# Patient Record
Sex: MALE | Race: WHITE | NOT HISPANIC OR LATINO | Employment: OTHER | URBAN - METROPOLITAN AREA
[De-identification: names, ages, dates, MRNs, and addresses within clinical notes are randomized per-mention and may not be internally consistent; named-entity substitution may affect disease eponyms.]

---

## 2019-10-28 ENCOUNTER — OFFICE VISIT (OUTPATIENT)
Dept: FAMILY MEDICINE | Facility: CLINIC | Age: 59
End: 2019-10-28
Payer: COMMERCIAL

## 2019-10-28 VITALS
TEMPERATURE: 98 F | SYSTOLIC BLOOD PRESSURE: 164 MMHG | HEART RATE: 76 BPM | BODY MASS INDEX: 40.43 KG/M2 | OXYGEN SATURATION: 97 % | DIASTOLIC BLOOD PRESSURE: 72 MMHG | HEIGHT: 74 IN | WEIGHT: 315 LBS

## 2019-10-28 DIAGNOSIS — I10 ESSENTIAL HYPERTENSION: ICD-10-CM

## 2019-10-28 DIAGNOSIS — Z11.59 ENCOUNTER FOR HEPATITIS C SCREENING TEST FOR LOW RISK PATIENT: ICD-10-CM

## 2019-10-28 DIAGNOSIS — I25.2 HX OF MYOCARDIAL INFARCTION: ICD-10-CM

## 2019-10-28 DIAGNOSIS — E11.9 TYPE 2 DIABETES MELLITUS WITHOUT COMPLICATION, WITHOUT LONG-TERM CURRENT USE OF INSULIN: Primary | ICD-10-CM

## 2019-10-28 DIAGNOSIS — Z23 IMMUNIZATION DUE: ICD-10-CM

## 2019-10-28 PROCEDURE — 3008F PR BODY MASS INDEX (BMI) DOCUMENTED: ICD-10-PCS | Mod: S$GLB,,, | Performed by: NURSE PRACTITIONER

## 2019-10-28 PROCEDURE — 99204 OFFICE O/P NEW MOD 45 MIN: CPT | Mod: 25,S$GLB,, | Performed by: NURSE PRACTITIONER

## 2019-10-28 PROCEDURE — 90471 IMMUNIZATION ADMIN: CPT | Mod: S$GLB,,, | Performed by: NURSE PRACTITIONER

## 2019-10-28 PROCEDURE — 3008F BODY MASS INDEX DOCD: CPT | Mod: S$GLB,,, | Performed by: NURSE PRACTITIONER

## 2019-10-28 PROCEDURE — 90686 FLU VACCINE (QUAD) GREATER THAN OR EQUAL TO 3YO PRESERVATIVE FREE IM: ICD-10-PCS | Mod: S$GLB,,, | Performed by: NURSE PRACTITIONER

## 2019-10-28 PROCEDURE — 90686 IIV4 VACC NO PRSV 0.5 ML IM: CPT | Mod: S$GLB,,, | Performed by: NURSE PRACTITIONER

## 2019-10-28 PROCEDURE — 90471 FLU VACCINE (QUAD) GREATER THAN OR EQUAL TO 3YO PRESERVATIVE FREE IM: ICD-10-PCS | Mod: S$GLB,,, | Performed by: NURSE PRACTITIONER

## 2019-10-28 PROCEDURE — 99204 PR OFFICE/OUTPT VISIT, NEW, LEVL IV, 45-59 MIN: ICD-10-PCS | Mod: 25,S$GLB,, | Performed by: NURSE PRACTITIONER

## 2019-10-28 RX ORDER — PRAVASTATIN SODIUM 40 MG/1
40 TABLET ORAL NIGHTLY
Qty: 90 TABLET | Refills: 1 | Status: SHIPPED | OUTPATIENT
Start: 2019-10-28 | End: 2020-04-02 | Stop reason: SDUPTHER

## 2019-10-28 RX ORDER — LISINOPRIL 40 MG/1
40 TABLET ORAL DAILY
COMMUNITY
End: 2019-10-28 | Stop reason: SDUPTHER

## 2019-10-28 RX ORDER — CLOPIDOGREL BISULFATE 75 MG/1
75 TABLET ORAL DAILY
COMMUNITY
End: 2019-10-28 | Stop reason: SDUPTHER

## 2019-10-28 RX ORDER — METFORMIN HYDROCHLORIDE 1000 MG/1
1000 TABLET ORAL 2 TIMES DAILY WITH MEALS
COMMUNITY
End: 2019-10-28 | Stop reason: SDUPTHER

## 2019-10-28 RX ORDER — FENOFIBRATE 160 MG/1
160 TABLET ORAL DAILY
COMMUNITY
End: 2019-10-28 | Stop reason: SDUPTHER

## 2019-10-28 RX ORDER — NEBIVOLOL 20 MG/1
1 TABLET ORAL DAILY
Qty: 90 TABLET | Refills: 1 | Status: SHIPPED | OUTPATIENT
Start: 2019-10-28 | End: 2020-04-02 | Stop reason: SDUPTHER

## 2019-10-28 RX ORDER — LISINOPRIL 40 MG/1
40 TABLET ORAL DAILY
Qty: 90 TABLET | Refills: 1 | Status: SHIPPED | OUTPATIENT
Start: 2019-10-28 | End: 2020-04-02 | Stop reason: SDUPTHER

## 2019-10-28 RX ORDER — PRAVASTATIN SODIUM 40 MG/1
40 TABLET ORAL DAILY
COMMUNITY
End: 2019-10-28 | Stop reason: SDUPTHER

## 2019-10-28 RX ORDER — FENOFIBRATE 160 MG/1
160 TABLET ORAL DAILY
Qty: 90 TABLET | Refills: 1 | Status: SHIPPED | OUTPATIENT
Start: 2019-10-28 | End: 2020-04-02 | Stop reason: SDUPTHER

## 2019-10-28 RX ORDER — CLOPIDOGREL BISULFATE 75 MG/1
75 TABLET ORAL DAILY
Qty: 90 TABLET | Refills: 1 | Status: SHIPPED | OUTPATIENT
Start: 2019-10-28 | End: 2020-04-02 | Stop reason: SDUPTHER

## 2019-10-28 RX ORDER — METFORMIN HYDROCHLORIDE 1000 MG/1
1000 TABLET ORAL 2 TIMES DAILY WITH MEALS
Qty: 180 TABLET | Refills: 1 | Status: SHIPPED | OUTPATIENT
Start: 2019-10-28 | End: 2020-04-02 | Stop reason: SDUPTHER

## 2019-10-28 RX ORDER — AMLODIPINE BESYLATE 10 MG/1
10 TABLET ORAL DAILY
COMMUNITY
End: 2019-10-28 | Stop reason: SDUPTHER

## 2019-10-28 RX ORDER — NEBIVOLOL 20 MG/1
TABLET ORAL
COMMUNITY
End: 2019-10-28 | Stop reason: SDUPTHER

## 2019-10-28 RX ORDER — AMLODIPINE BESYLATE 10 MG/1
10 TABLET ORAL DAILY
Qty: 90 TABLET | Refills: 1 | Status: SHIPPED | OUTPATIENT
Start: 2019-10-28 | End: 2020-04-02 | Stop reason: SDUPTHER

## 2019-10-28 NOTE — PROGRESS NOTES
SUBJECTIVE:      Patient ID: Donald Frey is a 59 y.o. male.    Chief Complaint: Establish Care (new patient)    New patient here to establish care- he recently moved from [place]. He has a PMH of DM type 2, HTN, MI with one stent in 2013, and HLD. He has been stable on his medications and reports his last A1C was 7.5. He has been running low on his BP medications and has been splitting them in half until this appointment. His BP is normally well-controlled on his regimen. He has not been seeing cardiology or any other specialists. He is overdue for a DM eye exam. He does not smoke and never has smoked. He denies any complaints today and needs all medications refilled.     HM: he has never had a screening colonoscopy (no complaints and no fam hx of colon cancer); wants a flu shot today     Diabetes   He has type 2 diabetes mellitus. His disease course has been stable. There are no hypoglycemic associated symptoms. Pertinent negatives for hypoglycemia include no dizziness, headaches or nervousness/anxiousness. Associated symptoms include foot paresthesias (occasional ). Pertinent negatives for diabetes include no blurred vision, no chest pain, no fatigue, no foot ulcerations, no polydipsia, no polyphagia, no polyuria, no visual change, no weakness and no weight loss. There are no hypoglycemic complications. There are no diabetic complications. Risk factors for coronary artery disease include male sex, obesity, hypertension, sedentary lifestyle, dyslipidemia and diabetes mellitus. Current diabetic treatment includes oral agent (monotherapy). He is compliant with treatment all of the time. There is no change in his home blood glucose trend. His breakfast blood glucose range is generally 130-140 mg/dl. An ACE inhibitor/angiotensin II receptor blocker is being taken. He does not see a podiatrist.Eye exam is not current.   Hypertension   This is a chronic problem. The current episode started more than 1 year ago. The  problem is unchanged. Pertinent negatives include no anxiety, blurred vision, chest pain, headaches, malaise/fatigue, palpitations, peripheral edema or shortness of breath. There are no associated agents to hypertension. Risk factors for coronary artery disease include male gender, obesity, dyslipidemia, diabetes mellitus and sedentary lifestyle. Past treatments include calcium channel blockers, ACE inhibitors and beta blockers. Compliance problems include diet and exercise.        Family History   Problem Relation Age of Onset    Heart attack Father     Cancer Father         prostate      Social History     Socioeconomic History    Marital status:      Spouse name: Not on file    Number of children: Not on file    Years of education: Not on file    Highest education level: Not on file   Occupational History    Not on file   Social Needs    Financial resource strain: Not on file    Food insecurity:     Worry: Not on file     Inability: Not on file    Transportation needs:     Medical: Not on file     Non-medical: Not on file   Tobacco Use    Smoking status: Never Smoker    Smokeless tobacco: Never Used   Substance and Sexual Activity    Alcohol use: Yes     Comment: occ    Drug use: Not on file    Sexual activity: Not on file   Lifestyle    Physical activity:     Days per week: Not on file     Minutes per session: Not on file    Stress: Not at all   Relationships    Social connections:     Talks on phone: Not on file     Gets together: Not on file     Attends Hoahaoism service: Not on file     Active member of club or organization: Not on file     Attends meetings of clubs or organizations: Not on file     Relationship status: Not on file   Other Topics Concern    Not on file   Social History Narrative    Not on file     Current Outpatient Medications   Medication Sig Dispense Refill    amLODIPine (NORVASC) 10 MG tablet Take 1 tablet (10 mg total) by mouth once daily. 90 tablet 1     clopidogrel (PLAVIX) 75 mg tablet Take 1 tablet (75 mg total) by mouth once daily. 90 tablet 1    fenofibrate 160 MG Tab Take 1 tablet (160 mg total) by mouth once daily. 90 tablet 1    lisinopril (PRINIVIL,ZESTRIL) 40 MG tablet Take 1 tablet (40 mg total) by mouth once daily. 90 tablet 1    metFORMIN (GLUCOPHAGE) 1000 MG tablet Take 1 tablet (1,000 mg total) by mouth 2 (two) times daily with meals. 180 tablet 1    nebivolol (BYSTOLIC) 20 mg Tab Take 1 tablet by mouth once daily. 90 tablet 1    pravastatin (PRAVACHOL) 40 MG tablet Take 1 tablet (40 mg total) by mouth every evening. 90 tablet 1     No current facility-administered medications for this visit.      Review of patient's allergies indicates:  No Known Allergies   Past Medical History:   Diagnosis Date    Diabetes mellitus, type 2     Myocardial infarction 2013     Past Surgical History:   Procedure Laterality Date    CORONARY STENT PLACEMENT  2013       Review of Systems   Constitutional: Negative for appetite change, chills, diaphoresis, fatigue, fever, malaise/fatigue, unexpected weight change and weight loss.   HENT: Negative for congestion, ear discharge, ear pain and sore throat.    Eyes: Negative for blurred vision, photophobia, pain and visual disturbance.   Respiratory: Negative for cough, shortness of breath and wheezing.    Cardiovascular: Negative for chest pain, palpitations and leg swelling.   Gastrointestinal: Negative for abdominal pain, blood in stool, constipation, diarrhea, nausea and vomiting.   Endocrine: Negative for cold intolerance, heat intolerance, polydipsia, polyphagia and polyuria.   Genitourinary: Negative for dysuria, frequency and hematuria.   Musculoskeletal: Negative for arthralgias and myalgias.   Skin: Negative for rash and wound.   Neurological: Negative for dizziness, syncope, weakness and headaches.   Hematological: Negative for adenopathy. Does not bruise/bleed easily.   Psychiatric/Behavioral: Negative for  "dysphoric mood. The patient is not nervous/anxious.       OBJECTIVE:      Vitals:    10/28/19 1017   BP: (!) 164/72   BP Location: Left arm   Patient Position: Sitting   BP Method: X-Large (Manual)   Pulse: 76   Temp: 98.4 °F (36.9 °C)   TempSrc: Oral   SpO2: 97%   Weight: (!) 153.5 kg (338 lb 6.4 oz)   Height: 6' 2" (1.88 m)     Physical Exam   Constitutional: He is oriented to person, place, and time. He appears well-developed and well-nourished. No distress.   Morbid obesity    HENT:   Head: Normocephalic and atraumatic.   Right Ear: Tympanic membrane, external ear and ear canal normal.   Left Ear: Tympanic membrane, external ear and ear canal normal.   Mouth/Throat: Oropharynx is clear and moist. No oropharyngeal exudate.   Eyes: Pupils are equal, round, and reactive to light. Conjunctivae are normal.   Neck: Normal range of motion. Neck supple. Carotid bruit is not present. No thyromegaly present.   Cardiovascular: Normal rate, regular rhythm and normal heart sounds. Exam reveals no gallop and no friction rub.   No murmur heard.  Pulmonary/Chest: Effort normal and breath sounds normal. He has no wheezes. He has no rales.   Abdominal: Soft. Bowel sounds are normal. He exhibits no distension. There is no tenderness.   Musculoskeletal: Normal range of motion. He exhibits no edema.   Lymphadenopathy:     He has no cervical adenopathy.   Neurological: He is alert and oriented to person, place, and time.   Skin: Skin is warm and dry. No rash noted. He is not diaphoretic.   Psychiatric: He has a normal mood and affect. His behavior is normal.   Nursing note and vitals reviewed.     Assessment:       1. Type 2 diabetes mellitus without complication, without long-term current use of insulin    2. Essential hypertension    3. Hx of myocardial infarction    4. Encounter for hepatitis C screening test for low risk patient    5. Immunization due        Plan:       Type 2 diabetes mellitus without complication, without " long-term current use of insulin  -     Comprehensive metabolic panel; Future; Expected date: 10/28/2019  -     Lipid panel; Future; Expected date: 10/28/2019  -     Urinalysis; Future; Expected date: 10/28/2019  -     Microalbumin/creatinine urine ratio; Future; Expected date: 10/28/2019  -     Hemoglobin A1c; Future; Expected date: 10/28/2019  -     fenofibrate 160 MG Tab; Take 1 tablet (160 mg total) by mouth once daily.  Dispense: 90 tablet; Refill: 1  -     metFORMIN (GLUCOPHAGE) 1000 MG tablet; Take 1 tablet (1,000 mg total) by mouth 2 (two) times daily with meals.  Dispense: 180 tablet; Refill: 1  -     pravastatin (PRAVACHOL) 40 MG tablet; Take 1 tablet (40 mg total) by mouth every evening.  Dispense: 90 tablet; Refill: 1  -     Ambulatory referral to Ophthalmology    *Risks of DM discussed. Types of treatment discussed. Encouraged exercise and diet. A1C Goal of < 7.0 reviewed. Stay away from sweets and high carb foods such as pasta, rice, bread, etc.  Will adjust/continue medication to achieve optimal glucose and cholesterol control.  Discussed need for annual eye exam, seasonal  flu vaccine seasonally, and routine inspection of feet.  Bring glucose diary to each visit.  *discussed will add januvia to regimen if needed after labs     Essential hypertension  -     CBC auto differential; Future; Expected date: 10/28/2019  -     Comprehensive metabolic panel; Future; Expected date: 10/28/2019  -     TSH; Future; Expected date: 10/28/2019  -     Urinalysis; Future; Expected date: 10/28/2019  -     amLODIPine (NORVASC) 10 MG tablet; Take 1 tablet (10 mg total) by mouth once daily.  Dispense: 90 tablet; Refill: 1  -     lisinopril (PRINIVIL,ZESTRIL) 40 MG tablet; Take 1 tablet (40 mg total) by mouth once daily.  Dispense: 90 tablet; Refill: 1  -     nebivolol (BYSTOLIC) 20 mg Tab; Take 1 tablet by mouth once daily.  Dispense: 90 tablet; Refill: 1  -     Ambulatory referral to Ophthalmology    *will start meds  again as prescribed and RTC in 2 weeks on nurse visit for BP recheck  *Lifestyle changes: Reduce the amount of salt in your diet; Lose weight; Avoid drinking too much alcohol; Exercise at least 30 minutes per day most days of the week.  Continue current medications and home BP monitoring.     Hx of myocardial infarction  -     clopidogrel (PLAVIX) 75 mg tablet; Take 1 tablet (75 mg total) by mouth once daily.  Dispense: 90 tablet; Refill: 1    Encounter for hepatitis C screening test for low risk patient  -     Hepatitis C antibody; Future; Expected date: 10/28/2019    Immunization due  -     Influenza - Quadrivalent (PF)      *discussed overdue health maintenance, he will contemplate colonoscopy vs [facility] for next visit     Follow up in about 3 months (around 1/28/2020) for diabetes, HTN.      10/28/2019 HONORIO Coello, FNP

## 2020-03-31 DIAGNOSIS — I25.2 HX OF MYOCARDIAL INFARCTION: ICD-10-CM

## 2020-03-31 DIAGNOSIS — I10 ESSENTIAL HYPERTENSION: ICD-10-CM

## 2020-03-31 DIAGNOSIS — E11.9 TYPE 2 DIABETES MELLITUS WITHOUT COMPLICATION, WITHOUT LONG-TERM CURRENT USE OF INSULIN: ICD-10-CM

## 2020-03-31 RX ORDER — LISINOPRIL 40 MG/1
TABLET ORAL
Qty: 90 TABLET | Refills: 3 | OUTPATIENT
Start: 2020-03-31

## 2020-03-31 RX ORDER — AMLODIPINE BESYLATE 10 MG/1
TABLET ORAL
Qty: 90 TABLET | Refills: 3 | OUTPATIENT
Start: 2020-03-31

## 2020-03-31 RX ORDER — FENOFIBRATE 160 MG/1
TABLET ORAL
Qty: 90 TABLET | Refills: 3 | OUTPATIENT
Start: 2020-03-31

## 2020-03-31 RX ORDER — METFORMIN HYDROCHLORIDE 1000 MG/1
TABLET ORAL
Qty: 180 TABLET | Refills: 3 | OUTPATIENT
Start: 2020-03-31

## 2020-03-31 RX ORDER — CLOPIDOGREL BISULFATE 75 MG/1
TABLET ORAL
Qty: 90 TABLET | Refills: 3 | OUTPATIENT
Start: 2020-03-31

## 2020-03-31 RX ORDER — NEBIVOLOL HYDROCHLORIDE 20 MG/1
TABLET ORAL
Qty: 90 TABLET | Refills: 3 | OUTPATIENT
Start: 2020-03-31

## 2020-03-31 RX ORDER — PRAVASTATIN SODIUM 40 MG/1
TABLET ORAL
Qty: 90 TABLET | Refills: 3 | OUTPATIENT
Start: 2020-03-31

## 2020-04-02 ENCOUNTER — OFFICE VISIT (OUTPATIENT)
Dept: FAMILY MEDICINE | Facility: CLINIC | Age: 60
End: 2020-04-02
Payer: COMMERCIAL

## 2020-04-02 DIAGNOSIS — I10 ESSENTIAL HYPERTENSION: ICD-10-CM

## 2020-04-02 DIAGNOSIS — Z11.59 ENCOUNTER FOR HEPATITIS C SCREENING TEST FOR LOW RISK PATIENT: ICD-10-CM

## 2020-04-02 DIAGNOSIS — E11.9 TYPE 2 DIABETES MELLITUS WITHOUT COMPLICATION, WITHOUT LONG-TERM CURRENT USE OF INSULIN: Primary | ICD-10-CM

## 2020-04-02 DIAGNOSIS — I25.2 HX OF MYOCARDIAL INFARCTION: ICD-10-CM

## 2020-04-02 PROCEDURE — 99213 OFFICE O/P EST LOW 20 MIN: CPT | Mod: 95,,, | Performed by: NURSE PRACTITIONER

## 2020-04-02 PROCEDURE — 99213 PR OFFICE/OUTPT VISIT, EST, LEVL III, 20-29 MIN: ICD-10-PCS | Mod: 95,,, | Performed by: NURSE PRACTITIONER

## 2020-04-02 RX ORDER — METFORMIN HYDROCHLORIDE 1000 MG/1
1000 TABLET ORAL 2 TIMES DAILY WITH MEALS
Qty: 180 TABLET | Refills: 0 | Status: SHIPPED | OUTPATIENT
Start: 2020-04-02 | End: 2020-10-21 | Stop reason: SDUPTHER

## 2020-04-02 RX ORDER — PRAVASTATIN SODIUM 40 MG/1
40 TABLET ORAL NIGHTLY
Qty: 90 TABLET | Refills: 0 | Status: SHIPPED | OUTPATIENT
Start: 2020-04-02 | End: 2020-07-15 | Stop reason: SDUPTHER

## 2020-04-02 RX ORDER — NEBIVOLOL 20 MG/1
1 TABLET ORAL DAILY
Qty: 90 TABLET | Refills: 0 | Status: SHIPPED | OUTPATIENT
Start: 2020-04-02 | End: 2020-07-15 | Stop reason: SDUPTHER

## 2020-04-02 RX ORDER — CLOPIDOGREL BISULFATE 75 MG/1
75 TABLET ORAL DAILY
Qty: 90 TABLET | Refills: 0 | Status: SHIPPED | OUTPATIENT
Start: 2020-04-02 | End: 2020-07-15 | Stop reason: SDUPTHER

## 2020-04-02 RX ORDER — AMLODIPINE BESYLATE 10 MG/1
10 TABLET ORAL DAILY
Qty: 90 TABLET | Refills: 0 | Status: SHIPPED | OUTPATIENT
Start: 2020-04-02 | End: 2020-07-15 | Stop reason: SDUPTHER

## 2020-04-02 RX ORDER — LISINOPRIL 40 MG/1
40 TABLET ORAL DAILY
Qty: 90 TABLET | Refills: 0 | Status: SHIPPED | OUTPATIENT
Start: 2020-04-02 | End: 2020-07-15 | Stop reason: SDUPTHER

## 2020-04-02 RX ORDER — FENOFIBRATE 160 MG/1
160 TABLET ORAL DAILY
Qty: 90 TABLET | Refills: 0 | Status: SHIPPED | OUTPATIENT
Start: 2020-04-02 | End: 2020-07-15

## 2020-04-02 NOTE — PROGRESS NOTES
"      Subjective:        The chief complaint leading to consultation is: med refills   The patient location is:  Home  Visit type: Virtual visit with synchronous audio/video or audio only  This was a phone conversation in lieu of in-person visit due to the coronavirus emergency. Patient acknowledged and agreed to the telephone encounter.     Established patient here via telemed visit for medication refills. He has been taking all of his medications as prescribed daily without SE or complaints. His BP is running around 135/80 at home per machine/cuff. He reports his BS are around 140 fasting daily. He has not completed any labs since his last visit- he states he has been too busy traveling. He is feeling "good" and has no complaints.       Past Surgical History:   Procedure Laterality Date    CORONARY STENT PLACEMENT  2013     Past Medical History:   Diagnosis Date    Diabetes mellitus, type 2     Myocardial infarction 2013     Family History   Problem Relation Age of Onset    Heart attack Father     Cancer Father         prostate        Social History:   Marital Status:   Alcohol History:  reports that he drinks alcohol.  Tobacco History:  reports that he has never smoked. He has never used smokeless tobacco.  Drug History:  has no drug history on file.    Review of patient's allergies indicates:  No Known Allergies    Current Outpatient Medications   Medication Sig Dispense Refill    amLODIPine (NORVASC) 10 MG tablet Take 1 tablet (10 mg total) by mouth once daily. 90 tablet 0    clopidogreL (PLAVIX) 75 mg tablet Take 1 tablet (75 mg total) by mouth once daily. 90 tablet 0    fenofibrate 160 MG Tab Take 1 tablet (160 mg total) by mouth once daily. 90 tablet 0    lisinopriL (PRINIVIL,ZESTRIL) 40 MG tablet Take 1 tablet (40 mg total) by mouth once daily. 90 tablet 0    metFORMIN (GLUCOPHAGE) 1000 MG tablet Take 1 tablet (1,000 mg total) by mouth 2 (two) times daily with meals. 180 tablet 0    " nebivoloL (BYSTOLIC) 20 mg Tab Take 1 tablet by mouth once daily. 90 tablet 0    pravastatin (PRAVACHOL) 40 MG tablet Take 1 tablet (40 mg total) by mouth every evening. 90 tablet 0     No current facility-administered medications for this visit.        Review of Systems   Constitutional: Negative for appetite change, chills, diaphoresis, fatigue, fever and unexpected weight change.   HENT: Negative for congestion, ear discharge, ear pain and sore throat.    Eyes: Negative for photophobia, pain and visual disturbance.   Respiratory: Negative for cough, shortness of breath and wheezing.    Cardiovascular: Negative for chest pain, palpitations and leg swelling.   Gastrointestinal: Negative for abdominal pain, constipation, diarrhea, nausea and vomiting.   Endocrine: Negative for cold intolerance, heat intolerance, polydipsia, polyphagia and polyuria.   Genitourinary: Negative for dysuria, frequency and hematuria.   Musculoskeletal: Negative for arthralgias and myalgias.   Skin: Negative for rash and wound.   Neurological: Negative for dizziness, syncope, weakness and headaches.   Hematological: Negative for adenopathy. Does not bruise/bleed easily.   Psychiatric/Behavioral: Negative for dysphoric mood. The patient is not nervous/anxious.          Objective:        Physical Exam:   Physical Exam   Constitutional: He is oriented to person, place, and time. He appears well-developed. No distress.   Morbid obesity    HENT:   Head: Normocephalic.   Eyes: Pupils are equal, round, and reactive to light.   Neck: Normal range of motion.   Pulmonary/Chest: Effort normal.   Neurological: He is alert and oriented to person, place, and time.   Skin: He is not diaphoretic. No pallor.   Psychiatric: He has a normal mood and affect. His behavior is normal. Judgment and thought content normal.            Assessment:       1. Type 2 diabetes mellitus without complication, without long-term current use of insulin    2. Essential  hypertension    3. Hx of myocardial infarction    4. Encounter for hepatitis C screening test for low risk patient      Plan:   Type 2 diabetes mellitus without complication, without long-term current use of insulin  -     fenofibrate 160 MG Tab; Take 1 tablet (160 mg total) by mouth once daily.  Dispense: 90 tablet; Refill: 0  -     metFORMIN (GLUCOPHAGE) 1000 MG tablet; Take 1 tablet (1,000 mg total) by mouth 2 (two) times daily with meals.  Dispense: 180 tablet; Refill: 0  -     pravastatin (PRAVACHOL) 40 MG tablet; Take 1 tablet (40 mg total) by mouth every evening.  Dispense: 90 tablet; Refill: 0  -     Comprehensive metabolic panel; Future; Expected date: 04/02/2020  -     Lipid panel; Future; Expected date: 04/02/2020  -     Urinalysis; Future; Expected date: 04/02/2020  -     Microalbumin/creatinine urine ratio; Future; Expected date: 04/02/2020  -     Hemoglobin A1c; Future; Expected date: 04/02/2020    Essential hypertension  -     amLODIPine (NORVASC) 10 MG tablet; Take 1 tablet (10 mg total) by mouth once daily.  Dispense: 90 tablet; Refill: 0  -     lisinopriL (PRINIVIL,ZESTRIL) 40 MG tablet; Take 1 tablet (40 mg total) by mouth once daily.  Dispense: 90 tablet; Refill: 0  -     nebivoloL (BYSTOLIC) 20 mg Tab; Take 1 tablet by mouth once daily.  Dispense: 90 tablet; Refill: 0  -     CBC auto differential; Future; Expected date: 04/02/2020  -     Comprehensive metabolic panel; Future; Expected date: 04/02/2020  -     Lipid panel; Future; Expected date: 04/02/2020  -     TSH; Future; Expected date: 04/02/2020  -     Urinalysis; Future; Expected date: 04/02/2020    Hx of myocardial infarction  -     clopidogreL (PLAVIX) 75 mg tablet; Take 1 tablet (75 mg total) by mouth once daily.  Dispense: 90 tablet; Refill: 0    Encounter for hepatitis C screening test for low risk patient  -     Hepatitis C Antibody; Future; Expected date: 04/02/2020    *pt understands he must complete labs to continue getting his  medications- we cannot prescribe them safely without basic assessment of renal function, hepatic function and evaluation of DM with A1C; he will go for fasting labs and f/u in 3 mo     Follow up in about 3 months (around 7/2/2020) for diabetes, HTN.    Total time spent with patient: 10 minutes     Each patient to whom he or she provides medical services by telemedicine is:  (1) informed of the relationship between the physician and patient and the respective role of any other health care provider with respect to management of the patient; and (2) notified that he or she may decline to receive medical services by telemedicine and may withdraw from such care at any time.    This note was created using [employer] voice recognition software that occasionally misinterprets phrases or words.

## 2020-04-06 ENCOUNTER — TELEPHONE (OUTPATIENT)
Dept: FAMILY MEDICINE | Facility: CLINIC | Age: 60
End: 2020-04-06

## 2020-06-30 ENCOUNTER — LAB VISIT (OUTPATIENT)
Dept: LAB | Facility: HOSPITAL | Age: 60
End: 2020-06-30
Attending: NURSE PRACTITIONER
Payer: COMMERCIAL

## 2020-06-30 DIAGNOSIS — Z11.59 ENCOUNTER FOR HEPATITIS C SCREENING TEST FOR LOW RISK PATIENT: ICD-10-CM

## 2020-06-30 DIAGNOSIS — I10 ESSENTIAL HYPERTENSION: ICD-10-CM

## 2020-06-30 DIAGNOSIS — E11.9 TYPE 2 DIABETES MELLITUS WITHOUT COMPLICATION, WITHOUT LONG-TERM CURRENT USE OF INSULIN: ICD-10-CM

## 2020-06-30 LAB
ALBUMIN SERPL BCP-MCNC: 4.4 G/DL (ref 3.5–5.2)
ALBUMIN/CREAT UR: 18.2 UG/MG (ref 0–30)
ALP SERPL-CCNC: 37 U/L (ref 55–135)
ALT SERPL W/O P-5'-P-CCNC: 38 U/L (ref 10–44)
ANION GAP SERPL CALC-SCNC: 14 MMOL/L (ref 8–16)
AST SERPL-CCNC: 28 U/L (ref 10–40)
BACTERIA #/AREA URNS HPF: NEGATIVE /HPF
BASOPHILS # BLD AUTO: 0.04 K/UL (ref 0–0.2)
BASOPHILS NFR BLD: 0.6 % (ref 0–1.9)
BILIRUB SERPL-MCNC: 1.1 MG/DL (ref 0.1–1)
BILIRUB UR QL STRIP: ABNORMAL
BUN SERPL-MCNC: 20 MG/DL (ref 8–23)
CALCIUM SERPL-MCNC: 9.2 MG/DL (ref 8.7–10.5)
CHLORIDE SERPL-SCNC: 101 MMOL/L (ref 95–110)
CHOLEST SERPL-MCNC: 214 MG/DL (ref 120–199)
CHOLEST/HDLC SERPL: 6.3 {RATIO} (ref 2–5)
CLARITY UR: CLEAR
CO2 SERPL-SCNC: 23 MMOL/L (ref 23–29)
COLOR UR: YELLOW
CREAT SERPL-MCNC: 1.2 MG/DL (ref 0.5–1.4)
CREAT UR-MCNC: 518 MG/DL (ref 23–375)
DIFFERENTIAL METHOD: NORMAL
EOSINOPHIL # BLD AUTO: 0.1 K/UL (ref 0–0.5)
EOSINOPHIL NFR BLD: 1.7 % (ref 0–8)
ERYTHROCYTE [DISTWIDTH] IN BLOOD BY AUTOMATED COUNT: 13.2 % (ref 11.5–14.5)
EST. GFR  (AFRICAN AMERICAN): >60 ML/MIN/1.73 M^2
EST. GFR  (NON AFRICAN AMERICAN): >60 ML/MIN/1.73 M^2
GLUCOSE SERPL-MCNC: 238 MG/DL (ref 70–110)
GLUCOSE UR QL STRIP: ABNORMAL
HCT VFR BLD AUTO: 46.9 % (ref 40–54)
HDLC SERPL-MCNC: 34 MG/DL (ref 40–75)
HDLC SERPL: 15.9 % (ref 20–50)
HGB BLD-MCNC: 15.5 G/DL (ref 14–18)
HGB UR QL STRIP: NEGATIVE
HYALINE CASTS #/AREA URNS LPF: 54 /LPF
IMM GRANULOCYTES # BLD AUTO: 0.02 K/UL (ref 0–0.04)
IMM GRANULOCYTES NFR BLD AUTO: 0.3 % (ref 0–0.5)
KETONES UR QL STRIP: ABNORMAL
LDLC SERPL CALC-MCNC: 127.6 MG/DL (ref 63–159)
LEUKOCYTE ESTERASE UR QL STRIP: NEGATIVE
LYMPHOCYTES # BLD AUTO: 2.1 K/UL (ref 1–4.8)
LYMPHOCYTES NFR BLD: 30.5 % (ref 18–48)
MCH RBC QN AUTO: 27.2 PG (ref 27–31)
MCHC RBC AUTO-ENTMCNC: 33 G/DL (ref 32–36)
MCV RBC AUTO: 82 FL (ref 82–98)
MICROALBUMIN UR DL<=1MG/L-MCNC: 94.4 UG/ML
MICROSCOPIC COMMENT: ABNORMAL
MONOCYTES # BLD AUTO: 0.6 K/UL (ref 0.3–1)
MONOCYTES NFR BLD: 9.2 % (ref 4–15)
NEUTROPHILS # BLD AUTO: 4 K/UL (ref 1.8–7.7)
NEUTROPHILS NFR BLD: 57.7 % (ref 38–73)
NITRITE UR QL STRIP: NEGATIVE
NONHDLC SERPL-MCNC: 180 MG/DL
NRBC BLD-RTO: 0 /100 WBC
PH UR STRIP: 6 [PH] (ref 5–8)
PLATELET # BLD AUTO: 231 K/UL (ref 150–350)
PMV BLD AUTO: 11 FL (ref 9.2–12.9)
POTASSIUM SERPL-SCNC: 4.1 MMOL/L (ref 3.5–5.1)
PROT SERPL-MCNC: 7.6 G/DL (ref 6–8.4)
PROT UR QL STRIP: ABNORMAL
RBC # BLD AUTO: 5.69 M/UL (ref 4.6–6.2)
RBC #/AREA URNS HPF: 1 /HPF (ref 0–4)
SODIUM SERPL-SCNC: 138 MMOL/L (ref 136–145)
SP GR UR STRIP: >1.03 (ref 1–1.03)
SQUAMOUS #/AREA URNS HPF: 6 /HPF
TRIGL SERPL-MCNC: 262 MG/DL (ref 30–150)
TSH SERPL DL<=0.005 MIU/L-ACNC: 3.04 UIU/ML (ref 0.34–5.6)
URN SPEC COLLECT METH UR: ABNORMAL
UROBILINOGEN UR STRIP-ACNC: ABNORMAL EU/DL
WBC # BLD AUTO: 6.92 K/UL (ref 3.9–12.7)
WBC #/AREA URNS HPF: 4 /HPF (ref 0–5)

## 2020-06-30 PROCEDURE — 80061 LIPID PANEL: CPT

## 2020-06-30 PROCEDURE — 36415 COLL VENOUS BLD VENIPUNCTURE: CPT

## 2020-06-30 PROCEDURE — 85025 COMPLETE CBC W/AUTO DIFF WBC: CPT

## 2020-06-30 PROCEDURE — 80053 COMPREHEN METABOLIC PANEL: CPT

## 2020-06-30 PROCEDURE — 86803 HEPATITIS C AB TEST: CPT

## 2020-06-30 PROCEDURE — 84443 ASSAY THYROID STIM HORMONE: CPT

## 2020-06-30 PROCEDURE — 81001 URINALYSIS AUTO W/SCOPE: CPT

## 2020-06-30 PROCEDURE — 83036 HEMOGLOBIN GLYCOSYLATED A1C: CPT

## 2020-06-30 PROCEDURE — 82043 UR ALBUMIN QUANTITATIVE: CPT

## 2020-07-01 LAB
ESTIMATED AVG GLUCOSE: 237 MG/DL (ref 68–131)
HBA1C MFR BLD HPLC: 9.9 % (ref 4.5–6.2)
HCV AB S/CO SERPL IA: <0.1 S/CO RATIO (ref 0–0.9)

## 2020-07-15 ENCOUNTER — OFFICE VISIT (OUTPATIENT)
Dept: FAMILY MEDICINE | Facility: CLINIC | Age: 60
End: 2020-07-15
Payer: COMMERCIAL

## 2020-07-15 VITALS
HEART RATE: 91 BPM | TEMPERATURE: 99 F | OXYGEN SATURATION: 99 % | WEIGHT: 315 LBS | SYSTOLIC BLOOD PRESSURE: 146 MMHG | HEIGHT: 74 IN | DIASTOLIC BLOOD PRESSURE: 82 MMHG | BODY MASS INDEX: 40.43 KG/M2

## 2020-07-15 DIAGNOSIS — Z23 IMMUNIZATION DUE: ICD-10-CM

## 2020-07-15 DIAGNOSIS — Z12.5 SCREENING FOR PROSTATE CANCER: ICD-10-CM

## 2020-07-15 DIAGNOSIS — I25.2 HX OF MYOCARDIAL INFARCTION: ICD-10-CM

## 2020-07-15 DIAGNOSIS — E11.65 TYPE 2 DIABETES MELLITUS WITH HYPERGLYCEMIA, WITHOUT LONG-TERM CURRENT USE OF INSULIN: Primary | ICD-10-CM

## 2020-07-15 DIAGNOSIS — I10 ESSENTIAL HYPERTENSION: ICD-10-CM

## 2020-07-15 PROCEDURE — 90471 PNEUMOCOCCAL POLYSACCHARIDE VACCINE 23-VALENT =>2YO SQ IM: ICD-10-PCS | Mod: S$GLB,,, | Performed by: NURSE PRACTITIONER

## 2020-07-15 PROCEDURE — 90732 PNEUMOCOCCAL POLYSACCHARIDE VACCINE 23-VALENT =>2YO SQ IM: ICD-10-PCS | Mod: S$GLB,,, | Performed by: NURSE PRACTITIONER

## 2020-07-15 PROCEDURE — 3046F PR MOST RECENT HEMOGLOBIN A1C LEVEL > 9.0%: ICD-10-PCS | Mod: S$GLB,,, | Performed by: NURSE PRACTITIONER

## 2020-07-15 PROCEDURE — 3008F PR BODY MASS INDEX (BMI) DOCUMENTED: ICD-10-PCS | Mod: S$GLB,,, | Performed by: NURSE PRACTITIONER

## 2020-07-15 PROCEDURE — 3077F SYST BP >= 140 MM HG: CPT | Mod: S$GLB,,, | Performed by: NURSE PRACTITIONER

## 2020-07-15 PROCEDURE — 3079F PR MOST RECENT DIASTOLIC BLOOD PRESSURE 80-89 MM HG: ICD-10-PCS | Mod: S$GLB,,, | Performed by: NURSE PRACTITIONER

## 2020-07-15 PROCEDURE — 3079F DIAST BP 80-89 MM HG: CPT | Mod: S$GLB,,, | Performed by: NURSE PRACTITIONER

## 2020-07-15 PROCEDURE — 3046F HEMOGLOBIN A1C LEVEL >9.0%: CPT | Mod: S$GLB,,, | Performed by: NURSE PRACTITIONER

## 2020-07-15 PROCEDURE — 90471 IMMUNIZATION ADMIN: CPT | Mod: S$GLB,,, | Performed by: NURSE PRACTITIONER

## 2020-07-15 PROCEDURE — 90732 PPSV23 VACC 2 YRS+ SUBQ/IM: CPT | Mod: S$GLB,,, | Performed by: NURSE PRACTITIONER

## 2020-07-15 PROCEDURE — 3008F BODY MASS INDEX DOCD: CPT | Mod: S$GLB,,, | Performed by: NURSE PRACTITIONER

## 2020-07-15 PROCEDURE — 99214 OFFICE O/P EST MOD 30 MIN: CPT | Mod: 25,S$GLB,, | Performed by: NURSE PRACTITIONER

## 2020-07-15 PROCEDURE — 3077F PR MOST RECENT SYSTOLIC BLOOD PRESSURE >= 140 MM HG: ICD-10-PCS | Mod: S$GLB,,, | Performed by: NURSE PRACTITIONER

## 2020-07-15 PROCEDURE — 99214 PR OFFICE/OUTPT VISIT, EST, LEVL IV, 30-39 MIN: ICD-10-PCS | Mod: 25,S$GLB,, | Performed by: NURSE PRACTITIONER

## 2020-07-15 RX ORDER — PRAVASTATIN SODIUM 40 MG/1
40 TABLET ORAL NIGHTLY
Qty: 90 TABLET | Refills: 0 | Status: SHIPPED | OUTPATIENT
Start: 2020-07-15 | End: 2020-10-21

## 2020-07-15 RX ORDER — NEBIVOLOL HYDROCHLORIDE 20 MG/1
1 TABLET ORAL DAILY
Qty: 90 TABLET | Refills: 0 | Status: SHIPPED | OUTPATIENT
Start: 2020-07-15 | End: 2020-10-21 | Stop reason: SDUPTHER

## 2020-07-15 RX ORDER — AMLODIPINE BESYLATE 10 MG/1
10 TABLET ORAL DAILY
Qty: 90 TABLET | Refills: 0 | Status: SHIPPED | OUTPATIENT
Start: 2020-07-15 | End: 2020-10-21

## 2020-07-15 RX ORDER — CLOPIDOGREL BISULFATE 75 MG/1
75 TABLET ORAL DAILY
Qty: 90 TABLET | Refills: 1 | Status: SHIPPED | OUTPATIENT
Start: 2020-07-15 | End: 2020-10-21 | Stop reason: SDUPTHER

## 2020-07-15 RX ORDER — LISINOPRIL 40 MG/1
40 TABLET ORAL DAILY
Qty: 90 TABLET | Refills: 0 | Status: SHIPPED | OUTPATIENT
Start: 2020-07-15 | End: 2020-10-21 | Stop reason: SDUPTHER

## 2020-07-15 NOTE — PROGRESS NOTES
SUBJECTIVE:      Patient ID: Donald Frey is a 60 y.o. male.    Chief Complaint: Hypertension (3 mo f/u) and Diabetes    Established patient here for follow-up on diabetes, hypertension, and hyperlipidemia.  He has been taking his medications as prescribed daily without side effects or complaints.  His lab work was done recently and is available for review today.  He is aware his A1c is high and he has began making changes to his diet such as no sugar to help with his blood sugar control.  He was recently told by his insurance the fenofibrate will not be covered and would like this taken off his list.    Lab Visit on 06/30/2020  WBC                                           Date: 06/30/2020  Value: 6.92        Ref range: 3.90 - 12.70 K/uL  Status: Final  RBC                                           Date: 06/30/2020  Value: 5.69        Ref range: 4.60 - 6.20 M/uL   Status: Final  Hemoglobin                                    Date: 06/30/2020  Value: 15.5        Ref range: 14.0 - 18.0 g/dL   Status: Final  Hematocrit                                    Date: 06/30/2020  Value: 46.9        Ref range: 40.0 - 54.0 %      Status: Final  Mean Corpuscular Volume                       Date: 06/30/2020  Value: 82          Ref range: 82 - 98 fL         Status: Final  Mean Corpuscular Hemoglobin                   Date: 06/30/2020  Value: 27.2        Ref range: 27.0 - 31.0 pg     Status: Final  Mean Corpuscular Hemoglobin Conc              Date: 06/30/2020  Value: 33.0        Ref range: 32.0 - 36.0 g/dL   Status: Final  RDW                                           Date: 06/30/2020  Value: 13.2        Ref range: 11.5 - 14.5 %      Status: Final  Platelets                                     Date: 06/30/2020  Value: 231         Ref range: 150 - 350 K/uL     Status: Final  MPV                                           Date: 06/30/2020  Value: 11.0        Ref range: 9.2 - 12.9 fL      Status: Final  Immature Granulocytes                          Date: 06/30/2020  Value: 0.3         Ref range: 0.0 - 0.5 %        Status: Final  Gran # (ANC)                                  Date: 06/30/2020  Value: 4.0         Ref range: 1.8 - 7.7 K/uL     Status: Final  Immature Grans (Abs)                          Date: 06/30/2020  Value: 0.02        Ref range: 0.00 - 0.04 K/uL   Status: Final                Comment: Mild elevation in immature granulocytes is non specific and   can be seen in a variety of conditions including stress response,   acute inflammation, trauma and pregnancy. Correlation with other   laboratory and clinical findings is essential.    Lymph #                                       Date: 06/30/2020  Value: 2.1         Ref range: 1.0 - 4.8 K/uL     Status: Final  Mono #                                        Date: 06/30/2020  Value: 0.6         Ref range: 0.3 - 1.0 K/uL     Status: Final  Eos #                                         Date: 06/30/2020  Value: 0.1         Ref range: 0.0 - 0.5 K/uL     Status: Final  Baso #                                        Date: 06/30/2020  Value: 0.04        Ref range: 0.00 - 0.20 K/uL   Status: Final  nRBC                                          Date: 06/30/2020  Value: 0           Ref range: 0 /100 WBC         Status: Final  Gran%                                         Date: 06/30/2020  Value: 57.7        Ref range: 38.0 - 73.0 %      Status: Final  Lymph%                                        Date: 06/30/2020  Value: 30.5        Ref range: 18.0 - 48.0 %      Status: Final  Mono%                                         Date: 06/30/2020  Value: 9.2         Ref range: 4.0 - 15.0 %       Status: Final  Eosinophil%                                   Date: 06/30/2020  Value: 1.7         Ref range: 0.0 - 8.0 %        Status: Final  Basophil%                                     Date: 06/30/2020  Value: 0.6         Ref range: 0.0 - 1.9 %        Status: Final  Differential Method                            Date: 06/30/2020  Value: Automated     Status: Final  Sodium                                        Date: 06/30/2020  Value: 138         Ref range: 136 - 145 mmol/L   Status: Final  Potassium                                     Date: 06/30/2020  Value: 4.1         Ref range: 3.5 - 5.1 mmol/L   Status: Final  Chloride                                      Date: 06/30/2020  Value: 101         Ref range: 95 - 110 mmol/L    Status: Final  CO2                                           Date: 06/30/2020  Value: 23          Ref range: 23 - 29 mmol/L     Status: Final  Glucose                                       Date: 06/30/2020  Value: 238*        Ref range: 70 - 110 mg/dL     Status: Final  BUN, Bld                                      Date: 06/30/2020  Value: 20          Ref range: 8 - 23 mg/dL       Status: Final  Creatinine                                    Date: 06/30/2020  Value: 1.2         Ref range: 0.5 - 1.4 mg/dL    Status: Final  Calcium                                       Date: 06/30/2020  Value: 9.2         Ref range: 8.7 - 10.5 mg/dL   Status: Final  Total Protein                                 Date: 06/30/2020  Value: 7.6         Ref range: 6.0 - 8.4 g/dL     Status: Final  Albumin                                       Date: 06/30/2020  Value: 4.4         Ref range: 3.5 - 5.2 g/dL     Status: Final  Total Bilirubin                               Date: 06/30/2020  Value: 1.1*        Ref range: 0.1 - 1.0 mg/dL    Status: Final                Comment: For infants and newborns, interpretation of results should be based  on gestational age, weight and in agreement with clinical  observations.  Premature Infant recommended reference ranges:  Up to 24 hours.............<8.0 mg/dL  Up to 48 hours............<12.0 mg/dL  3-5 days..................<15.0 mg/dL  6-29 days.................<15.0 mg/dL    Alkaline Phosphatase                          Date: 06/30/2020  Value: 37*         Ref range: 55 - 135 U/L        Status: Final  AST                                           Date: 06/30/2020  Value: 28          Ref range: 10 - 40 U/L        Status: Final  ALT                                           Date: 06/30/2020  Value: 38          Ref range: 10 - 44 U/L        Status: Final  Anion Gap                                     Date: 06/30/2020  Value: 14          Ref range: 8 - 16 mmol/L      Status: Final  eGFR if                       Date: 06/30/2020  Value: >60.0       Ref range: >60 mL/min/1.73 *  Status: Final  eGFR if non                   Date: 06/30/2020  Value: >60.0       Ref range: >60 mL/min/1.73 *  Status: Final                Comment: Calculation used to obtain the estimated glomerular filtration  rate (eGFR) is the CKD-EPI equation.     Cholesterol                                   Date: 06/30/2020  Value: 214*        Ref range: 120 - 199 mg/dL    Status: Final                Comment: The National Cholesterol Education Program (NCEP) has set the  following guidelines (reference ranges) for Cholesterol:  Optimal.....................<200 mg/dL  Borderline High.............200-239 mg/dL  High........................> or = 240 mg/dL    Triglycerides                                 Date: 06/30/2020  Value: 262*        Ref range: 30 - 150 mg/dL     Status: Final                Comment: The National Cholesterol Education Program (NCEP) has set the  following guidelines (reference values) for triglycerides:  Normal......................<150 mg/dL  Borderline High.............150-199 mg/dL  High........................200-499 mg/dL    HDL                                           Date: 06/30/2020  Value: 34*         Ref range: 40 - 75 mg/dL      Status: Final                Comment: The National Cholesterol Education Program (NCEP) has set the  following guidelines (reference values) for HDL Cholesterol:  Low...............<40 mg/dL  Optimal...........>60 mg/dL    LDL Cholesterol                                Date: 06/30/2020  Value: 127.6       Ref range: 63.0 - 159.0 mg/*  Status: Final                Comment: The National Cholesterol Education Program (NCEP) has set the  following guidelines (reference values) for LDL Cholesterol:  Optimal.......................<130 mg/dL  Borderline High...............130-159 mg/dL  High..........................160-189 mg/dL  Very High.....................>190 mg/dL    Hdl/Cholesterol Ratio                         Date: 06/30/2020  Value: 15.9*       Ref range: 20.0 - 50.0 %      Status: Final  Total Cholesterol/HDL Ratio                   Date: 06/30/2020  Value: 6.3*        Ref range: 2.0 - 5.0          Status: Final  Non-HDL Cholesterol                           Date: 06/30/2020  Value: 180         Ref range: mg/dL              Status: Final                Comment: Risk category and Non-HDL cholesterol goals:  Coronary heart disease (CHD)or equivalent (10-year risk of CHD >20%):  Non-HDL cholesterol goal     <130 mg/dL  Two or more CHD risk factors and 10-year risk of CHD <= 20%:  Non-HDL cholesterol goal     <160 mg/dL  0 to 1 CHD risk factor:  Non-HDL cholesterol goal     <190 mg/dL    TSH                                           Date: 06/30/2020  Value: 3.040       Ref range: 0.340 - 5.600 uI*  Status: Final  Specimen UA                                   Date: 06/30/2020  Value: Urine, Clean Catch                       Status: Final  Color, UA                                     Date: 06/30/2020  Value: Yellow      Ref range: Yellow, Straw, A*  Status: Final  Appearance, UA                                Date: 06/30/2020  Value: Clear       Ref range: Clear              Status: Final  pH, UA                                        Date: 06/30/2020  Value: 6.0         Ref range: 5.0 - 8.0          Status: Final  Specific [place], UA                          Date: 06/30/2020  Value: >1.030*     Ref range: 1.005 - 1.030      Status: Final  Protein, UA                                    Date: 06/30/2020  Value: 1+*         Ref range: Negative           Status: Final                Comment: Recommend a 24 hour urine protein or a urine   protein/creatinine ratio if globulin induced proteinuria is  clinically suspected.    Glucose, UA                                   Date: 06/30/2020  Value: Trace*      Ref range: Negative           Status: Final  Ketones, UA                                   Date: 06/30/2020  Value: Trace*      Ref range: Negative           Status: Final  Bilirubin (UA)                                Date: 06/30/2020  Value: 1+*         Ref range: Negative           Status: Final                Comment: Positive urine bilirubin is not confirmed. Correlate with   serum bilirubin and clinical presentation.    Occult Blood UA                               Date: 06/30/2020  Value: Negative    Ref range: Negative           Status: Final  Nitrite, UA                                   Date: 06/30/2020  Value: Negative    Ref range: Negative           Status: Final  Urobilinogen, UA                              Date: 06/30/2020  Value: 2.0-3.0*    Ref range: Negative EU/dL     Status: Final  Leukocytes, UA                                Date: 06/30/2020  Value: Negative    Ref range: Negative           Status: Final  Microalbum.,U,Random                          Date: 06/30/2020  Value: 94.4        Ref range: ug/mL              Status: Final  Creatinine, Random Ur                         Date: 06/30/2020  Value: 518.0*      Ref range: 23.0 - 375.0 mg/*  Status: Final                Comment: The random urine reference ranges provided were established   for 24 hour urine collections.  No reference ranges exist for  random urine specimens.  Correlate clinically.    Microalb Creat Ratio                          Date: 06/30/2020  Value: 18.2        Ref range: 0.0 - 30.0 ug/mg   Status: Final  Hemoglobin A1C                                Date: 06/30/2020  Value: 9.9*         Ref range: 4.5 - 6.2 %        Status: Final                Comment: According to ADA guidelines, hemoglobin A1C <7.0% represents  optimal control in non-pregnant diabetic patients.  Different  metrics may apply to specific populations.   Standards of Medical Care in Diabetes - 2016.  For the purpose of screening for the presence of diabetes:  <5.7%     Consistent with the absence of diabetes  5.7-6.4%  Consistent with increasing risk for diabetes   (prediabetes)  >or=6.5%  Consistent with diabetes  Currently no consensus exists for use of hemoglobin A1C  for diagnosis of diabetes for children.    Estimated Avg Glucose                         Date: 06/30/2020  Value: 237*        Ref range: 68 - 131 mg/dL     Status: Final  Hepatitis C Ab                                Date: 06/30/2020  Value: <0.1        Ref range: 0.0 - 0.9 s/co r*  Status: Final                Comment: Negative:     < 0.8  Indeterminate: 0.8 - 0.9  Positive:     > 0.9  The CDC recommends that a positive HCV antibody result  be followed up with a HCV Nucleic Acid Amplification  test ([id number]).  Performed at:  [facility]  [id number]  : Jax Montes De Oca MD, Phone:  [phone]    RBC, UA                                       Date: 06/30/2020  Value: 1           Ref range: 0 - 4 /hpf         Status: Final  WBC, UA                                       Date: 06/30/2020  Value: 4           Ref range: 0 - 5 /hpf         Status: Final  Bacteria                                      Date: 06/30/2020  Value: Negative    Ref range: None-Occ /hpf      Status: Final  Squam Epithel, UA                             Date: 06/30/2020  Value: 6           Ref range: /hpf               Status: Final  Hyaline Casts, UA                             Date: 06/30/2020  Value: 54*         Ref range: 0-1/lpf /lpf       Status: Final  Microscopic Comment                           Date: 06/30/2020  Value: SEE COMMENT    Status: Final                Comment: Other formed elements not mentioned in the report are not   present in the microscopic examination.     ------------)    Diabetes  He presents for his follow-up diabetic visit. He has type 2 diabetes mellitus. His disease course has been worsening. There are no hypoglycemic associated symptoms. Pertinent negatives for hypoglycemia include no dizziness, headaches or nervousness/anxiousness. Associated symptoms include foot paresthesias (occasional ). Pertinent negatives for diabetes include no blurred vision, no chest pain, no fatigue, no foot ulcerations, no polydipsia, no polyphagia, no polyuria, no visual change, no weakness and no weight loss. There are no hypoglycemic complications. There are no diabetic complications. Risk factors for coronary artery disease include male sex, obesity, hypertension, sedentary lifestyle, dyslipidemia and diabetes mellitus. Current diabetic treatment includes oral agent (monotherapy). He is compliant with treatment all of the time. His weight is stable. He is following a generally healthy diet. There is no change in his home blood glucose trend. His breakfast blood glucose range is generally >200 mg/dl. An ACE inhibitor/angiotensin II receptor blocker is being taken. He does not see a podiatrist.Eye exam is not current.   Hypertension  This is a chronic problem. The current episode started more than 1 year ago. The problem is unchanged. The problem is uncontrolled (normal at home- slightly elevated today ). Pertinent negatives include no anxiety, blurred vision, chest pain, headaches, malaise/fatigue, palpitations, peripheral edema or shortness of breath. There are no associated agents to hypertension. Risk factors for coronary artery disease include male gender, obesity, dyslipidemia, diabetes mellitus and sedentary lifestyle. Past treatments include calcium channel blockers, ACE inhibitors and beta blockers. The current treatment provides  moderate improvement. Compliance problems include diet and exercise.  There is no history of chronic renal disease, sleep apnea or a thyroid problem.   Hyperlipidemia  This is a chronic problem. The current episode started more than 1 year ago. The problem is uncontrolled. Recent lipid tests were reviewed and are high. Exacerbating diseases include diabetes and obesity. He has no history of chronic renal disease, hypothyroidism or liver disease. Factors aggravating his hyperlipidemia include beta blockers and fatty foods. Pertinent negatives include no chest pain, leg pain, myalgias or shortness of breath. Current antihyperlipidemic treatment includes statins and fibric acid derivatives. The current treatment provides mild improvement of lipids. Compliance problems include adherence to exercise and adherence to diet.  Risk factors for coronary artery disease include hypertension, male sex, obesity, a sedentary lifestyle, diabetes mellitus and dyslipidemia.       Family History   Problem Relation Age of Onset    Heart attack Father     Cancer Father         prostate      Social History     Socioeconomic History    Marital status:      Spouse name: Not on file    Number of children: Not on file    Years of education: Not on file    Highest education level: Not on file   Occupational History    Not on file   Social Needs    Financial resource strain: Not on file    Food insecurity     Worry: Not on file     Inability: Not on file    Transportation needs     Medical: Not on file     Non-medical: Not on file   Tobacco Use    Smoking status: Never Smoker    Smokeless tobacco: Never Used   Substance and Sexual Activity    Alcohol use: Yes     Comment: occ    Drug use: Not on file    Sexual activity: Not on file   Lifestyle    Physical activity     Days per week: Not on file     Minutes per session: Not on file    Stress: Not at all   Relationships    Social connections     Talks on phone: Not on  file     Gets together: Not on file     Attends Sabianist service: Not on file     Active member of club or organization: Not on file     Attends meetings of clubs or organizations: Not on file     Relationship status: Not on file   Other Topics Concern    Not on file   Social History Narrative    Not on file     Current Outpatient Medications   Medication Sig Dispense Refill    amLODIPine (NORVASC) 10 MG tablet Take 1 tablet (10 mg total) by mouth once daily. 90 tablet 0    clopidogreL (PLAVIX) 75 mg tablet Take 1 tablet (75 mg total) by mouth once daily. 90 tablet 1    lisinopriL (PRINIVIL,ZESTRIL) 40 MG tablet Take 1 tablet (40 mg total) by mouth once daily. 90 tablet 0    metFORMIN (GLUCOPHAGE) 1000 MG tablet Take 1 tablet (1,000 mg total) by mouth 2 (two) times daily with meals. 180 tablet 0    nebivoloL (BYSTOLIC) 20 mg Tab Take 1 tablet by mouth once daily. 90 tablet 0    pravastatin (PRAVACHOL) 40 MG tablet Take 1 tablet (40 mg total) by mouth every evening. 90 tablet 0    empagliflozin (JARDIANCE) 10 mg tablet Take 1 tablet (10 mg total) by mouth once daily. 90 tablet 1     No current facility-administered medications for this visit.      Review of patient's allergies indicates:  No Known Allergies   Past Medical History:   Diagnosis Date    Diabetes mellitus, type 2     Hypertension     Myocardial infarction 2013     Past Surgical History:   Procedure Laterality Date    CORONARY STENT PLACEMENT  2013       Review of Systems   Constitutional: Negative for appetite change, chills, diaphoresis, fatigue, fever, malaise/fatigue, unexpected weight change and weight loss.   HENT: Negative for congestion, ear discharge, ear pain and sore throat.    Eyes: Negative for blurred vision, pain and visual disturbance.   Respiratory: Negative for cough, shortness of breath and wheezing.    Cardiovascular: Negative for chest pain, palpitations and leg swelling.   Gastrointestinal: Negative for abdominal pain,  "blood in stool, constipation, diarrhea, nausea and vomiting.   Endocrine: Negative for cold intolerance, heat intolerance, polydipsia, polyphagia and polyuria.   Genitourinary: Negative for dysuria, frequency and hematuria.        Nocturia 1-2 times nightly- no change    Musculoskeletal: Negative for arthralgias and myalgias.   Skin: Negative for rash and wound.   Neurological: Negative for dizziness, syncope, weakness and headaches.   Hematological: Negative for adenopathy. Does not bruise/bleed easily.   Psychiatric/Behavioral: Negative for dysphoric mood and sleep disturbance. The patient is not nervous/anxious.       OBJECTIVE:      Vitals:    07/15/20 1302 07/15/20 1328   BP: (!) 150/84 (!) 146/82   BP Location: Left arm    Patient Position: Sitting    BP Method: X-Large (Manual)    Pulse: 91    Temp: 99.1 °F (37.3 °C)    TempSrc: Oral    SpO2: 99%    Weight: (!) 153.3 kg (338 lb)    Height: 6' 2" (1.88 m)      Physical Exam  Vitals signs and nursing note reviewed.   Constitutional:       General: He is not in acute distress.     Appearance: He is well-developed. He is obese. He is not ill-appearing or diaphoretic.      Comments: Morbid obesity    HENT:      Head: Normocephalic and atraumatic.      Left Ear: Ear canal normal.      Nose: Nose normal.      Mouth/Throat:      Mouth: Mucous membranes are moist.      Pharynx: Oropharynx is clear. No oropharyngeal exudate.   Eyes:      General: No scleral icterus.     Conjunctiva/sclera: Conjunctivae normal.      Pupils: Pupils are equal, round, and reactive to light.   Neck:      Musculoskeletal: Normal range of motion and neck supple.      Thyroid: No thyroid mass or thyromegaly.   Cardiovascular:      Rate and Rhythm: Normal rate and regular rhythm.      Heart sounds: Normal heart sounds. No murmur. No friction rub. No gallop.    Pulmonary:      Effort: Pulmonary effort is normal.      Breath sounds: Normal breath sounds. No wheezing, rhonchi or rales. "   Abdominal:      General: Bowel sounds are normal. There is no distension.      Palpations: Abdomen is soft.      Tenderness: There is no abdominal tenderness.   Musculoskeletal: Normal range of motion.      Right lower leg: No edema.      Left lower leg: No edema.   Lymphadenopathy:      Cervical: No cervical adenopathy.   Skin:     General: Skin is warm and dry.      Findings: No rash.   Neurological:      Mental Status: He is alert and oriented to person, place, and time.   Psychiatric:         Mood and Affect: Mood normal.         Behavior: Behavior normal.         Thought Content: Thought content normal.         Judgment: Judgment normal.        Assessment:       1. Type 2 diabetes mellitus with hyperglycemia, without long-term current use of insulin    2. Essential hypertension    3. Hx of myocardial infarction    4. Immunization due    5. Screening for prostate cancer        Plan:       Type 2 diabetes mellitus with hyperglycemia, without long-term current use of insulin  -     empagliflozin (JARDIANCE) 10 mg tablet; Take 1 tablet (10 mg total) by mouth once daily.  Dispense: 90 tablet; Refill: 1  -     pravastatin (PRAVACHOL) 40 MG tablet; Take 1 tablet (40 mg total) by mouth every evening.  Dispense: 90 tablet; Refill: 0  -     Comprehensive metabolic panel; Future; Expected date: 10/15/2020  -     Lipid Panel; Future; Expected date: 10/15/2020  -     Hemoglobin A1C; Future; Expected date: 10/15/2020    *will start Jardiance- discussed SE of the medication; increase hydration and strict low carb diet ; will make eye doc appt; foot exam at f/u   *Risks of DM discussed. Types of treatment discussed. Encouraged exercise and diet. A1C Goal of < 7.0 reviewed. Stay away from sweets and high carb foods such as pasta, rice, bread, etc.  Will adjust/continue medication to achieve optimal glucose and cholesterol control.  Discussed need for annual eye exam, seasonal  flu vaccine seasonally, and routine inspection of  feet.  Bring glucose diary to each visit.    Essential hypertension  -     amLODIPine (NORVASC) 10 MG tablet; Take 1 tablet (10 mg total) by mouth once daily.  Dispense: 90 tablet; Refill: 0  -     lisinopriL (PRINIVIL,ZESTRIL) 40 MG tablet; Take 1 tablet (40 mg total) by mouth once daily.  Dispense: 90 tablet; Refill: 0  -     nebivoloL (BYSTOLIC) 20 mg Tab; Take 1 tablet by mouth once daily.  Dispense: 90 tablet; Refill: 0    *pt is nervous today- normal ranges at home; will recheck In 3 mo; Lifestyle changes: Reduce the amount of salt in your diet; Lose weight; Avoid drinking too much alcohol; Exercise at least 30 minutes per day most days of the week.  Continue current medications and home BP monitoring.     Hx of myocardial infarction  -     Ambulatory referral/consult to Cardiology; Future; Expected date: 07/22/2020  -     clopidogreL (PLAVIX) 75 mg tablet; Take 1 tablet (75 mg total) by mouth once daily.  Dispense: 90 tablet; Refill: 1    Immunization due  -     Pneumococcal Polysaccharide Vaccine (23 Valent) (SQ/IM)    Screening for prostate cancer  -     PSA, Screening; Future; Expected date: 10/15/2020        Follow up in about 3 months (around 10/15/2020) for diabetes, HTN.      7/15/2020 HONORIO Coello, THIAGOP

## 2020-09-29 ENCOUNTER — TELEPHONE (OUTPATIENT)
Dept: FAMILY MEDICINE | Facility: CLINIC | Age: 60
End: 2020-09-29

## 2020-10-07 ENCOUNTER — TELEPHONE (OUTPATIENT)
Dept: FAMILY MEDICINE | Facility: CLINIC | Age: 60
End: 2020-10-07

## 2020-10-14 ENCOUNTER — LAB VISIT (OUTPATIENT)
Dept: LAB | Facility: HOSPITAL | Age: 60
End: 2020-10-14
Attending: NURSE PRACTITIONER
Payer: COMMERCIAL

## 2020-10-14 DIAGNOSIS — Z12.5 SCREENING FOR PROSTATE CANCER: ICD-10-CM

## 2020-10-14 DIAGNOSIS — E11.65 TYPE 2 DIABETES MELLITUS WITH HYPERGLYCEMIA, WITHOUT LONG-TERM CURRENT USE OF INSULIN: ICD-10-CM

## 2020-10-14 LAB
ALBUMIN SERPL BCP-MCNC: 4.5 G/DL (ref 3.5–5.2)
ALP SERPL-CCNC: 56 U/L (ref 55–135)
ALT SERPL W/O P-5'-P-CCNC: 28 U/L (ref 10–44)
ANION GAP SERPL CALC-SCNC: 15 MMOL/L (ref 8–16)
AST SERPL-CCNC: 20 U/L (ref 10–40)
BILIRUB SERPL-MCNC: 0.8 MG/DL (ref 0.1–1)
BUN SERPL-MCNC: 20 MG/DL (ref 8–23)
CALCIUM SERPL-MCNC: 9.7 MG/DL (ref 8.7–10.5)
CHLORIDE SERPL-SCNC: 102 MMOL/L (ref 95–110)
CHOLEST SERPL-MCNC: 197 MG/DL (ref 120–199)
CHOLEST/HDLC SERPL: 5.6 {RATIO} (ref 2–5)
CO2 SERPL-SCNC: 22 MMOL/L (ref 23–29)
COMPLEXED PSA SERPL-MCNC: 0.77 NG/ML (ref 0–4)
CREAT SERPL-MCNC: 0.9 MG/DL (ref 0.5–1.4)
EST. GFR  (AFRICAN AMERICAN): >60 ML/MIN/1.73 M^2
EST. GFR  (NON AFRICAN AMERICAN): >60 ML/MIN/1.73 M^2
ESTIMATED AVG GLUCOSE: 203 MG/DL (ref 68–131)
GLUCOSE SERPL-MCNC: 218 MG/DL (ref 70–110)
HBA1C MFR BLD HPLC: 8.7 % (ref 4.5–6.2)
HDLC SERPL-MCNC: 35 MG/DL (ref 40–75)
HDLC SERPL: 17.8 % (ref 20–50)
LDLC SERPL CALC-MCNC: 117.8 MG/DL (ref 63–159)
NONHDLC SERPL-MCNC: 162 MG/DL
POTASSIUM SERPL-SCNC: 4.3 MMOL/L (ref 3.5–5.1)
PROT SERPL-MCNC: 7.6 G/DL (ref 6–8.4)
SODIUM SERPL-SCNC: 139 MMOL/L (ref 136–145)
TRIGL SERPL-MCNC: 221 MG/DL (ref 30–150)

## 2020-10-14 PROCEDURE — 36415 COLL VENOUS BLD VENIPUNCTURE: CPT

## 2020-10-14 PROCEDURE — 80061 LIPID PANEL: CPT

## 2020-10-14 PROCEDURE — 83036 HEMOGLOBIN GLYCOSYLATED A1C: CPT

## 2020-10-14 PROCEDURE — 80053 COMPREHEN METABOLIC PANEL: CPT

## 2020-10-14 PROCEDURE — 84153 ASSAY OF PSA TOTAL: CPT

## 2020-10-21 ENCOUNTER — OFFICE VISIT (OUTPATIENT)
Dept: FAMILY MEDICINE | Facility: CLINIC | Age: 60
End: 2020-10-21
Payer: COMMERCIAL

## 2020-10-21 VITALS
SYSTOLIC BLOOD PRESSURE: 134 MMHG | HEART RATE: 72 BPM | TEMPERATURE: 98 F | BODY MASS INDEX: 40.43 KG/M2 | HEIGHT: 74 IN | OXYGEN SATURATION: 98 % | WEIGHT: 315 LBS | DIASTOLIC BLOOD PRESSURE: 78 MMHG

## 2020-10-21 DIAGNOSIS — E78.5 HYPERLIPIDEMIA, UNSPECIFIED HYPERLIPIDEMIA TYPE: ICD-10-CM

## 2020-10-21 DIAGNOSIS — E11.9 TYPE 2 DIABETES MELLITUS WITHOUT COMPLICATION, WITHOUT LONG-TERM CURRENT USE OF INSULIN: Primary | ICD-10-CM

## 2020-10-21 DIAGNOSIS — Z12.11 SCREENING FOR COLON CANCER: ICD-10-CM

## 2020-10-21 DIAGNOSIS — Z23 IMMUNIZATION DUE: ICD-10-CM

## 2020-10-21 DIAGNOSIS — I25.2 HX OF MYOCARDIAL INFARCTION: ICD-10-CM

## 2020-10-21 DIAGNOSIS — I10 ESSENTIAL HYPERTENSION: ICD-10-CM

## 2020-10-21 PROCEDURE — 3078F DIAST BP <80 MM HG: CPT | Mod: S$GLB,,, | Performed by: NURSE PRACTITIONER

## 2020-10-21 PROCEDURE — 3075F PR MOST RECENT SYSTOLIC BLOOD PRESS GE 130-139MM HG: ICD-10-PCS | Mod: S$GLB,,, | Performed by: NURSE PRACTITIONER

## 2020-10-21 PROCEDURE — 3075F SYST BP GE 130 - 139MM HG: CPT | Mod: S$GLB,,, | Performed by: NURSE PRACTITIONER

## 2020-10-21 PROCEDURE — 90686 FLU VACCINE (QUAD) GREATER THAN OR EQUAL TO 3YO PRESERVATIVE FREE IM: ICD-10-PCS | Mod: S$GLB,,, | Performed by: NURSE PRACTITIONER

## 2020-10-21 PROCEDURE — 90471 IMMUNIZATION ADMIN: CPT | Mod: S$GLB,,, | Performed by: NURSE PRACTITIONER

## 2020-10-21 PROCEDURE — 99214 OFFICE O/P EST MOD 30 MIN: CPT | Mod: 25,S$GLB,, | Performed by: NURSE PRACTITIONER

## 2020-10-21 PROCEDURE — 3078F PR MOST RECENT DIASTOLIC BLOOD PRESSURE < 80 MM HG: ICD-10-PCS | Mod: S$GLB,,, | Performed by: NURSE PRACTITIONER

## 2020-10-21 PROCEDURE — 3052F PR MOST RECENT HEMOGLOBIN A1C LEVEL 8.0 - < 9.0%: ICD-10-PCS | Mod: S$GLB,,, | Performed by: NURSE PRACTITIONER

## 2020-10-21 PROCEDURE — 90471 FLU VACCINE (QUAD) GREATER THAN OR EQUAL TO 3YO PRESERVATIVE FREE IM: ICD-10-PCS | Mod: S$GLB,,, | Performed by: NURSE PRACTITIONER

## 2020-10-21 PROCEDURE — 3008F BODY MASS INDEX DOCD: CPT | Mod: S$GLB,,, | Performed by: NURSE PRACTITIONER

## 2020-10-21 PROCEDURE — 99214 PR OFFICE/OUTPT VISIT, EST, LEVL IV, 30-39 MIN: ICD-10-PCS | Mod: 25,S$GLB,, | Performed by: NURSE PRACTITIONER

## 2020-10-21 PROCEDURE — 90686 IIV4 VACC NO PRSV 0.5 ML IM: CPT | Mod: S$GLB,,, | Performed by: NURSE PRACTITIONER

## 2020-10-21 PROCEDURE — 3052F HG A1C>EQUAL 8.0%<EQUAL 9.0%: CPT | Mod: S$GLB,,, | Performed by: NURSE PRACTITIONER

## 2020-10-21 PROCEDURE — 3008F PR BODY MASS INDEX (BMI) DOCUMENTED: ICD-10-PCS | Mod: S$GLB,,, | Performed by: NURSE PRACTITIONER

## 2020-10-21 RX ORDER — CLOPIDOGREL BISULFATE 75 MG/1
75 TABLET ORAL DAILY
Qty: 90 TABLET | Refills: 1 | Status: SHIPPED | OUTPATIENT
Start: 2020-10-21 | End: 2021-01-28 | Stop reason: SDUPTHER

## 2020-10-21 RX ORDER — NEBIVOLOL HYDROCHLORIDE 20 MG/1
1 TABLET ORAL DAILY
Qty: 90 TABLET | Refills: 1 | Status: SHIPPED | OUTPATIENT
Start: 2020-10-21 | End: 2021-01-28 | Stop reason: SDUPTHER

## 2020-10-21 RX ORDER — EMPAGLIFLOZIN 25 MG/1
25 TABLET, FILM COATED ORAL DAILY
Qty: 90 TABLET | Refills: 1 | Status: SHIPPED | OUTPATIENT
Start: 2020-10-21 | End: 2021-01-28 | Stop reason: SDUPTHER

## 2020-10-21 RX ORDER — LISINOPRIL 40 MG/1
40 TABLET ORAL DAILY
Qty: 90 TABLET | Refills: 1 | Status: SHIPPED | OUTPATIENT
Start: 2020-10-21 | End: 2021-01-28 | Stop reason: SDUPTHER

## 2020-10-21 RX ORDER — DILTIAZEM HYDROCHLORIDE 120 MG/1
CAPSULE, EXTENDED RELEASE ORAL
COMMUNITY
Start: 2020-10-19 | End: 2021-10-19 | Stop reason: SDUPTHER

## 2020-10-21 RX ORDER — METFORMIN HYDROCHLORIDE 1000 MG/1
1000 TABLET ORAL 2 TIMES DAILY WITH MEALS
Qty: 180 TABLET | Refills: 1 | Status: SHIPPED | OUTPATIENT
Start: 2020-10-21 | End: 2021-01-28 | Stop reason: SDUPTHER

## 2020-10-21 RX ORDER — ROSUVASTATIN CALCIUM 20 MG/1
20 TABLET, COATED ORAL DAILY
COMMUNITY
Start: 2020-10-19

## 2020-10-21 NOTE — PROGRESS NOTES
SUBJECTIVE:      Patient ID: Donald Frey is a 60 y.o. male.    Chief Complaint: Follow-up (dm htn)    Established patient here for follow-up on diabetes, hypertension, and hyperlipidemia.  He has been taking his medications as prescribed daily without side effects or complaints.  His A1c is coming down to 8.7 from 9.9.  He is avoiding all sugar products and drinks.  He has lost 7 lb since his last visit and he is feeling much better.  He has seen cardiology since his last visit with me and was prescribed Crestor instead of pravastatin and Cartia instead of amlodipine.  His blood pressures are running much better at home and only had to readings with diastolic over 90.  He has not yet completed his eye exam.  He does want the flu vaccine today.  He is due for a foot exam today.    Diabetes  He presents for his follow-up diabetic visit. He has type 2 diabetes mellitus. His disease course has been improving. There are no hypoglycemic associated symptoms. Pertinent negatives for hypoglycemia include no dizziness, headaches, nervousness/anxiousness or pallor. Associated symptoms include foot paresthesias (occasional ). Pertinent negatives for diabetes include no blurred vision, no chest pain, no fatigue, no foot ulcerations, no polydipsia, no polyphagia, no polyuria, no visual change, no weakness and no weight loss. There are no hypoglycemic complications. Symptoms are improving. There are no diabetic complications. Risk factors for coronary artery disease include male sex, obesity, hypertension, sedentary lifestyle, dyslipidemia and diabetes mellitus. Current diabetic treatment includes oral agent (dual therapy) and diet. He is compliant with treatment all of the time. His weight is decreasing steadily. He is following a generally healthy diet. Meal planning includes avoidance of concentrated sweets. There is no change in his home blood glucose trend. His breakfast blood glucose range is generally >200 mg/dl. An  ACE inhibitor/angiotensin II receptor blocker is being taken. He does not see a podiatrist.Eye exam is not current.   Hypertension  This is a chronic problem. The current episode started more than 1 year ago. The problem has been gradually improving since onset. The problem is controlled. Pertinent negatives include no anxiety, blurred vision, chest pain, headaches, malaise/fatigue, palpitations, peripheral edema or shortness of breath. There are no associated agents to hypertension. Risk factors for coronary artery disease include male gender, obesity, dyslipidemia, diabetes mellitus and sedentary lifestyle. Past treatments include calcium channel blockers, ACE inhibitors, beta blockers and lifestyle changes. The current treatment provides moderate improvement. Compliance problems include diet and exercise.  There is no history of chronic renal disease, sleep apnea or a thyroid problem.   Hyperlipidemia  This is a chronic problem. The current episode started more than 1 year ago. The problem is uncontrolled. Recent lipid tests were reviewed and are high (had recent change to Crestor ). Exacerbating diseases include diabetes and obesity. He has no history of chronic renal disease, hypothyroidism or liver disease. Factors aggravating his hyperlipidemia include beta blockers and fatty foods. Pertinent negatives include no chest pain, leg pain, myalgias or shortness of breath. Current antihyperlipidemic treatment includes statins and diet change. The current treatment provides mild improvement of lipids. Compliance problems include adherence to exercise and adherence to diet.  Risk factors for coronary artery disease include hypertension, male sex, obesity, a sedentary lifestyle, diabetes mellitus and dyslipidemia.       Family History   Problem Relation Age of Onset    Heart attack Father     Cancer Father         prostate      Social History     Socioeconomic History    Marital status:      Spouse name: Not  on file    Number of children: Not on file    Years of education: Not on file    Highest education level: Not on file   Occupational History    Not on file   Social Needs    Financial resource strain: Not on file    Food insecurity     Worry: Not on file     Inability: Not on file    Transportation needs     Medical: Not on file     Non-medical: Not on file   Tobacco Use    Smoking status: Never Smoker    Smokeless tobacco: Never Used   Substance and Sexual Activity    Alcohol use: Yes     Comment: occ    Drug use: Not on file    Sexual activity: Not on file   Lifestyle    Physical activity     Days per week: Not on file     Minutes per session: Not on file    Stress: Not at all   Relationships    Social connections     Talks on phone: Not on file     Gets together: Not on file     Attends Jew service: Not on file     Active member of club or organization: Not on file     Attends meetings of clubs or organizations: Not on file     Relationship status: Not on file   Other Topics Concern    Not on file   Social History Narrative    Not on file     Current Outpatient Medications   Medication Sig Dispense Refill    CARTIA  mg Cp24       clopidogreL (PLAVIX) 75 mg tablet Take 1 tablet (75 mg total) by mouth once daily. 90 tablet 1    lisinopriL (PRINIVIL,ZESTRIL) 40 MG tablet Take 1 tablet (40 mg total) by mouth once daily. 90 tablet 1    metFORMIN (GLUCOPHAGE) 1000 MG tablet Take 1 tablet (1,000 mg total) by mouth 2 (two) times daily with meals. 180 tablet 1    nebivoloL (BYSTOLIC) 20 mg Tab Take 1 tablet by mouth once daily. 90 tablet 1    rosuvastatin (CRESTOR) 20 MG tablet       empagliflozin (JARDIANCE) 25 mg tablet Take 1 tablet (25 mg total) by mouth once daily. 90 tablet 1     No current facility-administered medications for this visit.      Review of patient's allergies indicates:  No Known Allergies   Past Medical History:   Diagnosis Date    Diabetes mellitus, type 2      "Hypertension     Myocardial infarction 2013     Past Surgical History:   Procedure Laterality Date    CORONARY STENT PLACEMENT  2013       Review of Systems   Constitutional: Negative for appetite change, chills, diaphoresis, fatigue, fever, malaise/fatigue, unexpected weight change and weight loss.   HENT: Negative for congestion, ear discharge, ear pain, rhinorrhea, sore throat and trouble swallowing.    Eyes: Negative for blurred vision, pain and visual disturbance.   Respiratory: Negative for cough, shortness of breath and wheezing.    Cardiovascular: Negative for chest pain, palpitations and leg swelling.   Gastrointestinal: Negative for abdominal pain, blood in stool, constipation, diarrhea, nausea and vomiting.   Endocrine: Negative for cold intolerance, heat intolerance, polydipsia, polyphagia and polyuria.   Genitourinary: Negative for dysuria, frequency and hematuria.        Nocturia 1-2 times nightly- no change    Musculoskeletal: Negative for arthralgias and myalgias.   Skin: Negative for pallor, rash and wound.   Neurological: Negative for dizziness, syncope, weakness and headaches.   Hematological: Negative for adenopathy. Does not bruise/bleed easily.   Psychiatric/Behavioral: Negative for dysphoric mood and sleep disturbance. The patient is not nervous/anxious.       OBJECTIVE:      Vitals:    10/21/20 1327 10/21/20 1400   BP: (!) 142/80 134/78   BP Location: Left arm    Patient Position: Sitting    BP Method: X-Large (Manual)    Pulse: 72    Temp: 97.7 °F (36.5 °C)    TempSrc: Temporal    SpO2: 98%    Weight: (!) 150.5 kg (331 lb 11.2 oz)    Height: 6' 2" (1.88 m)      Physical Exam  Vitals signs and nursing note reviewed.   Constitutional:       General: He is not in acute distress.     Appearance: Normal appearance. He is well-developed. He is not ill-appearing or diaphoretic.      Comments: Morbid obesity    HENT:      Head: Normocephalic and atraumatic.      Mouth/Throat:      Mouth: Mucous " membranes are moist.      Pharynx: Oropharynx is clear. No oropharyngeal exudate.   Eyes:      General: No scleral icterus.     Conjunctiva/sclera: Conjunctivae normal.      Pupils: Pupils are equal, round, and reactive to light.   Neck:      Musculoskeletal: Normal range of motion and neck supple.      Thyroid: No thyroid mass or thyromegaly.   Cardiovascular:      Rate and Rhythm: Normal rate and regular rhythm.      Pulses:           Dorsalis pedis pulses are 2+ on the right side and 2+ on the left side.        Posterior tibial pulses are 2+ on the right side and 2+ on the left side.      Heart sounds: Normal heart sounds. No murmur. No friction rub. No gallop.    Pulmonary:      Effort: Pulmonary effort is normal.      Breath sounds: Normal breath sounds. No wheezing, rhonchi or rales.   Abdominal:      General: Bowel sounds are normal. There is no distension.      Palpations: Abdomen is soft.      Tenderness: There is no abdominal tenderness.   Musculoskeletal: Normal range of motion.      Right lower leg: No edema.      Left lower leg: No edema.      Right foot: Normal range of motion. No deformity.      Left foot: Normal range of motion. No deformity.   Feet:      Right foot:      Protective Sensation: 6 sites tested. 6 sites sensed.      Skin integrity: Dry skin present. No ulcer, blister, skin breakdown, erythema, warmth or callus.      Toenail Condition: Right toenails are normal.      Left foot:      Protective Sensation: 6 sites tested. 6 sites sensed.      Skin integrity: Dry skin present. No ulcer, blister, skin breakdown, erythema, warmth or callus.      Toenail Condition: Left toenails are normal.   Lymphadenopathy:      Cervical: No cervical adenopathy.   Skin:     General: Skin is warm and dry.      Findings: No rash.   Neurological:      Mental Status: He is alert and oriented to person, place, and time.   Psychiatric:         Mood and Affect: Mood normal.         Behavior: Behavior normal.          Thought Content: Thought content normal.         Judgment: Judgment normal.        Assessment:       1. Type 2 diabetes mellitus without complication, without long-term current use of insulin    2. Essential hypertension    3. Hyperlipidemia, unspecified hyperlipidemia type    4. Immunization due    5. Hx of myocardial infarction    6. Screening for colon cancer        Plan:       Type 2 diabetes mellitus without complication, without long-term current use of insulin  -     empagliflozin (JARDIANCE) 25 mg tablet; Take 1 tablet (25 mg total) by mouth once daily.  Dispense: 90 tablet; Refill: 1  -     metFORMIN (GLUCOPHAGE) 1000 MG tablet; Take 1 tablet (1,000 mg total) by mouth 2 (two) times daily with meals.  Dispense: 180 tablet; Refill: 1  -     Microalbumin/Creatinine Ratio, Urine; Future; Expected date: 10/15/2021  -     Hemoglobin A1C; Future; Expected date: 10/15/2021  -     Urinalysis; Future; Expected date: 10/15/2021  -     Lipid Panel; Future; Expected date: 10/15/2021  -     Comprehensive Metabolic Panel; Future; Expected date: 10/15/2021    *DM improving- A1C down to 8.7; will increase Jardiance to 25 mg daily; recheck in 3 mo   *needs eye exam- will schedule     Essential hypertension  -     lisinopriL (PRINIVIL,ZESTRIL) 40 MG tablet; Take 1 tablet (40 mg total) by mouth once daily.  Dispense: 90 tablet; Refill: 1  -     nebivoloL (BYSTOLIC) 20 mg Tab; Take 1 tablet by mouth once daily.  Dispense: 90 tablet; Refill: 1  -     Urinalysis; Future; Expected date: 10/15/2021  -     Lipid Panel; Future; Expected date: 10/15/2021  -     Comprehensive Metabolic Panel; Future; Expected date: 10/15/2021   *Lifestyle changes: Reduce the amount of salt in your diet; Lose weight; Avoid drinking too much alcohol; Exercise at least 30 minutes per day most days of the week.  Continue current medications and home BP monitoring.     Hyperlipidemia, unspecified hyperlipidemia type   *had change to Crestor- will recheck  in 3 mo     Immunization due  -     Influenza - Quadrivalent (PF)    Hx of myocardial infarction  -     clopidogreL (PLAVIX) 75 mg tablet; Take 1 tablet (75 mg total) by mouth once daily.  Dispense: 90 tablet; Refill: 1    Screening for colon cancer  -     Cologuard Screening (Multitarget Stool DNA); Future; Expected date: 10/21/2020   *no fam hx of colon cancer; no changes in bowel habits- will order Cologuard       Follow up in about 3 months (around 1/21/2021) for diabetes, HTN.      10/21/2020 HONORIO Coello, FNP

## 2020-11-02 ENCOUNTER — TELEPHONE (OUTPATIENT)
Dept: FAMILY MEDICINE | Facility: CLINIC | Age: 60
End: 2020-11-02

## 2020-11-02 NOTE — TELEPHONE ENCOUNTER
Left msg with pt regarding negative results. Updated HM and encouraged to call back if any questions.

## 2020-11-02 NOTE — TELEPHONE ENCOUNTER
----- Message from LEANNA Brownlee sent at 11/2/2020 11:30 AM CST -----  Okay please tell patient his Cologuard test is negative and should be repeated in 3 years; also please update result in health maintenance   ----- Message -----  From: Adriana Delgadillo LPN  Sent: 11/2/2020  10:35 AM CST  To: LEANNA Brownlee      ----- Message -----  From: Romy Arroyo  Sent: 11/2/2020  10:07 AM CST  To: Jeromy Garcia Staff    [facility], [employer], [facility]ARD, 10/27/20

## 2020-11-30 ENCOUNTER — PATIENT MESSAGE (OUTPATIENT)
Dept: FAMILY MEDICINE | Facility: CLINIC | Age: 60
End: 2020-11-30

## 2020-12-30 ENCOUNTER — TELEPHONE (OUTPATIENT)
Dept: FAMILY MEDICINE | Facility: CLINIC | Age: 60
End: 2020-12-30

## 2021-01-20 ENCOUNTER — LAB VISIT (OUTPATIENT)
Dept: LAB | Facility: HOSPITAL | Age: 61
End: 2021-01-20
Attending: NURSE PRACTITIONER
Payer: COMMERCIAL

## 2021-01-20 DIAGNOSIS — I10 ESSENTIAL HYPERTENSION: ICD-10-CM

## 2021-01-20 DIAGNOSIS — E11.9 TYPE 2 DIABETES MELLITUS WITHOUT COMPLICATION, WITHOUT LONG-TERM CURRENT USE OF INSULIN: ICD-10-CM

## 2021-01-20 LAB
ALBUMIN SERPL BCP-MCNC: 4.3 G/DL (ref 3.5–5.2)
ALBUMIN/CREAT UR: 15.6 UG/MG (ref 0–30)
ALP SERPL-CCNC: 50 U/L (ref 55–135)
ALT SERPL W/O P-5'-P-CCNC: 40 U/L (ref 10–44)
ANION GAP SERPL CALC-SCNC: 10 MMOL/L (ref 8–16)
AST SERPL-CCNC: 24 U/L (ref 10–40)
BACTERIA #/AREA URNS HPF: NEGATIVE /HPF
BILIRUB SERPL-MCNC: 1.1 MG/DL (ref 0.1–1)
BILIRUB UR QL STRIP: NEGATIVE
BUN SERPL-MCNC: 14 MG/DL (ref 8–23)
CALCIUM SERPL-MCNC: 9.1 MG/DL (ref 8.7–10.5)
CHLORIDE SERPL-SCNC: 102 MMOL/L (ref 95–110)
CHOLEST SERPL-MCNC: 105 MG/DL (ref 120–199)
CHOLEST/HDLC SERPL: 3.9 {RATIO} (ref 2–5)
CLARITY UR: CLEAR
CO2 SERPL-SCNC: 27 MMOL/L (ref 23–29)
COLOR UR: YELLOW
CREAT SERPL-MCNC: 1.1 MG/DL (ref 0.5–1.4)
CREAT UR-MCNC: 139 MG/DL (ref 23–375)
EST. GFR  (AFRICAN AMERICAN): >60 ML/MIN/1.73 M^2
EST. GFR  (NON AFRICAN AMERICAN): >60 ML/MIN/1.73 M^2
ESTIMATED AVG GLUCOSE: 194 MG/DL (ref 68–131)
GLUCOSE SERPL-MCNC: 156 MG/DL (ref 70–110)
GLUCOSE UR QL STRIP: ABNORMAL
HBA1C MFR BLD HPLC: 8.4 % (ref 4.5–6.2)
HDLC SERPL-MCNC: 27 MG/DL (ref 40–75)
HDLC SERPL: 25.7 % (ref 20–50)
HGB UR QL STRIP: NEGATIVE
HYALINE CASTS #/AREA URNS LPF: 0 /LPF
KETONES UR QL STRIP: ABNORMAL
LDLC SERPL CALC-MCNC: 44.2 MG/DL (ref 63–159)
LEUKOCYTE ESTERASE UR QL STRIP: NEGATIVE
MICROALBUMIN UR DL<=1MG/L-MCNC: 21.7 UG/ML
MICROSCOPIC COMMENT: NORMAL
NITRITE UR QL STRIP: NEGATIVE
NONHDLC SERPL-MCNC: 78 MG/DL
PH UR STRIP: 5 [PH] (ref 5–8)
POTASSIUM SERPL-SCNC: 4.2 MMOL/L (ref 3.5–5.1)
PROT SERPL-MCNC: 7.6 G/DL (ref 6–8.4)
PROT UR QL STRIP: NEGATIVE
RBC #/AREA URNS HPF: 0 /HPF (ref 0–4)
SODIUM SERPL-SCNC: 139 MMOL/L (ref 136–145)
SP GR UR STRIP: >1.03 (ref 1–1.03)
SQUAMOUS #/AREA URNS HPF: 0 /HPF
TRIGL SERPL-MCNC: 169 MG/DL (ref 30–150)
URN SPEC COLLECT METH UR: ABNORMAL
UROBILINOGEN UR STRIP-ACNC: NEGATIVE EU/DL
WBC #/AREA URNS HPF: 0 /HPF (ref 0–5)
YEAST URNS QL MICRO: NORMAL

## 2021-01-20 PROCEDURE — 82043 UR ALBUMIN QUANTITATIVE: CPT

## 2021-01-20 PROCEDURE — 36415 COLL VENOUS BLD VENIPUNCTURE: CPT

## 2021-01-20 PROCEDURE — 80061 LIPID PANEL: CPT

## 2021-01-20 PROCEDURE — 82570 ASSAY OF URINE CREATININE: CPT

## 2021-01-20 PROCEDURE — 83036 HEMOGLOBIN GLYCOSYLATED A1C: CPT

## 2021-01-20 PROCEDURE — 80053 COMPREHEN METABOLIC PANEL: CPT

## 2021-01-20 PROCEDURE — 81001 URINALYSIS AUTO W/SCOPE: CPT

## 2021-01-28 ENCOUNTER — DOCUMENTATION ONLY (OUTPATIENT)
Dept: FAMILY MEDICINE | Facility: CLINIC | Age: 61
End: 2021-01-28

## 2021-01-28 ENCOUNTER — OFFICE VISIT (OUTPATIENT)
Dept: FAMILY MEDICINE | Facility: CLINIC | Age: 61
End: 2021-01-28
Payer: COMMERCIAL

## 2021-01-28 VITALS
OXYGEN SATURATION: 99 % | SYSTOLIC BLOOD PRESSURE: 132 MMHG | HEART RATE: 69 BPM | DIASTOLIC BLOOD PRESSURE: 82 MMHG | HEIGHT: 74 IN | WEIGHT: 315 LBS | BODY MASS INDEX: 40.43 KG/M2 | TEMPERATURE: 98 F

## 2021-01-28 DIAGNOSIS — E11.9 TYPE 2 DIABETES MELLITUS WITHOUT COMPLICATION, WITHOUT LONG-TERM CURRENT USE OF INSULIN: Primary | ICD-10-CM

## 2021-01-28 DIAGNOSIS — I25.2 HX OF MYOCARDIAL INFARCTION: ICD-10-CM

## 2021-01-28 DIAGNOSIS — E78.5 HYPERLIPIDEMIA, UNSPECIFIED HYPERLIPIDEMIA TYPE: ICD-10-CM

## 2021-01-28 DIAGNOSIS — I10 ESSENTIAL HYPERTENSION: ICD-10-CM

## 2021-01-28 DIAGNOSIS — U07.1 COVID-19 VIRUS INFECTION: ICD-10-CM

## 2021-01-28 PROCEDURE — 99214 OFFICE O/P EST MOD 30 MIN: CPT | Mod: S$GLB,,, | Performed by: NURSE PRACTITIONER

## 2021-01-28 PROCEDURE — 3052F HG A1C>EQUAL 8.0%<EQUAL 9.0%: CPT | Mod: S$GLB,,, | Performed by: NURSE PRACTITIONER

## 2021-01-28 PROCEDURE — 3079F DIAST BP 80-89 MM HG: CPT | Mod: S$GLB,,, | Performed by: NURSE PRACTITIONER

## 2021-01-28 PROCEDURE — 1126F PR PAIN SEVERITY QUANTIFIED, NO PAIN PRESENT: ICD-10-PCS | Mod: S$GLB,,, | Performed by: NURSE PRACTITIONER

## 2021-01-28 PROCEDURE — 3008F PR BODY MASS INDEX (BMI) DOCUMENTED: ICD-10-PCS | Mod: S$GLB,,, | Performed by: NURSE PRACTITIONER

## 2021-01-28 PROCEDURE — 3075F SYST BP GE 130 - 139MM HG: CPT | Mod: S$GLB,,, | Performed by: NURSE PRACTITIONER

## 2021-01-28 PROCEDURE — 1126F AMNT PAIN NOTED NONE PRSNT: CPT | Mod: S$GLB,,, | Performed by: NURSE PRACTITIONER

## 2021-01-28 PROCEDURE — 3079F PR MOST RECENT DIASTOLIC BLOOD PRESSURE 80-89 MM HG: ICD-10-PCS | Mod: S$GLB,,, | Performed by: NURSE PRACTITIONER

## 2021-01-28 PROCEDURE — 3052F PR MOST RECENT HEMOGLOBIN A1C LEVEL 8.0 - < 9.0%: ICD-10-PCS | Mod: S$GLB,,, | Performed by: NURSE PRACTITIONER

## 2021-01-28 PROCEDURE — 3075F PR MOST RECENT SYSTOLIC BLOOD PRESS GE 130-139MM HG: ICD-10-PCS | Mod: S$GLB,,, | Performed by: NURSE PRACTITIONER

## 2021-01-28 PROCEDURE — 99214 PR OFFICE/OUTPT VISIT, EST, LEVL IV, 30-39 MIN: ICD-10-PCS | Mod: S$GLB,,, | Performed by: NURSE PRACTITIONER

## 2021-01-28 PROCEDURE — 3008F BODY MASS INDEX DOCD: CPT | Mod: S$GLB,,, | Performed by: NURSE PRACTITIONER

## 2021-01-28 RX ORDER — METFORMIN HYDROCHLORIDE 1000 MG/1
1000 TABLET ORAL 2 TIMES DAILY WITH MEALS
Qty: 180 TABLET | Refills: 1 | Status: SHIPPED | OUTPATIENT
Start: 2021-01-28 | End: 2021-02-04 | Stop reason: SDUPTHER

## 2021-01-28 RX ORDER — NEBIVOLOL HYDROCHLORIDE 20 MG/1
1 TABLET ORAL DAILY
Qty: 90 TABLET | Refills: 1 | Status: SHIPPED | OUTPATIENT
Start: 2021-01-28 | End: 2021-02-04 | Stop reason: SDUPTHER

## 2021-01-28 RX ORDER — FENOFIBRATE 160 MG/1
TABLET ORAL
COMMUNITY
End: 2021-10-19

## 2021-01-28 RX ORDER — CLOPIDOGREL BISULFATE 75 MG/1
75 TABLET ORAL DAILY
Qty: 90 TABLET | Refills: 1 | Status: SHIPPED | OUTPATIENT
Start: 2021-01-28 | End: 2021-02-04 | Stop reason: SDUPTHER

## 2021-01-28 RX ORDER — EMPAGLIFLOZIN 25 MG/1
25 TABLET, FILM COATED ORAL DAILY
Qty: 90 TABLET | Refills: 1 | Status: SHIPPED | OUTPATIENT
Start: 2021-01-28 | End: 2021-02-04 | Stop reason: SDUPTHER

## 2021-01-28 RX ORDER — LISINOPRIL 40 MG/1
40 TABLET ORAL DAILY
Qty: 90 TABLET | Refills: 1 | Status: SHIPPED | OUTPATIENT
Start: 2021-01-28 | End: 2021-02-04 | Stop reason: SDUPTHER

## 2021-02-03 ENCOUNTER — TELEPHONE (OUTPATIENT)
Dept: FAMILY MEDICINE | Facility: CLINIC | Age: 61
End: 2021-02-03

## 2021-02-03 DIAGNOSIS — I25.2 HX OF MYOCARDIAL INFARCTION: ICD-10-CM

## 2021-02-03 DIAGNOSIS — E11.9 TYPE 2 DIABETES MELLITUS WITHOUT COMPLICATION, WITHOUT LONG-TERM CURRENT USE OF INSULIN: ICD-10-CM

## 2021-02-03 DIAGNOSIS — I10 ESSENTIAL HYPERTENSION: ICD-10-CM

## 2021-02-04 RX ORDER — EMPAGLIFLOZIN 25 MG/1
25 TABLET, FILM COATED ORAL DAILY
Qty: 90 TABLET | Refills: 1 | Status: SHIPPED | OUTPATIENT
Start: 2021-02-04 | End: 2021-10-19

## 2021-02-04 RX ORDER — CLOPIDOGREL BISULFATE 75 MG/1
75 TABLET ORAL DAILY
Qty: 90 TABLET | Refills: 1 | Status: SHIPPED | OUTPATIENT
Start: 2021-02-04 | End: 2021-10-19 | Stop reason: SDUPTHER

## 2021-02-04 RX ORDER — NEBIVOLOL HYDROCHLORIDE 20 MG/1
1 TABLET ORAL DAILY
Qty: 90 TABLET | Refills: 1 | Status: SHIPPED | OUTPATIENT
Start: 2021-02-04 | End: 2021-10-19

## 2021-02-04 RX ORDER — METFORMIN HYDROCHLORIDE 1000 MG/1
1000 TABLET ORAL 2 TIMES DAILY WITH MEALS
Qty: 180 TABLET | Refills: 1 | Status: SHIPPED | OUTPATIENT
Start: 2021-02-04 | End: 2021-10-19 | Stop reason: SDUPTHER

## 2021-02-04 RX ORDER — LISINOPRIL 40 MG/1
40 TABLET ORAL DAILY
Qty: 90 TABLET | Refills: 1 | Status: SHIPPED | OUTPATIENT
Start: 2021-02-04 | End: 2021-10-19 | Stop reason: SDUPTHER

## 2021-10-13 ENCOUNTER — LAB VISIT (OUTPATIENT)
Dept: LAB | Facility: HOSPITAL | Age: 61
End: 2021-10-13
Attending: NURSE PRACTITIONER
Payer: COMMERCIAL

## 2021-10-13 DIAGNOSIS — E11.9 TYPE 2 DIABETES MELLITUS WITHOUT COMPLICATION, WITHOUT LONG-TERM CURRENT USE OF INSULIN: ICD-10-CM

## 2021-10-13 DIAGNOSIS — U07.1 COVID-19 VIRUS INFECTION: ICD-10-CM

## 2021-10-13 LAB
ALBUMIN SERPL BCP-MCNC: 4.3 G/DL (ref 3.5–5.2)
ALP SERPL-CCNC: 46 U/L (ref 55–135)
ALT SERPL W/O P-5'-P-CCNC: 26 U/L (ref 10–44)
ANION GAP SERPL CALC-SCNC: 13 MMOL/L (ref 8–16)
AST SERPL-CCNC: 21 U/L (ref 10–40)
BILIRUB SERPL-MCNC: 1.2 MG/DL (ref 0.1–1)
BUN SERPL-MCNC: 14 MG/DL (ref 8–23)
CALCIUM SERPL-MCNC: 9.7 MG/DL (ref 8.7–10.5)
CHLORIDE SERPL-SCNC: 105 MMOL/L (ref 95–110)
CO2 SERPL-SCNC: 25 MMOL/L (ref 23–29)
CREAT SERPL-MCNC: 0.9 MG/DL (ref 0.5–1.4)
EST. GFR  (AFRICAN AMERICAN): >60 ML/MIN/1.73 M^2
EST. GFR  (NON AFRICAN AMERICAN): >60 ML/MIN/1.73 M^2
ESTIMATED AVG GLUCOSE: 214 MG/DL (ref 68–131)
GLUCOSE SERPL-MCNC: 231 MG/DL (ref 70–110)
HBA1C MFR BLD: 9.1 % (ref 4.5–6.2)
POTASSIUM SERPL-SCNC: 4.2 MMOL/L (ref 3.5–5.1)
PROT SERPL-MCNC: 7.3 G/DL (ref 6–8.4)
SARS-COV-2 IGG SERPL IA-ACNC: 479.2 AU/ML
SARS-COV-2 IGG SERPL QL IA: POSITIVE
SODIUM SERPL-SCNC: 143 MMOL/L (ref 136–145)

## 2021-10-13 PROCEDURE — 86769 SARS-COV-2 COVID-19 ANTIBODY: CPT | Performed by: NURSE PRACTITIONER

## 2021-10-13 PROCEDURE — 80053 COMPREHEN METABOLIC PANEL: CPT | Performed by: NURSE PRACTITIONER

## 2021-10-13 PROCEDURE — 36415 COLL VENOUS BLD VENIPUNCTURE: CPT | Performed by: NURSE PRACTITIONER

## 2021-10-13 PROCEDURE — 83036 HEMOGLOBIN GLYCOSYLATED A1C: CPT | Performed by: NURSE PRACTITIONER

## 2021-10-19 ENCOUNTER — OFFICE VISIT (OUTPATIENT)
Dept: FAMILY MEDICINE | Facility: CLINIC | Age: 61
End: 2021-10-19
Payer: COMMERCIAL

## 2021-10-19 VITALS
OXYGEN SATURATION: 97 % | WEIGHT: 315 LBS | HEIGHT: 74 IN | HEART RATE: 74 BPM | DIASTOLIC BLOOD PRESSURE: 81 MMHG | BODY MASS INDEX: 40.43 KG/M2 | SYSTOLIC BLOOD PRESSURE: 126 MMHG

## 2021-10-19 DIAGNOSIS — E11.9 TYPE 2 DIABETES MELLITUS WITHOUT COMPLICATION, WITHOUT LONG-TERM CURRENT USE OF INSULIN: Primary | ICD-10-CM

## 2021-10-19 DIAGNOSIS — I25.2 HX OF MYOCARDIAL INFARCTION: ICD-10-CM

## 2021-10-19 DIAGNOSIS — I10 ESSENTIAL HYPERTENSION: ICD-10-CM

## 2021-10-19 PROCEDURE — 3079F DIAST BP 80-89 MM HG: CPT | Mod: 95,,, | Performed by: NURSE PRACTITIONER

## 2021-10-19 PROCEDURE — 3046F PR MOST RECENT HEMOGLOBIN A1C LEVEL > 9.0%: ICD-10-PCS | Mod: 95,,, | Performed by: NURSE PRACTITIONER

## 2021-10-19 PROCEDURE — 3061F NEG MICROALBUMINURIA REV: CPT | Mod: 95,,, | Performed by: NURSE PRACTITIONER

## 2021-10-19 PROCEDURE — 3046F HEMOGLOBIN A1C LEVEL >9.0%: CPT | Mod: 95,,, | Performed by: NURSE PRACTITIONER

## 2021-10-19 PROCEDURE — 99213 OFFICE O/P EST LOW 20 MIN: CPT | Mod: 95,,, | Performed by: NURSE PRACTITIONER

## 2021-10-19 PROCEDURE — 3079F PR MOST RECENT DIASTOLIC BLOOD PRESSURE 80-89 MM HG: ICD-10-PCS | Mod: 95,,, | Performed by: NURSE PRACTITIONER

## 2021-10-19 PROCEDURE — 1160F RVW MEDS BY RX/DR IN RCRD: CPT | Mod: 95,,, | Performed by: NURSE PRACTITIONER

## 2021-10-19 PROCEDURE — 3061F PR NEG MICROALBUMINURIA RESULT DOCUMENTED/REVIEW: ICD-10-PCS | Mod: 95,,, | Performed by: NURSE PRACTITIONER

## 2021-10-19 PROCEDURE — 4010F ACE/ARB THERAPY RXD/TAKEN: CPT | Mod: 95,,, | Performed by: NURSE PRACTITIONER

## 2021-10-19 PROCEDURE — 3066F NEPHROPATHY DOC TX: CPT | Mod: 95,,, | Performed by: NURSE PRACTITIONER

## 2021-10-19 PROCEDURE — 3008F PR BODY MASS INDEX (BMI) DOCUMENTED: ICD-10-PCS | Mod: 95,,, | Performed by: NURSE PRACTITIONER

## 2021-10-19 PROCEDURE — 3074F PR MOST RECENT SYSTOLIC BLOOD PRESSURE < 130 MM HG: ICD-10-PCS | Mod: 95,,, | Performed by: NURSE PRACTITIONER

## 2021-10-19 PROCEDURE — 99213 PR OFFICE/OUTPT VISIT, EST, LEVL III, 20-29 MIN: ICD-10-PCS | Mod: 95,,, | Performed by: NURSE PRACTITIONER

## 2021-10-19 PROCEDURE — 3066F PR DOCUMENTATION OF TREATMENT FOR NEPHROPATHY: ICD-10-PCS | Mod: 95,,, | Performed by: NURSE PRACTITIONER

## 2021-10-19 PROCEDURE — 1160F PR REVIEW ALL MEDS BY PRESCRIBER/CLIN PHARMACIST DOCUMENTED: ICD-10-PCS | Mod: 95,,, | Performed by: NURSE PRACTITIONER

## 2021-10-19 PROCEDURE — 3008F BODY MASS INDEX DOCD: CPT | Mod: 95,,, | Performed by: NURSE PRACTITIONER

## 2021-10-19 PROCEDURE — 3074F SYST BP LT 130 MM HG: CPT | Mod: 95,,, | Performed by: NURSE PRACTITIONER

## 2021-10-19 PROCEDURE — 4010F PR ACE/ARB THEARPY RXD/TAKEN: ICD-10-PCS | Mod: 95,,, | Performed by: NURSE PRACTITIONER

## 2021-10-19 RX ORDER — DILTIAZEM HYDROCHLORIDE 120 MG/1
120 CAPSULE, EXTENDED RELEASE ORAL DAILY
Qty: 90 CAPSULE | Refills: 1 | Status: ON HOLD | OUTPATIENT
Start: 2021-10-19 | End: 2022-01-24 | Stop reason: HOSPADM

## 2021-10-19 RX ORDER — CARVEDILOL 6.25 MG/1
6.25 TABLET ORAL 2 TIMES DAILY
Status: ON HOLD | COMMUNITY
Start: 2021-08-01 | End: 2022-01-24 | Stop reason: HOSPADM

## 2021-10-19 RX ORDER — SEMAGLUTIDE 1.34 MG/ML
0.25 INJECTION, SOLUTION SUBCUTANEOUS
Qty: 1 PEN | Refills: 0 | COMMUNITY
Start: 2021-10-19 | End: 2022-02-21

## 2021-10-19 RX ORDER — METFORMIN HYDROCHLORIDE 1000 MG/1
1000 TABLET ORAL 2 TIMES DAILY WITH MEALS
Qty: 180 TABLET | Refills: 1 | Status: SHIPPED | OUTPATIENT
Start: 2021-10-19 | End: 2022-04-18

## 2021-10-19 RX ORDER — LISINOPRIL 40 MG/1
40 TABLET ORAL DAILY
Qty: 90 TABLET | Refills: 1 | Status: ON HOLD | OUTPATIENT
Start: 2021-10-19 | End: 2022-01-24 | Stop reason: HOSPADM

## 2021-10-19 RX ORDER — GLIPIZIDE 5 MG/1
5 TABLET ORAL
Qty: 90 TABLET | Refills: 1 | Status: SHIPPED | OUTPATIENT
Start: 2021-10-19 | End: 2022-04-18

## 2021-10-19 RX ORDER — CLOPIDOGREL BISULFATE 75 MG/1
75 TABLET ORAL DAILY
Qty: 90 TABLET | Refills: 1 | Status: ON HOLD | OUTPATIENT
Start: 2021-10-19 | End: 2022-01-24 | Stop reason: HOSPADM

## 2021-10-20 ENCOUNTER — CLINICAL SUPPORT (OUTPATIENT)
Dept: FAMILY MEDICINE | Facility: CLINIC | Age: 61
End: 2021-10-20
Payer: COMMERCIAL

## 2021-10-20 VITALS — TEMPERATURE: 99 F

## 2021-10-20 DIAGNOSIS — Z23 NEED FOR INFLUENZA VACCINATION: Primary | ICD-10-CM

## 2021-10-20 PROCEDURE — 90686 FLU VACCINE (QUAD) GREATER THAN OR EQUAL TO 3YO PRESERVATIVE FREE IM: ICD-10-PCS | Mod: S$GLB,,, | Performed by: NURSE PRACTITIONER

## 2021-10-20 PROCEDURE — 90471 FLU VACCINE (QUAD) GREATER THAN OR EQUAL TO 3YO PRESERVATIVE FREE IM: ICD-10-PCS | Mod: S$GLB,,, | Performed by: NURSE PRACTITIONER

## 2021-10-20 PROCEDURE — 90471 IMMUNIZATION ADMIN: CPT | Mod: S$GLB,,, | Performed by: NURSE PRACTITIONER

## 2021-10-20 PROCEDURE — 90686 IIV4 VACC NO PRSV 0.5 ML IM: CPT | Mod: S$GLB,,, | Performed by: NURSE PRACTITIONER

## 2021-11-19 ENCOUNTER — OFFICE VISIT (OUTPATIENT)
Dept: FAMILY MEDICINE | Facility: CLINIC | Age: 61
End: 2021-11-19
Payer: COMMERCIAL

## 2021-11-19 DIAGNOSIS — E78.5 HYPERLIPIDEMIA, UNSPECIFIED HYPERLIPIDEMIA TYPE: ICD-10-CM

## 2021-11-19 DIAGNOSIS — E11.65 TYPE 2 DIABETES MELLITUS WITH HYPERGLYCEMIA, WITHOUT LONG-TERM CURRENT USE OF INSULIN: Primary | ICD-10-CM

## 2021-11-19 DIAGNOSIS — Z12.5 SCREENING FOR PROSTATE CANCER: ICD-10-CM

## 2021-11-19 DIAGNOSIS — I10 ESSENTIAL HYPERTENSION: ICD-10-CM

## 2021-11-19 PROCEDURE — 3066F PR DOCUMENTATION OF TREATMENT FOR NEPHROPATHY: ICD-10-PCS | Mod: 95,,, | Performed by: NURSE PRACTITIONER

## 2021-11-19 PROCEDURE — 1160F RVW MEDS BY RX/DR IN RCRD: CPT | Mod: 95,,, | Performed by: NURSE PRACTITIONER

## 2021-11-19 PROCEDURE — 99213 OFFICE O/P EST LOW 20 MIN: CPT | Mod: 95,,, | Performed by: NURSE PRACTITIONER

## 2021-11-19 PROCEDURE — 3046F HEMOGLOBIN A1C LEVEL >9.0%: CPT | Mod: 95,,, | Performed by: NURSE PRACTITIONER

## 2021-11-19 PROCEDURE — 3046F PR MOST RECENT HEMOGLOBIN A1C LEVEL > 9.0%: ICD-10-PCS | Mod: 95,,, | Performed by: NURSE PRACTITIONER

## 2021-11-19 PROCEDURE — 3066F NEPHROPATHY DOC TX: CPT | Mod: 95,,, | Performed by: NURSE PRACTITIONER

## 2021-11-19 PROCEDURE — 1160F PR REVIEW ALL MEDS BY PRESCRIBER/CLIN PHARMACIST DOCUMENTED: ICD-10-PCS | Mod: 95,,, | Performed by: NURSE PRACTITIONER

## 2021-11-19 PROCEDURE — 3061F NEG MICROALBUMINURIA REV: CPT | Mod: 95,,, | Performed by: NURSE PRACTITIONER

## 2021-11-19 PROCEDURE — 4010F ACE/ARB THERAPY RXD/TAKEN: CPT | Mod: 95,,, | Performed by: NURSE PRACTITIONER

## 2021-11-19 PROCEDURE — 3061F PR NEG MICROALBUMINURIA RESULT DOCUMENTED/REVIEW: ICD-10-PCS | Mod: 95,,, | Performed by: NURSE PRACTITIONER

## 2021-11-19 PROCEDURE — 99213 PR OFFICE/OUTPT VISIT, EST, LEVL III, 20-29 MIN: ICD-10-PCS | Mod: 95,,, | Performed by: NURSE PRACTITIONER

## 2021-11-19 PROCEDURE — 4010F PR ACE/ARB THEARPY RXD/TAKEN: ICD-10-PCS | Mod: 95,,, | Performed by: NURSE PRACTITIONER

## 2022-01-13 LAB
LEFT EYE DM RETINOPATHY: POSITIVE
RIGHT EYE DM RETINOPATHY: POSITIVE

## 2022-01-20 ENCOUNTER — HOSPITAL ENCOUNTER (INPATIENT)
Facility: HOSPITAL | Age: 62
LOS: 4 days | Discharge: HOME | DRG: 308 | End: 2022-01-24
Attending: EMERGENCY MEDICINE | Admitting: INTERNAL MEDICINE
Payer: COMMERCIAL

## 2022-01-20 ENCOUNTER — CLINICAL SUPPORT (OUTPATIENT)
Dept: CARDIOLOGY | Facility: HOSPITAL | Age: 62
DRG: 308 | End: 2022-01-20
Attending: INTERNAL MEDICINE
Payer: COMMERCIAL

## 2022-01-20 VITALS — WEIGHT: 315 LBS | HEIGHT: 74 IN | BODY MASS INDEX: 40.43 KG/M2

## 2022-01-20 DIAGNOSIS — R06.03 RESPIRATORY DISTRESS: ICD-10-CM

## 2022-01-20 DIAGNOSIS — I50.9 ACUTE ON CHRONIC CONGESTIVE HEART FAILURE, UNSPECIFIED HEART FAILURE TYPE: ICD-10-CM

## 2022-01-20 DIAGNOSIS — R07.9 CHEST PAIN: ICD-10-CM

## 2022-01-20 DIAGNOSIS — R06.02 SOB (SHORTNESS OF BREATH): ICD-10-CM

## 2022-01-20 DIAGNOSIS — J96.01 ACUTE HYPOXEMIC RESPIRATORY FAILURE: ICD-10-CM

## 2022-01-20 DIAGNOSIS — I48.91 ATRIAL FIBRILLATION WITH RVR: Primary | ICD-10-CM

## 2022-01-20 DIAGNOSIS — E11.9 TYPE 2 DIABETES MELLITUS WITHOUT COMPLICATION, WITHOUT LONG-TERM CURRENT USE OF INSULIN: ICD-10-CM

## 2022-01-20 LAB
ALBUMIN SERPL BCP-MCNC: 4.6 G/DL (ref 3.5–5.2)
ALP SERPL-CCNC: 58 U/L (ref 55–135)
ALT SERPL W/O P-5'-P-CCNC: 18 U/L (ref 10–44)
ANION GAP SERPL CALC-SCNC: 13 MMOL/L (ref 8–16)
AST SERPL-CCNC: 16 U/L (ref 10–40)
BASOPHILS # BLD AUTO: 0.05 K/UL (ref 0–0.2)
BASOPHILS # BLD AUTO: 0.07 K/UL (ref 0–0.2)
BASOPHILS NFR BLD: 0.5 % (ref 0–1.9)
BASOPHILS NFR BLD: 0.6 % (ref 0–1.9)
BILIRUB SERPL-MCNC: 1.7 MG/DL (ref 0.1–1)
BNP SERPL-MCNC: 828 PG/ML (ref 0–99)
BUN SERPL-MCNC: 19 MG/DL (ref 8–23)
CALCIUM SERPL-MCNC: 9.4 MG/DL (ref 8.7–10.5)
CHLORIDE SERPL-SCNC: 100 MMOL/L (ref 95–110)
CO2 SERPL-SCNC: 21 MMOL/L (ref 23–29)
CREAT SERPL-MCNC: 1 MG/DL (ref 0.5–1.4)
DIFFERENTIAL METHOD: ABNORMAL
DIFFERENTIAL METHOD: ABNORMAL
EOSINOPHIL # BLD AUTO: 0 K/UL (ref 0–0.5)
EOSINOPHIL # BLD AUTO: 0.1 K/UL (ref 0–0.5)
EOSINOPHIL NFR BLD: 0.3 % (ref 0–8)
EOSINOPHIL NFR BLD: 1 % (ref 0–8)
ERYTHROCYTE [DISTWIDTH] IN BLOOD BY AUTOMATED COUNT: 14.6 % (ref 11.5–14.5)
ERYTHROCYTE [DISTWIDTH] IN BLOOD BY AUTOMATED COUNT: 14.6 % (ref 11.5–14.5)
EST. GFR  (AFRICAN AMERICAN): >60 ML/MIN/1.73 M^2
EST. GFR  (NON AFRICAN AMERICAN): >60 ML/MIN/1.73 M^2
ESTIMATED AVG GLUCOSE: 194 MG/DL (ref 68–131)
GLUCOSE SERPL-MCNC: 265 MG/DL (ref 70–110)
GLUCOSE SERPL-MCNC: 320 MG/DL (ref 70–110)
GLUCOSE SERPL-MCNC: 322 MG/DL (ref 70–110)
GLUCOSE SERPL-MCNC: 339 MG/DL (ref 70–110)
GLUCOSE SERPL-MCNC: 425 MG/DL (ref 70–110)
HBA1C MFR BLD: 8.4 % (ref 4.5–6.2)
HCT VFR BLD AUTO: 44.5 % (ref 40–54)
HCT VFR BLD AUTO: 47.8 % (ref 40–54)
HGB BLD-MCNC: 14.6 G/DL (ref 14–18)
HGB BLD-MCNC: 15.5 G/DL (ref 14–18)
IMM GRANULOCYTES # BLD AUTO: 0.04 K/UL (ref 0–0.04)
IMM GRANULOCYTES # BLD AUTO: 0.04 K/UL (ref 0–0.04)
IMM GRANULOCYTES NFR BLD AUTO: 0.3 % (ref 0–0.5)
IMM GRANULOCYTES NFR BLD AUTO: 0.4 % (ref 0–0.5)
INR PPP: 1.2
LYMPHOCYTES # BLD AUTO: 1.3 K/UL (ref 1–4.8)
LYMPHOCYTES # BLD AUTO: 2.2 K/UL (ref 1–4.8)
LYMPHOCYTES NFR BLD: 14.6 % (ref 18–48)
LYMPHOCYTES NFR BLD: 19.5 % (ref 18–48)
MAGNESIUM SERPL-MCNC: 1.6 MG/DL (ref 1.6–2.6)
MCH RBC QN AUTO: 27.1 PG (ref 27–31)
MCH RBC QN AUTO: 27.7 PG (ref 27–31)
MCHC RBC AUTO-ENTMCNC: 32.4 G/DL (ref 32–36)
MCHC RBC AUTO-ENTMCNC: 32.8 G/DL (ref 32–36)
MCV RBC AUTO: 83 FL (ref 82–98)
MCV RBC AUTO: 84 FL (ref 82–98)
MONOCYTES # BLD AUTO: 0.6 K/UL (ref 0.3–1)
MONOCYTES # BLD AUTO: 0.8 K/UL (ref 0.3–1)
MONOCYTES NFR BLD: 6.2 % (ref 4–15)
MONOCYTES NFR BLD: 6.6 % (ref 4–15)
NEUTROPHILS # BLD AUTO: 7.1 K/UL (ref 1.8–7.7)
NEUTROPHILS # BLD AUTO: 8.2 K/UL (ref 1.8–7.7)
NEUTROPHILS NFR BLD: 72 % (ref 38–73)
NEUTROPHILS NFR BLD: 78 % (ref 38–73)
NRBC BLD-RTO: 0 /100 WBC
NRBC BLD-RTO: 0 /100 WBC
PLATELET # BLD AUTO: 244 K/UL (ref 150–450)
PLATELET # BLD AUTO: 278 K/UL (ref 150–450)
PMV BLD AUTO: 10.8 FL (ref 9.2–12.9)
PMV BLD AUTO: 10.9 FL (ref 9.2–12.9)
POTASSIUM SERPL-SCNC: 4.5 MMOL/L (ref 3.5–5.1)
PROT SERPL-MCNC: 8 G/DL (ref 6–8.4)
PROTHROMBIN TIME: 14.4 SEC (ref 11.4–13.7)
RBC # BLD AUTO: 5.28 M/UL (ref 4.6–6.2)
RBC # BLD AUTO: 5.73 M/UL (ref 4.6–6.2)
SARS-COV-2 RDRP RESP QL NAA+PROBE: NEGATIVE
SODIUM SERPL-SCNC: 134 MMOL/L (ref 136–145)
TROPONIN I SERPL DL<=0.01 NG/ML-MCNC: 0.04 NG/ML
TSH SERPL DL<=0.005 MIU/L-ACNC: 3.84 UIU/ML (ref 0.34–5.6)
WBC # BLD AUTO: 11.45 K/UL (ref 3.9–12.7)
WBC # BLD AUTO: 9.1 K/UL (ref 3.9–12.7)

## 2022-01-20 PROCEDURE — 96375 TX/PRO/DX INJ NEW DRUG ADDON: CPT

## 2022-01-20 PROCEDURE — 83735 ASSAY OF MAGNESIUM: CPT | Mod: 91 | Performed by: INTERNAL MEDICINE

## 2022-01-20 PROCEDURE — 93306 TTE W/DOPPLER COMPLETE: CPT

## 2022-01-20 PROCEDURE — 93005 ELECTROCARDIOGRAM TRACING: CPT | Performed by: SPECIALIST

## 2022-01-20 PROCEDURE — 63600175 PHARM REV CODE 636 W HCPCS: Performed by: INTERNAL MEDICINE

## 2022-01-20 PROCEDURE — 25000003 PHARM REV CODE 250: Performed by: INTERNAL MEDICINE

## 2022-01-20 PROCEDURE — 85025 COMPLETE CBC W/AUTO DIFF WBC: CPT | Performed by: EMERGENCY MEDICINE

## 2022-01-20 PROCEDURE — 93010 ELECTROCARDIOGRAM REPORT: CPT | Mod: 76,,, | Performed by: SPECIALIST

## 2022-01-20 PROCEDURE — 93010 ELECTROCARDIOGRAM REPORT: CPT | Mod: ,,, | Performed by: SPECIALIST

## 2022-01-20 PROCEDURE — 80053 COMPREHEN METABOLIC PANEL: CPT | Performed by: EMERGENCY MEDICINE

## 2022-01-20 PROCEDURE — 21000000 HC CCU ICU ROOM CHARGE

## 2022-01-20 PROCEDURE — 99900035 HC TECH TIME PER 15 MIN (STAT)

## 2022-01-20 PROCEDURE — 93010 EKG 12-LEAD: ICD-10-PCS | Mod: ,,, | Performed by: SPECIALIST

## 2022-01-20 PROCEDURE — 83880 ASSAY OF NATRIURETIC PEPTIDE: CPT | Performed by: EMERGENCY MEDICINE

## 2022-01-20 PROCEDURE — 85025 COMPLETE CBC W/AUTO DIFF WBC: CPT | Mod: 91 | Performed by: INTERNAL MEDICINE

## 2022-01-20 PROCEDURE — 99900031 HC PATIENT EDUCATION (STAT)

## 2022-01-20 PROCEDURE — 27000221 HC OXYGEN, UP TO 24 HOURS

## 2022-01-20 PROCEDURE — 83036 HEMOGLOBIN GLYCOSYLATED A1C: CPT | Performed by: INTERNAL MEDICINE

## 2022-01-20 PROCEDURE — 94660 CPAP INITIATION&MGMT: CPT

## 2022-01-20 PROCEDURE — U0002 COVID-19 LAB TEST NON-CDC: HCPCS | Performed by: EMERGENCY MEDICINE

## 2022-01-20 PROCEDURE — 94761 N-INVAS EAR/PLS OXIMETRY MLT: CPT

## 2022-01-20 PROCEDURE — 85610 PROTHROMBIN TIME: CPT | Performed by: EMERGENCY MEDICINE

## 2022-01-20 PROCEDURE — 84484 ASSAY OF TROPONIN QUANT: CPT | Performed by: EMERGENCY MEDICINE

## 2022-01-20 PROCEDURE — 83735 ASSAY OF MAGNESIUM: CPT | Performed by: INTERNAL MEDICINE

## 2022-01-20 PROCEDURE — 36415 COLL VENOUS BLD VENIPUNCTURE: CPT | Performed by: INTERNAL MEDICINE

## 2022-01-20 PROCEDURE — 96365 THER/PROPH/DIAG IV INF INIT: CPT

## 2022-01-20 PROCEDURE — 99291 CRITICAL CARE FIRST HOUR: CPT

## 2022-01-20 PROCEDURE — 84443 ASSAY THYROID STIM HORMONE: CPT | Performed by: INTERNAL MEDICINE

## 2022-01-20 PROCEDURE — 63600175 PHARM REV CODE 636 W HCPCS: Performed by: EMERGENCY MEDICINE

## 2022-01-20 RX ORDER — ENOXAPARIN SODIUM 100 MG/ML
1 INJECTION SUBCUTANEOUS
Status: COMPLETED | OUTPATIENT
Start: 2022-01-20 | End: 2022-01-20

## 2022-01-20 RX ORDER — HYDROCODONE BITARTRATE AND ACETAMINOPHEN 7.5; 325 MG/1; MG/1
1 TABLET ORAL EVERY 4 HOURS PRN
Status: DISCONTINUED | OUTPATIENT
Start: 2022-01-20 | End: 2022-01-24 | Stop reason: HOSPADM

## 2022-01-20 RX ORDER — FUROSEMIDE 20 MG/1
20 TABLET ORAL 2 TIMES DAILY
Status: DISCONTINUED | OUTPATIENT
Start: 2022-01-20 | End: 2022-01-24 | Stop reason: HOSPADM

## 2022-01-20 RX ORDER — HYDRALAZINE HYDROCHLORIDE 20 MG/ML
10 INJECTION INTRAMUSCULAR; INTRAVENOUS EVERY 6 HOURS PRN
Status: DISCONTINUED | OUTPATIENT
Start: 2022-01-20 | End: 2022-01-24 | Stop reason: HOSPADM

## 2022-01-20 RX ORDER — POTASSIUM CHLORIDE 7.45 MG/ML
40 INJECTION INTRAVENOUS
Status: DISCONTINUED | OUTPATIENT
Start: 2022-01-20 | End: 2022-01-24 | Stop reason: HOSPADM

## 2022-01-20 RX ORDER — POTASSIUM CHLORIDE 20 MEQ/1
20 TABLET, EXTENDED RELEASE ORAL
Status: DISCONTINUED | OUTPATIENT
Start: 2022-01-20 | End: 2022-01-24 | Stop reason: HOSPADM

## 2022-01-20 RX ORDER — INSULIN ASPART 100 [IU]/ML
1-10 INJECTION, SOLUTION INTRAVENOUS; SUBCUTANEOUS
Status: DISCONTINUED | OUTPATIENT
Start: 2022-01-20 | End: 2022-01-20

## 2022-01-20 RX ORDER — POTASSIUM CHLORIDE 7.45 MG/ML
20 INJECTION INTRAVENOUS
Status: DISCONTINUED | OUTPATIENT
Start: 2022-01-20 | End: 2022-01-24 | Stop reason: HOSPADM

## 2022-01-20 RX ORDER — ENOXAPARIN SODIUM 100 MG/ML
1 INJECTION SUBCUTANEOUS
Status: DISCONTINUED | OUTPATIENT
Start: 2022-01-20 | End: 2022-01-22

## 2022-01-20 RX ORDER — DILTIAZEM HYDROCHLORIDE 180 MG/1
180 CAPSULE, COATED, EXTENDED RELEASE ORAL DAILY
Status: DISCONTINUED | OUTPATIENT
Start: 2022-01-20 | End: 2022-01-21

## 2022-01-20 RX ORDER — LANOLIN ALCOHOL/MO/W.PET/CERES
800 CREAM (GRAM) TOPICAL
Status: DISCONTINUED | OUTPATIENT
Start: 2022-01-20 | End: 2022-01-24 | Stop reason: HOSPADM

## 2022-01-20 RX ORDER — POTASSIUM CHLORIDE 20 MEQ/1
40 TABLET, EXTENDED RELEASE ORAL
Status: DISCONTINUED | OUTPATIENT
Start: 2022-01-20 | End: 2022-01-24 | Stop reason: HOSPADM

## 2022-01-20 RX ORDER — IBUPROFEN 200 MG
24 TABLET ORAL
Status: DISCONTINUED | OUTPATIENT
Start: 2022-01-20 | End: 2022-01-20

## 2022-01-20 RX ORDER — GLUCAGON 1 MG
1 KIT INJECTION
Status: DISCONTINUED | OUTPATIENT
Start: 2022-01-20 | End: 2022-01-20

## 2022-01-20 RX ORDER — ACETAMINOPHEN 325 MG/1
650 TABLET ORAL EVERY 6 HOURS PRN
Status: DISCONTINUED | OUTPATIENT
Start: 2022-01-20 | End: 2022-01-24 | Stop reason: HOSPADM

## 2022-01-20 RX ORDER — IBUPROFEN 200 MG
16 TABLET ORAL
Status: DISCONTINUED | OUTPATIENT
Start: 2022-01-20 | End: 2022-01-20

## 2022-01-20 RX ORDER — MAGNESIUM SULFATE HEPTAHYDRATE 40 MG/ML
2 INJECTION, SOLUTION INTRAVENOUS
Status: DISCONTINUED | OUTPATIENT
Start: 2022-01-20 | End: 2022-01-24 | Stop reason: HOSPADM

## 2022-01-20 RX ORDER — AMIODARONE HYDROCHLORIDE 150 MG/3ML
150 INJECTION, SOLUTION INTRAVENOUS
Status: COMPLETED | OUTPATIENT
Start: 2022-01-20 | End: 2022-01-20

## 2022-01-20 RX ORDER — FUROSEMIDE 10 MG/ML
40 INJECTION INTRAMUSCULAR; INTRAVENOUS
Status: COMPLETED | OUTPATIENT
Start: 2022-01-20 | End: 2022-01-20

## 2022-01-20 RX ORDER — MAGNESIUM SULFATE 1 G/100ML
1 INJECTION INTRAVENOUS
Status: DISCONTINUED | OUTPATIENT
Start: 2022-01-20 | End: 2022-01-24 | Stop reason: HOSPADM

## 2022-01-20 RX ORDER — MAGNESIUM SULFATE HEPTAHYDRATE 40 MG/ML
4 INJECTION, SOLUTION INTRAVENOUS
Status: DISCONTINUED | OUTPATIENT
Start: 2022-01-20 | End: 2022-01-24 | Stop reason: HOSPADM

## 2022-01-20 RX ORDER — SODIUM CHLORIDE 0.9 % (FLUSH) 0.9 %
10 SYRINGE (ML) INJECTION
Status: DISCONTINUED | OUTPATIENT
Start: 2022-01-20 | End: 2022-01-24 | Stop reason: HOSPADM

## 2022-01-20 RX ORDER — CARVEDILOL 6.25 MG/1
6.25 TABLET ORAL 2 TIMES DAILY
Status: DISCONTINUED | OUTPATIENT
Start: 2022-01-20 | End: 2022-01-21

## 2022-01-20 RX ORDER — CLOPIDOGREL BISULFATE 75 MG/1
75 TABLET ORAL DAILY
Status: DISCONTINUED | OUTPATIENT
Start: 2022-01-20 | End: 2022-01-24 | Stop reason: HOSPADM

## 2022-01-20 RX ORDER — ASPIRIN 325 MG
325 TABLET ORAL DAILY
Status: ON HOLD | COMMUNITY
End: 2022-01-24 | Stop reason: HOSPADM

## 2022-01-20 RX ORDER — LISINOPRIL 20 MG/1
40 TABLET ORAL DAILY
Status: DISCONTINUED | OUTPATIENT
Start: 2022-01-20 | End: 2022-01-22

## 2022-01-20 RX ORDER — ATORVASTATIN CALCIUM 40 MG/1
80 TABLET, FILM COATED ORAL NIGHTLY
Status: DISCONTINUED | OUTPATIENT
Start: 2022-01-20 | End: 2022-01-24 | Stop reason: HOSPADM

## 2022-01-20 RX ADMIN — AMIODARONE HYDROCHLORIDE 150 MG: 50 INJECTION, SOLUTION INTRAVENOUS at 02:01

## 2022-01-20 RX ADMIN — LISINOPRIL 40 MG: 20 TABLET ORAL at 10:01

## 2022-01-20 RX ADMIN — FUROSEMIDE 40 MG: 10 INJECTION, SOLUTION INTRAVENOUS at 03:01

## 2022-01-20 RX ADMIN — ACETAMINOPHEN 650 MG: 325 TABLET, FILM COATED ORAL at 06:01

## 2022-01-20 RX ADMIN — AMIODARONE HYDROCHLORIDE 1 MG/MIN: 1.8 INJECTION, SOLUTION INTRAVENOUS at 10:01

## 2022-01-20 RX ADMIN — FUROSEMIDE 20 MG: 20 TABLET ORAL at 05:01

## 2022-01-20 RX ADMIN — CARVEDILOL 6.25 MG: 6.25 TABLET, FILM COATED ORAL at 08:01

## 2022-01-20 RX ADMIN — AMIODARONE HYDROCHLORIDE 0.5 MG/MIN: 1.8 INJECTION, SOLUTION INTRAVENOUS at 05:01

## 2022-01-20 RX ADMIN — CLOPIDOGREL BISULFATE 75 MG: 75 TABLET, FILM COATED ORAL at 10:01

## 2022-01-20 RX ADMIN — ATORVASTATIN CALCIUM 80 MG: 40 TABLET, FILM COATED ORAL at 08:01

## 2022-01-20 RX ADMIN — ENOXAPARIN SODIUM 150 MG: 80 INJECTION, SOLUTION INTRAVENOUS; SUBCUTANEOUS at 05:01

## 2022-01-20 RX ADMIN — DILTIAZEM HYDROCHLORIDE 180 MG: 180 CAPSULE, COATED, EXTENDED RELEASE ORAL at 10:01

## 2022-01-20 RX ADMIN — ENOXAPARIN SODIUM 150 MG: 80 INJECTION SUBCUTANEOUS at 05:01

## 2022-01-20 RX ADMIN — INSULIN ASPART 8 UNITS: 100 INJECTION, SOLUTION INTRAVENOUS; SUBCUTANEOUS at 05:01

## 2022-01-20 RX ADMIN — INSULIN ASPART 10 UNITS: 100 INJECTION, SOLUTION INTRAVENOUS; SUBCUTANEOUS at 08:01

## 2022-01-20 RX ADMIN — MAGNESIUM SULFATE 2 G: 2 INJECTION INTRAVENOUS at 08:01

## 2022-01-20 RX ADMIN — AMIODARONE HYDROCHLORIDE 1 MG/MIN: 1.8 INJECTION, SOLUTION INTRAVENOUS at 02:01

## 2022-01-20 RX ADMIN — FUROSEMIDE 20 MG: 20 TABLET ORAL at 10:01

## 2022-01-20 RX ADMIN — CARVEDILOL 6.25 MG: 6.25 TABLET, FILM COATED ORAL at 10:01

## 2022-01-20 NOTE — ED PROVIDER NOTES
Encounter Date: 1/20/2022       History   No chief complaint on file.    This is a 61-year-old male with history of coronary artery disease, hypertension, diabetes who comes in complaining of shortness of breath.  Patient reports that he has been having dyspnea on exertion, orthopnea, shortness of breath even at rest over the past few days.  His symptoms got significantly worse tonight.  His wife reports that he is unable to sleep.  He is waking up in the middle of the night gasping for air.  Tonight that was severe and she insisted on bringing him to the ER.  Patient denies any associated chest pain but reports that he feels like his chest is congested.  He denies any lower extremity edema.  He denies any fevers or chills.  Symptoms have become severe and constant.  He denies any exacerbating or alleviating factors otherwise.        Review of patient's allergies indicates:  No Known Allergies  Past Medical History:   Diagnosis Date    Diabetes mellitus, type 2     Hypertension     Myocardial infarction 2013     Past Surgical History:   Procedure Laterality Date    CORONARY STENT PLACEMENT  2013     Family History   Problem Relation Age of Onset    Heart attack Father     Cancer Father         prostate     Social History     Tobacco Use    Smoking status: Never Smoker    Smokeless tobacco: Never Used   Substance Use Topics    Alcohol use: Yes     Comment: occ    Drug use: Never     Review of Systems   Constitutional: Negative for chills and fever.   HENT: Negative for congestion, sore throat and trouble swallowing.    Respiratory: Positive for cough and shortness of breath.    Cardiovascular: Negative for chest pain and palpitations.   Gastrointestinal: Negative for abdominal pain, diarrhea, nausea and vomiting.   Genitourinary: Negative for dysuria and flank pain.   Musculoskeletal: Negative for back pain and neck pain.   Neurological: Positive for weakness. Negative for numbness and headaches.    Psychiatric/Behavioral: Negative for agitation and confusion.   All other systems reviewed and are negative.      Physical Exam     Initial Vitals [01/20/22 0147]   BP Pulse Resp Temp SpO2   (!) 176/127 (!) 145 (!) 30 97.6 °F (36.4 °C) (!) 92 %      MAP       --         Physical Exam    Nursing note and vitals reviewed.  Constitutional: Vital signs are normal. He appears well-developed and well-nourished.  Non-toxic appearance.   Severe respiratory distress.  Diaphoretic and pale appearing.   HENT:   Head: Normocephalic and atraumatic.   Mouth/Throat: Oropharynx is clear and moist.   Eyes: Conjunctivae and EOM are normal. Pupils are equal, round, and reactive to light.   Neck: Neck supple.   Normal range of motion.  Cardiovascular: Intact distal pulses.   Irregularly irregular, tachycardic   Pulmonary/Chest:   Patient is in severe respiratory distress with coarse breath sounds bilaterally.   Abdominal: Abdomen is soft. Normal appearance and bowel sounds are normal. There is no abdominal tenderness.   Musculoskeletal:         General: No tenderness or edema. Normal range of motion.      Cervical back: Normal range of motion and neck supple. No rigidity. No muscular tenderness.     Lymphadenopathy:     He has no cervical adenopathy.     He has no axillary adenopathy.   Neurological: He is alert and oriented to person, place, and time. He has normal strength. No cranial nerve deficit or sensory deficit. Gait normal.   Skin: Skin is warm, dry and intact.   Psychiatric: He has a normal mood and affect. His behavior is normal.         ED Course   Critical Care    Date/Time: 1/20/2022 3:22 AM  Performed by: Parul Laguna MD  Authorized by: Parul Laguna MD   Direct patient critical care time: 15 minutes  Additional history critical care time: 5 minutes  Ordering / reviewing critical care time: 5 minutes  Documentation critical care time: 5 minutes  Consulting other physicians critical care time: 5 minutes  Total  critical care time (exclusive of procedural time) : 35 minutes  Critical care was necessary to treat or prevent imminent or life-threatening deterioration of the following conditions: cardiac failure and respiratory failure.  Critical care was time spent personally by me on the following activities: development of treatment plan with patient or surrogate, discussions with consultants, interpretation of cardiac output measurements, evaluation of patient's response to treatment, examination of patient, obtaining history from patient or surrogate, ordering and performing treatments and interventions, ordering and review of laboratory studies, ordering and review of radiographic studies, pulse oximetry and re-evaluation of patient's condition.        Labs Reviewed   CBC W/ AUTO DIFFERENTIAL - Abnormal; Notable for the following components:       Result Value    RDW 14.6 (*)     Gran # (ANC) 8.2 (*)     All other components within normal limits   COMPREHENSIVE METABOLIC PANEL - Abnormal; Notable for the following components:    Sodium 134 (*)     CO2 21 (*)     Glucose 339 (*)     Total Bilirubin 1.7 (*)     All other components within normal limits   PROTIME-INR - Abnormal; Notable for the following components:    PT 14.4 (*)     All other components within normal limits   B-TYPE NATRIURETIC PEPTIDE - Abnormal; Notable for the following components:     (*)     All other components within normal limits   TROPONIN I   SARS-COV-2 RNA AMPLIFICATION, QUAL     EKG Readings: (Independently Interpreted)   Time: 159  Rate: 143  Afib with RVR. Inferior T wave abnormality. No priors.       Imaging Results          X-Ray Chest AP Portable (In process)                  Medications   amiodarone 360 mg/200 mL (1.8 mg/mL) infusion (1 mg/min Intravenous New Bag 1/20/22 0212)   enoxaparin injection 150 mg (has no administration in time range)   amiodarone injection 150 mg (150 mg Intravenous Given 1/20/22 0211)   furosemide  injection 40 mg (40 mg Intravenous Given 1/20/22 0308)     Medical Decision Making:   Initial Assessment:   This is a 61-year-old male with history of diabetes, hypertension, coronary artery disease who comes in acute respiratory distress.  On evaluation patient is hypertensive, tachycardic.  He is hypoxic and tachypneic.  On physical exam he has coarse breath sounds bilaterally.  Patient is very pale and diaphoretic.  He was immediately placed on rescue BiPAP.    Orders included EKG, CBC, CMP, troponin, BNP, chest x-ray, COVID-19 test.  Differential Diagnosis:   Acute respiratory distress, AFib with RVR, ACS, hypertensive emergency, COVID-19.  Independently Interpreted Test(s):   I have ordered and independently interpreted X-rays - see summary below.       <> Summary of X-Ray Reading(s): CXR showed cardiomegaly and venous congestion.  I have ordered and independently interpreted EKG Reading(s) - see prior notes  Clinical Tests:   Lab Tests: Ordered and Reviewed       <> Summary of Lab: Labs were reviewed and were significant for negative COVID 19 and elevated BNP.   ED Management:  Patient's work-up is concerning for Afib with RVR with pulmonary edema on CXR. He was started on amiodarone in light of concern for underlying CHF. His rate improved as did his work of breathing. His blood pressure also improved. He was diuresed and admitted to the hospital. He was started on anticoagulation with Lovenox.  Other:   I have discussed this case with another health care provider.       <> Summary of the Discussion: 0328: Case was discussed with Dr. Mg, hospitalist on-call who will admit the patient to the CCU.                      Clinical Impression:   Final diagnoses:  [R07.9] Chest pain  [I48.91] Atrial fibrillation with RVR (Primary)  [R06.03] Respiratory distress  [I50.9] Acute on chronic congestive heart failure, unspecified heart failure type          ED Disposition Condition    Admit               Parul AGARWAL  MD Jase  01/20/22 0323       Parul Laguna MD  01/20/22 0335

## 2022-01-20 NOTE — H&P
[facility] Medicine History & Physical Examination   Patient Name: Donald Frey  MRN: [id number]  Patient Class: Emergency   Admission Date: 1/20/2022  1:45 AM  Attending Physician: Dariana Mg MD  Primary Care Provider: LEANNA Coello  Face-to-Face encounter date: 01/20/2022  Code Status: Full  Chief Complaint: No chief complaint on file.      Covid test negative       Patient information was obtained from patient, past medical records and ER records and ED physician sign out.   HISTORY OF PRESENT ILLNESS:   Donald Frey is a 61 y.o. male who has PMH of  CAD s/p pci 2013 on DAPT, HTN, DM, Obesity, afib not on AC  The patient presented to [facility] on 1/20/2022 with acute respiratory distress.  Patient reports exertional short of breath, with orthopnea her with uncontrolled heart rate, wife at bedside reports patient could not sleep due to short breath, denies any chest pain nausea vomiting dizziness, reports increasing weight after started taking Ozempic.  Denies any fever cough change in bowel or bladder habits.  Denies any use due to bleed    In ED patient was tachypneic tachycardic AFib RVR with heart rate in 140s to 150s with pulmonary edema .Patient was placed on BiPAP and initiated on amiodarone drip with interval resolution of his symptoms.  During my eval patient is sitting comfortable on NC.  He has no history of CHF in the past.  Had afib  but not RVR in the past  On lab review CBC CMP unremarkable except blood glucose of 339,   cxr pending  EKG shows AFib with RVR    REVIEW OF SYSTEMS:   10 Point Review of System was performed and was found to be negative except for that mentioned already in the HPI above.     PAST MEDICAL HISTORY:     Past Medical History:   Diagnosis Date    Diabetes mellitus, type 2     Hypertension     Myocardial infarction 2013       PAST SURGICAL HISTORY:     Past Surgical History:   Procedure Laterality Date     "CORONARY STENT PLACEMENT  2013       ALLERGIES:   Patient has no known allergies.    FAMILY HISTORY:     Family History   Problem Relation Age of Onset    Heart attack Father     Cancer Father         prostate       SOCIAL HISTORY:     Social History     Tobacco Use    Smoking status: Never Smoker    Smokeless tobacco: Never Used   Substance Use Topics    Alcohol use: Yes     Comment: occ        Social History     Substance and Sexual Activity   Sexual Activity Not on file        HOME MEDICATIONS:     Prior to Admission medications    Medication Sig Start Date End Date Taking? Authorizing Provider   CARTIA  mg Cp24 Take 1 capsule (120 mg total) by mouth once daily. 10/19/21   LEANNA Brownlee   carvediloL (COREG) 6.25 MG tablet  8/1/21   Historical Provider   clopidogreL (PLAVIX) 75 mg tablet Take 1 tablet (75 mg total) by mouth once daily. 10/19/21   LEANNA Brownlee   glipiZIDE (GLUCOTROL) 5 MG tablet Take 1 tablet (5 mg total) by mouth daily with breakfast. 10/19/21   LEANNA Brownlee   lisinopriL (PRINIVIL,ZESTRIL) 40 MG tablet Take 1 tablet (40 mg total) by mouth once daily. 10/19/21   LEANNA Brownlee   metFORMIN (GLUCOPHAGE) 1000 MG tablet Take 1 tablet (1,000 mg total) by mouth 2 (two) times daily with meals. 10/19/21   LEANNA Brownlee   rosuvastatin (CRESTOR) 20 MG tablet  10/19/20   Historical Provider   semaglutide (OZEMPIC) 0.25 mg or 0.5 mg(2 mg/1.5 mL) pen injector Inject 0.25 mg into the skin every 7 days. 10/19/21   LEANNA Brownlee         PHYSICAL EXAM:   BP (!) 176/127   Pulse (!) 121   Temp 97.6 °F (36.4 °C) (Oral)   Resp (!) 27   Ht 6' 2" (1.88 m)   Wt (!) 145.2 kg (320 lb)   SpO2 (!) 92%   BMI 41.09 kg/m²   Vitals Reviewed  General appearance: Well-developed, well-nourished male in no apparent distress on nc, obese.  Skin: No Rash.   Neuro: Motor and sensory exams grossly intact. Good tone. Power in all 4 extremities 5/5.   HENT: Atraumatic " head. Moist mucous membranes of oral cavity.  Eyes: Normal extraocular movements.   Neck: Supple. No evidence of lymphadenopathy. No thyroidomegaly.  Lungs:  Wet  crackles basal bilaterally. No wheezing present.   Heart:  Irregular rate and tachy  S1 and S2 present with no murmurs/gallop/rub. No pedal edema. No JVD present.   Abdomen: Soft, non-distended, non-tender. No rebound tenderness/guarding. No masses or organomegaly. Bowel sounds are normal. Bladder is not palpable.  :no storm ,no CVA tenderness  Extremities: No cyanosis, clubbing, or edema.  Psych/mental status: Alert and oriented. Cooperative. Responds appropriately to questions.     EMERGENCY DEPARTMENT LABS AND IMAGING:     Labs Reviewed   CBC W/ AUTO DIFFERENTIAL - Abnormal; Notable for the following components:       Result Value    RDW 14.6 (*)     Gran # (ANC) 8.2 (*)     All other components within normal limits   COMPREHENSIVE METABOLIC PANEL - Abnormal; Notable for the following components:    Sodium 134 (*)     CO2 21 (*)     Glucose 339 (*)     Total Bilirubin 1.7 (*)     All other components within normal limits   PROTIME-INR - Abnormal; Notable for the following components:    PT 14.4 (*)     All other components within normal limits   B-TYPE NATRIURETIC PEPTIDE - Abnormal; Notable for the following components:     (*)     All other components within normal limits   TROPONIN I   SARS-COV-2 RNA AMPLIFICATION, QUAL       X-Ray Chest AP Portable    (Results Pending)       ASSESSMENT & PLAN:   Donald Frey is a 61 y.o. male admitted for    #Acute on chronic AFib with RVR  # new onset of CHF exacerbation possibly due to the above  # CAD s/p pci  # htn  # dm    Plan:  Admit to  ccu  Continue amiodarone drip  Will add therapeutic Lovenox  IV diuresis 20 IV q.12 hours lasix  Monitor blood pressure control   Monitor intake and output  Consult cardiology  Will obtain echo  Glucose poc  Sliding scale insulin  Will hold homeoral  medication on  DM  Restart home medication rate control      DVT Prophylaxis: will be placed on Lovenox for DVT prophylaxis and will be advised to be as mobile as possible and sit in a chair as tolerated.     INPATIENT LIST OF MEDICATIONS     Current Facility-Administered Medications:     amiodarone 360 mg/200 mL (1.8 mg/mL) infusion, 1 mg/min, Intravenous, Continuous, Parul Laguna MD, Last Rate: 33.3 mL/hr at 01/20/22 0212, 1 mg/min at 01/20/22 0212    Current Outpatient Medications:     CARTIA  mg Cp24, Take 1 capsule (120 mg total) by mouth once daily., Disp: 90 capsule, Rfl: 1    carvediloL (COREG) 6.25 MG tablet, , Disp: , Rfl:     clopidogreL (PLAVIX) 75 mg tablet, Take 1 tablet (75 mg total) by mouth once daily., Disp: 90 tablet, Rfl: 1    glipiZIDE (GLUCOTROL) 5 MG tablet, Take 1 tablet (5 mg total) by mouth daily with breakfast., Disp: 90 tablet, Rfl: 1    lisinopriL (PRINIVIL,ZESTRIL) 40 MG tablet, Take 1 tablet (40 mg total) by mouth once daily., Disp: 90 tablet, Rfl: 1    metFORMIN (GLUCOPHAGE) 1000 MG tablet, Take 1 tablet (1,000 mg total) by mouth 2 (two) times daily with meals., Disp: 180 tablet, Rfl: 1    rosuvastatin (CRESTOR) 20 MG tablet, , Disp: , Rfl:     semaglutide (OZEMPIC) 0.25 mg or 0.5 mg(2 mg/1.5 mL) pen injector, Inject 0.25 mg into the skin every 7 days., Disp: 1 pen, Rfl: 0      Scheduled Meds:  Continuous Infusions:   amiodarone in dextrose 5% 1 mg/min (01/20/22 0212)     PRN Meds:.      Dariana Mg  [facility] Hospitalist  01/20/2022

## 2022-01-20 NOTE — CARE UPDATE
"Rounding note: seen by Dr. Mg already today. No chest discomfort since admission. Feels "Short of breath, at times". Has orthopnea. Is for ECHO. Still in a fib with 's on amiodarone and diltiazem. Has received furosemide and has had expected output. Overall feels "Better" than when he arrived.  Lungs: decreased entry with scattered opening creps  Heart S1S2 irreg irreg tachy  Abdo: morbidly obese  Imp a fib RVR  Plan continue current course, transfer to Cardiology A or MICU when room is available; TSH and Mg level now  "

## 2022-01-21 LAB
ALBUMIN SERPL BCP-MCNC: 4.1 G/DL (ref 3.5–5.2)
ALP SERPL-CCNC: 47 U/L (ref 55–135)
ALT SERPL W/O P-5'-P-CCNC: 15 U/L (ref 10–44)
ANION GAP SERPL CALC-SCNC: 9 MMOL/L (ref 8–16)
ANION GAP SERPL CALC-SCNC: 9 MMOL/L (ref 8–16)
AORTIC ROOT ANNULUS: 2.64 CM
AORTIC VALVE CUSP SEPERATION: 2.05 CM
AST SERPL-CCNC: 11 U/L (ref 10–40)
AV INDEX (PROSTH): 0.84
AV MEAN GRADIENT: 2 MMHG
AV PEAK GRADIENT: 3 MMHG
AV VALVE AREA: 4.07 CM2
AV VELOCITY RATIO: 81.41
BILIRUB SERPL-MCNC: 1.4 MG/DL (ref 0.1–1)
BSA FOR ECHO PROCEDURE: 2.75 M2
BUN SERPL-MCNC: 24 MG/DL (ref 8–23)
BUN SERPL-MCNC: 24 MG/DL (ref 8–23)
CALCIUM SERPL-MCNC: 8.7 MG/DL (ref 8.7–10.5)
CALCIUM SERPL-MCNC: 8.7 MG/DL (ref 8.7–10.5)
CHLORIDE SERPL-SCNC: 99 MMOL/L (ref 95–110)
CHLORIDE SERPL-SCNC: 99 MMOL/L (ref 95–110)
CO2 SERPL-SCNC: 26 MMOL/L (ref 23–29)
CO2 SERPL-SCNC: 26 MMOL/L (ref 23–29)
CREAT SERPL-MCNC: 1 MG/DL (ref 0.5–1.4)
CREAT SERPL-MCNC: 1 MG/DL (ref 0.5–1.4)
CV ECHO LV RWT: 0.41 CM
DOP CALC AO PEAK VEL: 0.86 M/S
DOP CALC AO VTI: 11.8 CM
DOP CALC LVOT AREA: 4.9 CM2
DOP CALC LVOT DIAMETER: 2.49 CM
DOP CALC LVOT PEAK VEL: 70.01 M/S
DOP CALC LVOT STROKE VOLUME: 47.99 CM3
DOP CALCLVOT PEAK VEL VTI: 9.86 CM
E WAVE DECELERATION TIME: 98.88 MSEC
E/E' RATIO: 6.3 M/S
ECHO LV POSTERIOR WALL: 1.2 CM (ref 0.6–1.1)
EJECTION FRACTION: 45 %
EST. GFR  (AFRICAN AMERICAN): >60 ML/MIN/1.73 M^2
EST. GFR  (AFRICAN AMERICAN): >60 ML/MIN/1.73 M^2
EST. GFR  (NON AFRICAN AMERICAN): >60 ML/MIN/1.73 M^2
EST. GFR  (NON AFRICAN AMERICAN): >60 ML/MIN/1.73 M^2
FRACTIONAL SHORTENING: 7 % (ref 28–44)
GLUCOSE SERPL-MCNC: 224 MG/DL (ref 70–110)
GLUCOSE SERPL-MCNC: 227 MG/DL (ref 70–110)
GLUCOSE SERPL-MCNC: 238 MG/DL (ref 70–110)
GLUCOSE SERPL-MCNC: 238 MG/DL (ref 70–110)
GLUCOSE SERPL-MCNC: 310 MG/DL (ref 70–110)
INTERVENTRICULAR SEPTUM: 1.21 CM (ref 0.6–1.1)
IVRT: 99.45 MSEC
LEFT INTERNAL DIMENSION IN SYSTOLE: 5.51 CM (ref 2.1–4)
LEFT VENTRICLE MASS INDEX: 116 G/M2
LEFT VENTRICULAR INTERNAL DIMENSION IN DIASTOLE: 5.9 CM (ref 3.5–6)
LEFT VENTRICULAR MASS: 307.17 G
LV LATERAL E/E' RATIO: 5.67 M/S
LV SEPTAL E/E' RATIO: 7.08 M/S
MAGNESIUM SERPL-MCNC: 1.8 MG/DL (ref 1.6–2.6)
MAGNESIUM SERPL-MCNC: 2 MG/DL (ref 1.6–2.6)
MV PEAK E VEL: 0.85 M/S
PHOSPHATE SERPL-MCNC: 3.9 MG/DL (ref 2.7–4.5)
PISA TR MAX VEL: 2.49 M/S
POTASSIUM SERPL-SCNC: 4.3 MMOL/L (ref 3.5–5.1)
POTASSIUM SERPL-SCNC: 4.3 MMOL/L (ref 3.5–5.1)
PROT SERPL-MCNC: 7.2 G/DL (ref 6–8.4)
PV PEAK VELOCITY: 46.44 CM/S
RA PRESSURE: 8 MMHG
RIGHT VENTRICULAR END-DIASTOLIC DIMENSION: 343 CM
SODIUM SERPL-SCNC: 134 MMOL/L (ref 136–145)
SODIUM SERPL-SCNC: 134 MMOL/L (ref 136–145)
TDI LATERAL: 0.15 M/S
TDI SEPTAL: 0.12 M/S
TDI: 0.14 M/S
TR MAX PG: 25 MMHG
TV REST PULMONARY ARTERY PRESSURE: 33 MMHG

## 2022-01-21 PROCEDURE — 83735 ASSAY OF MAGNESIUM: CPT | Performed by: INTERNAL MEDICINE

## 2022-01-21 PROCEDURE — 99900035 HC TECH TIME PER 15 MIN (STAT)

## 2022-01-21 PROCEDURE — 27000221 HC OXYGEN, UP TO 24 HOURS

## 2022-01-21 PROCEDURE — 99900031 HC PATIENT EDUCATION (STAT)

## 2022-01-21 PROCEDURE — 21000000 HC CCU ICU ROOM CHARGE

## 2022-01-21 PROCEDURE — 84100 ASSAY OF PHOSPHORUS: CPT | Performed by: INTERNAL MEDICINE

## 2022-01-21 PROCEDURE — C9399 UNCLASSIFIED DRUGS OR BIOLOG: HCPCS | Performed by: INTERNAL MEDICINE

## 2022-01-21 PROCEDURE — 25000003 PHARM REV CODE 250

## 2022-01-21 PROCEDURE — 25000003 PHARM REV CODE 250: Performed by: INTERNAL MEDICINE

## 2022-01-21 PROCEDURE — 94799 UNLISTED PULMONARY SVC/PX: CPT

## 2022-01-21 PROCEDURE — 94761 N-INVAS EAR/PLS OXIMETRY MLT: CPT

## 2022-01-21 PROCEDURE — 80053 COMPREHEN METABOLIC PANEL: CPT | Performed by: INTERNAL MEDICINE

## 2022-01-21 PROCEDURE — 63600175 PHARM REV CODE 636 W HCPCS: Performed by: INTERNAL MEDICINE

## 2022-01-21 PROCEDURE — 36415 COLL VENOUS BLD VENIPUNCTURE: CPT | Performed by: INTERNAL MEDICINE

## 2022-01-21 RX ORDER — DILTIAZEM HYDROCHLORIDE 240 MG/1
240 CAPSULE, EXTENDED RELEASE ORAL DAILY
Status: DISCONTINUED | OUTPATIENT
Start: 2022-01-22 | End: 2022-01-22

## 2022-01-21 RX ORDER — AMIODARONE HYDROCHLORIDE 200 MG/1
200 TABLET ORAL 2 TIMES DAILY
Status: DISCONTINUED | OUTPATIENT
Start: 2022-01-21 | End: 2022-01-24 | Stop reason: HOSPADM

## 2022-01-21 RX ADMIN — HUMAN INSULIN 6 UNITS: 100 INJECTION, SOLUTION SUBCUTANEOUS at 12:01

## 2022-01-21 RX ADMIN — AMIODARONE HYDROCHLORIDE 200 MG: 200 TABLET ORAL at 09:01

## 2022-01-21 RX ADMIN — AMIODARONE HYDROCHLORIDE 200 MG: 200 TABLET ORAL at 10:01

## 2022-01-21 RX ADMIN — ENOXAPARIN SODIUM 150 MG: 80 INJECTION SUBCUTANEOUS at 05:01

## 2022-01-21 RX ADMIN — AMIODARONE HYDROCHLORIDE 0.5 MG/MIN: 1.8 INJECTION, SOLUTION INTRAVENOUS at 06:01

## 2022-01-21 RX ADMIN — FUROSEMIDE 20 MG: 20 TABLET ORAL at 05:01

## 2022-01-21 RX ADMIN — LISINOPRIL 40 MG: 20 TABLET ORAL at 08:01

## 2022-01-21 RX ADMIN — ATORVASTATIN CALCIUM 80 MG: 40 TABLET, FILM COATED ORAL at 09:01

## 2022-01-21 RX ADMIN — HUMAN INSULIN 12 UNITS: 100 INJECTION, SOLUTION SUBCUTANEOUS at 09:01

## 2022-01-21 RX ADMIN — CARVEDILOL 6.25 MG: 6.25 TABLET, FILM COATED ORAL at 08:01

## 2022-01-21 RX ADMIN — FUROSEMIDE 20 MG: 20 TABLET ORAL at 08:01

## 2022-01-21 RX ADMIN — ENOXAPARIN SODIUM 150 MG: 80 INJECTION SUBCUTANEOUS at 06:01

## 2022-01-21 RX ADMIN — DILTIAZEM HYDROCHLORIDE 180 MG: 180 CAPSULE, COATED, EXTENDED RELEASE ORAL at 08:01

## 2022-01-21 RX ADMIN — CLOPIDOGREL BISULFATE 75 MG: 75 TABLET, FILM COATED ORAL at 08:01

## 2022-01-21 RX ADMIN — INSULIN DETEMIR 10 UNITS: 100 INJECTION, SOLUTION SUBCUTANEOUS at 10:01

## 2022-01-21 NOTE — PLAN OF CARE
[facility]  Initial Discharge Assessment       Primary Care Provider: LEANNA Coello    Admission Diagnosis: Atrial fibrillation with RVR [I48.91]    Admission Date: 1/20/2022  Expected Discharge Date: 1/23/2022     Assessment completed at bedside with pt. Pt lives with spouse, and plans to return home at discharge. He is independent with ADLs. No needs voiced at this time.    Payor: Sell My Timeshare NOW BLUE SHIELD / Plan: BLUE CONNECT / Product Type: HMO /     Extended Emergency Contact Information  Primary Emergency Contact: Cassie Frey  [address]           [place] [address] [facility]  Home Phone: [phone]  Mobile Phone: [phone]  Relation: Spouse  Preferred language: English   needed? No    Discharge Plan A: (P) Home with family  Discharge Plan B: (P) Home with family      [facility] - [address] [address]  Phone: [phone] Fax: [phone]    [id number] - [place] - [address]  [address] [place] [address]  Phone: [phone] Fax: [phone]      Initial Assessment (most recent)     Adult Discharge Assessment - 01/21/22 1212        Discharge Assessment    Assessment Type Discharge Planning Assessment (P)      Confirmed/corrected address, phone number and insurance Yes (P)      Confirmed Demographics Correct on Facesheet (P)      Source of Information patient;health record (P)      When was your last doctors appointment? -- (P)    6 weeks ago    Communicated ROBINSON with patient/caregiver Date not available/Unable to determine (P)      Reason For Admission a fib (P)      Lives With spouse (P)      Facility Arrived From: home (P)      Do you expect to return to your current living situation? Yes (P)      Do you have help at home or someone to help you manage your care at home? Yes (P)       Who are your caregiver(s) and their phone number(s)? Spouse Cassie Frey [phone] (P)      Prior to hospitilization cognitive status: Alert/Oriented (P)      Current cognitive status: Alert/Oriented (P)      Walking or Climbing Stairs Difficulty none (P)      Dressing/Bathing Difficulty none (P)      Equipment Currently Used at Home other (see comments) (P)    blood pressure machine    Readmission within 30 days? No (P)      Patient currently being followed by outpatient case management? Yes (P)      If yes, name of outpatient case management following: insurance company assigned oupatient case management (P)      Do you currently have service(s) that help you manage your care at home? No (P)      Do you take prescription medications? Yes (P)      Do you have prescription coverage? Yes (P)      Coverage BCBS (P)      Do you have any problems affording any of your prescribed medications? No (P)      Is the patient taking medications as prescribed? yes (P)      Who is going to help you get home at discharge? spouse (P)      How do you get to doctors appointments? car, drives self (P)      Are you on dialysis? No (P)      Do you take coumadin? No (P)      Discharge Plan A Home with family (P)      Discharge Plan B Home with family (P)      DME Needed Upon Discharge  none (P)      Discharge Plan discussed with: Patient (P)

## 2022-01-21 NOTE — PLAN OF CARE
01/21/22 0918   Patient Assessment/Suction   Level of Consciousness (AVPU) alert   Respiratory Effort Normal;Unlabored   Expansion/Accessory Muscles/Retractions no use of accessory muscles;no retractions   PRE-TX-O2   O2 Device (Oxygen Therapy) nasal cannula   $ Is the patient on Low Flow Oxygen? Yes   Flow (L/min) 3   SpO2 98 %   Pulse Oximetry Type Continuous   $ Pulse Oximetry - Multiple Charge Pulse Oximetry - Multiple   Pulse 103   Resp (!) 25   Education   $ Education 15 min  (OXYGEN)   Respiratory Evaluation   $ Care Plan Tech Time 15 min   $ Eval/Re-eval Charges Re-evaluation

## 2022-01-21 NOTE — CONSULTS
[facility]  Adult Nutrition   Consult Note (Nutrition Education)     SUMMARY     Nutrition Education:  · Educated pt on significance of A1c and how it is affected and goal ranges. Educated on carb choices and serving sizes of 1 choice. Pairing carbohydrates with a fat, fiber, or protein to prevent increase in blood glucose. Educated pt on MyPlate and what a plate should consist of. Educated pt on consistent carbohydrate intake throughout the day with appropriate insulin coverage. Recommended checking blood glucose daily. Educated pt on heart healthy diet. Education focused on including healthy fats- gave examples and lowering LDL levels by excluding saturated/trans fat in the diet. Went over types of foods that have saturated/trans fat. Encouraged cooking with olive oil instead of butter. Choosing whole grains over refined grains, choosing canned foods with no salt added and plan frozen veggies instead of veggies cooked in butter and cream sauces. Encouraged patient to choose leaner cuts of meat and decreasing high fat meats such as wright, sausage, hot dogs, etc. Educated pt on decreasing sodium in diet. To try not to cook with salt and use herbs and spices to make food more flavorful. To limit eating out-if patient chooses to eat out, informed patient to discuss with  to cook meats and veggies with no salt and olive oil instead of butter. Provided handouts on all of these topics for pt to use in the future. Also provided RD contact information for pt to call with future questions.     Anais Jeffers RD 01/21/2022 8:49 AM

## 2022-01-21 NOTE — HOSPITAL COURSE
"01/21  Assumed care. Chart reviewed. Consultant's attendance noted and appreciated. Labs reviewed and noted below: trivial hyponatremia with normal renal function and minimal prerenal azotemia. Telemetry reviewed: a fib rate 's. Discussed a fib pathophysiology with patient and wife: expressed understanding of process and need for anticoagulation. No chest discomfort since admission. Feels "Pretty good". Plan agree with amiodarone converion to po, and apixaban; AM labs for review    01/22  Labs reviewed and noted below: normal CBC; normal electrolytes and renal function with minimal prerenal azotemia. Telemetry reviewed: a fib RVR (though rate has decreased--100's). No chest discomfort since admission. Feels "Good". No SOB. Plan continue current regimen--discharge if Ok' d by Cardiology with outpatient follow-up to ensure rate improvement--or keep in house for the same    01/23  Discussed with Cardiology: telemetry review: persisting a fib: continue current course, add digoxin--hopefully discharge in AM. Labs reviewed and noted below: normal CBC; normal electrolytes and renal function.   Plan: continue current regimen, adding Digoxin as above; AM labs; hopefully discharge in AM    01/24  Ready for discharge. Has been clear by Cardiology with close outpatient follow-up. Patient feels "Really good". Obtained vouchers for first month free and $10 co-pay there after for apixaban for the patient. Medication as per discharge med rec below.He is to follow-up with Cardiology in 1 week. Cardiac/DM diet. Activity as tolerated  VSS  Lungs: decreased entry without adventitious sounds  Heart S1S2 irreg irreg  Abdo obese, soft  "

## 2022-01-21 NOTE — PROGRESS NOTES
"[facility] Medicine  Progress Note    Patient Name: Dnoald Frey  MRN: [id number]  Patient Class: IP- Inpatient   Admission Date: 1/20/2022  Length of Stay: 1 days  Attending Physician: Frank Perez MD  Primary Care Provider: LEANNA Coello        Subjective:     Principal Problem:Acute hypoxemic respiratory failure        HPI:  Donald Frey is a 61 y.o. male who has PMH of  CAD s/p pci 2013 on DAPT, HTN, DM, Obesity, afib not on AC  The patient presented to [facility] on 1/20/2022 with acute respiratory distress.  Patient reports exertional short of breath, with orthopnea her with uncontrolled heart rate, wife at bedside reports patient could not sleep due to short breath, denies any chest pain nausea vomiting dizziness, reports increasing weight after started taking Ozempic.  Denies any fever cough change in bowel or bladder habits.  Denies any use due to bleed     In ED patient was tachypneic tachycardic AFib RVR with heart rate in 140s to 150s with pulmonary edema .Patient was placed on BiPAP and initiated on amiodarone drip with interval resolution of his symptoms.  During my eval patient is sitting comfortable on NC.  He has no history of CHF in the past.  Had afib  but not RVR in the past  On lab review CBC CMP unremarkable except blood glucose of 339,   cxr pending  EKG shows AFib with RVR         Overview/Hospital Course:  01/21  Assumed care. Chart reviewed. Consultant's attendance noted and appreciated. Labs reviewed and noted below: trivial hyponatremia with normal renal function and minimal prerenal azotemia. Telemetry reviewed: a fib rate 's. Discussed a fib pathophysiology with patient and wife: expressed understanding of process and need for anticoagulation. No chest discomfort since admission. Feels "Pretty good". Plan agree with amiodarone converion to p, and apixaban; AM labs for review      Interval History: a fib persisting " with better rate    Review of Systems   Constitutional: Negative.    HENT: Negative.    Eyes: Negative.    Respiratory: Positive for shortness of breath.    Cardiovascular: Positive for palpitations.   Gastrointestinal: Negative.    Endocrine: Negative.    Genitourinary: Negative.    Musculoskeletal: Negative.    Skin: Negative.    Allergic/Immunologic: Negative.    Neurological: Negative.    Hematological: Negative.    All other systems reviewed and are negative.    Objective:     Vital Signs (Most Recent):  Temp: 97.7 °F (36.5 °C) (01/21/22 0728)  Pulse: 103 (01/21/22 0919)  Resp: (!) 31 (01/21/22 0919)  BP: 121/80 (01/21/22 0837)  SpO2: 97 % (01/21/22 0919) Vital Signs (24h Range):  Temp:  [97.7 °F (36.5 °C)-98.9 °F (37.2 °C)] 97.7 °F (36.5 °C)  Pulse:  [] 103  Resp:  [18-38] 31  SpO2:  [91 %-100 %] 97 %  BP: (114-161)/() 121/80     Weight: (!) 159.9 kg (352 lb 8.3 oz)  Body mass index is 45.26 kg/m².    Intake/Output Summary (Last 24 hours) at 1/21/2022 1336  Last data filed at 1/21/2022 0500  Gross per 24 hour   Intake --   Output 750 ml   Net -750 ml      Physical Exam  Vitals and nursing note reviewed.   Constitutional:       Appearance: He is well-developed and well-nourished. He is obese.   HENT:      Head: Normocephalic and atraumatic.      Right Ear: External ear normal.      Left Ear: External ear normal.      Nose: Nose normal.      Mouth/Throat:      Mouth: Oropharynx is clear and moist.   Eyes:      Extraocular Movements: EOM normal.      Conjunctiva/sclera: Conjunctivae normal.      Pupils: Pupils are equal, round, and reactive to light.   Cardiovascular:      Rate and Rhythm: Tachycardia present. Rhythm irregular.      Pulses: Intact distal pulses.      Heart sounds: Normal heart sounds.   Pulmonary:      Effort: Pulmonary effort is normal.      Breath sounds: Normal breath sounds.   Abdominal:      General: Bowel sounds are normal.      Palpations: Abdomen is soft.   Musculoskeletal:          General: Normal range of motion.      Cervical back: Normal range of motion and neck supple.   Skin:     General: Skin is warm and dry.      Capillary Refill: Capillary refill takes less than 2 seconds.   Neurological:      Mental Status: He is alert and oriented to person, place, and time.   Psychiatric:         Mood and Affect: Mood and affect normal.         Behavior: Behavior normal.         Thought Content: Thought content normal.         Judgment: Judgment normal.         Significant Labs:   All pertinent labs within the past 24 hours have been reviewed.  BMP:   Recent Labs   Lab 01/21/22  0440   *  238*   *  134*   K 4.3  4.3   CL 99  99   CO2 26  26   BUN 24*  24*   CREATININE 1.0  1.0   CALCIUM 8.7  8.7   MG 1.8     CBC:   Recent Labs   Lab 01/20/22  0205 01/20/22  0706   WBC 11.45 9.10   HGB 15.5 14.6   HCT 47.8 44.5    244     Cardiac Markers:   Recent Labs   Lab 01/20/22  0205   *     Troponin:   Recent Labs   Lab 01/20/22  0205   TROPONINI 0.036       Significant Imaging: I have reviewed all pertinent imaging results/findings within the past 24 hours.      Assessment/Plan:      No notes have been filed under this hospital service.  Service: Hospital Medicine    VTE Risk Mitigation (From admission, onward)         Ordered     enoxaparin injection 150 mg  Every 12 hours (non-standard times)         01/20/22 0532                Discharge Planning   ROBINSON: 1/23/2022     Code Status: Full Code   Is the patient medically ready for discharge?:     Reason for patient still in hospital (select all that apply): Patient trending condition, Laboratory test and Treatment  Discharge Plan A: (P) Home with family                  Frank Perez MD  Department of Hospital Medicine   [facility]

## 2022-01-21 NOTE — PLAN OF CARE
01/20/22 1920   Patient Assessment/Suction   Level of Consciousness (AVPU) alert   Respiratory Effort Normal;Unlabored   Expansion/Accessory Muscles/Retractions no use of accessory muscles   All Lung Fields Breath Sounds Anterior:;coarse   Rhythm/Pattern, Respiratory unlabored   Cough Frequency infrequent   PRE-TX-O2   O2 Device (Oxygen Therapy) nasal cannula   $ Is the patient on Low Flow Oxygen? Yes   Flow (L/min) 3   SpO2 95 %   Pulse Oximetry Type Continuous   $ Pulse Oximetry - Multiple Charge Pulse Oximetry - Multiple   Pulse 102   Resp (!) 28   Education   $ Education DME Nebulizer;15 min   Respiratory Evaluation   $ Care Plan Tech Time 15 min

## 2022-01-21 NOTE — PLAN OF CARE
Problem: Adult Inpatient Plan of Care  Goal: Plan of Care Review  Outcome: Ongoing, Progressing  Goal: Patient-Specific Goal (Individualized)  Outcome: Ongoing, Progressing  Goal: Absence of Hospital-Acquired Illness or Injury  Outcome: Ongoing, Progressing  Goal: Optimal Comfort and Wellbeing  Outcome: Ongoing, Progressing  Goal: Readiness for Transition of Care  Outcome: Ongoing, Progressing     Problem: Bariatric Environmental Safety  Goal: Safety Maintained with Care  Outcome: Ongoing, Progressing     Problem: Diabetes Comorbidity  Goal: Blood Glucose Level Within Targeted Range  Outcome: Ongoing, Progressing     Problem: Fall Injury Risk  Goal: Absence of Fall and Fall-Related Injury  Outcome: Ongoing, Progressing

## 2022-01-21 NOTE — HPI
Donald Frey is a 61 y.o. male who has PMH of  CAD s/p pci 2013 on DAPT, HTN, DM, Obesity, afib not on AC  [facility].  Patient reports exertional short of breath, with orthopnea her with uncontrolled heart rate, wife at bedside reports patient could not sleep due to short breath, denies any chest pain nausea vomiting dizziness, reports increasing weight after started taking Ozempic.  Denies any fever cough change in bowel or bladder habits.  Denies any use due to bleed     In ED patient was tachypneic tachycardic AFib RVR with heart rate in 140s to 150s with pulmonary edema .Patient was placed on BiPAP and initiated on amiodarone drip with interval resolution of his symptoms.  During my eval patient is sitting comfortable on NC.  He has no history of CHF in the past.  Had afib  but not RVR in the past  On lab review CBC CMP unremarkable except blood glucose of 339,   cxr pending  EKG shows AFib with RVR

## 2022-01-21 NOTE — CONSULTS
[facility]   Cardiology Note    Consult Requested By: hospital medicine  Reason for Consult: Afib RVR    SUBJECTIVE:     History of Present Illness: Pt is a 60 yo male with PMHx CAD s/p PCI 2013, HTN, HLP, DM2, paroxysmal atrial fibrillation not on anticoagulation who presented to the ED on 1/20 with chief complaint of shortness of breath. He states it was worse with exertion and laying flat. He was having difficulty sleeping th night before due to his breathing. He denies CP, palpitations, dizziness, N/V, LE edema. He started ozempic a few weeks ago and experienced shortness of breath shortly after, he thought it was a side effect of the medication and stopped using the medication. ED showed patient was tachycardic and tachypneic. EKG showed atrial fibrillation with RVR, HR 140s-150s. He was placed on BiPap and amiodarone drip was initiated. IV lasix was started. Troponin negative, . TSH normal. Cr 1.0. HgA1c 8.4. CXR showed blunting of R costophrenic angle possible scarring or pleural effusion. . Echo pending. This morning patient is still in afib HR low 100s. He states his breathing has improved since yesterday. He denies palpitations, CP, dizziness, LE edema, syncope.   Vitals reviewed. 3L NC.      Review of patient's allergies indicates:  No Known Allergies    Past Medical History:   Diagnosis Date    Diabetes mellitus, type 2     Hypertension     Myocardial infarction 2013     Past Surgical History:   Procedure Laterality Date    CORONARY STENT PLACEMENT  2013     Family History   Problem Relation Age of Onset    Heart attack Father     Cancer Father         prostate     Social History     Tobacco Use    Smoking status: Never Smoker    Smokeless tobacco: Never Used   Substance Use Topics    Alcohol use: Yes     Comment: occ    Drug use: Never       Review of Systems:  Review of Systems   Respiratory: Positive for cough and shortness of breath.    Cardiovascular: Positive for  orthopnea. Negative for chest pain, palpitations and leg swelling.   Gastrointestinal: Negative for nausea and vomiting.   Neurological: Negative for dizziness and headaches.   All other systems reviewed and are negative.      OBJECTIVE:     Vital Signs (Most Recent)  Temp: 97.7 °F (36.5 °C) (01/21/22 0728)  Pulse: 104 (01/21/22 0728)  Resp: 18 (01/21/22 0728)  BP: 121/80 (01/21/22 0837)  SpO2: 100 % (01/21/22 0728)    Vital Signs Range (Last 24H):  Temp:  [97.7 °F (36.5 °C)-98.9 °F (37.2 °C)]   Pulse:  []   Resp:  [18-38]   BP: (114-161)/()   SpO2:  [91 %-100 %]     I & O (Last 24H):    Intake/Output Summary (Last 24 hours) at 1/21/2022 0909  Last data filed at 1/21/2022 0500  Gross per 24 hour   Intake --   Output 750 ml   Net -750 ml       Current Diet:     Current Diet Order   Procedures    Diet diabetic [facility]; 1500 Calorie     Order Specific Question:   Indicate patient location for additional diet options:     Answer:   [facility]     Order Specific Question:   Total calories:     Answer:   1500 Calorie        Allergies:  Review of patient's allergies indicates:  No Known Allergies    Meds:  Scheduled Meds:   atorvastatin  80 mg Oral QHS    carvediloL  6.25 mg Oral BID    clopidogreL  75 mg Oral Daily    diltiaZEM  180 mg Oral Daily    enoxaparin  1 mg/kg Subcutaneous Q12H    furosemide  20 mg Oral BID    insulin detemir U-100  10 Units Subcutaneous QHS    lisinopriL  40 mg Oral Daily     Continuous Infusions:   amiodarone in dextrose 5% 0.5 mg/min (01/21/22 0611)     PRN Meds:acetaminophen, calcium chloride IVPB, calcium chloride IVPB, calcium chloride IVPB, hydrALAZINE, HYDROcodone-acetaminophen, insulin regular, magnesium oxide, magnesium sulfate IVPB, magnesium sulfate IVPB, magnesium sulfate IVPB, magnesium sulfate IVPB, potassium chloride in water, potassium chloride in water, potassium chloride in water, potassium chloride in water, potassium chloride, potassium chloride, potassium  chloride, potassium chloride, sodium chloride 0.9%    Oxygen/Ventilator Data (Last 24H):  (if applicable)        Hemodynamic Parameters (Last 24H):   (if applicable)        Laboratory and Radiology Data:  Recent Results (from the past 24 hour(s))   Echo    Collection Time: 01/20/22  1:20 PM   Result Value Ref Range    BSA 2.75 m2    TDI SEPTAL 0.12 m/s    LV LATERAL E/E' RATIO 5.67 m/s    LV SEPTAL E/E' RATIO 7.08 m/s    AORTIC VALVE CUSP SEPERATION 2.05 cm    TDI LATERAL 0.15 m/s    PV PEAK VELOCITY 46.44 cm/s    LVIDd 5.90 3.5 - 6.0 cm    IVS 1.21 (A) 0.6 - 1.1 cm    Posterior Wall 1.20 (A) 0.6 - 1.1 cm    Ao root annulus 2.64 cm    LVIDs 5.51 (A) 2.1 - 4.0 cm    FS 7 28 - 44 %    LV mass 307.17 g    RVDD 343.00 cm    Left Ventricle Relative Wall Thickness 0.41 cm    AV mean gradient 2 mmHg    AV valve area 4.07 cm2    AV Velocity Ratio 81.41     AV index (prosthetic) 0.84     Mean e' 0.14 m/s    E wave deceleration time 98.88 msec    IVRT 99.45 msec    LVOT diameter 2.49 cm    LVOT area 4.9 cm2    LVOT peak fernando 70.01 m/s    LVOT peak VTI 9.86 cm    Ao peak fernando 0.86 m/s    Ao VTI 11.80 cm    LVOT stroke volume 47.99 cm3    AV peak gradient 3 mmHg    E/E' ratio 6.30 m/s    MV Peak E Fernando 0.85 m/s    TR Max Fernando 2.49 m/s    LV Mass Index 116 g/m2    Triscuspid Valve Regurgitation Peak Gradient 25 mmHg   TSH    Collection Time: 01/20/22  1:31 PM   Result Value Ref Range    TSH 3.840 0.340 - 5.600 uIU/mL   Magnesium    Collection Time: 01/20/22  1:31 PM   Result Value Ref Range    Magnesium 1.6 1.6 - 2.6 mg/dL   POCT glucose    Collection Time: 01/20/22  3:05 PM   Result Value Ref Range    POC Glucose 320 (H) 70 - 110   POCT glucose    Collection Time: 01/20/22  5:22 PM   Result Value Ref Range    POC Glucose 322 (H) 70 - 110   POCT glucose    Collection Time: 01/20/22  8:23 PM   Result Value Ref Range    POC Glucose 425 (H) 70 - 110   POCT glucose    Collection Time: 01/20/22 11:35 PM   Result Value Ref Range    POC  Glucose 265 (H) 70 - 110   Magnesium    Collection Time: 01/20/22 11:59 PM   Result Value Ref Range    Magnesium 2.0 1.6 - 2.6 mg/dL   Comprehensive metabolic panel - if not done in ED    Collection Time: 01/21/22  4:40 AM   Result Value Ref Range    Sodium 134 (L) 136 - 145 mmol/L    Potassium 4.3 3.5 - 5.1 mmol/L    Chloride 99 95 - 110 mmol/L    CO2 26 23 - 29 mmol/L    Glucose 238 (H) 70 - 110 mg/dL    BUN 24 (H) 8 - 23 mg/dL    Creatinine 1.0 0.5 - 1.4 mg/dL    Calcium 8.7 8.7 - 10.5 mg/dL    Total Protein 7.2 6.0 - 8.4 g/dL    Albumin 4.1 3.5 - 5.2 g/dL    Total Bilirubin 1.4 (H) 0.1 - 1.0 mg/dL    Alkaline Phosphatase 47 (L) 55 - 135 U/L    AST 11 10 - 40 U/L    ALT 15 10 - 44 U/L    Anion Gap 9 8 - 16 mmol/L    eGFR if African American >60.0 >60 mL/min/1.73 m^2    eGFR if non African American >60.0 >60 mL/min/1.73 m^2   Magnesium - if not done in ED    Collection Time: 01/21/22  4:40 AM   Result Value Ref Range    Magnesium 1.8 1.6 - 2.6 mg/dL   Phosphorus - if not done in ED    Collection Time: 01/21/22  4:40 AM   Result Value Ref Range    Phosphorus 3.9 2.7 - 4.5 mg/dL   Basic metabolic panel     Collection Time: 01/21/22  4:40 AM   Result Value Ref Range    Sodium 134 (L) 136 - 145 mmol/L    Potassium 4.3 3.5 - 5.1 mmol/L    Chloride 99 95 - 110 mmol/L    CO2 26 23 - 29 mmol/L    Glucose 238 (H) 70 - 110 mg/dL    BUN 24 (H) 8 - 23 mg/dL    Creatinine 1.0 0.5 - 1.4 mg/dL    Calcium 8.7 8.7 - 10.5 mg/dL    Anion Gap 9 8 - 16 mmol/L    eGFR if African American >60.0 >60 mL/min/1.73 m^2    eGFR if non African American >60.0 >60 mL/min/1.73 m^2   POCT glucose    Collection Time: 01/21/22  5:42 AM   Result Value Ref Range    POC Glucose 224 (H) 70 - 110     Imaging Results          X-Ray Chest AP Portable (Final result)  Result time 01/20/22 06:16:29    Final result by Wily Matta MD (01/20/22 06:16:29)                 Impression:      Underinflated chest with minimal blunting of lateral right costophrenic  angle which could be due to pleuroparenchymal scarring or trace right pleural effusion.      Electronically signed by: Wily Matta MD  Date:    01/20/2022  Time:    06:16             Narrative:    EXAMINATION:  XR CHEST AP PORTABLE    CLINICAL HISTORY:  shortness of breath;    FINDINGS:  Portable chest at 209 without comparisons shows normal cardiomediastinal silhouette. Patient is rotated.    No confluent alveolar consolidation or pneumothorax.  Minimal blunting of lateral right costophrenic angle occurs.  Lung volumes are low.  Central pulmonary vasculature is prominent without evidence of marjorie pulmonary edema.  No pneumothorax or acute osseous abnormality.                                12-lead EKG interpretation:  (if applicable)      Current Cardiac Rhythm:   (if applicable)    Physical Exam:   Physical Exam  Vitals and nursing note reviewed.   Constitutional:       General: He is not in acute distress.     Appearance: Normal appearance.   Cardiovascular:      Rate and Rhythm: Tachycardia present. Rhythm irregular.      Pulses: Normal pulses.      Heart sounds: Normal heart sounds. No murmur heard.      Pulmonary:      Effort: Pulmonary effort is normal. No respiratory distress.      Breath sounds: Normal breath sounds.   Musculoskeletal:      Right lower leg: No edema.      Left lower leg: No edema.   Skin:     General: Skin is warm and dry.      Findings: No rash.   Neurological:      Mental Status: He is alert and oriented to person, place, and time.         ASSESSMENT/PLAN:   Assessment:   Atrial Fibrillation with RVR  CHF exacerbation - likely due to afib RVR  CAD s/p PCI 2013  HTN  HLP  DM2 - uncontrolled, hgA1c 8.4.     Plan:   Patient remains in atrial fibrillation this morning. HR low 100s. He is on coreg, diltiazem, and amiodarone gtt. Can start amiodarone 200 mg PO BID once amio drip has completed.  D/c coreg, increase diltiazem 240 mg daily.   D/c lovenox and start eliquis 5 mg BID.   BNP elevated  to 828, he was started on IV lasix in ED, now on lasix 20 mg PO BID.

## 2022-01-21 NOTE — SUBJECTIVE & OBJECTIVE
Interval History: a fib persisting with better rate    Review of Systems   Constitutional: Negative.    HENT: Negative.    Eyes: Negative.    Respiratory: Positive for shortness of breath.    Cardiovascular: Positive for palpitations.   Gastrointestinal: Negative.    Endocrine: Negative.    Genitourinary: Negative.    Musculoskeletal: Negative.    Skin: Negative.    Allergic/Immunologic: Negative.    Neurological: Negative.    Hematological: Negative.    All other systems reviewed and are negative.    Objective:     Vital Signs (Most Recent):  Temp: 97.7 °F (36.5 °C) (01/21/22 0728)  Pulse: 103 (01/21/22 0919)  Resp: (!) 31 (01/21/22 0919)  BP: 121/80 (01/21/22 0837)  SpO2: 97 % (01/21/22 0919) Vital Signs (24h Range):  Temp:  [97.7 °F (36.5 °C)-98.9 °F (37.2 °C)] 97.7 °F (36.5 °C)  Pulse:  [] 103  Resp:  [18-38] 31  SpO2:  [91 %-100 %] 97 %  BP: (114-161)/() 121/80     Weight: (!) 159.9 kg (352 lb 8.3 oz)  Body mass index is 45.26 kg/m².    Intake/Output Summary (Last 24 hours) at 1/21/2022 1336  Last data filed at 1/21/2022 0500  Gross per 24 hour   Intake --   Output 750 ml   Net -750 ml      Physical Exam  Vitals and nursing note reviewed.   Constitutional:       Appearance: He is well-developed and well-nourished. He is obese.   HENT:      Head: Normocephalic and atraumatic.      Right Ear: External ear normal.      Left Ear: External ear normal.      Nose: Nose normal.      Mouth/Throat:      Mouth: Oropharynx is clear and moist.   Eyes:      Extraocular Movements: EOM normal.      Conjunctiva/sclera: Conjunctivae normal.      Pupils: Pupils are equal, round, and reactive to light.   Cardiovascular:      Rate and Rhythm: Tachycardia present. Rhythm irregular.      Pulses: Intact distal pulses.      Heart sounds: Normal heart sounds.   Pulmonary:      Effort: Pulmonary effort is normal.      Breath sounds: Normal breath sounds.   Abdominal:      General: Bowel sounds are normal.      Palpations:  Abdomen is soft.   Musculoskeletal:         General: Normal range of motion.      Cervical back: Normal range of motion and neck supple.   Skin:     General: Skin is warm and dry.      Capillary Refill: Capillary refill takes less than 2 seconds.   Neurological:      Mental Status: He is alert and oriented to person, place, and time.   Psychiatric:         Mood and Affect: Mood and affect normal.         Behavior: Behavior normal.         Thought Content: Thought content normal.         Judgment: Judgment normal.         Significant Labs:   All pertinent labs within the past 24 hours have been reviewed.  BMP:   Recent Labs   Lab 01/21/22  0440   *  238*   *  134*   K 4.3  4.3   CL 99  99   CO2 26  26   BUN 24*  24*   CREATININE 1.0  1.0   CALCIUM 8.7  8.7   MG 1.8     CBC:   Recent Labs   Lab 01/20/22  0205 01/20/22  0706   WBC 11.45 9.10   HGB 15.5 14.6   HCT 47.8 44.5    244     Cardiac Markers:   Recent Labs   Lab 01/20/22  0205   *     Troponin:   Recent Labs   Lab 01/20/22  0205   TROPONINI 0.036       Significant Imaging: I have reviewed all pertinent imaging results/findings within the past 24 hours.

## 2022-01-22 LAB
ANION GAP SERPL CALC-SCNC: 9 MMOL/L (ref 8–16)
BNP SERPL-MCNC: 329 PG/ML (ref 0–99)
BUN SERPL-MCNC: 25 MG/DL (ref 8–23)
CALCIUM SERPL-MCNC: 8.9 MG/DL (ref 8.7–10.5)
CHLORIDE SERPL-SCNC: 99 MMOL/L (ref 95–110)
CO2 SERPL-SCNC: 29 MMOL/L (ref 23–29)
CREAT SERPL-MCNC: 0.9 MG/DL (ref 0.5–1.4)
ERYTHROCYTE [DISTWIDTH] IN BLOOD BY AUTOMATED COUNT: 14.5 % (ref 11.5–14.5)
EST. GFR  (AFRICAN AMERICAN): >60 ML/MIN/1.73 M^2
EST. GFR  (NON AFRICAN AMERICAN): >60 ML/MIN/1.73 M^2
GLUCOSE SERPL-MCNC: 190 MG/DL (ref 70–110)
GLUCOSE SERPL-MCNC: 205 MG/DL (ref 70–110)
GLUCOSE SERPL-MCNC: 209 MG/DL (ref 70–110)
GLUCOSE SERPL-MCNC: 222 MG/DL (ref 70–110)
HCT VFR BLD AUTO: 41.5 % (ref 40–54)
HGB BLD-MCNC: 13.2 G/DL (ref 14–18)
MAGNESIUM SERPL-MCNC: 1.9 MG/DL (ref 1.6–2.6)
MCH RBC QN AUTO: 27.6 PG (ref 27–31)
MCHC RBC AUTO-ENTMCNC: 31.8 G/DL (ref 32–36)
MCV RBC AUTO: 87 FL (ref 82–98)
PLATELET # BLD AUTO: 215 K/UL (ref 150–450)
PMV BLD AUTO: 10.8 FL (ref 9.2–12.9)
POTASSIUM SERPL-SCNC: 4.5 MMOL/L (ref 3.5–5.1)
RBC # BLD AUTO: 4.79 M/UL (ref 4.6–6.2)
SODIUM SERPL-SCNC: 137 MMOL/L (ref 136–145)
WBC # BLD AUTO: 6.87 K/UL (ref 3.9–12.7)

## 2022-01-22 PROCEDURE — 63600175 PHARM REV CODE 636 W HCPCS: Performed by: INTERNAL MEDICINE

## 2022-01-22 PROCEDURE — 83880 ASSAY OF NATRIURETIC PEPTIDE: CPT | Performed by: INTERNAL MEDICINE

## 2022-01-22 PROCEDURE — 82962 GLUCOSE BLOOD TEST: CPT

## 2022-01-22 PROCEDURE — 25000003 PHARM REV CODE 250

## 2022-01-22 PROCEDURE — 83735 ASSAY OF MAGNESIUM: CPT | Performed by: INTERNAL MEDICINE

## 2022-01-22 PROCEDURE — 80048 BASIC METABOLIC PNL TOTAL CA: CPT | Performed by: INTERNAL MEDICINE

## 2022-01-22 PROCEDURE — 85027 COMPLETE CBC AUTOMATED: CPT | Performed by: INTERNAL MEDICINE

## 2022-01-22 PROCEDURE — 99900031 HC PATIENT EDUCATION (STAT)

## 2022-01-22 PROCEDURE — 99900035 HC TECH TIME PER 15 MIN (STAT)

## 2022-01-22 PROCEDURE — 25000003 PHARM REV CODE 250: Performed by: INTERNAL MEDICINE

## 2022-01-22 PROCEDURE — 21000000 HC CCU ICU ROOM CHARGE

## 2022-01-22 PROCEDURE — C9399 UNCLASSIFIED DRUGS OR BIOLOG: HCPCS | Performed by: INTERNAL MEDICINE

## 2022-01-22 PROCEDURE — 94799 UNLISTED PULMONARY SVC/PX: CPT

## 2022-01-22 PROCEDURE — 36415 COLL VENOUS BLD VENIPUNCTURE: CPT | Performed by: INTERNAL MEDICINE

## 2022-01-22 PROCEDURE — 94761 N-INVAS EAR/PLS OXIMETRY MLT: CPT

## 2022-01-22 RX ORDER — LISINOPRIL 20 MG/1
20 TABLET ORAL DAILY
Status: DISCONTINUED | OUTPATIENT
Start: 2022-01-23 | End: 2022-01-24 | Stop reason: HOSPADM

## 2022-01-22 RX ORDER — DILTIAZEM HYDROCHLORIDE 120 MG/1
240 CAPSULE, COATED, EXTENDED RELEASE ORAL 2 TIMES DAILY
Status: DISCONTINUED | OUTPATIENT
Start: 2022-01-22 | End: 2022-01-24 | Stop reason: HOSPADM

## 2022-01-22 RX ADMIN — CLOPIDOGREL BISULFATE 75 MG: 75 TABLET, FILM COATED ORAL at 09:01

## 2022-01-22 RX ADMIN — DILTIAZEM HYDROCHLORIDE 240 MG: 120 CAPSULE, COATED, EXTENDED RELEASE ORAL at 08:01

## 2022-01-22 RX ADMIN — FUROSEMIDE 20 MG: 20 TABLET ORAL at 09:01

## 2022-01-22 RX ADMIN — HUMAN INSULIN 6 UNITS: 100 INJECTION, SOLUTION SUBCUTANEOUS at 12:01

## 2022-01-22 RX ADMIN — ATORVASTATIN CALCIUM 80 MG: 40 TABLET, FILM COATED ORAL at 08:01

## 2022-01-22 RX ADMIN — LISINOPRIL 20 MG: 20 TABLET ORAL at 09:01

## 2022-01-22 RX ADMIN — HUMAN INSULIN 6 UNITS: 100 INJECTION, SOLUTION SUBCUTANEOUS at 05:01

## 2022-01-22 RX ADMIN — ENOXAPARIN SODIUM 150 MG: 80 INJECTION SUBCUTANEOUS at 05:01

## 2022-01-22 RX ADMIN — APIXABAN 5 MG: 5 TABLET, FILM COATED ORAL at 08:01

## 2022-01-22 RX ADMIN — AMIODARONE HYDROCHLORIDE 200 MG: 200 TABLET ORAL at 08:01

## 2022-01-22 RX ADMIN — AMIODARONE HYDROCHLORIDE 200 MG: 200 TABLET ORAL at 09:01

## 2022-01-22 RX ADMIN — APIXABAN 5 MG: 5 TABLET, FILM COATED ORAL at 10:01

## 2022-01-22 RX ADMIN — FUROSEMIDE 20 MG: 20 TABLET ORAL at 05:01

## 2022-01-22 RX ADMIN — INSULIN DETEMIR 10 UNITS: 100 INJECTION, SOLUTION SUBCUTANEOUS at 08:01

## 2022-01-22 NOTE — PROGRESS NOTES
[facility]    Cardiology Progress Note    Subjective:  Patient feels well he denies any chest pains but does have occasional palpitations.  Heart rate still in the 100 range.  No shortness of breath.  He is not sure how long he has been in atrial fibrillation      Objective:  Vital Signs (Most Recent)  Temp: 97.9 °F (36.6 °C) (01/22/22 0736)  Pulse: 106 (01/22/22 0900)  Resp: (!) 26 (01/22/22 0900)  BP: 116/86 (01/22/22 0736)  SpO2: (!) 94 % (01/22/22 0900)    Vital Signs Range (Last 24H):  Temp:  [97.8 °F (36.6 °C)-97.9 °F (36.6 °C)]   Pulse:  []   Resp:  [19-31]   BP: ()/(65-86)   SpO2:  [94 %-99 %]     I & O (Last 24H):    Intake/Output Summary (Last 24 hours) at 1/22/2022 0928  Last data filed at 1/22/2022 0738  Gross per 24 hour   Intake --   Output 1150 ml   Net -1150 ml       Current Diet:     Current Diet Order   Procedures    Diet diabetic [facility]; 1500 Calorie     Order Specific Question:   Indicate patient location for additional diet options:     Answer:   [facility]     Order Specific Question:   Total calories:     Answer:   1500 Calorie        Allergies:  Review of patient's allergies indicates:  No Known Allergies    Meds:  Scheduled Meds:   amiodarone  200 mg Oral BID    atorvastatin  80 mg Oral QHS    clopidogreL  75 mg Oral Daily    diltiaZEM  240 mg Oral Daily    enoxaparin  1 mg/kg Subcutaneous Q12H    furosemide  20 mg Oral BID    insulin detemir U-100  10 Units Subcutaneous QHS    lisinopriL  40 mg Oral Daily     Continuous Infusions:  PRN Meds:acetaminophen, calcium chloride IVPB, calcium chloride IVPB, calcium chloride IVPB, hydrALAZINE, HYDROcodone-acetaminophen, insulin regular, magnesium oxide, magnesium sulfate IVPB, magnesium sulfate IVPB, magnesium sulfate IVPB, magnesium sulfate IVPB, potassium chloride in water, potassium chloride in water, potassium chloride in water, potassium chloride in water, potassium chloride, potassium chloride, potassium chloride,  potassium chloride, sodium chloride 0.9%    Lab Results :  Recent Results (from the past 24 hour(s))   POCT glucose    Collection Time: 01/21/22 11:04 AM   Result Value Ref Range    POC Glucose 227 (H) 70 - 110   POCT glucose    Collection Time: 01/21/22  8:41 PM   Result Value Ref Range    POC Glucose 310 (H) 70 - 110   Magnesium    Collection Time: 01/22/22  4:18 AM   Result Value Ref Range    Magnesium 1.9 1.6 - 2.6 mg/dL   Basic Metabolic Panel    Collection Time: 01/22/22  4:18 AM   Result Value Ref Range    Sodium 137 136 - 145 mmol/L    Potassium 4.5 3.5 - 5.1 mmol/L    Chloride 99 95 - 110 mmol/L    CO2 29 23 - 29 mmol/L    Glucose 190 (H) 70 - 110 mg/dL    BUN 25 (H) 8 - 23 mg/dL    Creatinine 0.9 0.5 - 1.4 mg/dL    Calcium 8.9 8.7 - 10.5 mg/dL    Anion Gap 9 8 - 16 mmol/L    eGFR if African American >60.0 >60 mL/min/1.73 m^2    eGFR if non African American >60.0 >60 mL/min/1.73 m^2   BNP    Collection Time: 01/22/22  4:18 AM   Result Value Ref Range     (H) 0 - 99 pg/mL   CBC Without Differential    Collection Time: 01/22/22  4:21 AM   Result Value Ref Range    WBC 6.87 3.90 - 12.70 K/uL    RBC 4.79 4.60 - 6.20 M/uL    Hemoglobin 13.2 (L) 14.0 - 18.0 g/dL    Hematocrit 41.5 40.0 - 54.0 %    MCV 87 82 - 98 fL    MCH 27.6 27.0 - 31.0 pg    MCHC 31.8 (L) 32.0 - 36.0 g/dL    RDW 14.5 11.5 - 14.5 %    Platelets 215 150 - 450 K/uL    MPV 10.8 9.2 - 12.9 fL       Diagnostic Results:  Imaging Results          X-Ray Chest AP Portable (Final result)  Result time 01/20/22 06:16:29    Final result by Wily Matta MD (01/20/22 06:16:29)                 Impression:      Underinflated chest with minimal blunting of lateral right costophrenic angle which could be due to pleuroparenchymal scarring or trace right pleural effusion.      Electronically signed by: Wily Matta MD  Date:    01/20/2022  Time:    06:16             Narrative:    EXAMINATION:  XR CHEST AP PORTABLE    CLINICAL HISTORY:  shortness of  "breath;    FINDINGS:  Portable chest at 209 without comparisons shows normal cardiomediastinal silhouette. Patient is rotated.    No confluent alveolar consolidation or pneumothorax.  Minimal blunting of lateral right costophrenic angle occurs.  Lung volumes are low.  Central pulmonary vasculature is prominent without evidence of marjorie pulmonary edema.  No pneumothorax or acute osseous abnormality.                                Recent Cardiac Rhythm   (if applicable)      Physical Exam:  Objective:  General Appearance:  Comfortable and in no acute distress.    Vital signs: (most recent): Blood pressure 116/86, pulse 106, temperature 97.9 °F (36.6 °C), temperature source Oral, resp. rate (!) 26, height 6' 2" (1.88 m), weight (!) 159.9 kg (352 lb 8.3 oz), SpO2 (!) 94 %.    Lungs:  Normal effort and normal respiratory rate.  Breath sounds clear to auscultation.    Heart: Tachycardia.  Irregular rhythm.  S1 normal and S2 normal.  Positive for murmur.    Abdomen: Abdomen is soft and scaphoid.  Bowel sounds are normal.   There is no abdominal tenderness.     Extremities: Normal range of motion.  There is no local swelling.        Current Consults:  IP CONSULT TO HOSPITAL MEDICINE  IP CONSULT TO CARDIOLOGY  IP CONSULT TO REGISTERED DIETITIAN/NUTRITIONIST    Assessment/Plan:  Assessment:   Atrial Fibrillation with RVR  CHF exacerbation - likely due to afib RVR  CAD s/p PCI 2013  HTN  HLP  DM2 - uncontrolled, hgA1c 8.4.   Plan:  Will improve rate control.  Will increase Cardizem.  DC Lovenox and change to Eliquis   "

## 2022-01-22 NOTE — SUBJECTIVE & OBJECTIVE
Interval History: rate improving    Review of Systems   Constitutional: Negative.    HENT: Negative.    Eyes: Negative.    Respiratory: Negative.    Cardiovascular: Positive for palpitations.   Gastrointestinal: Negative.    Endocrine: Negative.    Genitourinary: Negative.    Musculoskeletal: Negative.    Skin: Negative.    Allergic/Immunologic: Negative.    Neurological: Negative.    Hematological: Negative.    All other systems reviewed and are negative.    Objective:     Vital Signs (Most Recent):  Temp: 97.9 °F (36.6 °C) (01/22/22 0736)  Pulse: 96 (01/22/22 0736)  Resp: 19 (01/22/22 0736)  BP: 116/86 (01/22/22 0736)  SpO2: 99 % (01/22/22 0736) Vital Signs (24h Range):  Temp:  [97.8 °F (36.6 °C)-97.9 °F (36.6 °C)] 97.9 °F (36.6 °C)  Pulse:  [] 96  Resp:  [19-31] 19  SpO2:  [95 %-99 %] 99 %  BP: ()/(65-86) 116/86     Weight: (!) 159.9 kg (352 lb 8.3 oz)  Body mass index is 45.26 kg/m².    Intake/Output Summary (Last 24 hours) at 1/22/2022 0821  Last data filed at 1/22/2022 0738  Gross per 24 hour   Intake --   Output 1150 ml   Net -1150 ml      Physical Exam  Vitals and nursing note reviewed.   Constitutional:       Appearance: He is well-developed and well-nourished. He is obese.   HENT:      Head: Normocephalic and atraumatic.      Right Ear: External ear normal.      Left Ear: External ear normal.      Nose: Nose normal.      Mouth/Throat:      Mouth: Oropharynx is clear and moist.   Eyes:      Extraocular Movements: EOM normal.      Conjunctiva/sclera: Conjunctivae normal.      Pupils: Pupils are equal, round, and reactive to light.   Cardiovascular:      Rate and Rhythm: Tachycardia present. Rhythm irregular.      Pulses: Intact distal pulses.      Heart sounds: Normal heart sounds.   Pulmonary:      Effort: Pulmonary effort is normal.      Breath sounds: Normal breath sounds.   Abdominal:      General: Bowel sounds are normal.      Palpations: Abdomen is soft.   Musculoskeletal:         General:  Normal range of motion.      Cervical back: Normal range of motion and neck supple.   Skin:     General: Skin is warm and dry.      Capillary Refill: Capillary refill takes less than 2 seconds.   Neurological:      Mental Status: He is alert and oriented to person, place, and time.   Psychiatric:         Mood and Affect: Mood and affect normal.         Behavior: Behavior normal.         Thought Content: Thought content normal.         Judgment: Judgment normal.         Significant Labs:   All pertinent labs within the past 24 hours have been reviewed.  BMP:   Recent Labs   Lab 01/22/22  0418   *      K 4.5   CL 99   CO2 29   BUN 25*   CREATININE 0.9   CALCIUM 8.9   MG 1.9     CBC:   Recent Labs   Lab 01/22/22  0421   WBC 6.87   HGB 13.2*   HCT 41.5          Significant Imaging: I have reviewed all pertinent imaging results/findings within the past 24 hours.

## 2022-01-22 NOTE — PROGRESS NOTES
"[facility]  Progress Note    Patient Name: Donald Frey  MRN: [id number]  Patient Class: IP- Inpatient   Admission Date: 1/20/2022  Length of Stay: 2 days  Attending Physician: Frank Perez MD  Primary Care Provider: LEANNA Coello        Subjective:     Principal Problem:Acute hypoxemic respiratory failure        HPI:  Donald Frey is a 61 y.o. male who has PMH of  CAD s/p pci 2013 on DAPT, HTN, DM, Obesity, afib not on AC  The patient presented to [facility] on 1/20/2022 with acute respiratory distress.  Patient reports exertional short of breath, with orthopnea her with uncontrolled heart rate, wife at bedside reports patient could not sleep due to short breath, denies any chest pain nausea vomiting dizziness, reports increasing weight after started taking Ozempic.  Denies any fever cough change in bowel or bladder habits.  Denies any use due to bleed     In ED patient was tachypneic tachycardic AFib RVR with heart rate in 140s to 150s with pulmonary edema .Patient was placed on BiPAP and initiated on amiodarone drip with interval resolution of his symptoms.  During my eval patient is sitting comfortable on NC.  He has no history of CHF in the past.  Had afib  but not RVR in the past  On lab review CBC CMP unremarkable except blood glucose of 339,   cxr pending  EKG shows AFib with RVR         Overview/Hospital Course:  01/21  Assumed care. Chart reviewed. Consultant's attendance noted and appreciated. Labs reviewed and noted below: trivial hyponatremia with normal renal function and minimal prerenal azotemia. Telemetry reviewed: a fib rate 's. Discussed a fib pathophysiology with patient and wife: expressed understanding of process and need for anticoagulation. No chest discomfort since admission. Feels "Pretty good". Plan agree with amiodarone converion to po, and apixaban; AM labs for review    01/22  Labs reviewed and noted below: " "normal CBC; normal electrolytes and renal function with minimal prerenal azotemia. Telemetry reviewed: a fib RVR (though rate has decreased--100's). No chest discomfort since admission. Feels "Good". No SOB. Plan continue current regimen--discharge if Ok' d by Cardiology with outpatient follow-up to ensure rate improvement--or keep in house for the same        Interval History: rate improving    Review of Systems   Constitutional: Negative.    HENT: Negative.    Eyes: Negative.    Respiratory: Negative.    Cardiovascular: Positive for palpitations.   Gastrointestinal: Negative.    Endocrine: Negative.    Genitourinary: Negative.    Musculoskeletal: Negative.    Skin: Negative.    Allergic/Immunologic: Negative.    Neurological: Negative.    Hematological: Negative.    All other systems reviewed and are negative.    Objective:     Vital Signs (Most Recent):  Temp: 97.9 °F (36.6 °C) (01/22/22 0736)  Pulse: 96 (01/22/22 0736)  Resp: 19 (01/22/22 0736)  BP: 116/86 (01/22/22 0736)  SpO2: 99 % (01/22/22 0736) Vital Signs (24h Range):  Temp:  [97.8 °F (36.6 °C)-97.9 °F (36.6 °C)] 97.9 °F (36.6 °C)  Pulse:  [] 96  Resp:  [19-31] 19  SpO2:  [95 %-99 %] 99 %  BP: ()/(65-86) 116/86     Weight: (!) 159.9 kg (352 lb 8.3 oz)  Body mass index is 45.26 kg/m².    Intake/Output Summary (Last 24 hours) at 1/22/2022 0821  Last data filed at 1/22/2022 0738  Gross per 24 hour   Intake --   Output 1150 ml   Net -1150 ml      Physical Exam  Vitals and nursing note reviewed.   Constitutional:       Appearance: He is well-developed and well-nourished. He is obese.   HENT:      Head: Normocephalic and atraumatic.      Right Ear: External ear normal.      Left Ear: External ear normal.      Nose: Nose normal.      Mouth/Throat:      Mouth: Oropharynx is clear and moist.   Eyes:      Extraocular Movements: EOM normal.      Conjunctiva/sclera: Conjunctivae normal.      Pupils: Pupils are equal, round, and reactive to light. "   Cardiovascular:      Rate and Rhythm: Tachycardia present. Rhythm irregular.      Pulses: Intact distal pulses.      Heart sounds: Normal heart sounds.   Pulmonary:      Effort: Pulmonary effort is normal.      Breath sounds: Normal breath sounds.   Abdominal:      General: Bowel sounds are normal.      Palpations: Abdomen is soft.   Musculoskeletal:         General: Normal range of motion.      Cervical back: Normal range of motion and neck supple.   Skin:     General: Skin is warm and dry.      Capillary Refill: Capillary refill takes less than 2 seconds.   Neurological:      Mental Status: He is alert and oriented to person, place, and time.   Psychiatric:         Mood and Affect: Mood and affect normal.         Behavior: Behavior normal.         Thought Content: Thought content normal.         Judgment: Judgment normal.         Significant Labs:   All pertinent labs within the past 24 hours have been reviewed.  BMP:   Recent Labs   Lab 01/22/22  0418   *      K 4.5   CL 99   CO2 29   BUN 25*   CREATININE 0.9   CALCIUM 8.9   MG 1.9     CBC:   Recent Labs   Lab 01/22/22  0421   WBC 6.87   HGB 13.2*   HCT 41.5          Significant Imaging: I have reviewed all pertinent imaging results/findings within the past 24 hours.      Assessment/Plan:      No notes have been filed under this hospital service.  Service: Hospital Medicine    VTE Risk Mitigation (From admission, onward)         Ordered     enoxaparin injection 150 mg  Every 12 hours (non-standard times)         01/20/22 0532                Discharge Planning   ROBINSON: 1/23/2022     Code Status: Full Code   Is the patient medically ready for discharge?:     Reason for patient still in hospital (select all that apply): Patient trending condition and Treatment  Discharge Plan A: (P) Home with family                  Frank Perez MD  Department of Hospital Medicine   [facility]

## 2022-01-23 LAB
ANION GAP SERPL CALC-SCNC: 10 MMOL/L (ref 8–16)
BUN SERPL-MCNC: 22 MG/DL (ref 8–23)
CALCIUM SERPL-MCNC: 9.1 MG/DL (ref 8.7–10.5)
CHLORIDE SERPL-SCNC: 101 MMOL/L (ref 95–110)
CO2 SERPL-SCNC: 29 MMOL/L (ref 23–29)
CREAT SERPL-MCNC: 0.9 MG/DL (ref 0.5–1.4)
ERYTHROCYTE [DISTWIDTH] IN BLOOD BY AUTOMATED COUNT: 14.7 % (ref 11.5–14.5)
EST. GFR  (AFRICAN AMERICAN): >60 ML/MIN/1.73 M^2
EST. GFR  (NON AFRICAN AMERICAN): >60 ML/MIN/1.73 M^2
GLUCOSE SERPL-MCNC: 209 MG/DL (ref 70–110)
GLUCOSE SERPL-MCNC: 215 MG/DL (ref 70–110)
GLUCOSE SERPL-MCNC: 235 MG/DL (ref 70–110)
HCT VFR BLD AUTO: 41 % (ref 40–54)
HGB BLD-MCNC: 13.3 G/DL (ref 14–18)
MCH RBC QN AUTO: 27.4 PG (ref 27–31)
MCHC RBC AUTO-ENTMCNC: 32.4 G/DL (ref 32–36)
MCV RBC AUTO: 85 FL (ref 82–98)
PLATELET # BLD AUTO: 207 K/UL (ref 150–450)
PMV BLD AUTO: 10.8 FL (ref 9.2–12.9)
POTASSIUM SERPL-SCNC: 4.2 MMOL/L (ref 3.5–5.1)
RBC # BLD AUTO: 4.85 M/UL (ref 4.6–6.2)
SODIUM SERPL-SCNC: 140 MMOL/L (ref 136–145)
WBC # BLD AUTO: 6.29 K/UL (ref 3.9–12.7)

## 2022-01-23 PROCEDURE — 63600175 PHARM REV CODE 636 W HCPCS: Performed by: INTERNAL MEDICINE

## 2022-01-23 PROCEDURE — 85027 COMPLETE CBC AUTOMATED: CPT | Performed by: INTERNAL MEDICINE

## 2022-01-23 PROCEDURE — 80048 BASIC METABOLIC PNL TOTAL CA: CPT | Performed by: INTERNAL MEDICINE

## 2022-01-23 PROCEDURE — 25000003 PHARM REV CODE 250: Performed by: INTERNAL MEDICINE

## 2022-01-23 PROCEDURE — 36415 COLL VENOUS BLD VENIPUNCTURE: CPT | Performed by: INTERNAL MEDICINE

## 2022-01-23 PROCEDURE — 25000003 PHARM REV CODE 250

## 2022-01-23 PROCEDURE — 21000000 HC CCU ICU ROOM CHARGE

## 2022-01-23 PROCEDURE — 94799 UNLISTED PULMONARY SVC/PX: CPT

## 2022-01-23 PROCEDURE — 99900035 HC TECH TIME PER 15 MIN (STAT)

## 2022-01-23 PROCEDURE — 94761 N-INVAS EAR/PLS OXIMETRY MLT: CPT

## 2022-01-23 RX ORDER — DIGOXIN 0.25 MG/ML
250 INJECTION INTRAMUSCULAR; INTRAVENOUS ONCE
Status: COMPLETED | OUTPATIENT
Start: 2022-01-23 | End: 2022-01-23

## 2022-01-23 RX ORDER — DIGOXIN 125 MCG
0.12 TABLET ORAL DAILY
Status: DISCONTINUED | OUTPATIENT
Start: 2022-01-24 | End: 2022-01-24 | Stop reason: HOSPADM

## 2022-01-23 RX ORDER — DIGOXIN 0.25 MG/ML
250 INJECTION INTRAMUSCULAR; INTRAVENOUS ONCE
Status: DISCONTINUED | OUTPATIENT
Start: 2022-01-23 | End: 2022-01-23

## 2022-01-23 RX ADMIN — HUMAN INSULIN 6 UNITS: 100 INJECTION, SOLUTION SUBCUTANEOUS at 05:01

## 2022-01-23 RX ADMIN — APIXABAN 5 MG: 5 TABLET, FILM COATED ORAL at 08:01

## 2022-01-23 RX ADMIN — HUMAN INSULIN 6 UNITS: 100 INJECTION, SOLUTION SUBCUTANEOUS at 08:01

## 2022-01-23 RX ADMIN — DIGOXIN 250 MCG: 250 INJECTION, SOLUTION INTRAMUSCULAR; INTRAVENOUS; PARENTERAL at 10:01

## 2022-01-23 RX ADMIN — HUMAN INSULIN 6 UNITS: 100 INJECTION, SOLUTION SUBCUTANEOUS at 12:01

## 2022-01-23 RX ADMIN — ATORVASTATIN CALCIUM 80 MG: 40 TABLET, FILM COATED ORAL at 08:01

## 2022-01-23 RX ADMIN — AMIODARONE HYDROCHLORIDE 200 MG: 200 TABLET ORAL at 08:01

## 2022-01-23 RX ADMIN — DILTIAZEM HYDROCHLORIDE 240 MG: 120 CAPSULE, COATED, EXTENDED RELEASE ORAL at 08:01

## 2022-01-23 RX ADMIN — DIGOXIN 250 MCG: 0.25 INJECTION INTRAMUSCULAR; INTRAVENOUS at 03:01

## 2022-01-23 RX ADMIN — FUROSEMIDE 20 MG: 20 TABLET ORAL at 05:01

## 2022-01-23 RX ADMIN — LISINOPRIL 20 MG: 20 TABLET ORAL at 08:01

## 2022-01-23 RX ADMIN — FUROSEMIDE 20 MG: 20 TABLET ORAL at 08:01

## 2022-01-23 RX ADMIN — CLOPIDOGREL BISULFATE 75 MG: 75 TABLET, FILM COATED ORAL at 08:01

## 2022-01-23 RX ADMIN — INSULIN DETEMIR 10 UNITS: 100 INJECTION, SOLUTION SUBCUTANEOUS at 08:01

## 2022-01-23 NOTE — SUBJECTIVE & OBJECTIVE
Interval History: persisting RVR    Review of Systems   Constitutional: Positive for fatigue.   HENT: Negative.    Eyes: Negative.    Respiratory: Negative.    Cardiovascular: Positive for palpitations.   Gastrointestinal: Negative.    Endocrine: Negative.    Genitourinary: Negative.    Musculoskeletal: Negative.    Skin: Negative.    Allergic/Immunologic: Negative.    Neurological: Negative.    Hematological: Negative.    All other systems reviewed and are negative.    Objective:     Vital Signs (Most Recent):  Temp: 98.8 °F (37.1 °C) (01/23/22 1131)  Pulse: 95 (01/23/22 1131)  Resp: 18 (01/23/22 1131)  BP: 122/82 (01/23/22 1131)  SpO2: 95 % (01/23/22 1131) Vital Signs (24h Range):  Temp:  [96.8 °F (36 °C)-98.8 °F (37.1 °C)] 98.8 °F (37.1 °C)  Pulse:  [] 95  Resp:  [16-20] 18  SpO2:  [95 %-97 %] 95 %  BP: (114-130)/(76-91) 122/82     Weight: (!) 159.9 kg (352 lb 8.3 oz)  Body mass index is 45.26 kg/m².    Intake/Output Summary (Last 24 hours) at 1/23/2022 1405  Last data filed at 1/23/2022 0800  Gross per 24 hour   Intake 480 ml   Output --   Net 480 ml      Physical Exam  Vitals and nursing note reviewed.   Constitutional:       Appearance: He is well-developed and well-nourished. He is obese.   HENT:      Head: Normocephalic and atraumatic.      Right Ear: External ear normal.      Left Ear: External ear normal.      Nose: Nose normal.      Mouth/Throat:      Mouth: Oropharynx is clear and moist.   Eyes:      Extraocular Movements: EOM normal.      Conjunctiva/sclera: Conjunctivae normal.      Pupils: Pupils are equal, round, and reactive to light.   Cardiovascular:      Rate and Rhythm: Tachycardia present. Rhythm irregular.      Pulses: Intact distal pulses.      Heart sounds: Normal heart sounds.   Pulmonary:      Effort: Pulmonary effort is normal.      Breath sounds: Normal breath sounds.      Comments: Decreased entry without adventitious sounds    Abdominal:      General: Bowel sounds are normal.       Palpations: Abdomen is soft.   Musculoskeletal:         General: Normal range of motion.      Cervical back: Normal range of motion and neck supple.   Skin:     General: Skin is warm and dry.      Capillary Refill: Capillary refill takes less than 2 seconds.   Neurological:      Mental Status: He is alert and oriented to person, place, and time.   Psychiatric:         Mood and Affect: Mood and affect normal.         Behavior: Behavior normal.         Thought Content: Thought content normal.         Judgment: Judgment normal.         Significant Labs:   All pertinent labs within the past 24 hours have been reviewed.  BMP:   Recent Labs   Lab 01/22/22  0418 01/22/22  0418 01/23/22  0510   *   < > 235*      < > 140   K 4.5   < > 4.2   CL 99   < > 101   CO2 29   < > 29   BUN 25*   < > 22   CREATININE 0.9   < > 0.9   CALCIUM 8.9   < > 9.1   MG 1.9  --   --     < > = values in this interval not displayed.     CBC:   Recent Labs   Lab 01/22/22  0421 01/23/22  0510   WBC 6.87 6.29   HGB 13.2* 13.3*   HCT 41.5 41.0    207       Significant Imaging: I have reviewed all pertinent imaging results/findings within the past 24 hours.

## 2022-01-23 NOTE — CARE UPDATE
01/23/22 1414   Patient Assessment/Suction   Level of Consciousness (AVPU) alert   Respiratory Effort Normal;Unlabored   Expansion/Accessory Muscles/Retractions expansion symmetric;no use of accessory muscles   Rhythm/Pattern, Respiratory depth regular;pattern regular;no shortness of breath reported   PRE-TX-O2   O2 Device (Oxygen Therapy) room air   SpO2 98 %   Pulse Oximetry Type Continuous   $ Pulse Oximetry - Multiple Charge Pulse Oximetry - Multiple   Pulse 86   Resp 18   Positioning HOB elevated 45 degrees   Respiratory Evaluation   $ Care Plan Tech Time 15 min

## 2022-01-23 NOTE — PROGRESS NOTES
"[facility]  Progress Note    Patient Name: Donald Frey  MRN: [id number]  Patient Class: IP- Inpatient   Admission Date: 1/20/2022  Length of Stay: 3 days  Attending Physician: Frank Perez MD  Primary Care Provider: LEANNA Coello        Subjective:     Principal Problem:Acute hypoxemic respiratory failure        HPI:  Donald Frey is a 61 y.o. male who has PMH of  CAD s/p pci 2013 on DAPT, HTN, DM, Obesity, afib not on AC  The patient presented to [facility] on 1/20/2022 with acute respiratory distress.  Patient reports exertional short of breath, with orthopnea her with uncontrolled heart rate, wife at bedside reports patient could not sleep due to short breath, denies any chest pain nausea vomiting dizziness, reports increasing weight after started taking Ozempic.  Denies any fever cough change in bowel or bladder habits.  Denies any use due to bleed     In ED patient was tachypneic tachycardic AFib RVR with heart rate in 140s to 150s with pulmonary edema .Patient was placed on BiPAP and initiated on amiodarone drip with interval resolution of his symptoms.  During my eval patient is sitting comfortable on NC.  He has no history of CHF in the past.  Had afib  but not RVR in the past  On lab review CBC CMP unremarkable except blood glucose of 339,   cxr pending  EKG shows AFib with RVR         Overview/Hospital Course:  01/21  Assumed care. Chart reviewed. Consultant's attendance noted and appreciated. Labs reviewed and noted below: trivial hyponatremia with normal renal function and minimal prerenal azotemia. Telemetry reviewed: a fib rate 's. Discussed a fib pathophysiology with patient and wife: expressed understanding of process and need for anticoagulation. No chest discomfort since admission. Feels "Pretty good". Plan agree with amiodarone converion to po, and apixaban; AM labs for review    01/22  Labs reviewed and noted below: " "normal CBC; normal electrolytes and renal function with minimal prerenal azotemia. Telemetry reviewed: a fib RVR (though rate has decreased--100's). No chest discomfort since admission. Feels "Good". No SOB. Plan continue current regimen--discharge if Ok' d by Cardiology with outpatient follow-up to ensure rate improvement--or keep in house for the same    01/23  Discussed with Cardiology: telemetry review: persisting a fib: continue current course, add digoxin--hopefully discharge in AM. Labs reviewed and noted below: normal CBC; normal electrolytes and renal function.   Plan: continue current regimen, adding Digoxin as above; AM labs; hopefully discharge in AM      Interval History: persisting RVR    Review of Systems   Constitutional: Positive for fatigue.   HENT: Negative.    Eyes: Negative.    Respiratory: Negative.    Cardiovascular: Positive for palpitations.   Gastrointestinal: Negative.    Endocrine: Negative.    Genitourinary: Negative.    Musculoskeletal: Negative.    Skin: Negative.    Allergic/Immunologic: Negative.    Neurological: Negative.    Hematological: Negative.    All other systems reviewed and are negative.    Objective:     Vital Signs (Most Recent):  Temp: 98.8 °F (37.1 °C) (01/23/22 1131)  Pulse: 95 (01/23/22 1131)  Resp: 18 (01/23/22 1131)  BP: 122/82 (01/23/22 1131)  SpO2: 95 % (01/23/22 1131) Vital Signs (24h Range):  Temp:  [96.8 °F (36 °C)-98.8 °F (37.1 °C)] 98.8 °F (37.1 °C)  Pulse:  [] 95  Resp:  [16-20] 18  SpO2:  [95 %-97 %] 95 %  BP: (114-130)/(76-91) 122/82     Weight: (!) 159.9 kg (352 lb 8.3 oz)  Body mass index is 45.26 kg/m².    Intake/Output Summary (Last 24 hours) at 1/23/2022 1405  Last data filed at 1/23/2022 0800  Gross per 24 hour   Intake 480 ml   Output --   Net 480 ml      Physical Exam  Vitals and nursing note reviewed.   Constitutional:       Appearance: He is well-developed and well-nourished. He is obese.   HENT:      Head: Normocephalic and atraumatic.     "  Right Ear: External ear normal.      Left Ear: External ear normal.      Nose: Nose normal.      Mouth/Throat:      Mouth: Oropharynx is clear and moist.   Eyes:      Extraocular Movements: EOM normal.      Conjunctiva/sclera: Conjunctivae normal.      Pupils: Pupils are equal, round, and reactive to light.   Cardiovascular:      Rate and Rhythm: Tachycardia present. Rhythm irregular.      Pulses: Intact distal pulses.      Heart sounds: Normal heart sounds.   Pulmonary:      Effort: Pulmonary effort is normal.      Breath sounds: Normal breath sounds.      Comments: Decreased entry without adventitious sounds    Abdominal:      General: Bowel sounds are normal.      Palpations: Abdomen is soft.   Musculoskeletal:         General: Normal range of motion.      Cervical back: Normal range of motion and neck supple.   Skin:     General: Skin is warm and dry.      Capillary Refill: Capillary refill takes less than 2 seconds.   Neurological:      Mental Status: He is alert and oriented to person, place, and time.   Psychiatric:         Mood and Affect: Mood and affect normal.         Behavior: Behavior normal.         Thought Content: Thought content normal.         Judgment: Judgment normal.         Significant Labs:   All pertinent labs within the past 24 hours have been reviewed.  BMP:   Recent Labs   Lab 01/22/22  0418 01/22/22  0418 01/23/22  0510   *   < > 235*      < > 140   K 4.5   < > 4.2   CL 99   < > 101   CO2 29   < > 29   BUN 25*   < > 22   CREATININE 0.9   < > 0.9   CALCIUM 8.9   < > 9.1   MG 1.9  --   --     < > = values in this interval not displayed.     CBC:   Recent Labs   Lab 01/22/22  0421 01/23/22  0510   WBC 6.87 6.29   HGB 13.2* 13.3*   HCT 41.5 41.0    207       Significant Imaging: I have reviewed all pertinent imaging results/findings within the past 24 hours.      Assessment/Plan:      No notes have been filed under this hospital service.  Service: Hospital Medicine    VTE  Risk Mitigation (From admission, onward)         Ordered     apixaban tablet 5 mg  2 times daily         01/22/22 0935                Discharge Planning   ROBINSON: 1/23/2022     Code Status: Full Code   Is the patient medically ready for discharge?:     Reason for patient still in hospital (select all that apply): Patient trending condition, Laboratory test and Treatment  Discharge Plan A: (P) Home with family                  Frank Perez MD  Department of Hospital Medicine   [facility]

## 2022-01-23 NOTE — PROGRESS NOTES
[facility]    Cardiology Progress Note    Subjective:  Comfortable , however heart rate is still not under control he does bounce around up to the 120 range.  I did increase his diltiazem.  He denies any shortness of breath or chest pain       Objective:  Vital Signs (Most Recent)  Temp: 97.9 °F (36.6 °C) (01/23/22 0742)  Pulse: 97 (01/23/22 0742)  Resp: 18 (01/23/22 0742)  BP: (!) 130/91 (01/23/22 0742)  SpO2: 96 % (01/23/22 0742)    Vital Signs Range (Last 24H):  Temp:  [96.8 °F (36 °C)-98 °F (36.7 °C)]   Pulse:  []   Resp:  [16-20]   BP: (107-130)/(76-91)   SpO2:  [95 %-97 %]     I & O (Last 24H):    Intake/Output Summary (Last 24 hours) at 1/23/2022 0935  Last data filed at 1/22/2022 1300  Gross per 24 hour   Intake 360 ml   Output --   Net 360 ml       Current Diet:     Current Diet Order   Procedures    Diet diabetic [facility]; 1500 Calorie     Order Specific Question:   Indicate patient location for additional diet options:     Answer:   [facility]     Order Specific Question:   Total calories:     Answer:   1500 Calorie        Allergies:  Review of patient's allergies indicates:  No Known Allergies    Meds:  Scheduled Meds:   amiodarone  200 mg Oral BID    apixaban  5 mg Oral BID    atorvastatin  80 mg Oral QHS    clopidogreL  75 mg Oral Daily    diltiaZEM  240 mg Oral BID    furosemide  20 mg Oral BID    insulin detemir U-100  10 Units Subcutaneous QHS    lisinopriL  20 mg Oral Daily     Continuous Infusions:  PRN Meds:acetaminophen, calcium chloride IVPB, calcium chloride IVPB, calcium chloride IVPB, hydrALAZINE, HYDROcodone-acetaminophen, insulin regular, magnesium oxide, magnesium sulfate IVPB, magnesium sulfate IVPB, magnesium sulfate IVPB, magnesium sulfate IVPB, potassium chloride in water, potassium chloride in water, potassium chloride in water, potassium chloride in water, potassium chloride, potassium chloride, potassium chloride, potassium chloride, sodium chloride 0.9%    Lab  Results :  Recent Results (from the past 24 hour(s))   POCT glucose    Collection Time: 01/22/22 12:22 PM   Result Value Ref Range    POC Glucose 222 (H) 70 - 110   POCT glucose    Collection Time: 01/22/22  4:20 PM   Result Value Ref Range    POC Glucose 205 (H) 70 - 110   POCT glucose    Collection Time: 01/22/22  9:26 PM   Result Value Ref Range    POC Glucose 209 (H) 70 - 110   CBC Without Differential    Collection Time: 01/23/22  5:10 AM   Result Value Ref Range    WBC 6.29 3.90 - 12.70 K/uL    RBC 4.85 4.60 - 6.20 M/uL    Hemoglobin 13.3 (L) 14.0 - 18.0 g/dL    Hematocrit 41.0 40.0 - 54.0 %    MCV 85 82 - 98 fL    MCH 27.4 27.0 - 31.0 pg    MCHC 32.4 32.0 - 36.0 g/dL    RDW 14.7 (H) 11.5 - 14.5 %    Platelets 207 150 - 450 K/uL    MPV 10.8 9.2 - 12.9 fL   Basic Metabolic Panel    Collection Time: 01/23/22  5:10 AM   Result Value Ref Range    Sodium 140 136 - 145 mmol/L    Potassium 4.2 3.5 - 5.1 mmol/L    Chloride 101 95 - 110 mmol/L    CO2 29 23 - 29 mmol/L    Glucose 235 (H) 70 - 110 mg/dL    BUN 22 8 - 23 mg/dL    Creatinine 0.9 0.5 - 1.4 mg/dL    Calcium 9.1 8.7 - 10.5 mg/dL    Anion Gap 10 8 - 16 mmol/L    eGFR if African American >60.0 >60 mL/min/1.73 m^2    eGFR if non African American >60.0 >60 mL/min/1.73 m^2   POCT glucose    Collection Time: 01/23/22  6:03 AM   Result Value Ref Range    POC Glucose 235 (H) 70 - 110       Diagnostic Results:  Imaging Results          X-Ray Chest AP Portable (Final result)  Result time 01/20/22 06:16:29    Final result by Wily Matta MD (01/20/22 06:16:29)                 Impression:      Underinflated chest with minimal blunting of lateral right costophrenic angle which could be due to pleuroparenchymal scarring or trace right pleural effusion.      Electronically signed by: Wily Matta MD  Date:    01/20/2022  Time:    06:16             Narrative:    EXAMINATION:  XR CHEST AP PORTABLE    CLINICAL HISTORY:  shortness of breath;    FINDINGS:  Portable chest at 209  "without comparisons shows normal cardiomediastinal silhouette. Patient is rotated.    No confluent alveolar consolidation or pneumothorax.  Minimal blunting of lateral right costophrenic angle occurs.  Lung volumes are low.  Central pulmonary vasculature is prominent without evidence of marjorie pulmonary edema.  No pneumothorax or acute osseous abnormality.                                Recent Cardiac Rhythm   (if applicable)      Physical Exam:  Objective:  General Appearance:  Comfortable.    Vital signs: (most recent): Blood pressure (!) 130/91, pulse 97, temperature 97.9 °F (36.6 °C), temperature source Oral, resp. rate 18, height 6' 2" (1.88 m), weight (!) 159.9 kg (352 lb 8.3 oz), SpO2 96 %.    Lungs:  Normal effort and normal respiratory rate.  Breath sounds clear to auscultation.    Heart: Tachycardia.  Irregular rhythm.  S1 normal and S2 normal.  Positive for murmur.    Abdomen: Abdomen is soft.  There is no abdominal tenderness.         Current Consults:  IP CONSULT TO HOSPITAL MEDICINE  IP CONSULT TO CARDIOLOGY  IP CONSULT TO REGISTERED DIETITIAN/NUTRITIONIST    Assessment/Plan:  Assessment:   Atrial Fibrillation with RVR  CHF exacerbation - likely due to afib RVR  CAD s/p PCI 2013  HTN  HLP  DM2 - uncontrolled, hgA1c 8.4  Plan:   Since heart rate is still not under control with amiodarone and Cardizem will add digoxin hopefully rates will get below 100 and can be discharged in a.m.  "

## 2022-01-23 NOTE — CARE UPDATE
01/22/22 2110   Patient Assessment/Suction   Respiratory Effort Unlabored   Rhythm/Pattern, Respiratory pattern regular   PRE-TX-O2   O2 Device (Oxygen Therapy) room air   SpO2 97 %   Pulse Oximetry Type Continuous   Pulse (!) 114   Resp 16   Respiratory Evaluation   $ Care Plan Tech Time 15 min   $ Eval/Re-eval Charges Re-evaluation   Evaluation For   (CARE PLAN)

## 2022-01-24 VITALS
HEART RATE: 86 BPM | SYSTOLIC BLOOD PRESSURE: 156 MMHG | OXYGEN SATURATION: 96 % | HEIGHT: 74 IN | DIASTOLIC BLOOD PRESSURE: 88 MMHG | TEMPERATURE: 98 F | RESPIRATION RATE: 20 BRPM | WEIGHT: 315 LBS | BODY MASS INDEX: 40.43 KG/M2

## 2022-01-24 PROBLEM — J96.01 ACUTE HYPOXEMIC RESPIRATORY FAILURE: Status: RESOLVED | Noted: 2022-01-20 | Resolved: 2022-01-24

## 2022-01-24 LAB
ANION GAP SERPL CALC-SCNC: 10 MMOL/L (ref 8–16)
BUN SERPL-MCNC: 16 MG/DL (ref 8–23)
CALCIUM SERPL-MCNC: 9.3 MG/DL (ref 8.7–10.5)
CHLORIDE SERPL-SCNC: 101 MMOL/L (ref 95–110)
CO2 SERPL-SCNC: 30 MMOL/L (ref 23–29)
CREAT SERPL-MCNC: 0.8 MG/DL (ref 0.5–1.4)
ERYTHROCYTE [DISTWIDTH] IN BLOOD BY AUTOMATED COUNT: 14.6 % (ref 11.5–14.5)
EST. GFR  (AFRICAN AMERICAN): >60 ML/MIN/1.73 M^2
EST. GFR  (NON AFRICAN AMERICAN): >60 ML/MIN/1.73 M^2
GLUCOSE SERPL-MCNC: 192 MG/DL (ref 70–110)
GLUCOSE SERPL-MCNC: 208 MG/DL (ref 70–110)
GLUCOSE SERPL-MCNC: 215 MG/DL (ref 70–110)
HCT VFR BLD AUTO: 44.2 % (ref 40–54)
HGB BLD-MCNC: 14 G/DL (ref 14–18)
MCH RBC QN AUTO: 27.3 PG (ref 27–31)
MCHC RBC AUTO-ENTMCNC: 31.7 G/DL (ref 32–36)
MCV RBC AUTO: 86 FL (ref 82–98)
PLATELET # BLD AUTO: 211 K/UL (ref 150–450)
PMV BLD AUTO: 10.6 FL (ref 9.2–12.9)
POTASSIUM SERPL-SCNC: 4.4 MMOL/L (ref 3.5–5.1)
RBC # BLD AUTO: 5.13 M/UL (ref 4.6–6.2)
SODIUM SERPL-SCNC: 141 MMOL/L (ref 136–145)
WBC # BLD AUTO: 6.14 K/UL (ref 3.9–12.7)

## 2022-01-24 PROCEDURE — 94761 N-INVAS EAR/PLS OXIMETRY MLT: CPT

## 2022-01-24 PROCEDURE — 85027 COMPLETE CBC AUTOMATED: CPT | Performed by: INTERNAL MEDICINE

## 2022-01-24 PROCEDURE — 80048 BASIC METABOLIC PNL TOTAL CA: CPT | Performed by: INTERNAL MEDICINE

## 2022-01-24 PROCEDURE — 36415 COLL VENOUS BLD VENIPUNCTURE: CPT | Performed by: INTERNAL MEDICINE

## 2022-01-24 PROCEDURE — 63600175 PHARM REV CODE 636 W HCPCS: Performed by: INTERNAL MEDICINE

## 2022-01-24 PROCEDURE — 25000003 PHARM REV CODE 250

## 2022-01-24 PROCEDURE — 25000003 PHARM REV CODE 250: Performed by: INTERNAL MEDICINE

## 2022-01-24 RX ORDER — AMIODARONE HYDROCHLORIDE 200 MG/1
200 TABLET ORAL 2 TIMES DAILY
Qty: 60 TABLET | Refills: 11 | Status: SHIPPED | OUTPATIENT
Start: 2022-01-24 | End: 2022-01-24

## 2022-01-24 RX ORDER — LISINOPRIL 20 MG/1
20 TABLET ORAL DAILY
Qty: 90 TABLET | Refills: 3 | Status: SHIPPED | OUTPATIENT
Start: 2022-01-25 | End: 2024-02-20

## 2022-01-24 RX ORDER — FUROSEMIDE 20 MG/1
20 TABLET ORAL 2 TIMES DAILY
Qty: 60 TABLET | Refills: 11 | Status: SHIPPED | OUTPATIENT
Start: 2022-01-24 | End: 2024-02-20

## 2022-01-24 RX ORDER — DIGOXIN 125 MCG
0.12 TABLET ORAL DAILY
Qty: 30 TABLET | Refills: 11 | Status: SHIPPED | OUTPATIENT
Start: 2022-01-25 | End: 2022-01-24

## 2022-01-24 RX ORDER — AMIODARONE HYDROCHLORIDE 200 MG/1
200 TABLET ORAL 2 TIMES DAILY
Qty: 60 TABLET | Refills: 11 | Status: SHIPPED | OUTPATIENT
Start: 2022-01-24 | End: 2022-10-26

## 2022-01-24 RX ORDER — LISINOPRIL 20 MG/1
20 TABLET ORAL DAILY
Qty: 90 TABLET | Refills: 3 | Status: SHIPPED | OUTPATIENT
Start: 2022-01-25 | End: 2022-01-24

## 2022-01-24 RX ORDER — FUROSEMIDE 20 MG/1
20 TABLET ORAL 2 TIMES DAILY
Qty: 60 TABLET | Refills: 11 | Status: SHIPPED | OUTPATIENT
Start: 2022-01-24 | End: 2022-01-24

## 2022-01-24 RX ORDER — DILTIAZEM HYDROCHLORIDE 240 MG/1
240 CAPSULE, COATED, EXTENDED RELEASE ORAL 2 TIMES DAILY
Qty: 60 CAPSULE | Refills: 11 | Status: SHIPPED | OUTPATIENT
Start: 2022-01-24 | End: 2022-01-24

## 2022-01-24 RX ORDER — DIGOXIN 125 MCG
0.12 TABLET ORAL DAILY
Qty: 30 TABLET | Refills: 11 | Status: SHIPPED | OUTPATIENT
Start: 2022-01-25 | End: 2022-10-26

## 2022-01-24 RX ORDER — DILTIAZEM HYDROCHLORIDE 240 MG/1
240 CAPSULE, COATED, EXTENDED RELEASE ORAL 2 TIMES DAILY
Qty: 60 CAPSULE | Refills: 11 | Status: SHIPPED | OUTPATIENT
Start: 2022-01-24 | End: 2024-02-20

## 2022-01-24 RX ADMIN — LISINOPRIL 20 MG: 20 TABLET ORAL at 08:01

## 2022-01-24 RX ADMIN — FUROSEMIDE 20 MG: 20 TABLET ORAL at 08:01

## 2022-01-24 RX ADMIN — HUMAN INSULIN 6 UNITS: 100 INJECTION, SOLUTION SUBCUTANEOUS at 12:01

## 2022-01-24 RX ADMIN — DIGOXIN 0.12 MG: 125 TABLET ORAL at 08:01

## 2022-01-24 RX ADMIN — AMIODARONE HYDROCHLORIDE 200 MG: 200 TABLET ORAL at 08:01

## 2022-01-24 RX ADMIN — DILTIAZEM HYDROCHLORIDE 240 MG: 120 CAPSULE, COATED, EXTENDED RELEASE ORAL at 08:01

## 2022-01-24 RX ADMIN — APIXABAN 5 MG: 5 TABLET, FILM COATED ORAL at 08:01

## 2022-01-24 RX ADMIN — CLOPIDOGREL BISULFATE 75 MG: 75 TABLET, FILM COATED ORAL at 08:01

## 2022-01-24 NOTE — PROGRESS NOTES
[facility]    Cardiology Progress Note    Subjective:  Pt seen and examined this AM.  Patient remains in persistent afib, rate controlled this morning.   He denies CP, palpitations, SOB, LE edema, dizziness.  Vitals reviewed.   Labs reviewed.   Tele shows atrial fibrillation rate controlled, HR 80-90s.     Objective:  Vital Signs (Most Recent)  Temp: 97.5 °F (36.4 °C) (01/24/22 0737)  Pulse: 84 (01/24/22 0420)  Resp: 20 (01/24/22 0737)  BP: (!) 156/88 (01/24/22 0737)  SpO2: 97 % (01/24/22 0737)    Vital Signs Range (Last 24H):  Temp:  [97.5 °F (36.4 °C)-98.8 °F (37.1 °C)]   Pulse:  [70-95]   Resp:  [17-20]   BP: (122-156)/(56-88)   SpO2:  [95 %-98 %]     I & O (Last 24H):    Intake/Output Summary (Last 24 hours) at 1/24/2022 0907  Last data filed at 1/23/2022 1300  Gross per 24 hour   Intake 240 ml   Output --   Net 240 ml       Current Diet:     Current Diet Order   Procedures    Diet diabetic [facility]; 1500 Calorie     Order Specific Question:   Indicate patient location for additional diet options:     Answer:   [facility]     Order Specific Question:   Total calories:     Answer:   1500 Calorie        Allergies:  Review of patient's allergies indicates:  No Known Allergies    Meds:  Scheduled Meds:   amiodarone  200 mg Oral BID    apixaban  5 mg Oral BID    atorvastatin  80 mg Oral QHS    clopidogreL  75 mg Oral Daily    digoxin  0.125 mg Oral Daily    diltiaZEM  240 mg Oral BID    furosemide  20 mg Oral BID    insulin detemir U-100  10 Units Subcutaneous QHS    lisinopriL  20 mg Oral Daily     Continuous Infusions:  PRN Meds:acetaminophen, calcium chloride IVPB, calcium chloride IVPB, calcium chloride IVPB, hydrALAZINE, HYDROcodone-acetaminophen, insulin regular, magnesium oxide, magnesium sulfate IVPB, magnesium sulfate IVPB, magnesium sulfate IVPB, magnesium sulfate IVPB, potassium chloride in water, potassium chloride in water, potassium chloride in water, potassium chloride in water, potassium  chloride, potassium chloride, potassium chloride, potassium chloride, sodium chloride 0.9%    Lab Results :  Recent Results (from the past 24 hour(s))   POCT glucose    Collection Time: 01/23/22 11:36 AM   Result Value Ref Range    POC Glucose 215 (H) 70 - 110   POCT glucose    Collection Time: 01/23/22  4:02 PM   Result Value Ref Range    POC Glucose 209 (H) 70 - 110   POCT glucose    Collection Time: 01/23/22  8:27 PM   Result Value Ref Range    POC Glucose 235 (H) 70 - 110   CBC Without Differential    Collection Time: 01/24/22  5:04 AM   Result Value Ref Range    WBC 6.14 3.90 - 12.70 K/uL    RBC 5.13 4.60 - 6.20 M/uL    Hemoglobin 14.0 14.0 - 18.0 g/dL    Hematocrit 44.2 40.0 - 54.0 %    MCV 86 82 - 98 fL    MCH 27.3 27.0 - 31.0 pg    MCHC 31.7 (L) 32.0 - 36.0 g/dL    RDW 14.6 (H) 11.5 - 14.5 %    Platelets 211 150 - 450 K/uL    MPV 10.6 9.2 - 12.9 fL   Basic Metabolic Panel    Collection Time: 01/24/22  5:04 AM   Result Value Ref Range    Sodium 141 136 - 145 mmol/L    Potassium 4.4 3.5 - 5.1 mmol/L    Chloride 101 95 - 110 mmol/L    CO2 30 (H) 23 - 29 mmol/L    Glucose 215 (H) 70 - 110 mg/dL    BUN 16 8 - 23 mg/dL    Creatinine 0.8 0.5 - 1.4 mg/dL    Calcium 9.3 8.7 - 10.5 mg/dL    Anion Gap 10 8 - 16 mmol/L    eGFR if African American >60.0 >60 mL/min/1.73 m^2    eGFR if non African American >60.0 >60 mL/min/1.73 m^2   POCT glucose    Collection Time: 01/24/22  5:56 AM   Result Value Ref Range    POC Glucose 192 (H) 70 - 110       Diagnostic Results:  Imaging Results          X-Ray Chest AP Portable (Final result)  Result time 01/20/22 06:16:29    Final result by Wily Matta MD (01/20/22 06:16:29)                 Impression:      Underinflated chest with minimal blunting of lateral right costophrenic angle which could be due to pleuroparenchymal scarring or trace right pleural effusion.      Electronically signed by: Wily Matta MD  Date:    01/20/2022  Time:    06:16             Narrative:     "EXAMINATION:  XR CHEST AP PORTABLE    CLINICAL HISTORY:  shortness of breath;    FINDINGS:  Portable chest at 209 without comparisons shows normal cardiomediastinal silhouette. Patient is rotated.    No confluent alveolar consolidation or pneumothorax.  Minimal blunting of lateral right costophrenic angle occurs.  Lung volumes are low.  Central pulmonary vasculature is prominent without evidence of marjorie pulmonary edema.  No pneumothorax or acute osseous abnormality.                                Recent Cardiac Rhythm   (if applicable)      Physical Exam:  Objective:  General Appearance:  Comfortable and well-appearing.    Vital signs: (most recent): Blood pressure (!) 156/88, pulse 84, temperature 97.5 °F (36.4 °C), temperature source Oral, resp. rate 20, height 6' 2" (1.88 m), weight (!) 159.9 kg (352 lb 8.3 oz), SpO2 97 %.  Vital signs are normal.  No fever.    Output: Producing urine.    Lungs:  Normal effort and normal respiratory rate.  Breath sounds clear to auscultation.  He is not in respiratory distress.    Heart: Normal rate.  Irregular rhythm.  No murmur.   Extremities: There is no dependent edema.    Neurological: Patient is oriented to person, place and time.    Skin:  Warm and dry.  No rash.       Current Consults:  IP CONSULT TO HOSPITAL MEDICINE  IP CONSULT TO CARDIOLOGY  IP CONSULT TO REGISTERED DIETITIAN/NUTRITIONIST    Assessment/Plan:  Assessment:   Atrial Fibrillation with RVR  CHF exacerbation - likely due to afib RVR  CAD s/p PCI 2013  HTN  HLP  DM2     Plan:   Patient remains in atrial fibrillation, rate controlled this morning. On amiodarone, digoxin, and cardizem.   Continue eliquis.   Patient is okay to discharge from a cardiac standpoint.  Patient should follow up with Dr. Orellana in clinic in 1 week.   May need ARMANDO cardioversion.  "

## 2022-01-24 NOTE — DISCHARGE SUMMARY
"Critical access hospital Medicine  Discharge Summary      Patient Name: Donald Frey  MRN: [id number]  Patient Class: IP- Inpatient  Admission Date: 1/20/2022  Hospital Length of Stay: 4 days  Discharge Date and Time:  01/24/2022 2:44 PM  Attending Physician: Frank Perez MD   Discharging Provider: Frank Perez MD  Primary Care Provider: LEANNA Coello      HPI:   Donald Frey is a 61 y.o. male who has PMH of  CAD s/p pci 2013 on DAPT, HTN, DM, Obesity, afib not on AC  The patient presented to [facility] on 1/20/2022 with acute respiratory distress.  Patient reports exertional short of breath, with orthopnea her with uncontrolled heart rate, wife at bedside reports patient could not sleep due to short breath, denies any chest pain nausea vomiting dizziness, reports increasing weight after started taking Ozempic.  Denies any fever cough change in bowel or bladder habits.  Denies any use due to bleed     In ED patient was tachypneic tachycardic AFib RVR with heart rate in 140s to 150s with pulmonary edema .Patient was placed on BiPAP and initiated on amiodarone drip with interval resolution of his symptoms.  During my eval patient is sitting comfortable on NC.  He has no history of CHF in the past.  Had afib  but not RVR in the past  On lab review CBC CMP unremarkable except blood glucose of 339,   cxr pending  EKG shows AFib with RVR         * No surgery found *      Hospital Course:   01/21  Assumed care. Chart reviewed. Consultant's attendance noted and appreciated. Labs reviewed and noted below: trivial hyponatremia with normal renal function and minimal prerenal azotemia. Telemetry reviewed: a fib rate 's. Discussed a fib pathophysiology with patient and wife: expressed understanding of process and need for anticoagulation. No chest discomfort since admission. Feels "Pretty good". Plan agree with amiodarone converion to po, and apixaban; AM labs for " "review    01/22  Labs reviewed and noted below: normal CBC; normal electrolytes and renal function with minimal prerenal azotemia. Telemetry reviewed: a fib RVR (though rate has decreased--100's). No chest discomfort since admission. Feels "Good". No SOB. Plan continue current regimen--discharge if Ok' d by Cardiology with outpatient follow-up to ensure rate improvement--or keep in house for the same    01/23  Discussed with Cardiology: telemetry review: persisting a fib: continue current course, add digoxin--hopefully discharge in AM. Labs reviewed and noted below: normal CBC; normal electrolytes and renal function.   Plan: continue current regimen, adding Digoxin as above; AM labs; hopefully discharge in AM    01/24  Ready for discharge. Has been clear by Cardiology with close outpatient follow-up. Patient feels "Really good". Obtained vouchers for first month free and $10 co-pay there after for apixaban for the patient. Medication as per discharge med rec below.He is to follow-up with Cardiology in 1 week. Cardiac/DM diet. Activity as tolerated  VSS  Lungs: decreased entry without adventitious sounds  Heart S1S2 irreg irreg  Abdo obese, soft       Goals of Care Treatment Preferences:  Code Status: Full Code      Consults:   Consults (From admission, onward)        Status Ordering Provider     Inpatient consult to Registered Dietitian/Nutritionist  Once        Provider:  (Not yet assigned)    Completed DARIANA PRINCE     Inpatient consult to Cardiology  Once        Provider:  Gomez Orellana MD    Completed DARIANA PRINCE     Inpatient consult to Hospitalist  Once        Provider:  Dariana Prince MD    Acknowledged CASSIE BLACK          No new Assessment & Plan notes have been filed under this hospital service since the last note was generated.  Service: Hospital Medicine    Final Active Diagnoses:    Diagnosis Date Noted POA    Afib [I48.91] 01/20/2022 Unknown    Hyperlipidemia [E78.5] 10/21/2020 " Yes    Essential hypertension [I10] 10/28/2019 Yes    Type 2 diabetes mellitus without complication, without long-term current use of insulin [E11.9] 10/28/2019 Yes    Hx of myocardial infarction [I25.2] 10/28/2019 Not Applicable      Problems Resolved During this Admission:    Diagnosis Date Noted Date Resolved POA    PRINCIPAL PROBLEM:  Acute hypoxemic respiratory failure [J96.01] 01/20/2022 01/24/2022 Yes       Discharged Condition: good    Disposition: Home or Self Care    Follow Up:   Follow-up Information     Gomez Orellana MD In 1 week.    Specialty: Cardiology  Why: Post discharge follow-up  Contact information:  [address]  [address]  [facility]  [place] [address]  [phone]                       Patient Instructions:      Diet diabetic     Diet Cardiac     Activity as tolerated       Significant Diagnostic Studies: Labs:   BMP:   Recent Labs   Lab 01/23/22  0510 01/24/22  0504   * 215*    141   K 4.2 4.4    101   CO2 29 30*   BUN 22 16   CREATININE 0.9 0.8   CALCIUM 9.1 9.3    and CBC   Recent Labs   Lab 01/23/22  0510 01/23/22  0510 01/24/22  0504   WBC 6.29  --  6.14   HGB 13.3*  --  14.0   HCT 41.0   < > 44.2     --  211    < > = values in this interval not displayed.       Pending Diagnostic Studies:     None         Medications:  Reconciled Home Medications:      Medication List      START taking these medications    amiodarone 200 MG Tab  Commonly known as: PACERONE  Take 1 tablet (200 mg total) by mouth 2 (two) times daily.     apixaban 5 mg Tab  Commonly known as: ELIQUIS  Take 1 tablet (5 mg total) by mouth 2 (two) times daily.     digoxin 125 mcg tablet  Commonly known as: LANOXIN  Take 1 tablet (0.125 mg total) by mouth once daily.  Start taking on: January 25, 2022     furosemide 20 MG tablet  Commonly known as: LASIX  Take 1 tablet (20 mg total) by mouth 2 (two) times daily.        CHANGE how you take these medications    diltiaZEM 240 MG 24  hr capsule  Commonly known as: CARDIZEM CD  Take 1 capsule (240 mg total) by mouth 2 (two) times a day.  What changed:   · medication strength  · how much to take  · when to take this     lisinopriL 20 MG tablet  Commonly known as: PRINIVIL,ZESTRIL  Take 1 tablet (20 mg total) by mouth once daily.  Start taking on: January 25, 2022  What changed:   · medication strength  · how much to take        CONTINUE taking these medications    glipiZIDE 5 MG tablet  Commonly known as: GLUCOTROL  Take 1 tablet (5 mg total) by mouth daily with breakfast.     metFORMIN 1000 MG tablet  Commonly known as: GLUCOPHAGE  Take 1 tablet (1,000 mg total) by mouth 2 (two) times daily with meals.     OZEMPIC 0.25 mg or 0.5 mg(2 mg/1.5 mL) pen injector  Generic drug: semaglutide  Inject 0.25 mg into the skin every 7 days.     rosuvastatin 20 MG tablet  Commonly known as: CRESTOR  Take 20 mg by mouth once daily.        STOP taking these medications    aspirin 325 MG tablet     carvediloL 6.25 MG tablet  Commonly known as: COREG     clopidogreL 75 mg tablet  Commonly known as: PLAVIX            Indwelling Lines/Drains at time of discharge:   Lines/Drains/Airways     None                 Time spent on the discharge of patient: 32  minutes         Frank Perez MD  Department of Hospital Medicine  [facility]

## 2022-01-24 NOTE — RESPIRATORY THERAPY
01/23/22 2206   PRE-TX-O2   O2 Device (Oxygen Therapy) room air   SpO2 95 %   Pulse Oximetry Type Continuous   $ Pulse Oximetry - Multiple Charge Pulse Oximetry - Multiple   Oximetry Probe Site Assessed;Intact;No Change Needed   Resp 18   Respiratory Evaluation   $ Care Plan Tech Time 15 min   $ Eval/Re-eval Charges Re-evaluation

## 2022-01-24 NOTE — PLAN OF CARE
01/24/22 1529   Final Note   Assessment Type Final Discharge Note   Anticipated Discharge Disposition Home   What phone number can be called within the next 1-3 days to see how you are doing after discharge? [phone]   Post-Acute Status   Post-Acute Authorization Other   Other Status No Post-Acute Service Needs

## 2022-01-24 NOTE — RESPIRATORY THERAPY
01/24/22 0928   Patient Assessment/Suction   Level of Consciousness (AVPU) alert   Respiratory Effort Normal;Unlabored   Expansion/Accessory Muscles/Retractions no use of accessory muscles   PRE-TX-O2   O2 Device (Oxygen Therapy) room air   SpO2 96 %   Pulse Oximetry Type Continuous   $ Pulse Oximetry - Multiple Charge Pulse Oximetry - Multiple   Pulse 86   Resp 18

## 2022-01-24 NOTE — PLAN OF CARE
Problem: Adult Inpatient Plan of Care  Goal: Plan of Care Review  Outcome: Met  Goal: Patient-Specific Goal (Individualized)  Outcome: Met  Goal: Absence of Hospital-Acquired Illness or Injury  Outcome: Met  Goal: Optimal Comfort and Wellbeing  Outcome: Met  Goal: Readiness for Transition of Care  Outcome: Met     Problem: Bariatric Environmental Safety  Goal: Safety Maintained with Care  Outcome: Met     Problem: Diabetes Comorbidity  Goal: Blood Glucose Level Within Targeted Range  Outcome: Met     Problem: Fall Injury Risk  Goal: Absence of Fall and Fall-Related Injury  Outcome: Met

## 2022-01-25 ENCOUNTER — TELEPHONE (OUTPATIENT)
Dept: FAMILY MEDICINE | Facility: CLINIC | Age: 62
End: 2022-01-25
Payer: COMMERCIAL

## 2022-01-30 NOTE — PHYSICIAN QUERY
PT Name: Donald Frey  MR #: [id number]     DOCUMENTATION CLARIFICATION     CDS/: Sonya Parisi               Contact information: [phone]  This form is a permanent document in the medical record.     Query Date: January 30, 2022    By submitting this query, we are merely seeking further clarification of documentation.  Please utilize your independent clinical judgment when addressing the question(s) below.    The Medical Record contains the following   Indicators Supporting Clinical Findings Location in Medical Record    Heart Failure documented CHF exacerbation  New onset of CHF exacerbation H7P, Cardio Consult, PN, DS        [facility] - 1/20    Epic Lab - 1/22    EF/Echo Eccentric hypertrophy and mildly decreased systolic function.    The estimated ejection fraction is 45%.  Echo Results - 1/20    Radiology findings Underinflated chest with minimal blunting of lateral right costophrenic angle which could be due to pleuroparenchymal scarring or trace right pleural effusion.  Chest X-ray - 1/20    Subjective/Objective Respiratory Conditions SOB, Severe Respiratory Distress, Hypoxia,  ER phys Note    Diuretics/Meds  IV diuresis 20 IV q.12 hours lasix    lasix 20 mg PO BID    Home meds, start taking Lasix 20 mg twice daily Phys order 1/20, ER Phys note    Cardio Consult 1/21    DS         Provider, please specify the diagnosis associated with the above clinical findings.    [ X  ]  Acute Systolic Heart Failure (HFrEF or HFmrEF) - new diagnosis   [   ]  Acute Diastolic Heart Failure (HFpEF) - new diagnosis   [   ]  Acute Combined Systolic and Diastolic Heart Failure - new diagnosis   [   ]  Other (please specify):

## 2022-02-21 ENCOUNTER — OFFICE VISIT (OUTPATIENT)
Dept: FAMILY MEDICINE | Facility: CLINIC | Age: 62
End: 2022-02-21
Payer: COMMERCIAL

## 2022-02-21 VITALS
SYSTOLIC BLOOD PRESSURE: 140 MMHG | HEART RATE: 59 BPM | BODY MASS INDEX: 40.43 KG/M2 | RESPIRATION RATE: 18 BRPM | WEIGHT: 315 LBS | DIASTOLIC BLOOD PRESSURE: 80 MMHG | TEMPERATURE: 99 F | OXYGEN SATURATION: 94 % | HEIGHT: 74 IN

## 2022-02-21 DIAGNOSIS — I10 ESSENTIAL HYPERTENSION: ICD-10-CM

## 2022-02-21 DIAGNOSIS — E11.65 TYPE 2 DIABETES MELLITUS WITH HYPERGLYCEMIA, WITHOUT LONG-TERM CURRENT USE OF INSULIN: Primary | ICD-10-CM

## 2022-02-21 DIAGNOSIS — E78.5 HYPERLIPIDEMIA, UNSPECIFIED HYPERLIPIDEMIA TYPE: ICD-10-CM

## 2022-02-21 PROCEDURE — 99214 PR OFFICE/OUTPT VISIT, EST, LEVL IV, 30-39 MIN: ICD-10-PCS | Mod: S$GLB,,, | Performed by: NURSE PRACTITIONER

## 2022-02-21 PROCEDURE — 3008F BODY MASS INDEX DOCD: CPT | Mod: S$GLB,,, | Performed by: NURSE PRACTITIONER

## 2022-02-21 PROCEDURE — 4010F ACE/ARB THERAPY RXD/TAKEN: CPT | Mod: S$GLB,,, | Performed by: NURSE PRACTITIONER

## 2022-02-21 PROCEDURE — 3077F PR MOST RECENT SYSTOLIC BLOOD PRESSURE >= 140 MM HG: ICD-10-PCS | Mod: S$GLB,,, | Performed by: NURSE PRACTITIONER

## 2022-02-21 PROCEDURE — 1160F RVW MEDS BY RX/DR IN RCRD: CPT | Mod: S$GLB,,, | Performed by: NURSE PRACTITIONER

## 2022-02-21 PROCEDURE — 3052F HG A1C>EQUAL 8.0%<EQUAL 9.0%: CPT | Mod: S$GLB,,, | Performed by: NURSE PRACTITIONER

## 2022-02-21 PROCEDURE — 3077F SYST BP >= 140 MM HG: CPT | Mod: S$GLB,,, | Performed by: NURSE PRACTITIONER

## 2022-02-21 PROCEDURE — 3079F DIAST BP 80-89 MM HG: CPT | Mod: S$GLB,,, | Performed by: NURSE PRACTITIONER

## 2022-02-21 PROCEDURE — 3052F PR MOST RECENT HEMOGLOBIN A1C LEVEL 8.0 - < 9.0%: ICD-10-PCS | Mod: S$GLB,,, | Performed by: NURSE PRACTITIONER

## 2022-02-21 PROCEDURE — 3079F PR MOST RECENT DIASTOLIC BLOOD PRESSURE 80-89 MM HG: ICD-10-PCS | Mod: S$GLB,,, | Performed by: NURSE PRACTITIONER

## 2022-02-21 PROCEDURE — 99214 OFFICE O/P EST MOD 30 MIN: CPT | Mod: S$GLB,,, | Performed by: NURSE PRACTITIONER

## 2022-02-21 PROCEDURE — 1111F DSCHRG MED/CURRENT MED MERGE: CPT | Mod: S$GLB,,, | Performed by: NURSE PRACTITIONER

## 2022-02-21 PROCEDURE — 3008F PR BODY MASS INDEX (BMI) DOCUMENTED: ICD-10-PCS | Mod: S$GLB,,, | Performed by: NURSE PRACTITIONER

## 2022-02-21 PROCEDURE — 4010F PR ACE/ARB THEARPY RXD/TAKEN: ICD-10-PCS | Mod: S$GLB,,, | Performed by: NURSE PRACTITIONER

## 2022-02-21 PROCEDURE — 1160F PR REVIEW ALL MEDS BY PRESCRIBER/CLIN PHARMACIST DOCUMENTED: ICD-10-PCS | Mod: S$GLB,,, | Performed by: NURSE PRACTITIONER

## 2022-02-21 PROCEDURE — 1111F PR DISCHARGE MEDS RECONCILED W/ CURRENT OUTPATIENT MED LIST: ICD-10-PCS | Mod: S$GLB,,, | Performed by: NURSE PRACTITIONER

## 2022-02-21 RX ORDER — ERTUGLIFLOZIN 5 MG/1
5 TABLET, FILM COATED ORAL DAILY
Qty: 90 TABLET | Refills: 1 | Status: SHIPPED | OUTPATIENT
Start: 2022-02-21 | End: 2022-07-21

## 2022-02-21 NOTE — PROGRESS NOTES
SUBJECTIVE:      Patient ID: Donald Frey is a 61 y.o. male.    Chief Complaint: Diabetes and Hypertension    Donald is here today for follow-up on hypertension and diabetes. He is taking his medications as prescribed daily- denies any SE or complaints.  He stopped the Ozempic after last visit due to shortness of breath complaints.  Patient attributed this symptoms to the medication, but he ended up being hospitalized for atrial fibrillation.  He is currently seeing his cardiologist and is scheduled to have a cardioversion next month.  He did have a medication change since his last visit with me which was noted today.  Patient is feeling better and his heart rate is between 50s and 80s at home.  His blood sugar has been trending up again and he wants to discuss other medication options.  Last A1c while in the hospital was 8.4- he has not completed the labs yet that I ordered for him.  Eye exam has been done since last visit and we will need to get copies.  He is due for a foot exam today.  Diabetes  He presents for his follow-up diabetic visit. He has type 2 diabetes mellitus. His disease course has been worsening. There are no hypoglycemic associated symptoms. Pertinent negatives for hypoglycemia include no confusion, dizziness, headaches, nervousness/anxiousness, pallor or sweats. Associated symptoms include foot paresthesias (occasional ). Pertinent negatives for diabetes include no blurred vision, no chest pain, no fatigue, no foot ulcerations, no polydipsia, no polyphagia, no polyuria, no visual change, no weakness and no weight loss. There are no hypoglycemic complications. Symptoms are worsening. There are no diabetic complications. Risk factors for coronary artery disease include male sex, obesity, hypertension, sedentary lifestyle, dyslipidemia and diabetes mellitus. Current diabetic treatment includes oral agent (dual therapy) and diet. He is compliant with treatment all of the time. His weight is  increasing steadily. He is following a generally healthy diet. Meal planning includes avoidance of concentrated sweets. He rarely participates in exercise. His home blood glucose trend is increasing steadily. His breakfast blood glucose range is generally >200 mg/dl. An ACE inhibitor/angiotensin II receptor blocker is being taken. He does not see a podiatrist.Eye exam is current.   Hypertension  This is a chronic problem. The current episode started more than 1 year ago. The problem has been waxing and waning since onset. The problem is controlled. Pertinent negatives include no anxiety, blurred vision, chest pain, headaches, malaise/fatigue, neck pain, palpitations, peripheral edema, shortness of breath or sweats. There are no associated agents to hypertension. Risk factors for coronary artery disease include male gender, obesity, dyslipidemia, diabetes mellitus and sedentary lifestyle. Past treatments include calcium channel blockers, ACE inhibitors, beta blockers, lifestyle changes and diuretics. The current treatment provides moderate improvement. Compliance problems include diet and exercise.  There is no history of sleep apnea or a thyroid problem.       Family History   Problem Relation Age of Onset    Heart attack Father     Cancer Father         prostate      Social History     Socioeconomic History    Marital status:    Tobacco Use    Smoking status: Never Smoker    Smokeless tobacco: Never Used   Substance and Sexual Activity    Alcohol use: Yes     Comment: occ    Drug use: Never     Current Outpatient Medications   Medication Sig Dispense Refill    amiodarone (PACERONE) 200 MG Tab Take 1 tablet (200 mg total) by mouth 2 (two) times daily. 60 tablet 11    apixaban (ELIQUIS) 5 mg Tab Take 1 tablet (5 mg total) by mouth 2 (two) times daily. 60 tablet 11    digoxin (LANOXIN) 125 mcg tablet Take 1 tablet (0.125 mg total) by mouth once daily. 30 tablet 11    diltiaZEM (CARDIZEM CD) 240 MG 24  hr capsule Take 1 capsule (240 mg total) by mouth 2 (two) times a day. 60 capsule 11    furosemide (LASIX) 20 MG tablet Take 1 tablet (20 mg total) by mouth 2 (two) times daily. 60 tablet 11    glipiZIDE (GLUCOTROL) 5 MG tablet Take 1 tablet (5 mg total) by mouth daily with breakfast. 90 tablet 1    lisinopriL (PRINIVIL,ZESTRIL) 20 MG tablet Take 1 tablet (20 mg total) by mouth once daily. 90 tablet 3    metFORMIN (GLUCOPHAGE) 1000 MG tablet Take 1 tablet (1,000 mg total) by mouth 2 (two) times daily with meals. 180 tablet 1    rosuvastatin (CRESTOR) 20 MG tablet Take 20 mg by mouth once daily.      ertugliflozin (STEGLATRO) 5 mg Tab Take 5 mg by mouth once daily. 90 tablet 1     No current facility-administered medications for this visit.     Review of patient's allergies indicates:  No Known Allergies   Past Medical History:   Diagnosis Date    Diabetes mellitus, type 2     Hypertension     Myocardial infarction 2013     Past Surgical History:   Procedure Laterality Date    CORONARY STENT PLACEMENT  2013       Review of Systems   Constitutional: Negative for activity change, appetite change, chills, fatigue, fever, malaise/fatigue, unexpected weight change and weight loss.   HENT: Negative for congestion, hearing loss, rhinorrhea and trouble swallowing.    Eyes: Negative for blurred vision, discharge and visual disturbance.   Respiratory: Negative for cough, chest tightness, shortness of breath and wheezing.    Cardiovascular: Negative for chest pain, palpitations and leg swelling.   Gastrointestinal: Negative for abdominal pain, diarrhea, nausea and vomiting.   Endocrine: Negative for polydipsia, polyphagia and polyuria.   Genitourinary: Negative for difficulty urinating, dysuria, frequency, hematuria and urgency.   Musculoskeletal: Negative for arthralgias, joint swelling and neck pain.   Skin: Negative for pallor, rash and wound.   Neurological: Positive for numbness (toes ). Negative for dizziness,  "weakness and headaches.   Hematological: Negative for adenopathy. Bruises/bleeds easily.   Psychiatric/Behavioral: Negative for confusion and dysphoric mood. The patient is not nervous/anxious.       OBJECTIVE:      Vitals:    02/21/22 0837 02/21/22 0843   BP: (!) 160/80 (!) 140/80   BP Location: Left arm Left arm   Patient Position: Sitting Sitting   BP Method: Large (Manual) Large (Manual)   Pulse: (!) 59    Resp: 18    Temp: 99 °F (37.2 °C)    TempSrc: Oral    SpO2: (!) 94%    Weight: (!) 149.3 kg (329 lb 1.6 oz)    Height: 6' 2" (1.88 m)      Physical Exam  Vitals and nursing note reviewed.   Constitutional:       General: He is not in acute distress.     Appearance: Normal appearance. He is well-developed. He is not ill-appearing or diaphoretic.      Comments: Morbid obesity    HENT:      Head: Normocephalic and atraumatic.      Mouth/Throat:      Mouth: Mucous membranes are moist.   Eyes:      General: No scleral icterus.     Conjunctiva/sclera: Conjunctivae normal.      Pupils: Pupils are equal, round, and reactive to light.   Neck:      Thyroid: No thyroid mass or thyromegaly.   Cardiovascular:      Rate and Rhythm: Normal rate and regular rhythm.      Pulses:           Dorsalis pedis pulses are 1+ on the right side and 1+ on the left side.        Posterior tibial pulses are 1+ on the right side and 1+ on the left side.      Heart sounds: Normal heart sounds. No murmur heard.    No friction rub. No gallop.   Pulmonary:      Effort: Pulmonary effort is normal.      Breath sounds: Normal breath sounds. No wheezing, rhonchi or rales.   Abdominal:      General: Bowel sounds are normal.      Palpations: Abdomen is soft.      Tenderness: There is no abdominal tenderness.   Musculoskeletal:         General: Normal range of motion.      Cervical back: Normal range of motion and neck supple.      Right lower leg: No edema.      Left lower leg: No edema.      Right foot: Normal range of motion. No deformity.      Left " foot: Normal range of motion. No deformity.   Feet:      Right foot:      Protective Sensation: 8 sites tested. 4 sites sensed.      Skin integrity: Dry skin present. No ulcer, blister, skin breakdown, erythema, warmth, callus or fissure.      Toenail Condition: Right toenails are normal.      Left foot:      Protective Sensation: 8 sites tested. 4 sites sensed.      Skin integrity: Dry skin present. No ulcer, blister, skin breakdown, erythema, warmth, callus or fissure.      Toenail Condition: Left toenails are normal.   Lymphadenopathy:      Cervical: No cervical adenopathy.   Skin:     General: Skin is warm and dry.      Coloration: Skin is not jaundiced or pale.   Neurological:      Mental Status: He is alert and oriented to person, place, and time.   Psychiatric:         Mood and Affect: Mood normal.         Behavior: Behavior normal.         Thought Content: Thought content normal.         Judgment: Judgment normal.        Assessment:       1. Type 2 diabetes mellitus with hyperglycemia, without long-term current use of insulin    2. Essential hypertension    3. Hyperlipidemia, unspecified hyperlipidemia type    4. BMI 40.0-44.9, adult        Plan:       Type 2 diabetes mellitus with hyperglycemia, without long-term current use of insulin  -     ertugliflozin (STEGLATRO) 5 mg Tab; Take 5 mg by mouth once daily.  Dispense: 90 tablet; Refill: 1   -trial of Steglatro; SE and proper use discussed; recheck in 3 mo; keep BS log     Essential hypertension   -waxing/waning; monitor     Hyperlipidemia, unspecified hyperlipidemia type    BMI 40.0-44.9, adult        Follow up in 3 months (on 5/21/2022) for diabetes, HTN.      2/21/2022 HONORIO Coello, FNP    This note was created using [employer] voice recognition software that occasionally misinterprets phrases or words.

## 2022-03-07 ENCOUNTER — CLINICAL SUPPORT (OUTPATIENT)
Dept: CARDIOLOGY | Facility: HOSPITAL | Age: 62
End: 2022-03-07
Attending: SPECIALIST
Payer: COMMERCIAL

## 2022-03-07 ENCOUNTER — HOSPITAL ENCOUNTER (OUTPATIENT)
Facility: HOSPITAL | Age: 62
Discharge: HOME | End: 2022-03-07
Attending: SPECIALIST | Admitting: SPECIALIST
Payer: COMMERCIAL

## 2022-03-07 ENCOUNTER — ANESTHESIA (OUTPATIENT)
Dept: CARDIOLOGY | Facility: HOSPITAL | Age: 62
End: 2022-03-07
Payer: COMMERCIAL

## 2022-03-07 ENCOUNTER — ANESTHESIA EVENT (OUTPATIENT)
Dept: CARDIOLOGY | Facility: HOSPITAL | Age: 62
End: 2022-03-07
Payer: COMMERCIAL

## 2022-03-07 VITALS — WEIGHT: 315 LBS | BODY MASS INDEX: 41.75 KG/M2 | HEIGHT: 73 IN

## 2022-03-07 VITALS
TEMPERATURE: 97 F | HEIGHT: 73 IN | WEIGHT: 315 LBS | OXYGEN SATURATION: 97 % | DIASTOLIC BLOOD PRESSURE: 70 MMHG | RESPIRATION RATE: 20 BRPM | SYSTOLIC BLOOD PRESSURE: 140 MMHG | BODY MASS INDEX: 41.75 KG/M2 | HEART RATE: 94 BPM

## 2022-03-07 DIAGNOSIS — E78.5 HYPERLIPIDEMIA, UNSPECIFIED HYPERLIPIDEMIA TYPE: ICD-10-CM

## 2022-03-07 DIAGNOSIS — I48.91 ATRIAL FIBRILLATION STATUS POST CARDIOVERSION: ICD-10-CM

## 2022-03-07 DIAGNOSIS — I48.91 A-FIB: ICD-10-CM

## 2022-03-07 DIAGNOSIS — I48.92 ATRIAL FIBRILLATION AND FLUTTER: ICD-10-CM

## 2022-03-07 DIAGNOSIS — I48.19 PERSISTENT ATRIAL FIBRILLATION: Primary | ICD-10-CM

## 2022-03-07 DIAGNOSIS — I48.91 ATRIAL FIBRILLATION AND FLUTTER: ICD-10-CM

## 2022-03-07 LAB
ALBUMIN SERPL BCP-MCNC: 4.2 G/DL (ref 3.5–5.2)
ALP SERPL-CCNC: 50 U/L (ref 55–135)
ALT SERPL W/O P-5'-P-CCNC: 30 U/L (ref 10–44)
ANION GAP SERPL CALC-SCNC: 15 MMOL/L (ref 8–16)
APTT PPP: 28.3 SEC (ref 23.3–35.1)
AST SERPL-CCNC: 22 U/L (ref 10–40)
BASOPHILS # BLD AUTO: 0.03 K/UL (ref 0–0.2)
BASOPHILS NFR BLD: 0.5 % (ref 0–1.9)
BILIRUB SERPL-MCNC: 1 MG/DL (ref 0.1–1)
BSA FOR ECHO PROCEDURE: 2.77 M2
BUN SERPL-MCNC: 19 MG/DL (ref 8–23)
CALCIUM SERPL-MCNC: 8.8 MG/DL (ref 8.7–10.5)
CHLORIDE SERPL-SCNC: 100 MMOL/L (ref 95–110)
CO2 SERPL-SCNC: 23 MMOL/L (ref 23–29)
CREAT SERPL-MCNC: 1.1 MG/DL (ref 0.5–1.4)
DIFFERENTIAL METHOD: ABNORMAL
EJECTION FRACTION: 45 %
EJECTION FRACTION: 45 %
EOSINOPHIL # BLD AUTO: 0.1 K/UL (ref 0–0.5)
EOSINOPHIL NFR BLD: 1.1 % (ref 0–8)
ERYTHROCYTE [DISTWIDTH] IN BLOOD BY AUTOMATED COUNT: 15.2 % (ref 11.5–14.5)
EST. GFR  (AFRICAN AMERICAN): >60 ML/MIN/1.73 M^2
EST. GFR  (NON AFRICAN AMERICAN): >60 ML/MIN/1.73 M^2
GLUCOSE SERPL-MCNC: 282 MG/DL (ref 70–110)
HCT VFR BLD AUTO: 46.3 % (ref 40–54)
HGB BLD-MCNC: 15.2 G/DL (ref 14–18)
IMM GRANULOCYTES # BLD AUTO: 0.04 K/UL (ref 0–0.04)
IMM GRANULOCYTES NFR BLD AUTO: 0.6 % (ref 0–0.5)
INR PPP: 1.2
LYMPHOCYTES # BLD AUTO: 1.3 K/UL (ref 1–4.8)
LYMPHOCYTES NFR BLD: 20.7 % (ref 18–48)
MCH RBC QN AUTO: 27.4 PG (ref 27–31)
MCHC RBC AUTO-ENTMCNC: 32.8 G/DL (ref 32–36)
MCV RBC AUTO: 83 FL (ref 82–98)
MONOCYTES # BLD AUTO: 0.6 K/UL (ref 0.3–1)
MONOCYTES NFR BLD: 9.7 % (ref 4–15)
NEUTROPHILS # BLD AUTO: 4.2 K/UL (ref 1.8–7.7)
NEUTROPHILS NFR BLD: 67.4 % (ref 38–73)
NRBC BLD-RTO: 0 /100 WBC
PLATELET # BLD AUTO: 182 K/UL (ref 150–450)
PMV BLD AUTO: 10.6 FL (ref 9.2–12.9)
POTASSIUM SERPL-SCNC: 4.3 MMOL/L (ref 3.5–5.1)
PROT SERPL-MCNC: 7.3 G/DL (ref 6–8.4)
PROTHROMBIN TIME: 14.8 SEC (ref 11.4–13.7)
RBC # BLD AUTO: 5.55 M/UL (ref 4.6–6.2)
SARS-COV-2 RDRP RESP QL NAA+PROBE: NEGATIVE
SODIUM SERPL-SCNC: 138 MMOL/L (ref 136–145)
WBC # BLD AUTO: 6.28 K/UL (ref 3.9–12.7)

## 2022-03-07 PROCEDURE — 93010 ELECTROCARDIOGRAM REPORT: CPT | Mod: ,,, | Performed by: SPECIALIST

## 2022-03-07 PROCEDURE — 85730 THROMBOPLASTIN TIME PARTIAL: CPT | Performed by: SPECIALIST

## 2022-03-07 PROCEDURE — 27100019 HC AMBU BAG ADULT/PED: Performed by: ANESTHESIOLOGY

## 2022-03-07 PROCEDURE — 85610 PROTHROMBIN TIME: CPT | Performed by: SPECIALIST

## 2022-03-07 PROCEDURE — 27000671 HC TUBING MICROBORE EXT: Performed by: ANESTHESIOLOGY

## 2022-03-07 PROCEDURE — 27000675 HC TUBING MICRODRIP: Performed by: ANESTHESIOLOGY

## 2022-03-07 PROCEDURE — 27202103: Performed by: ANESTHESIOLOGY

## 2022-03-07 PROCEDURE — 93005 ELECTROCARDIOGRAM TRACING: CPT | Mod: 59 | Performed by: SPECIALIST

## 2022-03-07 PROCEDURE — 63600175 PHARM REV CODE 636 W HCPCS: Performed by: NURSE ANESTHETIST, CERTIFIED REGISTERED

## 2022-03-07 PROCEDURE — 93010 EKG 12-LEAD: ICD-10-PCS | Mod: ,,, | Performed by: SPECIALIST

## 2022-03-07 PROCEDURE — 25000003 PHARM REV CODE 250: Performed by: NURSE ANESTHETIST, CERTIFIED REGISTERED

## 2022-03-07 PROCEDURE — 80053 COMPREHEN METABOLIC PANEL: CPT | Performed by: SPECIALIST

## 2022-03-07 PROCEDURE — 85025 COMPLETE CBC W/AUTO DIFF WBC: CPT | Performed by: SPECIALIST

## 2022-03-07 PROCEDURE — 93325 DOPPLER ECHO COLOR FLOW MAPG: CPT

## 2022-03-07 PROCEDURE — 37000009 HC ANESTHESIA EA ADD 15 MINS: Performed by: SPECIALIST

## 2022-03-07 PROCEDURE — 37000008 HC ANESTHESIA 1ST 15 MINUTES: Performed by: SPECIALIST

## 2022-03-07 PROCEDURE — U0002 COVID-19 LAB TEST NON-CDC: HCPCS | Performed by: SPECIALIST

## 2022-03-07 RX ORDER — AMIODARONE HYDROCHLORIDE 200 MG/1
200 TABLET ORAL 2 TIMES DAILY
Qty: 60 TABLET | Refills: 11 | Status: ON HOLD | OUTPATIENT
Start: 2022-03-07 | End: 2022-07-08 | Stop reason: HOSPADM

## 2022-03-07 RX ORDER — PROPOFOL 10 MG/ML
VIAL (ML) INTRAVENOUS
Status: DISCONTINUED | OUTPATIENT
Start: 2022-03-07 | End: 2022-03-07

## 2022-03-07 RX ADMIN — PROPOFOL 50 MG: 10 INJECTION, EMULSION INTRAVENOUS at 11:03

## 2022-03-07 RX ADMIN — SODIUM CHLORIDE: 0.9 INJECTION, SOLUTION INTRAVENOUS at 11:03

## 2022-03-07 NOTE — ANESTHESIA POSTPROCEDURE EVALUATION
Anesthesia Post Evaluation    Patient: Donald Frey    Procedure(s) Performed: Procedure(s) (LRB):  ECHOCARDIOGRAM,TRANSESOPHAGEAL (N/A)  CARDIOVERSION (N/A)    Final Anesthesia Type: MAC      Patient location: [facility].  Patient participation: Yes- Able to Participate  Level of consciousness: awake and alert, oriented and awake  Post-procedure vital signs: reviewed and stable  Pain management: adequate  Airway patency: patent    PONV status at discharge: No PONV  Anesthetic complications: no      Cardiovascular status: blood pressure returned to baseline, hemodynamically stable and stable  Respiratory status: unassisted, spontaneous ventilation and room air  Hydration status: euvolemic  Follow-up not needed.  Comments: The patient states that he was comfortable for the procedure and is without recall the procedure.          Vitals Value Taken Time   /75 03/07/22 1200   Temp 98.1 03/07/22 1207   Pulse 87 03/07/22 1205   Resp 16 03/07/22 1205   SpO2 95 % 03/07/22 1205   Vitals shown include unvalidated device data.      No case tracking events are documented in the log.      Pain/Codie Score: Codie Score: 10 (3/7/2022 11:04 AM)

## 2022-03-07 NOTE — TRANSFER OF CARE
"Anesthesia Transfer of Care Note    Patient: Donald Frey    Procedure(s) Performed: Procedure(s) (LRB):  ECHOCARDIOGRAM,TRANSESOPHAGEAL (N/A)  CARDIOVERSION (N/A)    Patient location: Telemetry/Step Down Unit    Anesthesia Type: MAC    Transport from OR: Transported from OR on 6-10 L/min O2 by face mask with adequate spontaneous ventilation    Post pain: adequate analgesia    Post assessment: no apparent anesthetic complications    Post vital signs: stable    Level of consciousness: awake and alert    Nausea/Vomiting: no nausea/vomiting    Complications: none    Transfer of care protocol was followed      Last vitals:   Visit Vitals  BP (!) 161/91 (BP Location: Left arm, Patient Position: Lying)   Pulse 85   Temp 36 °C (96.8 °F) (Axillary)   Resp 20   Ht 6' 1" (1.854 m)   Wt (!) 149.2 kg (329 lb)   SpO2 99%   BMI 43.41 kg/m²     "

## 2022-03-07 NOTE — OP NOTE
[facility]  Cardiology  Discharge Summary      Patient Name: Donald Frey  MRN: [id number]  Admission Date: 3/7/2022  Hospital Length of Stay: 0 days  Discharge Date and Time:  03/07/2022 12:02 PM  Attending Physician: Gomez Orellana MD  Discharging Provider: Gomez Orellana MD  Primary Care Physician: LEANNA Coello    HPI: Persistent A-fib on amiodarone 200mg bid    Procedure(s) (LRB):  ECHOCARDIOGRAM,TRANSESOPHAGEAL (N/A)  CARDIOVERSION (N/A)     Indwelling Lines/Drains at time of discharge:  Lines/Drains/Airways     None                 Hospital Course Received 2 200j shocks  Without resumption of sinus rhythm  Consults:     Significant Diagnostic Studies:     Pending Diagnostic Studies:     Procedure Component Value Units Date/Time    EKG 12-LEAD [[id number]]     Order Status: Sent Lab Status: No result           There are no hospital problems to display for this patient.      Discharged Condition: good    Follow Up:   Follow-up Information     Gomez Orellana MD Follow up.    Specialty: Cardiology  Contact information:  [address]  [address]  [facility] [address]  [phone]                       Patient Instructions:      Diet general     Activity as tolerated     Medications:  Reconciled Home Medications:      Medication List      CHANGE how you take these medications    * amiodarone 200 MG Tab  Commonly known as: PACERONE  Take 1 tablet (200 mg total) by mouth 2 (two) times daily.  What changed: Another medication with the same name was added. Make sure you understand how and when to take each.     * amiodarone 200 MG Tab  Commonly known as: PACERONE  Take 1 tablet (200 mg total) by mouth 2 (two) times daily.  What changed: You were already taking a medication with the same name, and this prescription was added. Make sure you understand how and when to take each.         * This list has 2 medication(s) that are the same as other medications prescribed for  you. Read the directions carefully, and ask your doctor or other care provider to review them with you.            CONTINUE taking these medications    apixaban 5 mg Tab  Commonly known as: ELIQUIS  Take 1 tablet (5 mg total) by mouth 2 (two) times daily.     diltiaZEM 240 MG 24 hr capsule  Commonly known as: CARDIZEM CD  Take 1 capsule (240 mg total) by mouth 2 (two) times a day.     furosemide 20 MG tablet  Commonly known as: LASIX  Take 1 tablet (20 mg total) by mouth 2 (two) times daily.     glipiZIDE 5 MG tablet  Commonly known as: GLUCOTROL  Take 1 tablet (5 mg total) by mouth daily with breakfast.     lisinopriL 20 MG tablet  Commonly known as: PRINIVIL,ZESTRIL  Take 1 tablet (20 mg total) by mouth once daily.     metFORMIN 1000 MG tablet  Commonly known as: GLUCOPHAGE  Take 1 tablet (1,000 mg total) by mouth 2 (two) times daily with meals.     rosuvastatin 20 MG tablet  Commonly known as: CRESTOR  Take 20 mg by mouth once daily.     STEGLATRO 5 mg Tab  Generic drug: ertugliflozin  Take 5 mg by mouth once daily.        ASK your doctor about these medications    digoxin 125 mcg tablet  Commonly known as: LANOXIN  Take 1 tablet (0.125 mg total) by mouth once daily.            Time spent on the discharge of patient: 5 minutes    Gomez Orellana MD  Cardiology  [facility]

## 2022-03-07 NOTE — ANESTHESIA PREPROCEDURE EVALUATION
03/07/2022  Donald Frey is a 61 y.o., male.      Patient Active Problem List   Diagnosis    Essential hypertension    Type 2 diabetes mellitus without complication, without long-term current use of insulin    Hx of myocardial infarction    Hyperlipidemia    Afib       Past Surgical History:   Procedure Laterality Date    CORONARY STENT PLACEMENT  2013        Tobacco Use:  The patient  reports that he has never smoked. He has never used smokeless tobacco.     Results for orders placed or performed during the hospital encounter of 01/20/22   EKG 12-LEAD    Collection Time: 01/20/22  7:03 AM    Narrative    Test Reason : R07.9,    Vent. Rate : 119 BPM     Atrial Rate : 000 BPM     P-R Int : 000 ms          QRS Dur : 112 ms      QT Int : 342 ms       P-R-T Axes : 000 041 -23 degrees     QTc Int : 481 ms    Atrial fibrillation with rapid ventricular response  Anterior infarct (cited on or before 20-JAN-2022)  Abnormal ECG  When compared with ECG of 20-JAN-2022 01:59,  No significant change was found  Confirmed by Norris KAUR, Conor SHEN ([id number]) on 1/23/2022 10:39:51 AM    Referred By: [email or web address]   SELF           Confirmed By:Conor Pisano MD             Lab Results   Component Value Date    WBC 6.14 01/24/2022    HGB 14.0 01/24/2022    HCT 44.2 01/24/2022    MCV 86 01/24/2022     01/24/2022     BMP  Lab Results   Component Value Date     01/24/2022    K 4.4 01/24/2022     01/24/2022    CO2 30 (H) 01/24/2022    BUN 16 01/24/2022    CREATININE 0.8 01/24/2022    CALCIUM 9.3 01/24/2022    ANIONGAP 10 01/24/2022     (H) 01/24/2022     (H) 01/23/2022     (H) 01/22/2022       Results for orders placed during the hospital encounter of 01/20/22    Echo    Interpretation Summary  · Eccentric hypertrophy and mildly decreased systolic function.  · The estimated ejection fraction  is 45%.  · Mild left atrial enlargement.  · Mild mitral regurgitation.  · Mild tricuspid regurgitation.  · Intermediate central venous pressure (8 mmHg).  · The estimated PA systolic pressure is 33 mmHg.  · Atrial fibrillation observed.        Pre-op Assessment    I have reviewed the Patient Summary Reports.     I have reviewed the Nursing Notes. I have reviewed the NPO Status.   I have reviewed the Medications.     Review of Systems  Anesthesia Hx:  No problems with previous Anesthesia Denies Hx of Anesthetic complications  Denies Family Hx of Anesthesia complications.   Denies Personal Hx of Anesthesia complications.   Social:  Non-Smoker, Alcohol Use    Hematology/Oncology:  Hematology Normal   Oncology Normal     EENT/Dental:EENT/Dental Normal   Cardiovascular:   Hypertension, poorly controlled Valvular problems/Murmurs, MR Past MI CABG/stent Dysrhythmias atrial fibrillation ECG has been reviewed.    Pulmonary:   Sleep Apnea Patient not diagnostic of ADDISON but meets criteria   Education provided regarding risk of obstructive sleep apnea     Renal/:  Renal/ Normal     Hepatic/GI:  Hepatic/GI Normal Patient with no active nausea vomiting at this time  Patient with no intestinal obstruction at this time   Musculoskeletal:  Musculoskeletal Normal    Neurological:  Neurology Normal    Endocrine:   Diabetes, poorly controlled, type 2    Dermatological:  Skin Normal    Psych:  Psychiatric Normal           Physical Exam  General: Well nourished, Cooperative, Alert and Oriented    Airway:  Mallampati: III / II  Mouth Opening: Normal  TM Distance: Normal  Tongue: Normal  Neck ROM: Normal ROM    Dental:  Intact    Chest/Lungs:  Clear to auscultation, Normal Respiratory Rate    Heart:  Rate: Normal  Rhythm: Irregularly Irregular    Abdomen:  Normal, Soft        Anesthesia Plan  Type of Anesthesia, risks & benefits discussed:    Anesthesia Type: MAC  Intra-op Monitoring Plan: Standard ASA Monitors  Induction:  IV  Informed  Consent: Informed consent signed with the Patient and all parties understand the risks and agree with anesthesia plan.  All questions answered.   ASA Score: 3  Anesthesia Plan Notes:   MAC with Propofol  ++POM Mask++    Ready For Surgery From Anesthesia Perspective.     .

## 2022-05-10 ENCOUNTER — TELEPHONE (OUTPATIENT)
Dept: ELECTROPHYSIOLOGY | Facility: CLINIC | Age: 62
End: 2022-05-10
Payer: COMMERCIAL

## 2022-05-10 NOTE — TELEPHONE ENCOUNTER
Called Mr. Frey to schedule an appt for him with Dr. Pacheco at the [place] location. The pt is scheduled to see Dr. Pacheco on tomorrow. The pt verbalized understanding.    ----- Message from Diana Elmore RN sent at 5/10/2022  8:13 AM CDT -----  Pt needs to be set up to see Dr Pacheco. Thanks  ----- Message -----  From: Dale Wells MA  Sent: 5/9/2022   4:31 PM CDT  To: Diana Elmore RN    Does this pt need an appt scheduled?  ----- Message -----  From: Fior Palacios MA  Sent: 5/9/2022   4:27 PM CDT  To: Tara Pope Staff    The patient would like to talk to you about an ablation he would be a new patient please call [phone]. Thank you.

## 2022-05-11 ENCOUNTER — OFFICE VISIT (OUTPATIENT)
Dept: CARDIOLOGY | Facility: CLINIC | Age: 62
End: 2022-05-11
Payer: COMMERCIAL

## 2022-05-11 VITALS
HEIGHT: 73 IN | OXYGEN SATURATION: 98 % | SYSTOLIC BLOOD PRESSURE: 132 MMHG | RESPIRATION RATE: 16 BRPM | WEIGHT: 315 LBS | BODY MASS INDEX: 41.75 KG/M2 | DIASTOLIC BLOOD PRESSURE: 80 MMHG | HEART RATE: 118 BPM

## 2022-05-11 DIAGNOSIS — I10 ESSENTIAL HYPERTENSION: ICD-10-CM

## 2022-05-11 DIAGNOSIS — E11.9 TYPE 2 DIABETES MELLITUS WITHOUT COMPLICATION, WITHOUT LONG-TERM CURRENT USE OF INSULIN: ICD-10-CM

## 2022-05-11 DIAGNOSIS — E78.2 MIXED HYPERLIPIDEMIA: ICD-10-CM

## 2022-05-11 DIAGNOSIS — I48.91 ATRIAL FIBRILLATION, UNSPECIFIED TYPE: Primary | ICD-10-CM

## 2022-05-11 PROCEDURE — 93005 EKG 12-LEAD: ICD-10-PCS | Mod: S$GLB,,, | Performed by: INTERNAL MEDICINE

## 2022-05-11 PROCEDURE — 3079F DIAST BP 80-89 MM HG: CPT | Mod: CPTII,S$GLB,, | Performed by: INTERNAL MEDICINE

## 2022-05-11 PROCEDURE — 99205 OFFICE O/P NEW HI 60 MIN: CPT | Mod: S$GLB,,, | Performed by: INTERNAL MEDICINE

## 2022-05-11 PROCEDURE — 3052F PR MOST RECENT HEMOGLOBIN A1C LEVEL 8.0 - < 9.0%: ICD-10-PCS | Mod: CPTII,S$GLB,, | Performed by: INTERNAL MEDICINE

## 2022-05-11 PROCEDURE — 93005 ELECTROCARDIOGRAM TRACING: CPT | Mod: S$GLB,,, | Performed by: INTERNAL MEDICINE

## 2022-05-11 PROCEDURE — 4010F PR ACE/ARB THEARPY RXD/TAKEN: ICD-10-PCS | Mod: CPTII,S$GLB,, | Performed by: INTERNAL MEDICINE

## 2022-05-11 PROCEDURE — 99205 PR OFFICE/OUTPT VISIT, NEW, LEVL V, 60-74 MIN: ICD-10-PCS | Mod: S$GLB,,, | Performed by: INTERNAL MEDICINE

## 2022-05-11 PROCEDURE — 3008F BODY MASS INDEX DOCD: CPT | Mod: CPTII,S$GLB,, | Performed by: INTERNAL MEDICINE

## 2022-05-11 PROCEDURE — 1159F PR MEDICATION LIST DOCUMENTED IN MEDICAL RECORD: ICD-10-PCS | Mod: CPTII,S$GLB,, | Performed by: INTERNAL MEDICINE

## 2022-05-11 PROCEDURE — 1159F MED LIST DOCD IN RCRD: CPT | Mod: CPTII,S$GLB,, | Performed by: INTERNAL MEDICINE

## 2022-05-11 PROCEDURE — 93010 EKG 12-LEAD: ICD-10-PCS | Mod: S$GLB,,, | Performed by: INTERNAL MEDICINE

## 2022-05-11 PROCEDURE — 4010F ACE/ARB THERAPY RXD/TAKEN: CPT | Mod: CPTII,S$GLB,, | Performed by: INTERNAL MEDICINE

## 2022-05-11 PROCEDURE — 93010 ELECTROCARDIOGRAM REPORT: CPT | Mod: S$GLB,,, | Performed by: INTERNAL MEDICINE

## 2022-05-11 PROCEDURE — 3008F PR BODY MASS INDEX (BMI) DOCUMENTED: ICD-10-PCS | Mod: CPTII,S$GLB,, | Performed by: INTERNAL MEDICINE

## 2022-05-11 PROCEDURE — 3075F PR MOST RECENT SYSTOLIC BLOOD PRESS GE 130-139MM HG: ICD-10-PCS | Mod: CPTII,S$GLB,, | Performed by: INTERNAL MEDICINE

## 2022-05-11 PROCEDURE — 3052F HG A1C>EQUAL 8.0%<EQUAL 9.0%: CPT | Mod: CPTII,S$GLB,, | Performed by: INTERNAL MEDICINE

## 2022-05-11 PROCEDURE — 3079F PR MOST RECENT DIASTOLIC BLOOD PRESSURE 80-89 MM HG: ICD-10-PCS | Mod: CPTII,S$GLB,, | Performed by: INTERNAL MEDICINE

## 2022-05-11 PROCEDURE — 3075F SYST BP GE 130 - 139MM HG: CPT | Mod: CPTII,S$GLB,, | Performed by: INTERNAL MEDICINE

## 2022-05-11 NOTE — PROGRESS NOTES
Subjective:     HPI    I had the pleasure of seeing Donald Frey in consultation at your request for the evaluation of AF. He is a 61M with HTN, HLD, DM2, CAD status-post MI/PCI in 2013, who was first diagnosed with AF in 1/2022 when he presented to [facility] with ADHF and was found to be in AF. An echo showed an EF of 45%. He was diuresed and rate controlled, and discharged. He was loaded with amiodarone, and underwent ARMANDO/DCCV in 3/2022 (ERAF). Mr. He presents to discuss management options.    Mr. Frey states he had a negative sleep study 10 years ago.    My interpretation of today's ECG is AF at 102 bpm.    Review of Systems   Constitutional: Positive for malaise/fatigue. Negative for decreased appetite, weight gain and weight loss.   HENT: Negative for sore throat.    Eyes: Negative for blurred vision.   Cardiovascular: Positive for dyspnea on exertion. Negative for chest pain, irregular heartbeat, leg swelling, near-syncope, orthopnea, palpitations, paroxysmal nocturnal dyspnea and syncope.   Respiratory: Negative for shortness of breath.    Skin: Negative for rash.   Musculoskeletal: Negative for arthritis.   Gastrointestinal: Negative for abdominal pain.   Neurological: Negative for focal weakness.   Psychiatric/Behavioral: Negative for altered mental status.        Objective:    Physical Exam  Constitutional:       General: He is not in acute distress.     Appearance: He is well-developed.   HENT:      Head: Normocephalic and atraumatic.   Eyes:      General: No scleral icterus.     Pupils: Pupils are equal, round, and reactive to light.   Neck:      Thyroid: No thyromegaly.   Cardiovascular:      Rate and Rhythm: Rhythm irregular.      Pulses: Normal pulses.      Heart sounds: Normal heart sounds. No murmur heard.    No friction rub. No gallop.   Pulmonary:      Effort: Pulmonary effort is normal.      Breath sounds: Normal breath sounds.   Abdominal:      General: Bowel sounds are normal. There is no  distension.      Palpations: Abdomen is soft.      Tenderness: There is no abdominal tenderness.   Musculoskeletal:      Cervical back: Neck supple.   Skin:     General: Skin is warm and dry.      Findings: No rash.   Neurological:      Mental Status: He is alert and oriented to person, place, and time.   Psychiatric:         Behavior: Behavior normal.           Assessment:       1. Atrial fibrillation, unspecified type    2. Mixed hyperlipidemia    3. Essential hypertension    4. Type 2 diabetes mellitus without complication, without long-term current use of insulin         Plan:       In summary, Donald Frey is a 61M with a history of persistent AF who failed DCCV on amiodarone. EF is mildly impaired.     We discussed in detail the pathophysiology of AF as well as the myriad of treatment options available to manage it including antiarrhythmics and catheter ablation. We specifically discussed the risks, benefits, indications, and alternatives to PVI. Risks discussed include bleeding, hematoma, vascular damage, cardiac tamponade, stroke, PV stenosis, AE fistula, phrenic nerve damage, and death.  After considering his options he has decided to proceed.      CARTO. Hold amiodarone 2 weeks prior to procedure. Hold eliquis AM of procedure. ARMANDO--cancel if sinus. Post-procedure will continue amiodarone 200 mg daily and eliquis.    Thank you for allowing me to participate in the care of this patient. Please do not hesitate to call me with any questions or concerns.

## 2022-05-17 ENCOUNTER — TELEPHONE (OUTPATIENT)
Dept: ELECTROPHYSIOLOGY | Facility: CLINIC | Age: 62
End: 2022-05-17
Payer: COMMERCIAL

## 2022-05-19 ENCOUNTER — PATIENT MESSAGE (OUTPATIENT)
Dept: ELECTROPHYSIOLOGY | Facility: CLINIC | Age: 62
End: 2022-05-19
Payer: COMMERCIAL

## 2022-05-19 DIAGNOSIS — I48.0 PAROXYSMAL ATRIAL FIBRILLATION: Primary | ICD-10-CM

## 2022-05-19 DIAGNOSIS — Z01.818 PRE-OP TESTING: ICD-10-CM

## 2022-06-28 ENCOUNTER — LAB VISIT (OUTPATIENT)
Dept: LAB | Facility: HOSPITAL | Age: 62
End: 2022-06-28
Attending: INTERNAL MEDICINE
Payer: COMMERCIAL

## 2022-06-28 DIAGNOSIS — I48.0 PAROXYSMAL ATRIAL FIBRILLATION: ICD-10-CM

## 2022-06-28 DIAGNOSIS — Z01.818 PRE-OP TESTING: ICD-10-CM

## 2022-06-28 LAB
ANION GAP SERPL CALC-SCNC: 17 MMOL/L (ref 8–16)
BASOPHILS # BLD AUTO: 0.05 K/UL (ref 0–0.2)
BASOPHILS NFR BLD: 0.7 % (ref 0–1.9)
BUN SERPL-MCNC: 25 MG/DL (ref 8–23)
CALCIUM SERPL-MCNC: 9.7 MG/DL (ref 8.7–10.5)
CHLORIDE SERPL-SCNC: 99 MMOL/L (ref 95–110)
CO2 SERPL-SCNC: 20 MMOL/L (ref 23–29)
CREAT SERPL-MCNC: 1.5 MG/DL (ref 0.5–1.4)
DIFFERENTIAL METHOD: ABNORMAL
EOSINOPHIL # BLD AUTO: 0.1 K/UL (ref 0–0.5)
EOSINOPHIL NFR BLD: 1.5 % (ref 0–8)
ERYTHROCYTE [DISTWIDTH] IN BLOOD BY AUTOMATED COUNT: 14.2 % (ref 11.5–14.5)
EST. GFR  (AFRICAN AMERICAN): 56.1 ML/MIN/1.73 M^2
EST. GFR  (NON AFRICAN AMERICAN): 48.5 ML/MIN/1.73 M^2
GLUCOSE SERPL-MCNC: 418 MG/DL (ref 70–110)
HCT VFR BLD AUTO: 54.4 % (ref 40–54)
HGB BLD-MCNC: 17.1 G/DL (ref 14–18)
IMM GRANULOCYTES # BLD AUTO: 0.04 K/UL (ref 0–0.04)
IMM GRANULOCYTES NFR BLD AUTO: 0.5 % (ref 0–0.5)
LYMPHOCYTES # BLD AUTO: 1.9 K/UL (ref 1–4.8)
LYMPHOCYTES NFR BLD: 25.5 % (ref 18–48)
MCH RBC QN AUTO: 27.9 PG (ref 27–31)
MCHC RBC AUTO-ENTMCNC: 31.4 G/DL (ref 32–36)
MCV RBC AUTO: 89 FL (ref 82–98)
MONOCYTES # BLD AUTO: 0.6 K/UL (ref 0.3–1)
MONOCYTES NFR BLD: 8.1 % (ref 4–15)
NEUTROPHILS # BLD AUTO: 4.7 K/UL (ref 1.8–7.7)
NEUTROPHILS NFR BLD: 63.7 % (ref 38–73)
NRBC BLD-RTO: 0 /100 WBC
PLATELET # BLD AUTO: 225 K/UL (ref 150–450)
PMV BLD AUTO: 11.5 FL (ref 9.2–12.9)
POTASSIUM SERPL-SCNC: 4.5 MMOL/L (ref 3.5–5.1)
RBC # BLD AUTO: 6.14 M/UL (ref 4.6–6.2)
SODIUM SERPL-SCNC: 136 MMOL/L (ref 136–145)
WBC # BLD AUTO: 7.4 K/UL (ref 3.9–12.7)

## 2022-06-28 PROCEDURE — 85610 PROTHROMBIN TIME: CPT | Performed by: INTERNAL MEDICINE

## 2022-06-28 PROCEDURE — 85025 COMPLETE CBC W/AUTO DIFF WBC: CPT | Performed by: INTERNAL MEDICINE

## 2022-06-28 PROCEDURE — 80048 BASIC METABOLIC PNL TOTAL CA: CPT | Performed by: INTERNAL MEDICINE

## 2022-06-28 PROCEDURE — 36415 COLL VENOUS BLD VENIPUNCTURE: CPT | Mod: PO | Performed by: INTERNAL MEDICINE

## 2022-06-28 PROCEDURE — 85730 THROMBOPLASTIN TIME PARTIAL: CPT | Performed by: INTERNAL MEDICINE

## 2022-06-29 LAB
APTT BLDCRRT: 29.9 SEC (ref 21–32)
INR PPP: 1.2 (ref 0.8–1.2)
PROTHROMBIN TIME: 12 SEC (ref 9–12.5)

## 2022-07-06 ENCOUNTER — TELEPHONE (OUTPATIENT)
Dept: ELECTROPHYSIOLOGY | Facility: CLINIC | Age: 62
End: 2022-07-06
Payer: COMMERCIAL

## 2022-07-07 ENCOUNTER — HOSPITAL ENCOUNTER (OUTPATIENT)
Facility: HOSPITAL | Age: 62
Discharge: HOME | End: 2022-07-08
Attending: INTERNAL MEDICINE | Admitting: INTERNAL MEDICINE
Payer: COMMERCIAL

## 2022-07-07 ENCOUNTER — HOSPITAL ENCOUNTER (OUTPATIENT)
Dept: CARDIOLOGY | Facility: HOSPITAL | Age: 62
Discharge: HOME | End: 2022-07-07
Attending: INTERNAL MEDICINE | Admitting: INTERNAL MEDICINE
Payer: COMMERCIAL

## 2022-07-07 ENCOUNTER — ANESTHESIA (OUTPATIENT)
Dept: MEDSURG UNIT | Facility: HOSPITAL | Age: 62
End: 2022-07-07
Payer: COMMERCIAL

## 2022-07-07 ENCOUNTER — ANESTHESIA EVENT (OUTPATIENT)
Dept: MEDSURG UNIT | Facility: HOSPITAL | Age: 62
End: 2022-07-07
Payer: COMMERCIAL

## 2022-07-07 VITALS
HEIGHT: 74 IN | SYSTOLIC BLOOD PRESSURE: 136 MMHG | BODY MASS INDEX: 40.3 KG/M2 | DIASTOLIC BLOOD PRESSURE: 87 MMHG | WEIGHT: 314 LBS

## 2022-07-07 DIAGNOSIS — Z01.818 PRE-OP TESTING: ICD-10-CM

## 2022-07-07 DIAGNOSIS — I49.9 ARRHYTHMIA: ICD-10-CM

## 2022-07-07 DIAGNOSIS — I48.0 PAROXYSMAL ATRIAL FIBRILLATION: ICD-10-CM

## 2022-07-07 DIAGNOSIS — I48.11 LONGSTANDING PERSISTENT ATRIAL FIBRILLATION: ICD-10-CM

## 2022-07-07 DIAGNOSIS — Z01.812 ENCOUNTER FOR PRE-OPERATIVE LABORATORY TESTING: Primary | ICD-10-CM

## 2022-07-07 DIAGNOSIS — I48.91 ATRIAL FIBRILLATION: ICD-10-CM

## 2022-07-07 LAB
ASCENDING AORTA: 3.4 CM
BSA FOR ECHO PROCEDURE: 2.73 M2
CTP QC/QA: YES
EJECTION FRACTION: 40 %
POC ACTIVATED CLOTTING TIME K: 126 SEC (ref 74–137)
POC ACTIVATED CLOTTING TIME K: 132 SEC (ref 74–137)
POC ACTIVATED CLOTTING TIME K: 306 SEC (ref 74–137)
POC ACTIVATED CLOTTING TIME K: 341 SEC (ref 74–137)
POC ACTIVATED CLOTTING TIME K: 352 SEC (ref 74–137)
POCT GLUCOSE: 273 MG/DL (ref 70–110)
POCT GLUCOSE: 331 MG/DL (ref 70–110)
POCT GLUCOSE: 391 MG/DL (ref 70–110)
SAMPLE: ABNORMAL
SAMPLE: NORMAL
SAMPLE: NORMAL
SARS-COV-2 AG RESP QL IA.RAPID: NEGATIVE
SINUS: 3.5 CM

## 2022-07-07 PROCEDURE — 93320 DOPPLER ECHO COMPLETE: CPT | Mod: 26,,, | Performed by: INTERNAL MEDICINE

## 2022-07-07 PROCEDURE — C1753 CATH, INTRAVAS ULTRASOUND: HCPCS | Performed by: INTERNAL MEDICINE

## 2022-07-07 PROCEDURE — D9220A PRA ANESTHESIA: Mod: CRNA,,, | Performed by: NURSE ANESTHETIST, CERTIFIED REGISTERED

## 2022-07-07 PROCEDURE — 92960 CARDIOVERSION ELECTRIC EXT: CPT | Mod: 59,,, | Performed by: INTERNAL MEDICINE

## 2022-07-07 PROCEDURE — 63600175 PHARM REV CODE 636 W HCPCS: Performed by: INTERNAL MEDICINE

## 2022-07-07 PROCEDURE — 93010 ELECTROCARDIOGRAM REPORT: CPT | Mod: ,,, | Performed by: INTERNAL MEDICINE

## 2022-07-07 PROCEDURE — 27201423 OPTIME MED/SURG SUP & DEVICES STERILE SUPPLY: Performed by: INTERNAL MEDICINE

## 2022-07-07 PROCEDURE — 37000009 HC ANESTHESIA EA ADD 15 MINS: Performed by: INTERNAL MEDICINE

## 2022-07-07 PROCEDURE — 93325 DOPPLER ECHO COLOR FLOW MAPG: CPT

## 2022-07-07 PROCEDURE — 93656 COMPRE EP EVAL ABLTJ ATR FIB: CPT | Mod: ,,, | Performed by: INTERNAL MEDICINE

## 2022-07-07 PROCEDURE — 25000003 PHARM REV CODE 250: Performed by: NURSE ANESTHETIST, CERTIFIED REGISTERED

## 2022-07-07 PROCEDURE — 82962 GLUCOSE BLOOD TEST: CPT | Performed by: INTERNAL MEDICINE

## 2022-07-07 PROCEDURE — C1732 CATH, EP, DIAG/ABL, 3D/VECT: HCPCS | Performed by: INTERNAL MEDICINE

## 2022-07-07 PROCEDURE — 92960 PR CARDIOVERSION, ELECTIVE;EXTERN: ICD-10-PCS | Mod: 59,,, | Performed by: INTERNAL MEDICINE

## 2022-07-07 PROCEDURE — 51702 INSERT TEMP BLADDER CATH: CPT

## 2022-07-07 PROCEDURE — 93312 ECHO TRANSESOPHAGEAL: CPT | Mod: 26,,, | Performed by: INTERNAL MEDICINE

## 2022-07-07 PROCEDURE — D9220A PRA ANESTHESIA: Mod: ANES,,, | Performed by: ANESTHESIOLOGY

## 2022-07-07 PROCEDURE — 63600175 PHARM REV CODE 636 W HCPCS: Performed by: NURSE ANESTHETIST, CERTIFIED REGISTERED

## 2022-07-07 PROCEDURE — 93010 EKG 12-LEAD: ICD-10-PCS | Mod: ,,, | Performed by: INTERNAL MEDICINE

## 2022-07-07 PROCEDURE — D9220A PRA ANESTHESIA: ICD-10-PCS | Mod: CRNA,,, | Performed by: NURSE ANESTHETIST, CERTIFIED REGISTERED

## 2022-07-07 PROCEDURE — 93656 COMPRE EP EVAL ABLTJ ATR FIB: CPT | Performed by: INTERNAL MEDICINE

## 2022-07-07 PROCEDURE — 37000008 HC ANESTHESIA 1ST 15 MINUTES: Performed by: INTERNAL MEDICINE

## 2022-07-07 PROCEDURE — 93320 TRANSESOPHAGEAL ECHO (TEE) (CUPID ONLY): ICD-10-PCS | Mod: 26,,, | Performed by: INTERNAL MEDICINE

## 2022-07-07 PROCEDURE — 93656 PR ELECTROPHYS EVAL, COMPREHEN, W/ABLATION OF ATRIAL FIBR: ICD-10-PCS | Mod: ,,, | Performed by: INTERNAL MEDICINE

## 2022-07-07 PROCEDURE — C1731 CATH, EP, 20 OR MORE ELEC: HCPCS | Performed by: INTERNAL MEDICINE

## 2022-07-07 PROCEDURE — 93005 ELECTROCARDIOGRAM TRACING: CPT

## 2022-07-07 PROCEDURE — 93325 DOPPLER ECHO COLOR FLOW MAPG: CPT | Mod: 26,,, | Performed by: INTERNAL MEDICINE

## 2022-07-07 PROCEDURE — D9220A PRA ANESTHESIA: ICD-10-PCS | Mod: ANES,,, | Performed by: ANESTHESIOLOGY

## 2022-07-07 PROCEDURE — 93312 TRANSESOPHAGEAL ECHO (TEE) (CUPID ONLY): ICD-10-PCS | Mod: 26,,, | Performed by: INTERNAL MEDICINE

## 2022-07-07 PROCEDURE — 25000003 PHARM REV CODE 250: Performed by: INTERNAL MEDICINE

## 2022-07-07 PROCEDURE — 93325 TRANSESOPHAGEAL ECHO (TEE) (CUPID ONLY): ICD-10-PCS | Mod: 26,,, | Performed by: INTERNAL MEDICINE

## 2022-07-07 PROCEDURE — C1766 INTRO/SHEATH,STRBLE,NON-PEEL: HCPCS | Performed by: INTERNAL MEDICINE

## 2022-07-07 PROCEDURE — 92960 CARDIOVERSION ELECTRIC EXT: CPT | Mod: 59 | Performed by: INTERNAL MEDICINE

## 2022-07-07 PROCEDURE — C1894 INTRO/SHEATH, NON-LASER: HCPCS | Performed by: INTERNAL MEDICINE

## 2022-07-07 RX ORDER — ACETAMINOPHEN 325 MG/1
650 TABLET ORAL EVERY 4 HOURS PRN
Status: DISCONTINUED | OUTPATIENT
Start: 2022-07-07 | End: 2022-07-08 | Stop reason: HOSPADM

## 2022-07-07 RX ORDER — PROCHLORPERAZINE EDISYLATE 5 MG/ML
5 INJECTION INTRAMUSCULAR; INTRAVENOUS EVERY 30 MIN PRN
Status: DISCONTINUED | OUTPATIENT
Start: 2022-07-07 | End: 2022-07-08 | Stop reason: HOSPADM

## 2022-07-07 RX ORDER — LIDOCAINE HYDROCHLORIDE 20 MG/ML
INJECTION INTRAVENOUS
Status: DISCONTINUED | OUTPATIENT
Start: 2022-07-07 | End: 2022-07-07

## 2022-07-07 RX ORDER — DIGOXIN 125 MCG
0.12 TABLET ORAL DAILY
Status: DISCONTINUED | OUTPATIENT
Start: 2022-07-08 | End: 2022-07-08 | Stop reason: HOSPADM

## 2022-07-07 RX ORDER — SODIUM CHLORIDE 0.9 % (FLUSH) 0.9 %
10 SYRINGE (ML) INJECTION
Status: DISCONTINUED | OUTPATIENT
Start: 2022-07-07 | End: 2022-07-08 | Stop reason: HOSPADM

## 2022-07-07 RX ORDER — GLUCAGON 1 MG
1 KIT INJECTION
Status: DISCONTINUED | OUTPATIENT
Start: 2022-07-07 | End: 2022-07-08 | Stop reason: HOSPADM

## 2022-07-07 RX ORDER — IBUPROFEN 200 MG
16 TABLET ORAL
Status: DISCONTINUED | OUTPATIENT
Start: 2022-07-07 | End: 2022-07-08 | Stop reason: HOSPADM

## 2022-07-07 RX ORDER — FUROSEMIDE 20 MG/1
20 TABLET ORAL 2 TIMES DAILY
Status: DISCONTINUED | OUTPATIENT
Start: 2022-07-07 | End: 2022-07-08 | Stop reason: HOSPADM

## 2022-07-07 RX ORDER — KETAMINE HCL IN 0.9 % NACL 50 MG/5 ML
SYRINGE (ML) INTRAVENOUS
Status: DISCONTINUED | OUTPATIENT
Start: 2022-07-07 | End: 2022-07-07

## 2022-07-07 RX ORDER — ONDANSETRON 2 MG/ML
INJECTION INTRAMUSCULAR; INTRAVENOUS
Status: DISCONTINUED | OUTPATIENT
Start: 2022-07-07 | End: 2022-07-07

## 2022-07-07 RX ORDER — HEPARIN SOD,PORCINE/0.9 % NACL 1000/500ML
INTRAVENOUS SOLUTION INTRAVENOUS
Status: DISCONTINUED | OUTPATIENT
Start: 2022-07-07 | End: 2022-07-08 | Stop reason: HOSPADM

## 2022-07-07 RX ORDER — FENTANYL CITRATE 50 UG/ML
INJECTION, SOLUTION INTRAMUSCULAR; INTRAVENOUS
Status: DISCONTINUED | OUTPATIENT
Start: 2022-07-07 | End: 2022-07-07

## 2022-07-07 RX ORDER — MIDAZOLAM HYDROCHLORIDE 1 MG/ML
INJECTION, SOLUTION INTRAMUSCULAR; INTRAVENOUS
Status: DISCONTINUED | OUTPATIENT
Start: 2022-07-07 | End: 2022-07-07

## 2022-07-07 RX ORDER — SUCCINYLCHOLINE CHLORIDE 20 MG/ML
INJECTION INTRAMUSCULAR; INTRAVENOUS
Status: DISCONTINUED | OUTPATIENT
Start: 2022-07-07 | End: 2022-07-07

## 2022-07-07 RX ORDER — PHENYLEPHRINE HYDROCHLORIDE 10 MG/ML
INJECTION INTRAVENOUS
Status: DISCONTINUED | OUTPATIENT
Start: 2022-07-07 | End: 2022-07-07

## 2022-07-07 RX ORDER — LIDOCAINE HYDROCHLORIDE 20 MG/ML
INJECTION, SOLUTION INFILTRATION; PERINEURAL
Status: DISCONTINUED | OUTPATIENT
Start: 2022-07-07 | End: 2022-07-08 | Stop reason: HOSPADM

## 2022-07-07 RX ORDER — DEXMEDETOMIDINE HYDROCHLORIDE 100 UG/ML
INJECTION, SOLUTION INTRAVENOUS
Status: DISCONTINUED | OUTPATIENT
Start: 2022-07-07 | End: 2022-07-07

## 2022-07-07 RX ORDER — DILTIAZEM HYDROCHLORIDE 240 MG/1
240 CAPSULE, COATED, EXTENDED RELEASE ORAL 2 TIMES DAILY
Status: DISCONTINUED | OUTPATIENT
Start: 2022-07-07 | End: 2022-07-08 | Stop reason: HOSPADM

## 2022-07-07 RX ORDER — HEPARIN SODIUM 1000 [USP'U]/ML
INJECTION, SOLUTION INTRAVENOUS; SUBCUTANEOUS
Status: DISCONTINUED | OUTPATIENT
Start: 2022-07-07 | End: 2022-07-07

## 2022-07-07 RX ORDER — AMIODARONE HYDROCHLORIDE 200 MG/1
200 TABLET ORAL 2 TIMES DAILY
Status: DISCONTINUED | OUTPATIENT
Start: 2022-07-07 | End: 2022-07-08 | Stop reason: HOSPADM

## 2022-07-07 RX ORDER — LISINOPRIL 10 MG/1
20 TABLET ORAL DAILY
Status: DISCONTINUED | OUTPATIENT
Start: 2022-07-08 | End: 2022-07-08 | Stop reason: HOSPADM

## 2022-07-07 RX ORDER — PROTAMINE SULFATE 10 MG/ML
INJECTION, SOLUTION INTRAVENOUS
Status: DISCONTINUED | OUTPATIENT
Start: 2022-07-07 | End: 2022-07-07

## 2022-07-07 RX ORDER — HEPARIN SODIUM 10000 [USP'U]/100ML
INJECTION, SOLUTION INTRAVENOUS CONTINUOUS PRN
Status: DISCONTINUED | OUTPATIENT
Start: 2022-07-07 | End: 2022-07-07

## 2022-07-07 RX ORDER — INSULIN ASPART 100 [IU]/ML
0-5 INJECTION, SOLUTION INTRAVENOUS; SUBCUTANEOUS
Status: DISCONTINUED | OUTPATIENT
Start: 2022-07-07 | End: 2022-07-08 | Stop reason: HOSPADM

## 2022-07-07 RX ORDER — ATORVASTATIN CALCIUM 20 MG/1
80 TABLET, FILM COATED ORAL DAILY
Status: DISCONTINUED | OUTPATIENT
Start: 2022-07-08 | End: 2022-07-08 | Stop reason: HOSPADM

## 2022-07-07 RX ORDER — PROPOFOL 10 MG/ML
VIAL (ML) INTRAVENOUS
Status: DISCONTINUED | OUTPATIENT
Start: 2022-07-07 | End: 2022-07-07

## 2022-07-07 RX ORDER — FUROSEMIDE 10 MG/ML
INJECTION INTRAMUSCULAR; INTRAVENOUS
Status: DISCONTINUED | OUTPATIENT
Start: 2022-07-07 | End: 2022-07-07

## 2022-07-07 RX ORDER — IBUPROFEN 200 MG
24 TABLET ORAL
Status: DISCONTINUED | OUTPATIENT
Start: 2022-07-07 | End: 2022-07-08 | Stop reason: HOSPADM

## 2022-07-07 RX ORDER — HYDROMORPHONE HYDROCHLORIDE 1 MG/ML
0.2 INJECTION, SOLUTION INTRAMUSCULAR; INTRAVENOUS; SUBCUTANEOUS EVERY 5 MIN PRN
Status: DISCONTINUED | OUTPATIENT
Start: 2022-07-07 | End: 2022-07-08 | Stop reason: HOSPADM

## 2022-07-07 RX ADMIN — INSULIN ASPART 2 UNITS: 100 INJECTION, SOLUTION INTRAVENOUS; SUBCUTANEOUS at 10:07

## 2022-07-07 RX ADMIN — SODIUM CHLORIDE: 0.9 INJECTION, SOLUTION INTRAVENOUS at 11:07

## 2022-07-07 RX ADMIN — DEXMEDETOMIDINE HYDROCHLORIDE 8 MCG: 100 INJECTION, SOLUTION, CONCENTRATE INTRAVENOUS at 03:07

## 2022-07-07 RX ADMIN — APIXABAN 5 MG: 5 TABLET, FILM COATED ORAL at 09:07

## 2022-07-07 RX ADMIN — PROPOFOL 30 MG: 10 INJECTION, EMULSION INTRAVENOUS at 12:07

## 2022-07-07 RX ADMIN — PROPOFOL 30 MG: 10 INJECTION, EMULSION INTRAVENOUS at 02:07

## 2022-07-07 RX ADMIN — HEPARIN SODIUM 3000 UNITS: 1000 INJECTION INTRAVENOUS; SUBCUTANEOUS at 01:07

## 2022-07-07 RX ADMIN — HEPARIN SODIUM AND DEXTROSE 12 UNITS/KG/HR: 10000; 5 INJECTION INTRAVENOUS at 01:07

## 2022-07-07 RX ADMIN — LIDOCAINE HYDROCHLORIDE 100 MG: 20 INJECTION, SOLUTION INTRAVENOUS at 12:07

## 2022-07-07 RX ADMIN — HEPARIN SODIUM 22000 UNITS: 1000 INJECTION INTRAVENOUS; SUBCUTANEOUS at 01:07

## 2022-07-07 RX ADMIN — PROPOFOL 50 MG: 10 INJECTION, EMULSION INTRAVENOUS at 12:07

## 2022-07-07 RX ADMIN — DILTIAZEM HYDROCHLORIDE 240 MG: 240 CAPSULE, COATED, EXTENDED RELEASE ORAL at 10:07

## 2022-07-07 RX ADMIN — FENTANYL CITRATE 100 MCG: 50 INJECTION, SOLUTION INTRAMUSCULAR; INTRAVENOUS at 12:07

## 2022-07-07 RX ADMIN — SODIUM CHLORIDE 0.3 MCG/KG/MIN: 9 INJECTION, SOLUTION INTRAVENOUS at 12:07

## 2022-07-07 RX ADMIN — PHENYLEPHRINE HYDROCHLORIDE 200 MCG: 10 INJECTION, SOLUTION INTRAMUSCULAR; INTRAVENOUS; SUBCUTANEOUS at 12:07

## 2022-07-07 RX ADMIN — DEXMEDETOMIDINE HYDROCHLORIDE 8 MCG: 100 INJECTION, SOLUTION, CONCENTRATE INTRAVENOUS at 02:07

## 2022-07-07 RX ADMIN — AMIODARONE HYDROCHLORIDE 200 MG: 200 TABLET ORAL at 09:07

## 2022-07-07 RX ADMIN — PROTAMINE SULFATE 105 MG: 10 INJECTION, SOLUTION INTRAVENOUS at 03:07

## 2022-07-07 RX ADMIN — Medication 30 MG: at 12:07

## 2022-07-07 RX ADMIN — ACETAMINOPHEN 650 MG: 325 TABLET ORAL at 09:07

## 2022-07-07 RX ADMIN — SUCCINYLCHOLINE CHLORIDE 120 MG: 20 INJECTION, SOLUTION INTRAMUSCULAR; INTRAVENOUS at 12:07

## 2022-07-07 RX ADMIN — MIDAZOLAM HYDROCHLORIDE 2 MG: 1 INJECTION, SOLUTION INTRAMUSCULAR; INTRAVENOUS at 11:07

## 2022-07-07 RX ADMIN — ONDANSETRON 4 MG: 2 INJECTION INTRAMUSCULAR; INTRAVENOUS at 02:07

## 2022-07-07 RX ADMIN — PROTAMINE SULFATE 5 MG: 10 INJECTION, SOLUTION INTRAVENOUS at 03:07

## 2022-07-07 RX ADMIN — Medication 20 MG: at 02:07

## 2022-07-07 RX ADMIN — FUROSEMIDE 40 MG: 10 INJECTION, SOLUTION INTRAMUSCULAR; INTRAVENOUS at 03:07

## 2022-07-07 RX ADMIN — FUROSEMIDE 20 MG: 20 TABLET ORAL at 09:07

## 2022-07-07 RX ADMIN — PROPOFOL 150 MG: 10 INJECTION, EMULSION INTRAVENOUS at 12:07

## 2022-07-07 NOTE — TRANSFER OF CARE
"Anesthesia Transfer of Care Note    Patient: Donald Frey    Procedure(s) Performed: Procedure(s) (LRB):  ABLATION, ARRHYTHMOGENIC FOCUS, FOR ATRIAL FIBRILLATION (N/A)  ECHOCARDIOGRAM, TRANSESOPHAGEAL (N/A)  Cardioversion or Defibrillation    Patient location: PACU    Anesthesia Type: general    Transport from OR: Transported from OR on 6-10 L/min O2 by face mask with adequate spontaneous ventilation    Post pain: adequate analgesia    Post assessment: no apparent anesthetic complications and tolerated procedure well    Post vital signs: stable    Level of consciousness: awake and oriented    Nausea/Vomiting: no nausea/vomiting    Complications: none    Transfer of care protocol was followed      Last vitals:   Visit Vitals  /87 (BP Location: Left arm, Patient Position: Lying)   Pulse 98   Temp 36.6 °C (97.9 °F)   Resp 18   Ht 6' 2" (1.88 m)   Wt (!) 142.4 kg (314 lb)   SpO2 98%   BMI 40.32 kg/m²     "

## 2022-07-07 NOTE — PLAN OF CARE
"Vss. sats 96% on room air. Pt aaox4 follows commands.  Pt s/p ablation pvi, jeanne groin sutures placed at 1500, sutures removal time 1900, bedrest done at 2100. [facility]. Pt arrived on hospital bed from ep lab.  Left groin manual pressure held from 1530 to 1545.  jeanne groins no hematoma or drainage noted at this time. Sutures gabriela, well approx.  Palpable pulses noted to jeanne le.  Pt arrived with storm from procedure, 40mg iv lasix given by crna prior to arrival.  Pt denies pain or sob.  Only complaint is "storm catheter is uncomfortable".  Repositioned and weber in place to left leg. csu rn aware.  Storm to be removed when bedrest done.  Pt transferred from ep pacu 2 to 355 via bed with pacu pct efren. Tele box on confirmed by tele tech. 12 lead ekg done and in chart.  pts' wife updated and given new room number. Pt aware that when goes to room if storm catheter still bothering him, to let rn know and md can be called.  Pt's belongings on bed from [facility] locker. Silvadene to chest and back. On pt's bed with chart upon transfer to room. Pt verbalizes understanding, educated on use of silvadene. Pt tolerating ice chips in ep pacu 2. See flowsheet for full assessment.   "

## 2022-07-07 NOTE — PLAN OF CARE
Problem: Adult Inpatient Plan of Care  Goal: Patient-Specific Goal (Individualized)  Outcome: Ongoing, Progressing  Goal: Optimal Comfort and Wellbeing  Outcome: Ongoing, Progressing  Intervention: Monitor Pain and Promote Comfort  Flowsheets (Taken 7/7/2022 3429)  Pain Management Interventions:   around-the-clock dosing utilized   care clustered   diversional activity provided     Problem: Bariatric Environmental Safety  Goal: Safety Maintained with Care  Outcome: Ongoing, Progressing  Intervention: Promote Safety and Comfort  Flowsheets (Taken 7/7/2022 8517)  Bariatric Safety: specialty bed utilized

## 2022-07-07 NOTE — ANESTHESIA PROCEDURE NOTES
Intubation    Date/Time: 7/7/2022 12:14 PM  Performed by: Adam Fine CRNA  Authorized by: THU Richard MD     Intubation:     Induction:  Intravenous    Intubated:  Postinduction    Mask Ventilation:  Very difficult with oral airway    Attempts:  1    Attempted By:  Student    Method of Intubation:  Video laryngoscopy    Blade:  Cruz 3    Laryngeal View Grade: Grade I - full view of cords      Difficult Airway Encountered?: No      Complications:  None    Airway Device:  Oral endotracheal tube    Airway Device Size:  7.5    Style/Cuff Inflation:  Cuffed (inflated to minimal occlusive pressure)    Tube secured:  22    Secured at:  The lips    Placement Verified By:  Capnometry    Complicating Factors:  None    Findings Post-Intubation:  BS equal bilateral and atraumatic/condition of teeth unchanged

## 2022-07-07 NOTE — H&P
[facility] - Short Stay Cardiac Unit  Cardiac Electrophysiology  History and Physical     Admission Date: 7/7/2022  Code Status: Prior   Attending Provider: Niko Pacheco MD   Principal Problem:<principal problem not specified>    Subjective:     Chief Complaint:  PVI     HPI:  He is a 61M with HTN, HLD, DM2, CAD status-post MI/PCI in 2013, who was first diagnosed with AF in 1/2022 when he presented to [facility] with ADHF and was found to be in AF. An echo showed an EF of 45%. He was diuresed and rate controlled, and discharged. He was loaded with amiodarone, and underwent ARMANDO/DCCV in 3/2022 (ERAF). Mr. He presents to discuss management options.     Mr. Frey states he had a negative sleep study 10 years ago.     Today he presents for PVI after a failed outpt DCCV with amiodarone. EKG still in AF with HR 99 bpm. Last dose of Eliquis was last night and last dose of Amiodarone was 2 weeks ago. Accompanied by his wife and feeling well today. Consented and agreeable to proceed with procedure.       Past Medical History:   Diagnosis Date    Diabetes mellitus, type 2     Hypertension     Myocardial infarction 2013       Past Surgical History:   Procedure Laterality Date    CORONARY STENT PLACEMENT  2013    TREATMENT OF CARDIAC ARRHYTHMIA N/A 3/7/2022    Procedure: CARDIOVERSION;  Surgeon: Gomez Orellana MD;  Location: [facility] CATH/EP LAB;  Service: Cardiology;  Laterality: N/A;       Review of patient's allergies indicates:  No Known Allergies    No current facility-administered medications on file prior to encounter.     Current Outpatient Medications on File Prior to Encounter   Medication Sig    apixaban (ELIQUIS) 5 mg Tab Take 1 tablet (5 mg total) by mouth 2 (two) times daily.    digoxin (LANOXIN) 125 mcg tablet Take 1 tablet (0.125 mg total) by mouth once daily.    diltiaZEM (CARDIZEM CD) 240 MG 24 hr capsule Take 1 capsule (240 mg total) by mouth 2 (two) times a day.    ertugliflozin (STEGLATRO) 5 mg Tab Take 5  mg by mouth once daily.    furosemide (LASIX) 20 MG tablet Take 1 tablet (20 mg total) by mouth 2 (two) times daily.    glipiZIDE (GLUCOTROL) 5 MG tablet TAKE 1 TABLET DAILY WITH BREAKFAST    lisinopriL (PRINIVIL,ZESTRIL) 20 MG tablet Take 1 tablet (20 mg total) by mouth once daily.    metFORMIN (GLUCOPHAGE) 1000 MG tablet TAKE 1 TABLET TWICE A DAY WITH MEALS    rosuvastatin (CRESTOR) 20 MG tablet Take 20 mg by mouth once daily.    amiodarone (PACERONE) 200 MG Tab Take 1 tablet (200 mg total) by mouth 2 (two) times daily.    amiodarone (PACERONE) 200 MG Tab Take 1 tablet (200 mg total) by mouth 2 (two) times daily.     Family History       Problem Relation (Age of Onset)    Cancer Father    Heart attack Father          Tobacco Use    Smoking status: Never Smoker    Smokeless tobacco: Never Used   Substance and Sexual Activity    Alcohol use: Yes     Comment: occ    Drug use: Never    Sexual activity: Not on file     Review of Systems   Constitutional: Negative.   HENT: Negative.     Eyes: Negative.    Cardiovascular: Negative.    Respiratory: Negative.     Endocrine: Negative.    Hematologic/Lymphatic: Negative.    Skin: Negative.    Musculoskeletal: Negative.    Gastrointestinal: Negative.    Genitourinary: Negative.    Neurological: Negative.    Psychiatric/Behavioral: Negative.     Allergic/Immunologic: Negative.    Objective:     Vital Signs (Most Recent):  Temp: 97.9 °F (36.6 °C) (07/07/22 0849)  Pulse: 98 (07/07/22 0849)  Resp: 18 (07/07/22 0849)  BP: 136/87 (07/07/22 0854)  SpO2: 98 % (07/07/22 0849) Vital Signs (24h Range):  Temp:  [97.9 °F (36.6 °C)] 97.9 °F (36.6 °C)  Pulse:  [98] 98  Resp:  [18] 18  SpO2:  [98 %] 98 %  BP: (136-174)/(82-87) 136/87       Weight: (!) 142.4 kg (314 lb)  Body mass index is 40.32 kg/m².    SpO2: 98 %       Physical Exam  Constitutional:       General: He is not in acute distress.     Appearance: He is obese. He is not ill-appearing.   HENT:      Head:  Normocephalic.      Mouth/Throat:      Mouth: Mucous membranes are moist.   Eyes:      Extraocular Movements: Extraocular movements intact.   Cardiovascular:      Rate and Rhythm: Normal rate. Rhythm irregular.      Pulses: Normal pulses.      Heart sounds: No murmur heard.  Pulmonary:      Effort: Pulmonary effort is normal. No respiratory distress.      Breath sounds: Normal breath sounds.   Abdominal:      General: Bowel sounds are normal. There is no distension.      Palpations: Abdomen is soft.      Tenderness: There is no abdominal tenderness.   Musculoskeletal:      Right lower leg: No edema.      Left lower leg: No edema.   Skin:     General: Skin is warm.   Neurological:      General: No focal deficit present.      Mental Status: He is alert and oriented to person, place, and time.   Psychiatric:         Mood and Affect: Mood normal.         Behavior: Behavior normal.       Significant Labs: BMP: No results for input(s): GLU, NA, K, CL, CO2, BUN, CREATININE, CALCIUM, MG in the last 48 hours., CMP: No results for input(s): NA, K, CL, CO2, GLU, BUN, CREATININE, CALCIUM, PROT, ALBUMIN, BILITOT, ALKPHOS, AST, ALT, ANIONGAP, ESTGFRAFRICA, EGFRNONAA in the last 48 hours., CBC: No results for input(s): WBC, HGB, HCT, PLT in the last 48 hours., INR: No results for input(s): INR, PROTIME in the last 48 hours., Lipid Panel No results for input(s): CHOL, HDL, LDLCALC, TRIG, CHOLHDL in the last 48 hours., and Troponin No results for input(s): TROPONINI in the last 48 hours.    Significant Imaging:   ARMANDO 3/7/22  · Mild eccentric hypertrophy and  · Left ventricular diastolic dysfunction.  · There is mild left ventricular global hypokinesis.  · Normal right ventricular size with normal right ventricular systolic function.  · Mild left atrial enlargement.  · No interatrial septal defect present with a negative bubble test the mid interatrial septum is extremely thin compared to the inferior and superior portions  · Normal  appearing left atrial appendage. No thrombus is present in the appendage. SANNA occluder is absent. No evidence of spontaneous echo contrast in the left atrial appendage or the left atrium  · Mild mitral regurgitation.  · The estimated ejection fraction is 45%.  · DC cardioversion with 2 synchronized shocks at 200 joules were unsuccessful      Assessment and Plan:     Afib  Persistent AF and failed mgmt with DCCV/Amiodarone. EKG with AF and HR 99 bpm today. Last dose of Eliquis was last night. [id number] 3.     - NPO for PVI with Dr Pacheco; consented and placed in chart  - ARMANDO prior to PVI as in AF today on EKG; EF 45% with clear SANNA on previous ARMANDO  - Holding Amiodarone and Eliquis with plans to restart post-op    Case discussed with Dr Niko Pacheco MD.    Franky Farmer MD  Cardiac Electrophysiology  [facility] - [facility] Stay Cardiac Unit     Cephalexin Pregnancy And Lactation Text: This medication is Pregnancy Category B and considered safe during pregnancy.  It is also excreted in breast milk but can be used safely for shorter doses.

## 2022-07-07 NOTE — ASSESSMENT & PLAN NOTE
Here today for ARMANDO for SANNA clearance prior to PVI  -No absolute contraindications of esophageal stricture, tumor, perforation, laceration,or diverticulum and/or active GI bleed  -The risks, benefits & alternatives of the procedure were explained to the patient.   -The risks of transesophageal echo include but are not limited to:  Dental trauma, esophageal trauma/perforation, bleeding, laryngospasm/brochospasm, aspiration, sore throat/hoarseness, & dislodgement of the endotracheal tube/nasogastric tube (where applicable).    -The risks of ANES monitored sedation include hypotension, respiratory depression, arrhythmias, bronchospasm, & death.    -Informed consent was obtained. The patient is agreeable to proceed with the procedure and all questions and concerns addressed.    Case discussed with an attending in echocardiography lab.    Further recommendations per attending addendum

## 2022-07-07 NOTE — ASSESSMENT & PLAN NOTE
Persistent AF and failed mgmt with DCCV/Amiodarone. EKG with AF and HR 99 bpm today. Last dose of Eliquis was last night. [id number] 3.     - NPO for PVI with Dr Pacheco; consented and placed in chart  - ARMANDO prior to PVI as in AF today on EKG; EF 45% with clear SANNA on previous ARMANDO  - Holding Amiodarone and Eliquis with plans to restart post-op

## 2022-07-07 NOTE — Clinical Note
Additional clipping needed   Caller would like to discuss an/a  for . Writer advised caller of callback within 24-72 hours.    Patient Name: Faye Hurtaod  Caller Name: Patient  Name of Facility: n/a  Callback Number: [phone]  Best Availability: anytime  Can A Detailed Message Be left? yes  Fax Number: n/a  Additional Info: Patient was prescribe blood pressure pills but Patient does not want 25 mg losartan (COZAAR) 25 MG tablet Patient started medication and now she is going to the bathroom too much.  Patient would like it reduce  The medication to 10 mg Patient would like a Nurse to call her back as soon as possible.  Did you confirm the message with the caller?: yes    Thank you,  Johnna Lopez

## 2022-07-07 NOTE — PLAN OF CARE
Ambulated on unit this morning with wife at side.  Denies pain or SOB.  Verbalized an understanding of procedure.  Oriented to unit and all bell provided.  Reported to EP charge KUSHAL Avelar and Dr. Richard that pt CBG = 391.  Dr. Richard stated they would manage CBG during procedure.  No new orders received .  Will continue to monitor.

## 2022-07-07 NOTE — ANESTHESIA PREPROCEDURE EVALUATION
07/07/2022  Donald Frey is a 62 y.o., male.  [facility]  Anesthesia Pre-Operative Evaluation       Patient Name: Donald Frey  YOB: 1960  MRN: [id number]  [facility]: [id number]      Code Status: Prior   Date of Procedure: 7/7/2022  Anesthesia: General Procedure: Procedure(s) (LRB):  ABLATION, ARRHYTHMOGENIC FOCUS, FOR ATRIAL FIBRILLATION (N/A)  ECHOCARDIOGRAM, TRANSESOPHAGEAL (N/A)  Pre-Operative Diagnosis: Longstanding persistent atrial fibrillation [I48.11]  Proceduralist: Surgeon(s) and Role:  Panel 1:     * Niko Pacheco MD - Primary  Panel 2:     * [facility] Diagnostic Provider - Primary        SUBJECTIVE:   Donald Frey is a 62 y.o. male who  has a past medical history of Diabetes mellitus, type 2, Hypertension, and Myocardial infarction (2013). Donald Frey is a 61M with HTN, HLD, DM2, CAD status-post MI/PCI in 2013, who was first diagnosed with AF in 1/2022 when he presented to [facility] with ADHF and was found to be in AF. An echo showed an EF of 45%. He was diuresed and rate controlled, and discharged. He was loaded with amiodarone, and underwent ARMANDO/DCCV in 3/2022 (ERAF). He presents today for ARMANDO and PVI.     he has a current medication list which includes the following long-term medication(s): digoxin, diltiazem, furosemide, glipizide, lisinopril, metformin, rosuvastatin, amiodarone, and amiodarone.   ALLERGIES:   Review of patient's allergies indicates:  No Known Allergies    History:     Past Medical History:   Diagnosis Date    Diabetes mellitus, type 2     Hypertension     Myocardial infarction 2013       Active Hospital Problems    Diagnosis  POA    Afib [I48.91]  Yes      Resolved Hospital Problems   No resolved problems to display.     Surgical History:    has a past surgical history that includes Coronary stent placement (2013) and Treatment of cardiac  arrhythmia (N/A, 3/7/2022).   Social History:     reports that he has never smoked. He has never used smokeless tobacco. He reports current alcohol use. He reports that he does not use drugs.     OBJECTIVE:     Vital Signs (Most Recent):  Temp: 36.6 °C (97.9 °F) (07/07/22 0849)  Pulse: 98 (07/07/22 0849)  Resp: 18 (07/07/22 0849)  BP: 136/87 (07/07/22 0854)  SpO2: 98 % (07/07/22 0849) Vital Signs Range (Last 24H):  Temp:  [36.6 °C (97.9 °F)]   Pulse:  [98]   Resp:  [18]   BP: (136-174)/(82-87)   SpO2:  [98 %]        Body mass index is 40.32 kg/m².   Wt Readings from Last 4 Encounters:   07/07/22 (!) 142.4 kg (314 lb)   05/11/22 (!) 146.5 kg (323 lb)   03/04/22 (!) 149.2 kg (329 lb)   03/07/22 (!) 149.2 kg (328 lb 14.8 oz)     Significant Labs:  Lab Results   Component Value Date    WBC 7.40 06/28/2022    HGB 17.1 06/28/2022    HCT 54.4 (H) 06/28/2022     06/28/2022     06/28/2022    K 4.5 06/28/2022    CL 99 06/28/2022    CREATININE 1.5 (H) 06/28/2022    BUN 25 (H) 06/28/2022    CO2 20 (L) 06/28/2022     (H) 06/28/2022    CALCIUM 9.7 06/28/2022    MG 1.9 01/22/2022    PHOS 3.9 01/21/2022    ALKPHOS 50 (L) 03/07/2022    ALT 30 03/07/2022    AST 22 03/07/2022    ALBUMIN 4.2 03/07/2022    INR 1.2 06/28/2022    APTT 29.9 06/28/2022    HGBA1C 8.4 (H) 01/20/2022    TROPONINI 0.036 01/20/2022     (H) 01/22/2022     No LMP for male patient.  Recent Results (from the past 72 hour(s))   SARS Coronavirus 2 Antigen, POCT Manual Read    Collection Time: 07/07/22  9:49 AM   Result Value Ref Range    SARS Coronavirus 2 Antigen Negative Negative     Acceptable Yes        EKG:   Results for orders placed or performed in visit on 05/11/22   IN OFFICE EKG 12-LEAD (to [place])    Collection Time: 05/11/22  9:03 AM    Narrative    Test Reason : I48.91,    Vent. Rate : 102 BPM     Atrial Rate : 102 BPM     P-R Int : 000 ms          QRS Dur : 118 ms      QT Int : 366 ms       P-R-T Axes : 000 125 -34  degrees     QTc Int : 477 ms    Atrial fibrillation with rapid ventricular response  Left posterior fascicular block  Anterior infarct (cited on or before 20-JAN-2022)  T wave abnormality, consider inferior ischemia  Abnormal ECG  When compared with ECG of 07-MAR-2022 11:22,  Left posterior fascicular block is now Present  T wave inversion now evident in Inferior leads  Confirmed by Boby Eugene MD ([id number]) on 5/23/2022 8:03:24 PM    Referred By: MIHAIERR   SELF           Confirmed By:Boby Eugene MD       TTE:  Results for orders placed or performed during the hospital encounter of 01/20/22   Echo   Result Value Ref Range    Narrative    · Eccentric hypertrophy and mildly decreased systolic function.  · The estimated ejection fraction is 45%.  · Mild left atrial enlargement.  · Mild mitral regurgitation.  · Mild tricuspid regurgitation.  · Intermediate central venous pressure (8 mmHg).  · The estimated PA systolic pressure is 33 mmHg.  · Atrial fibrillation observed.        EF   Date Value Ref Range Status   03/07/2022 45 % Final   03/07/2022 45 % Final           ASSESSMENT/PLAN:         Pre-op Assessment    I have reviewed the Patient Summary Reports.     I have reviewed the Nursing Notes. I have reviewed the NPO Status.   I have reviewed the Medications.     Review of Systems      Physical Exam  General: Well nourished, Cooperative and Alert    Airway:  Mallampati: III / II  Mouth Opening: Normal  TM Distance: Normal  Tongue: Normal  Neck ROM: Normal ROM    Dental:  Intact    Chest/Lungs:  Normal Respiratory Rate    Heart:  Rate: Normal  Rhythm: Regular Rhythm        Anesthesia Plan  Type of Anesthesia, risks & benefits discussed:    Anesthesia Type: Gen ETT  Intra-op Monitoring Plan: Standard ASA Monitors and Art Line  Post Op Pain Control Plan: IV/PO Opioids PRN  Induction:  IV  Informed Consent: Informed consent signed with the Patient and all parties understand the risks and agree with anesthesia plan.   All questions answered.   ASA Score: 3  Day of Surgery Review of History & Physical: H&P Update referred to the surgeon/provider.    Ready For Surgery From Anesthesia Perspective.     .

## 2022-07-07 NOTE — SUBJECTIVE & OBJECTIVE
Past Medical History:   Diagnosis Date    Diabetes mellitus, type 2     Hypertension     Myocardial infarction 2013       Past Surgical History:   Procedure Laterality Date    CORONARY STENT PLACEMENT  2013    TREATMENT OF CARDIAC ARRHYTHMIA N/A 3/7/2022    Procedure: CARDIOVERSION;  Surgeon: Gomez Orellana MD;  Location: [facility] CATH/EP LAB;  Service: Cardiology;  Laterality: N/A;       Review of patient's allergies indicates:  No Known Allergies    No current facility-administered medications on file prior to encounter.     Current Outpatient Medications on File Prior to Encounter   Medication Sig    apixaban (ELIQUIS) 5 mg Tab Take 1 tablet (5 mg total) by mouth 2 (two) times daily.    digoxin (LANOXIN) 125 mcg tablet Take 1 tablet (0.125 mg total) by mouth once daily.    diltiaZEM (CARDIZEM CD) 240 MG 24 hr capsule Take 1 capsule (240 mg total) by mouth 2 (two) times a day.    ertugliflozin (STEGLATRO) 5 mg Tab Take 5 mg by mouth once daily.    furosemide (LASIX) 20 MG tablet Take 1 tablet (20 mg total) by mouth 2 (two) times daily.    glipiZIDE (GLUCOTROL) 5 MG tablet TAKE 1 TABLET DAILY WITH BREAKFAST    lisinopriL (PRINIVIL,ZESTRIL) 20 MG tablet Take 1 tablet (20 mg total) by mouth once daily.    metFORMIN (GLUCOPHAGE) 1000 MG tablet TAKE 1 TABLET TWICE A DAY WITH MEALS    rosuvastatin (CRESTOR) 20 MG tablet Take 20 mg by mouth once daily.    amiodarone (PACERONE) 200 MG Tab Take 1 tablet (200 mg total) by mouth 2 (two) times daily.    amiodarone (PACERONE) 200 MG Tab Take 1 tablet (200 mg total) by mouth 2 (two) times daily.     Family History       Problem Relation (Age of Onset)    Cancer Father    Heart attack Father          Tobacco Use    Smoking status: Never Smoker    Smokeless tobacco: Never Used   Substance and Sexual Activity    Alcohol use: Yes     Comment: occ    Drug use: Never    Sexual activity: Not on file     Review of Systems   Constitutional: Negative.   HENT: Negative.      Eyes: Negative.    Cardiovascular: Negative.    Respiratory: Negative.     Endocrine: Negative.    Hematologic/Lymphatic: Negative.    Skin: Negative.    Musculoskeletal: Negative.    Gastrointestinal: Negative.    Genitourinary: Negative.    Neurological: Negative.    Psychiatric/Behavioral: Negative.     Allergic/Immunologic: Negative.    Objective:     Vital Signs (Most Recent):  Temp: 97.9 °F (36.6 °C) (07/07/22 0849)  Pulse: 98 (07/07/22 0849)  Resp: 18 (07/07/22 0849)  BP: 136/87 (07/07/22 0854)  SpO2: 98 % (07/07/22 0849) Vital Signs (24h Range):  Temp:  [97.9 °F (36.6 °C)] 97.9 °F (36.6 °C)  Pulse:  [98] 98  Resp:  [18] 18  SpO2:  [98 %] 98 %  BP: (136-174)/(82-87) 136/87       Weight: (!) 142.4 kg (314 lb)  Body mass index is 40.32 kg/m².    SpO2: 98 %       Physical Exam  Constitutional:       General: He is not in acute distress.     Appearance: He is obese. He is not ill-appearing.   HENT:      Head: Normocephalic.      Mouth/Throat:      Mouth: Mucous membranes are moist.   Eyes:      Extraocular Movements: Extraocular movements intact.   Cardiovascular:      Rate and Rhythm: Normal rate. Rhythm irregular.      Pulses: Normal pulses.      Heart sounds: No murmur heard.  Pulmonary:      Effort: Pulmonary effort is normal. No respiratory distress.      Breath sounds: Normal breath sounds.   Abdominal:      General: Bowel sounds are normal. There is no distension.      Palpations: Abdomen is soft.      Tenderness: There is no abdominal tenderness.   Musculoskeletal:      Right lower leg: No edema.      Left lower leg: No edema.   Skin:     General: Skin is warm.   Neurological:      General: No focal deficit present.      Mental Status: He is alert and oriented to person, place, and time.   Psychiatric:         Mood and Affect: Mood normal.         Behavior: Behavior normal.       Significant Labs: BMP: No results for input(s): GLU, NA, K, CL, CO2, BUN, CREATININE, CALCIUM, MG in the last 48 hours., CMP:  No results for input(s): NA, K, CL, CO2, GLU, BUN, CREATININE, CALCIUM, PROT, ALBUMIN, BILITOT, ALKPHOS, AST, ALT, ANIONGAP, ESTGFRAFRICA, EGFRNONAA in the last 48 hours., CBC: No results for input(s): WBC, HGB, HCT, PLT in the last 48 hours., INR: No results for input(s): INR, PROTIME in the last 48 hours., Lipid Panel No results for input(s): CHOL, HDL, LDLCALC, TRIG, CHOLHDL in the last 48 hours., and Troponin No results for input(s): TROPONINI in the last 48 hours.    Significant Imaging:   ARMANDO 3/7/22  Mild eccentric hypertrophy and  Left ventricular diastolic dysfunction.  There is mild left ventricular global hypokinesis.  Normal right ventricular size with normal right ventricular systolic function.  Mild left atrial enlargement.  No interatrial septal defect present with a negative bubble test the mid interatrial septum is extremely thin compared to the inferior and superior portions  Normal appearing left atrial appendage. No thrombus is present in the appendage. SANNA occluder is absent. No evidence of spontaneous echo contrast in the left atrial appendage or the left atrium  Mild mitral regurgitation.  The estimated ejection fraction is 45%.  DC cardioversion with 2 synchronized shocks at 200 joules were unsuccessful

## 2022-07-07 NOTE — NURSING TRANSFER
Nursing Transfer Note      7/7/2022     Reason patient is being transferred: ep pacu 2 to 355    Transfer To:  pacu 2 to 355    Transfer via bed    Transfer with cardiac monitoring, tele box on confirmed by tele tech    Transported by [facility] pacu pct    Medicines sent: silvadene    Any special needs or follow-up needed: bedrest x6hrs. Sutures removal time 1900. Bedrest done 2100. Campa to be removed when pt bedrest done    Chart send with patient: Yes    Notified: spouse    Patient reassessed at: 7/7/22 1630. Next due 1700    Upon arrival to floor: cardiac monitor applied, patient oriented to room, call bell in reach and bed in lowest position, pt's belongings bag x2 on bed

## 2022-07-07 NOTE — HPI
Donald Frey is a 61M with HTN, HLD, DM2, CAD status-post MI/PCI in 2013, who was first diagnosed with AF in 1/2022 when he presented to [facility] with ADHF and was found to be in AF. An echo showed an EF of 45%. He was diuresed and rate controlled, and discharged. He was loaded with amiodarone, and underwent ARMANDO/DCCV in 3/2022 (ERAF). He presents today for ARMANDO and PVI.     ARMANDO 3/7/2022  Mild eccentric hypertrophy and  Left ventricular diastolic dysfunction.  There is mild left ventricular global hypokinesis.  Normal right ventricular size with normal right ventricular systolic function.  Mild left atrial enlargement.  No interatrial septal defect present with a negative bubble test the mid interatrial septum is extremely thin compared to the inferior and superior portions  Normal appearing left atrial appendage. No thrombus is present in the appendage. SANNA occluder is absent. No evidence of spontaneous echo contrast in the left atrial appendage or the left atrium  Mild mitral regurgitation.  The estimated ejection fraction is 45%.  DC cardioversion with 2 synchronized shocks at 200 joules were unsuccessful    Anticoagulant/antiplatelets: Eliquis 5 mg bid  ECG: Afib     Dysphagia or odynophagia:  No  Liver Disease, esophageal disease, or known varices:  No  Upper GI Bleeding: No  Snoring:  No  Sleep Apnea:  No  Prior neck surgery or radiation:  No  History of anesthetic difficulties:  No  Family history of anesthetic difficulties:  No  Last oral intake: yesterday before midnight  Able to move neck in all directions:  Yes  Platelet count: 225k  INR: 1.2

## 2022-07-07 NOTE — SUBJECTIVE & OBJECTIVE
Past Medical History:   Diagnosis Date    Diabetes mellitus, type 2     Hypertension     Myocardial infarction 2013       Past Surgical History:   Procedure Laterality Date    CORONARY STENT PLACEMENT  2013    TREATMENT OF CARDIAC ARRHYTHMIA N/A 3/7/2022    Procedure: CARDIOVERSION;  Surgeon: Gomez Orellana MD;  Location: [facility] CATH/EP LAB;  Service: Cardiology;  Laterality: N/A;       Review of patient's allergies indicates:  No Known Allergies    No current facility-administered medications on file prior to encounter.     Current Outpatient Medications on File Prior to Encounter   Medication Sig    amiodarone (PACERONE) 200 MG Tab Take 1 tablet (200 mg total) by mouth 2 (two) times daily.    amiodarone (PACERONE) 200 MG Tab Take 1 tablet (200 mg total) by mouth 2 (two) times daily.    apixaban (ELIQUIS) 5 mg Tab Take 1 tablet (5 mg total) by mouth 2 (two) times daily.    digoxin (LANOXIN) 125 mcg tablet Take 1 tablet (0.125 mg total) by mouth once daily.    diltiaZEM (CARDIZEM CD) 240 MG 24 hr capsule Take 1 capsule (240 mg total) by mouth 2 (two) times a day.    ertugliflozin (STEGLATRO) 5 mg Tab Take 5 mg by mouth once daily.    furosemide (LASIX) 20 MG tablet Take 1 tablet (20 mg total) by mouth 2 (two) times daily.    glipiZIDE (GLUCOTROL) 5 MG tablet TAKE 1 TABLET DAILY WITH BREAKFAST    lisinopriL (PRINIVIL,ZESTRIL) 20 MG tablet Take 1 tablet (20 mg total) by mouth once daily.    metFORMIN (GLUCOPHAGE) 1000 MG tablet TAKE 1 TABLET TWICE A DAY WITH MEALS    rosuvastatin (CRESTOR) 20 MG tablet Take 20 mg by mouth once daily.     Family History       Problem Relation (Age of Onset)    Cancer Father    Heart attack Father          Tobacco Use    Smoking status: Never Smoker    Smokeless tobacco: Never Used   Substance and Sexual Activity    Alcohol use: Yes     Comment: occ    Drug use: Never    Sexual activity: Not on file     Review of Systems   Constitutional: Negative for diaphoresis, malaise/fatigue, weight  gain and weight loss.   HENT:  Negative for nosebleeds.    Eyes:  Negative for vision loss in left eye, vision loss in right eye and visual disturbance.   Cardiovascular:  Negative for chest pain, claudication, dyspnea on exertion, irregular heartbeat, leg swelling, near-syncope, orthopnea, palpitations, paroxysmal nocturnal dyspnea and syncope.   Respiratory:  Negative for cough, shortness of breath, sleep disturbances due to breathing, snoring and wheezing.    Hematologic/Lymphatic: Negative for bleeding problem. Does not bruise/bleed easily.   Skin:  Negative for poor wound healing and rash.   Musculoskeletal:  Negative for muscle cramps and myalgias.   Gastrointestinal:  Negative for bloating, abdominal pain, diarrhea, heartburn, melena, nausea and vomiting.   Genitourinary:  Negative for hematuria and nocturia.   Neurological:  Negative for brief paralysis, dizziness, headaches, light-headedness, numbness and weakness.   Psychiatric/Behavioral:  Negative for depression.    Allergic/Immunologic: Negative for hives.   Objective:     Vital Signs (Most Recent):  Temp: 97.9 °F (36.6 °C) (07/07/22 0849)  Pulse: 98 (07/07/22 0849)  Resp: 18 (07/07/22 0849)  BP: 136/87 (07/07/22 0854)  SpO2: 98 % (07/07/22 0849) Vital Signs (24h Range):  Temp:  [97.9 °F (36.6 °C)] 97.9 °F (36.6 °C)  Pulse:  [98] 98  Resp:  [18] 18  SpO2:  [98 %] 98 %  BP: (136-174)/(82-87) 136/87     Weight: (!) 142.4 kg (314 lb)  Body mass index is 40.32 kg/m².    SpO2: 98 %       No intake or output data in the 24 hours ending 07/07/22 0859    Lines/Drains/Airways       None                   Physical Exam  Vitals and nursing note reviewed.   Constitutional:       Appearance: He is well-developed. He is obese. He is not diaphoretic.   HENT:      Head: Normocephalic and atraumatic.   Eyes:      Conjunctiva/sclera: Conjunctivae normal.   Neck:      Vascular: No carotid bruit or JVD.   Cardiovascular:      Rate and Rhythm: Normal rate. Rhythm irregular.       Pulses: Normal pulses and intact distal pulses.      Heart sounds: Normal heart sounds. No murmur heard.    No friction rub. No gallop.   Pulmonary:      Effort: Pulmonary effort is normal.      Breath sounds: Normal breath sounds. No rales.   Chest:      Chest wall: No tenderness.   Abdominal:      General: There is no distension.      Palpations: Abdomen is soft. There is no mass.      Tenderness: There is no abdominal tenderness.   Skin:     General: Skin is warm and dry.      Coloration: Skin is not pale.   Neurological:      Mental Status: He is alert and oriented to person, place, and time.       Significant Labs: All pertinent lab results from the last 24 hours have been reviewed.

## 2022-07-07 NOTE — PROGRESS NOTES
Pt arrived to ep pacu 2 with jeanne groin sutures gabriela.  Left groin with hematoma noted and sang drainage. Manual pressure held by ep pacu rn vicky radford.  md to be notified.

## 2022-07-07 NOTE — CONSULTS
[facility] - Short Stay Cardiac Unit  Cardiology  Consult Note    Patient Name: Donald Frey  MRN: [id number]  Admission Date: 7/7/2022  Hospital Length of Stay: 0 days  Code Status: Prior   Attending Provider: Niko Pacheco MD   Consulting Provider: Kristen Henao PA-C  Primary Care Physician: LEANNA Coello  Principal Problem:<principal problem not specified>    Patient information was obtained from patient and past medical records.     Consults  Subjective:     Chief Complaint:  Atrial fibrillation    HPI:   Donald Frey is a 61M with HTN, HLD, DM2, CAD status-post MI/PCI in 2013, who was first diagnosed with AF in 1/2022 when he presented to [facility] with ADHF and was found to be in AF. An echo showed an EF of 45%. He was diuresed and rate controlled, and discharged. He was loaded with amiodarone, and underwent ARMANDO/DCCV in 3/2022 (ERAF). He presents today for ARMANDO and PVI.     ARMANDO 3/7/2022  · Mild eccentric hypertrophy and  · Left ventricular diastolic dysfunction.  · There is mild left ventricular global hypokinesis.  · Normal right ventricular size with normal right ventricular systolic function.  · Mild left atrial enlargement.  · No interatrial septal defect present with a negative bubble test the mid interatrial septum is extremely thin compared to the inferior and superior portions  · Normal appearing left atrial appendage. No thrombus is present in the appendage. SANNA occluder is absent. No evidence of spontaneous echo contrast in the left atrial appendage or the left atrium  · Mild mitral regurgitation.  · The estimated ejection fraction is 45%.  · DC cardioversion with 2 synchronized shocks at 200 joules were unsuccessful    Anticoagulant/antiplatelets: Eliquis 5 mg bid  ECG: Afib     Dysphagia or odynophagia:  No  Liver Disease, esophageal disease, or known varices:  No  Upper GI Bleeding: No  Snoring:  No  Sleep Apnea:  No  Prior neck surgery or radiation:  No  History of anesthetic  difficulties:  No  Family history of anesthetic difficulties:  No  Last oral intake: yesterday before midnight  Able to move neck in all directions:  Yes  Platelet count: 225k  INR: 1.2        Past Medical History:   Diagnosis Date    Diabetes mellitus, type 2     Hypertension     Myocardial infarction 2013       Past Surgical History:   Procedure Laterality Date    CORONARY STENT PLACEMENT  2013    TREATMENT OF CARDIAC ARRHYTHMIA N/A 3/7/2022    Procedure: CARDIOVERSION;  Surgeon: Gomez Orellana MD;  Location: [facility] CATH/EP LAB;  Service: Cardiology;  Laterality: N/A;       Review of patient's allergies indicates:  No Known Allergies    No current facility-administered medications on file prior to encounter.     Current Outpatient Medications on File Prior to Encounter   Medication Sig    amiodarone (PACERONE) 200 MG Tab Take 1 tablet (200 mg total) by mouth 2 (two) times daily.    amiodarone (PACERONE) 200 MG Tab Take 1 tablet (200 mg total) by mouth 2 (two) times daily.    apixaban (ELIQUIS) 5 mg Tab Take 1 tablet (5 mg total) by mouth 2 (two) times daily.    digoxin (LANOXIN) 125 mcg tablet Take 1 tablet (0.125 mg total) by mouth once daily.    diltiaZEM (CARDIZEM CD) 240 MG 24 hr capsule Take 1 capsule (240 mg total) by mouth 2 (two) times a day.    ertugliflozin (STEGLATRO) 5 mg Tab Take 5 mg by mouth once daily.    furosemide (LASIX) 20 MG tablet Take 1 tablet (20 mg total) by mouth 2 (two) times daily.    glipiZIDE (GLUCOTROL) 5 MG tablet TAKE 1 TABLET DAILY WITH BREAKFAST    lisinopriL (PRINIVIL,ZESTRIL) 20 MG tablet Take 1 tablet (20 mg total) by mouth once daily.    metFORMIN (GLUCOPHAGE) 1000 MG tablet TAKE 1 TABLET TWICE A DAY WITH MEALS    rosuvastatin (CRESTOR) 20 MG tablet Take 20 mg by mouth once daily.     Family History       Problem Relation (Age of Onset)    Cancer Father    Heart attack Father          Tobacco Use    Smoking status: Never Smoker    Smokeless tobacco: Never Used    Substance and Sexual Activity    Alcohol use: Yes     Comment: occ    Drug use: Never    Sexual activity: Not on file     Review of Systems   Constitutional: Negative for diaphoresis, malaise/fatigue, weight gain and weight loss.   HENT:  Negative for nosebleeds.    Eyes:  Negative for vision loss in left eye, vision loss in right eye and visual disturbance.   Cardiovascular:  Negative for chest pain, claudication, dyspnea on exertion, irregular heartbeat, leg swelling, near-syncope, orthopnea, palpitations, paroxysmal nocturnal dyspnea and syncope.   Respiratory:  Negative for cough, shortness of breath, sleep disturbances due to breathing, snoring and wheezing.    Hematologic/Lymphatic: Negative for bleeding problem. Does not bruise/bleed easily.   Skin:  Negative for poor wound healing and rash.   Musculoskeletal:  Negative for muscle cramps and myalgias.   Gastrointestinal:  Negative for bloating, abdominal pain, diarrhea, heartburn, melena, nausea and vomiting.   Genitourinary:  Negative for hematuria and nocturia.   Neurological:  Negative for brief paralysis, dizziness, headaches, light-headedness, numbness and weakness.   Psychiatric/Behavioral:  Negative for depression.    Allergic/Immunologic: Negative for hives.   Objective:     Vital Signs (Most Recent):  Temp: 97.9 °F (36.6 °C) (07/07/22 0849)  Pulse: 98 (07/07/22 0849)  Resp: 18 (07/07/22 0849)  BP: 136/87 (07/07/22 0854)  SpO2: 98 % (07/07/22 0849) Vital Signs (24h Range):  Temp:  [97.9 °F (36.6 °C)] 97.9 °F (36.6 °C)  Pulse:  [98] 98  Resp:  [18] 18  SpO2:  [98 %] 98 %  BP: (136-174)/(82-87) 136/87     Weight: (!) 142.4 kg (314 lb)  Body mass index is 40.32 kg/m².    SpO2: 98 %       No intake or output data in the 24 hours ending 07/07/22 0859    Lines/Drains/Airways       None                   Physical Exam  Vitals and nursing note reviewed.   Constitutional:       Appearance: He is well-developed. He is obese. He is not diaphoretic.   HENT:       Head: Normocephalic and atraumatic.   Eyes:      Conjunctiva/sclera: Conjunctivae normal.   Neck:      Vascular: No carotid bruit or JVD.   Cardiovascular:      Rate and Rhythm: Normal rate. Rhythm irregular.      Pulses: Normal pulses and intact distal pulses.      Heart sounds: Normal heart sounds. No murmur heard.    No friction rub. No gallop.   Pulmonary:      Effort: Pulmonary effort is normal.      Breath sounds: Normal breath sounds. No rales.   Chest:      Chest wall: No tenderness.   Abdominal:      General: There is no distension.      Palpations: Abdomen is soft. There is no mass.      Tenderness: There is no abdominal tenderness.   Skin:     General: Skin is warm and dry.      Coloration: Skin is not pale.   Neurological:      Mental Status: He is alert and oriented to person, place, and time.       Significant Labs: All pertinent lab results from the last 24 hours have been reviewed.    Assessment and Plan:     Afib  Here today for ARMANDO for SANNA clearance prior to PVI  -No absolute contraindications of esophageal stricture, tumor, perforation, laceration,or diverticulum and/or active GI bleed  -The risks, benefits & alternatives of the procedure were explained to the patient.   -The risks of transesophageal echo include but are not limited to:  Dental trauma, esophageal trauma/perforation, bleeding, laryngospasm/brochospasm, aspiration, sore throat/hoarseness, & dislodgement of the endotracheal tube/nasogastric tube (where applicable).    -The risks of ANES monitored sedation include hypotension, respiratory depression, arrhythmias, bronchospasm, & death.    -Informed consent was obtained. The patient is agreeable to proceed with the procedure and all questions and concerns addressed.    Case discussed with an attending in echocardiography lab.    Further recommendations per attending addendum          VTE Risk Mitigation (From admission, onward)    None          Thank you for your consult. I will  sign off. Please contact us if you have any additional questions.    Kristen Henao PA-C  Cardiology   [facility] - Short Stay Cardiac Unit

## 2022-07-07 NOTE — HPI
He is a 61M with HTN, HLD, DM2, CAD status-post MI/PCI in 2013, who was first diagnosed with AF in 1/2022 when he presented to [facility] with ADHF and was found to be in AF. An echo showed an EF of 45%. He was diuresed and rate controlled, and discharged. He was loaded with amiodarone, and underwent ARMANDO/DCCV in 3/2022 (ERAF). Mr. He presents to discuss management options.     Mr. Frey states he had a negative sleep study 10 years ago.     Today he presents for PVI after a failed outpt DCCV with amiodarone. EKG still in AF with HR 99 bpm. Last dose of Eliquis was last night and last dose of Amiodarone was 2 weeks ago. Accompanied by his wife and feeling well today. Consented and agreeable to proceed with procedure.

## 2022-07-08 VITALS
RESPIRATION RATE: 18 BRPM | DIASTOLIC BLOOD PRESSURE: 84 MMHG | OXYGEN SATURATION: 97 % | HEIGHT: 74 IN | BODY MASS INDEX: 40.3 KG/M2 | WEIGHT: 314 LBS | TEMPERATURE: 98 F | SYSTOLIC BLOOD PRESSURE: 183 MMHG | HEART RATE: 82 BPM

## 2022-07-08 PROCEDURE — 25000003 PHARM REV CODE 250: Performed by: INTERNAL MEDICINE

## 2022-07-08 RX ORDER — PANTOPRAZOLE SODIUM 40 MG/1
40 TABLET, DELAYED RELEASE ORAL DAILY
Qty: 90 TABLET | Refills: 0 | Status: ON HOLD | OUTPATIENT
Start: 2022-07-08 | End: 2022-12-12

## 2022-07-08 RX ADMIN — AMIODARONE HYDROCHLORIDE 200 MG: 200 TABLET ORAL at 07:07

## 2022-07-08 RX ADMIN — ATORVASTATIN CALCIUM 80 MG: 20 TABLET, FILM COATED ORAL at 07:07

## 2022-07-08 RX ADMIN — DIGOXIN 0.12 MG: 125 TABLET ORAL at 07:07

## 2022-07-08 RX ADMIN — FUROSEMIDE 20 MG: 20 TABLET ORAL at 07:07

## 2022-07-08 RX ADMIN — LISINOPRIL 20 MG: 10 TABLET ORAL at 07:07

## 2022-07-08 RX ADMIN — APIXABAN 5 MG: 5 TABLET, FILM COATED ORAL at 07:07

## 2022-07-08 RX ADMIN — DILTIAZEM HYDROCHLORIDE 240 MG: 240 CAPSULE, COATED, EXTENDED RELEASE ORAL at 07:07

## 2022-07-08 NOTE — DISCHARGE INSTRUCTIONS
Please start Protonix 40 mg once daily  Please take extra dose of Lasix daily if you develop leg swelling, shortness of breath  Please continue Amiodarone 200 mg twice daily

## 2022-07-08 NOTE — ANESTHESIA POSTPROCEDURE EVALUATION
Anesthesia Post Evaluation    Patient: Donald Frey    Procedure(s) Performed: Procedure(s) (LRB):  ABLATION, ARRHYTHMOGENIC FOCUS, FOR ATRIAL FIBRILLATION (N/A)  ECHOCARDIOGRAM, TRANSESOPHAGEAL (N/A)  Cardioversion or Defibrillation    Final Anesthesia Type: general      Patient location during evaluation: PACU  Patient participation: Yes- Able to Participate  Level of consciousness: awake and alert  Post-procedure vital signs: reviewed and stable  Pain management: adequate  Airway patency: patent    PONV status at discharge: No PONV  Anesthetic complications: no      Cardiovascular status: blood pressure returned to baseline  Respiratory status: unassisted  Hydration status: euvolemic  Follow-up not needed.          Vitals Value Taken Time   /84 07/08/22 0756   Temp 36.4 °C (97.5 °F) 07/08/22 0750   Pulse 82 07/08/22 0750   Resp 18 07/08/22 0750   SpO2 97 % 07/08/22 0750         No case tracking events are documented in the log.      Pain/Codie Score: Pain Rating Prior to Med Admin: 8 (7/7/2022  9:57 PM)  Pain Rating Post Med Admin: 6 (7/7/2022 10:57 PM)  Codie Score: 9 (7/7/2022  4:30 PM)

## 2022-07-08 NOTE — DISCHARGE SUMMARY
[facility] - [facility] Stepdown  Cardiology  Discharge Summary      Patient Name: Donald Frey  MRN: [id number]  Admission Date: 7/7/2022  Hospital Length of Stay: 0 days  Discharge Date and Time:  07/08/2022 7:55 AM  Attending Physician: Niko Pacheco MD  Discharging Provider: Franky Farmer MD  Primary Care Physician: LEANNA Coello    HPI:   He is a 61M with HTN, HLD, DM2, CAD status-post MI/PCI in 2013, who was first diagnosed with AF in 1/2022 when he presented to [facility] with ADHF and was found to be in AF. An echo showed an EF of 45%. He was diuresed and rate controlled, and discharged. He was loaded with amiodarone, and underwent ARMANDO/DCCV in 3/2022 (ERAF). MrJuly He presents to discuss management options.     Mr. Frey states he had a negative sleep study 10 years ago.     Today he presents for PVI after a failed outpt DCCV with amiodarone. EKG still in AF with HR 99 bpm. Last dose of Eliquis was last night and last dose of Amiodarone was 2 weeks ago. Accompanied by his wife and feeling well today. Consented and agreeable to proceed with procedure.     Procedure(s) (LRB):  ABLATION, ARRHYTHMOGENIC FOCUS, FOR ATRIAL FIBRILLATION (N/A)  ECHOCARDIOGRAM, TRANSESOPHAGEAL (N/A)  Cardioversion or Defibrillation     Indwelling Lines/Drains at time of discharge:  Lines/Drains/Airways     None                 Hospital Course (synopsis of major diagnoses, care, treatment, and services provided during the course of the hospital stay):     Patient had a successful PVI with Dr Pacheco. No complications and monitored overnight for patient comfort. Feeling well the following morning and ambulating without assistance. Will follow-up with Dr Pacheco as outpt.    Significant Diagnostic Studies:  ARMANDO 7/7/22  · The left ventricle is normal in size with mildly decreased systolic function. The estimated ejection fraction is 40-45%.  · Mild right ventricular enlargement with normal right ventricular systolic function.  · No  interatrial septal defect present.  · Plaque present in the descending aorta.  · Normal appearing left atrial appendage. No thrombus is present in the appendage. Normal appendage velocities.    Pending Diagnostic Studies:     Procedure Component Value Units Date/Time    Hemoglobin A1c if not done in past 3 months [[id number]]     Order Status: Sent Lab Status: No result     Specimen: Blood     Transesophageal echo (ARMANDO) [[id number]]     Order Status: Sent Lab Status: No result           Final Active Diagnoses:    Diagnosis Date Noted POA    Afib [I48.91] 01/20/2022 Yes      Problems Resolved During this Admission:       Discharged Condition: stable    Follow Up: 3 month follow-up with Dr Pacheco    Patient Instructions:   Continue home meds including Amiodarone  Start PPI daily x3 months  Increase Lasix PRN for leg swelling, MOSER/SOB, weight gain > 3 lbs    Medications:  Reconciled Home Medications:      Medication List      START taking these medications    pantoprazole 40 MG tablet  Commonly known as: PROTONIX  Take 1 tablet (40 mg total) by mouth once daily.        CHANGE how you take these medications    amiodarone 200 MG Tab  Commonly known as: PACERONE  Take 1 tablet (200 mg total) by mouth 2 (two) times daily.  What changed: Another medication with the same name was removed. Continue taking this medication, and follow the directions you see here.        CONTINUE taking these medications    apixaban 5 mg Tab  Commonly known as: ELIQUIS  Take 1 tablet (5 mg total) by mouth 2 (two) times daily.     digoxin 125 mcg tablet  Commonly known as: LANOXIN  Take 1 tablet (0.125 mg total) by mouth once daily.     diltiaZEM 240 MG 24 hr capsule  Commonly known as: CARDIZEM CD  Take 1 capsule (240 mg total) by mouth 2 (two) times a day.     furosemide 20 MG tablet  Commonly known as: LASIX  Take 1 tablet (20 mg total) by mouth 2 (two) times daily.     glipiZIDE 5 MG tablet  Commonly known as: GLUCOTROL  TAKE 1 TABLET DAILY  WITH BREAKFAST     lisinopriL 20 MG tablet  Commonly known as: PRINIVIL,ZESTRIL  Take 1 tablet (20 mg total) by mouth once daily.     metFORMIN 1000 MG tablet  Commonly known as: GLUCOPHAGE  TAKE 1 TABLET TWICE A DAY WITH MEALS     rosuvastatin 20 MG tablet  Commonly known as: CRESTOR  Take 20 mg by mouth once daily.     STEGLATRO 5 mg Tab  Generic drug: ertugliflozin  Take 5 mg by mouth once daily.            Time spent on the discharge of patient: 30 minutes    Franky Farmer MD  Cardiology  [facility] - [facility] Step[facility]

## 2022-07-08 NOTE — PLAN OF CARE
Problem: Adult Inpatient Plan of Care  Goal: Patient-Specific Goal (Individualized)  Outcome: Ongoing, Progressing  Goal: Readiness for Transition of Care  Outcome: Ongoing, Progressing       Patient was elevated to 30* around 1930 and came off bedrest at 2140. Patient's sutures were removed from the bilateral groin sites. Gauze and clear dressings were applied, and patient asked to sit up at this time. Bilateral groin sites showed no shadowing and continued to stay soft and boggy, without any signs of hematoma. Education was discussed with the patient to inform (me) if he noticed any shadowing or bleeding at the groin sites and/or increased pain. Patient was slightly hypertensive but within his baseline pressures. Glucose check was 331 and insulin coverage was given. Patient was provided with a bedside urinal and his spouse stays with him at bedside. Will continue to monitor vitals signs. Patient is able to move about independently, but was asked to call if he needed to get out of bed.

## 2022-07-08 NOTE — NURSING
Patient discharged into the care of his wife. Went over all discharge instructions, medications, and follow-up appts. All questions answered. All ivs removed

## 2022-07-08 NOTE — NURSING
PT WAS GIVEN A COPY OF HOME CARE DISCHARGE INFORMATION-AVS COMPLETED IN Epic- PT WAS GIVEN A COPY OF HOME CARE MEDICATION SHEET WITH NEXT DOSE WRITTEN ON SHEET. BOTH IV AND TELE REMOVED. PT VERBALIZE COMPLETE UNDERSTANDING OF HOME CARE DISCHARGE INSTRUCTIONS AND IMPORTANCE OF FOLLOW UP CARE.   ESCORT NOTIFIED OF DISCHARGE. REINFORCE HOME CARE INSTRUCTIONS ON POST ABLATION-DRESSING TO BILATERAL GROIN, - MONITOR SITE FOR REDNESS.. PT IS AWARE OF HOME CARE RESTRICITONS.  BEDSIDE DELIVERY DELIVERED MEDS TO BEDSIDE  P.     @6815- PT LEFT W/ ALL OF HIS VALUABLES AND BELONGINGS.. PT VOICE NO COMPLAINTS UPON LEAVING FLOOR - SPOUSE IS W. PT.

## 2022-07-13 ENCOUNTER — TELEPHONE (OUTPATIENT)
Dept: ELECTROPHYSIOLOGY | Facility: CLINIC | Age: 62
End: 2022-07-13
Payer: COMMERCIAL

## 2022-07-13 NOTE — TELEPHONE ENCOUNTER
Spoke to patient regarding post op care. Patient  denies any complaints. Wound site dry and intact without redness, swelling, hematoma or drainage.  Has a small lump less than a marble, hard.  Discussed may use heating pad or warm compresses to the area.  Patient denies any fever or pain. Denies any symptoms of afib, discussed blanking period of 90 days and when to seek emergency medical attention.     Patient  has resumed medications and is taking the Protonix daily as ordered.   Patient verbalizes understanding of all post op instructions.

## 2022-07-18 ENCOUNTER — LAB VISIT (OUTPATIENT)
Dept: LAB | Facility: HOSPITAL | Age: 62
End: 2022-07-18
Attending: NURSE PRACTITIONER
Payer: COMMERCIAL

## 2022-07-18 DIAGNOSIS — I10 ESSENTIAL HYPERTENSION: ICD-10-CM

## 2022-07-18 DIAGNOSIS — Z12.5 SCREENING FOR PROSTATE CANCER: ICD-10-CM

## 2022-07-18 DIAGNOSIS — E11.65 TYPE 2 DIABETES MELLITUS WITH HYPERGLYCEMIA, WITHOUT LONG-TERM CURRENT USE OF INSULIN: ICD-10-CM

## 2022-07-18 DIAGNOSIS — E78.5 HYPERLIPIDEMIA, UNSPECIFIED HYPERLIPIDEMIA TYPE: ICD-10-CM

## 2022-07-18 LAB
ALBUMIN SERPL BCP-MCNC: 4.4 G/DL (ref 3.5–5.2)
ALBUMIN/CREAT UR: 12.1 UG/MG (ref 0–30)
ALP SERPL-CCNC: 64 U/L (ref 55–135)
ALT SERPL W/O P-5'-P-CCNC: 37 U/L (ref 10–44)
ANION GAP SERPL CALC-SCNC: 9 MMOL/L (ref 8–16)
AST SERPL-CCNC: 30 U/L (ref 10–40)
BASOPHILS # BLD AUTO: 0.06 K/UL (ref 0–0.2)
BASOPHILS NFR BLD: 0.8 % (ref 0–1.9)
BILIRUB SERPL-MCNC: 0.9 MG/DL (ref 0.1–1)
BUN SERPL-MCNC: 23 MG/DL (ref 8–23)
CALCIUM SERPL-MCNC: 9.4 MG/DL (ref 8.7–10.5)
CHLORIDE SERPL-SCNC: 99 MMOL/L (ref 95–110)
CHOLEST SERPL-MCNC: 131 MG/DL (ref 120–199)
CHOLEST/HDLC SERPL: 4.2 {RATIO} (ref 2–5)
CO2 SERPL-SCNC: 29 MMOL/L (ref 23–29)
COMPLEXED PSA SERPL-MCNC: 0.78 NG/ML (ref 0–4)
CREAT SERPL-MCNC: 1.2 MG/DL (ref 0.5–1.4)
CREAT UR-MCNC: 103 MG/DL (ref 23–375)
DIFFERENTIAL METHOD: NORMAL
EOSINOPHIL # BLD AUTO: 0.1 K/UL (ref 0–0.5)
EOSINOPHIL NFR BLD: 1.5 % (ref 0–8)
ERYTHROCYTE [DISTWIDTH] IN BLOOD BY AUTOMATED COUNT: 14.2 % (ref 11.5–14.5)
EST. GFR  (AFRICAN AMERICAN): >60 ML/MIN/1.73 M^2
EST. GFR  (NON AFRICAN AMERICAN): >60 ML/MIN/1.73 M^2
GLUCOSE SERPL-MCNC: 282 MG/DL (ref 70–110)
HCT VFR BLD AUTO: 51.9 % (ref 40–54)
HDLC SERPL-MCNC: 31 MG/DL (ref 40–75)
HDLC SERPL: 23.7 % (ref 20–50)
HGB BLD-MCNC: 16.8 G/DL (ref 14–18)
IMM GRANULOCYTES # BLD AUTO: 0.04 K/UL (ref 0–0.04)
IMM GRANULOCYTES NFR BLD AUTO: 0.5 % (ref 0–0.5)
LDLC SERPL CALC-MCNC: 56.8 MG/DL (ref 63–159)
LYMPHOCYTES # BLD AUTO: 2.1 K/UL (ref 1–4.8)
LYMPHOCYTES NFR BLD: 27 % (ref 18–48)
MCH RBC QN AUTO: 27.9 PG (ref 27–31)
MCHC RBC AUTO-ENTMCNC: 32.4 G/DL (ref 32–36)
MCV RBC AUTO: 86 FL (ref 82–98)
MICROALBUMIN UR DL<=1MG/L-MCNC: 12.5 UG/ML
MONOCYTES # BLD AUTO: 0.6 K/UL (ref 0.3–1)
MONOCYTES NFR BLD: 7.5 % (ref 4–15)
NEUTROPHILS # BLD AUTO: 4.9 K/UL (ref 1.8–7.7)
NEUTROPHILS NFR BLD: 62.7 % (ref 38–73)
NONHDLC SERPL-MCNC: 100 MG/DL
NRBC BLD-RTO: 0 /100 WBC
PLATELET # BLD AUTO: 258 K/UL (ref 150–450)
PMV BLD AUTO: 10.8 FL (ref 9.2–12.9)
POTASSIUM SERPL-SCNC: 4.3 MMOL/L (ref 3.5–5.1)
PROT SERPL-MCNC: 8.1 G/DL (ref 6–8.4)
RBC # BLD AUTO: 6.02 M/UL (ref 4.6–6.2)
SODIUM SERPL-SCNC: 137 MMOL/L (ref 136–145)
TRIGL SERPL-MCNC: 216 MG/DL (ref 30–150)
TSH SERPL DL<=0.005 MIU/L-ACNC: 4.74 UIU/ML (ref 0.34–5.6)
WBC # BLD AUTO: 7.75 K/UL (ref 3.9–12.7)

## 2022-07-18 PROCEDURE — 84153 ASSAY OF PSA TOTAL: CPT | Performed by: NURSE PRACTITIONER

## 2022-07-18 PROCEDURE — 84443 ASSAY THYROID STIM HORMONE: CPT | Performed by: NURSE PRACTITIONER

## 2022-07-18 PROCEDURE — 80061 LIPID PANEL: CPT | Performed by: NURSE PRACTITIONER

## 2022-07-18 PROCEDURE — 36415 COLL VENOUS BLD VENIPUNCTURE: CPT | Performed by: NURSE PRACTITIONER

## 2022-07-18 PROCEDURE — 80053 COMPREHEN METABOLIC PANEL: CPT | Performed by: NURSE PRACTITIONER

## 2022-07-18 PROCEDURE — 82043 UR ALBUMIN QUANTITATIVE: CPT | Performed by: NURSE PRACTITIONER

## 2022-07-18 PROCEDURE — 85025 COMPLETE CBC W/AUTO DIFF WBC: CPT | Performed by: NURSE PRACTITIONER

## 2022-07-18 PROCEDURE — 83036 HEMOGLOBIN GLYCOSYLATED A1C: CPT | Performed by: NURSE PRACTITIONER

## 2022-07-18 PROCEDURE — 82570 ASSAY OF URINE CREATININE: CPT | Performed by: NURSE PRACTITIONER

## 2022-07-19 LAB
ESTIMATED AVG GLUCOSE: 286 MG/DL (ref 68–131)
HBA1C MFR BLD: 11.6 % (ref 4.5–6.2)

## 2022-07-21 ENCOUNTER — OFFICE VISIT (OUTPATIENT)
Dept: FAMILY MEDICINE | Facility: CLINIC | Age: 62
End: 2022-07-21
Payer: COMMERCIAL

## 2022-07-21 VITALS
WEIGHT: 307 LBS | HEIGHT: 74 IN | RESPIRATION RATE: 20 BRPM | HEART RATE: 93 BPM | BODY MASS INDEX: 39.4 KG/M2 | OXYGEN SATURATION: 95 % | SYSTOLIC BLOOD PRESSURE: 142 MMHG | TEMPERATURE: 99 F | DIASTOLIC BLOOD PRESSURE: 82 MMHG

## 2022-07-21 DIAGNOSIS — I10 ESSENTIAL HYPERTENSION: ICD-10-CM

## 2022-07-21 DIAGNOSIS — E11.65 TYPE 2 DIABETES MELLITUS WITH HYPERGLYCEMIA, WITHOUT LONG-TERM CURRENT USE OF INSULIN: Primary | ICD-10-CM

## 2022-07-21 DIAGNOSIS — E78.5 HYPERLIPIDEMIA, UNSPECIFIED HYPERLIPIDEMIA TYPE: ICD-10-CM

## 2022-07-21 DIAGNOSIS — E66.01 CLASS 2 SEVERE OBESITY DUE TO EXCESS CALORIES WITH SERIOUS COMORBIDITY AND BODY MASS INDEX (BMI) OF 39.0 TO 39.9 IN ADULT: ICD-10-CM

## 2022-07-21 PROCEDURE — 99214 OFFICE O/P EST MOD 30 MIN: CPT | Mod: S$GLB,,, | Performed by: NURSE PRACTITIONER

## 2022-07-21 PROCEDURE — 3046F PR MOST RECENT HEMOGLOBIN A1C LEVEL > 9.0%: ICD-10-PCS | Mod: CPTII,S$GLB,, | Performed by: NURSE PRACTITIONER

## 2022-07-21 PROCEDURE — 3077F SYST BP >= 140 MM HG: CPT | Mod: CPTII,S$GLB,, | Performed by: NURSE PRACTITIONER

## 2022-07-21 PROCEDURE — 1160F PR REVIEW ALL MEDS BY PRESCRIBER/CLIN PHARMACIST DOCUMENTED: ICD-10-PCS | Mod: CPTII,S$GLB,, | Performed by: NURSE PRACTITIONER

## 2022-07-21 PROCEDURE — 4010F PR ACE/ARB THEARPY RXD/TAKEN: ICD-10-PCS | Mod: CPTII,S$GLB,, | Performed by: NURSE PRACTITIONER

## 2022-07-21 PROCEDURE — 3008F PR BODY MASS INDEX (BMI) DOCUMENTED: ICD-10-PCS | Mod: CPTII,S$GLB,, | Performed by: NURSE PRACTITIONER

## 2022-07-21 PROCEDURE — 3061F PR NEG MICROALBUMINURIA RESULT DOCUMENTED/REVIEW: ICD-10-PCS | Mod: CPTII,S$GLB,, | Performed by: NURSE PRACTITIONER

## 2022-07-21 PROCEDURE — 4010F ACE/ARB THERAPY RXD/TAKEN: CPT | Mod: CPTII,S$GLB,, | Performed by: NURSE PRACTITIONER

## 2022-07-21 PROCEDURE — 1160F RVW MEDS BY RX/DR IN RCRD: CPT | Mod: CPTII,S$GLB,, | Performed by: NURSE PRACTITIONER

## 2022-07-21 PROCEDURE — 1159F MED LIST DOCD IN RCRD: CPT | Mod: CPTII,S$GLB,, | Performed by: NURSE PRACTITIONER

## 2022-07-21 PROCEDURE — 3079F PR MOST RECENT DIASTOLIC BLOOD PRESSURE 80-89 MM HG: ICD-10-PCS | Mod: CPTII,S$GLB,, | Performed by: NURSE PRACTITIONER

## 2022-07-21 PROCEDURE — 3061F NEG MICROALBUMINURIA REV: CPT | Mod: CPTII,S$GLB,, | Performed by: NURSE PRACTITIONER

## 2022-07-21 PROCEDURE — 1159F PR MEDICATION LIST DOCUMENTED IN MEDICAL RECORD: ICD-10-PCS | Mod: CPTII,S$GLB,, | Performed by: NURSE PRACTITIONER

## 2022-07-21 PROCEDURE — 3008F BODY MASS INDEX DOCD: CPT | Mod: CPTII,S$GLB,, | Performed by: NURSE PRACTITIONER

## 2022-07-21 PROCEDURE — 3066F NEPHROPATHY DOC TX: CPT | Mod: CPTII,S$GLB,, | Performed by: NURSE PRACTITIONER

## 2022-07-21 PROCEDURE — 99214 PR OFFICE/OUTPT VISIT, EST, LEVL IV, 30-39 MIN: ICD-10-PCS | Mod: S$GLB,,, | Performed by: NURSE PRACTITIONER

## 2022-07-21 PROCEDURE — 3077F PR MOST RECENT SYSTOLIC BLOOD PRESSURE >= 140 MM HG: ICD-10-PCS | Mod: CPTII,S$GLB,, | Performed by: NURSE PRACTITIONER

## 2022-07-21 PROCEDURE — 3079F DIAST BP 80-89 MM HG: CPT | Mod: CPTII,S$GLB,, | Performed by: NURSE PRACTITIONER

## 2022-07-21 PROCEDURE — 3046F HEMOGLOBIN A1C LEVEL >9.0%: CPT | Mod: CPTII,S$GLB,, | Performed by: NURSE PRACTITIONER

## 2022-07-21 PROCEDURE — 3066F PR DOCUMENTATION OF TREATMENT FOR NEPHROPATHY: ICD-10-PCS | Mod: CPTII,S$GLB,, | Performed by: NURSE PRACTITIONER

## 2022-07-21 RX ORDER — ERTUGLIFLOZIN 15 MG/1
15 TABLET, FILM COATED ORAL DAILY
Qty: 90 TABLET | Refills: 1 | Status: SHIPPED | OUTPATIENT
Start: 2022-07-21 | End: 2022-12-07

## 2022-07-21 NOTE — PROGRESS NOTES
SUBJECTIVE:      Patient ID: Donald Frey is a 62 y.o. male.    Chief Complaint: Diabetes    Donald is here for follow-up on diabetes, hypertension, and hyperlipidemia. Recent labs reviewed-A1C up to 11.6.  Patient admits he has not been compliant with his diet due to his complications with his atrial fibrillation over the last several months.  He is getting back on track since his ablation procedure several weeks ago with Dr. Pacheco.  He has lost 22 lb since his last visit with me in February and denies any side effects from his current medication regimen.  He is agreeable to increase in Steglatro dose.  He is trying to walk more and eat a healthier diet as well.  Blood pressures have been higher here but are running below goal at home.    Diabetes  He presents for his follow-up diabetic visit. He has type 2 diabetes mellitus. No MedicAlert identification noted. The initial diagnosis of diabetes was made 9 years ago. His disease course has been worsening. Hypoglycemia symptoms include hunger and sleepiness. Pertinent negatives for hypoglycemia include no confusion, dizziness, headaches, mood changes, nervousness/anxiousness, pallor, seizures, speech difficulty, sweats or tremors. Associated symptoms include foot paresthesias and polyuria. Pertinent negatives for diabetes include no blurred vision, no chest pain, no fatigue, no foot ulcerations, no polydipsia, no polyphagia, no visual change, no weakness and no weight loss. Hypoglycemia complications include hospitalization. Pertinent negatives for hypoglycemia complications include no blackouts, no nocturnal hypoglycemia, no required assistance and no required glucagon injection. Symptoms are worsening. Diabetic complications include heart disease, impotence and peripheral neuropathy. Pertinent negatives for diabetic complications include no autonomic neuropathy, CVA, nephropathy, PVD or retinopathy. Risk factors for coronary artery disease include  dyslipidemia, hypertension, obesity, diabetes mellitus, male sex and sedentary lifestyle. Current diabetic treatment includes oral agent (triple therapy) and diet. He is compliant with treatment most of the time. His weight is decreasing steadily. He is following a low salt and generally healthy diet. When asked about meal planning, he reported none. He has not had a previous visit with a dietitian. He rarely participates in exercise. He monitors blood glucose at home 1-2 x per week. Blood glucose monitoring compliance is inadequate. His home blood glucose trend is increasing steadily. His breakfast blood glucose range is generally >200 mg/dl. An ACE inhibitor/angiotensin II receptor blocker is being taken. He does not see a podiatrist.Eye exam is current.   Hypertension  This is a chronic problem. The current episode started more than 1 year ago. The problem has been waxing and waning since onset. The problem is controlled. Associated symptoms include malaise/fatigue. Pertinent negatives include no anxiety, blurred vision, chest pain, headaches, neck pain, palpitations, peripheral edema, shortness of breath or sweats. There are no associated agents to hypertension. Risk factors for coronary artery disease include male gender, obesity, dyslipidemia, diabetes mellitus and sedentary lifestyle. Past treatments include calcium channel blockers, ACE inhibitors, lifestyle changes and diuretics (Digoxin, amiodarone ). The current treatment provides moderate improvement. Compliance problems include diet and exercise.  There is no history of CVA, PVD or retinopathy. There is no history of chronic renal disease, sleep apnea or a thyroid problem.   Hyperlipidemia  This is a chronic problem. The current episode started more than 1 year ago. The problem is controlled (trigs 216). Recent lipid tests were reviewed and are normal. Exacerbating diseases include diabetes and obesity. He has no history of chronic renal disease,  hypothyroidism or liver disease. Factors aggravating his hyperlipidemia include beta blockers. Pertinent negatives include no chest pain, leg pain, myalgias or shortness of breath. Current antihyperlipidemic treatment includes statins and diet change. The current treatment provides moderate improvement of lipids. Compliance problems include adherence to exercise and adherence to diet.  Risk factors for coronary artery disease include hypertension, male sex, obesity, a sedentary lifestyle, dyslipidemia, family history and diabetes mellitus.       Family History   Problem Relation Age of Onset    Heart attack Father     Cancer Father         prostate      Social History     Socioeconomic History    Marital status:    Tobacco Use    Smoking status: Never Smoker    Smokeless tobacco: Never Used   Substance and Sexual Activity    Alcohol use: Yes     Comment: occ    Drug use: Never     Current Outpatient Medications   Medication Sig Dispense Refill    amiodarone (PACERONE) 200 MG Tab Take 1 tablet (200 mg total) by mouth 2 (two) times daily. 60 tablet 11    apixaban (ELIQUIS) 5 mg Tab Take 1 tablet (5 mg total) by mouth 2 (two) times daily. 60 tablet 11    digoxin (LANOXIN) 125 mcg tablet Take 1 tablet (0.125 mg total) by mouth once daily. 30 tablet 11    diltiaZEM (CARDIZEM CD) 240 MG 24 hr capsule Take 1 capsule (240 mg total) by mouth 2 (two) times a day. 60 capsule 11    furosemide (LASIX) 20 MG tablet Take 1 tablet (20 mg total) by mouth 2 (two) times daily. 60 tablet 11    glipiZIDE (GLUCOTROL) 5 MG tablet TAKE 1 TABLET DAILY WITH BREAKFAST 90 tablet 1    lisinopriL (PRINIVIL,ZESTRIL) 20 MG tablet Take 1 tablet (20 mg total) by mouth once daily. 90 tablet 3    metFORMIN (GLUCOPHAGE) 1000 MG tablet TAKE 1 TABLET TWICE A DAY WITH MEALS 180 tablet 1    pantoprazole (PROTONIX) 40 MG tablet Take 1 tablet (40 mg total) by mouth once daily. 90 tablet 0    rosuvastatin (CRESTOR) 20 MG tablet Take 20  mg by mouth once daily.      ertugliflozin (STEGLATRO) 15 mg Tab Take 15 mg by mouth once daily at 6am. 90 tablet 1     No current facility-administered medications for this visit.     Review of patient's allergies indicates:  No Known Allergies   Past Medical History:   Diagnosis Date    Diabetes mellitus, type 2     Hypertension     Myocardial infarction 2013     Past Surgical History:   Procedure Laterality Date    ABLATION OF ARRHYTHMOGENIC FOCUS FOR ATRIAL FIBRILLATION N/A 7/7/2022    Procedure: ABLATION, ARRHYTHMOGENIC FOCUS, FOR ATRIAL FIBRILLATION;  Surgeon: Niko Pacheco MD;  Location: [facility] EP LAB;  Service: Cardiology;  Laterality: N/A;  AF, ARMANDO(Cx if SR), PVI, RFA, CARTO, GEN, GP, 3 PREP    CORONARY STENT PLACEMENT  2013    TRANSESOPHAGEAL ECHOCARDIOGRAPHY N/A 7/7/2022    Procedure: ECHOCARDIOGRAM, TRANSESOPHAGEAL;  Surgeon: Conor Chappell MD;  Location: [facility] EP LAB;  Service: Cardiology;  Laterality: N/A;    TREATMENT OF CARDIAC ARRHYTHMIA N/A 3/7/2022    Procedure: CARDIOVERSION;  Surgeon: Gomez Orellana MD;  Location[facility] CATH/EP LAB;  Service: Cardiology;  Laterality: N/A;    TREATMENT OF CARDIAC ARRHYTHMIA  7/7/2022    Procedure: Cardioversion or Defibrillation;  Surgeon: Conor Chappell MD;  Location: [facility] EP LAB;  Service: Cardiology;;       Review of Systems   Constitutional: Positive for malaise/fatigue. Negative for activity change, appetite change, chills, fatigue, fever, unexpected weight change and weight loss.   HENT: Negative for congestion, hearing loss, rhinorrhea and trouble swallowing.    Eyes: Negative for blurred vision, discharge and visual disturbance.   Respiratory: Negative for cough, chest tightness, shortness of breath and wheezing.    Cardiovascular: Negative for chest pain, palpitations and leg swelling.   Gastrointestinal: Negative for abdominal pain, diarrhea, nausea and vomiting.   Endocrine: Positive for polyuria. Negative for cold intolerance, heat  "intolerance, polydipsia and polyphagia.   Genitourinary: Positive for impotence. Negative for difficulty urinating, dysuria, frequency, hematuria and urgency.   Musculoskeletal: Negative for arthralgias, joint swelling, myalgias and neck pain.   Skin: Negative for color change, pallor, rash and wound.   Neurological: Positive for numbness (toes ). Negative for dizziness, tremors, seizures, speech difficulty, weakness and headaches.   Hematological: Negative for adenopathy. Bruises/bleeds easily.   Psychiatric/Behavioral: Negative for confusion and dysphoric mood. The patient is not nervous/anxious.       OBJECTIVE:      Vitals:    07/21/22 1304 07/21/22 1308   BP: (!) 162/90 (!) 142/82   BP Location: Left arm Left arm   Patient Position: Sitting Sitting   BP Method: Large (Manual) Large (Manual)   Pulse: 93    Resp: 20    Temp: 98.7 °F (37.1 °C)    TempSrc: Oral    SpO2: 95%    Weight: (!) 139.3 kg (307 lb)    Height: 6' 2" (1.88 m)      Physical Exam  Vitals and nursing note reviewed.   Constitutional:       General: He is not in acute distress.     Appearance: Normal appearance. He is well-developed. He is obese. He is not ill-appearing or diaphoretic.      Comments: Morbid obesity    HENT:      Head: Normocephalic.      Mouth/Throat:      Mouth: Mucous membranes are moist.   Eyes:      General: No scleral icterus.     Pupils: Pupils are equal, round, and reactive to light.   Neck:      Thyroid: No thyroid mass or thyromegaly.   Cardiovascular:      Rate and Rhythm: Normal rate and regular rhythm.      Heart sounds: Normal heart sounds. No murmur heard.  Pulmonary:      Effort: Pulmonary effort is normal. No respiratory distress.      Breath sounds: Normal breath sounds. No wheezing, rhonchi or rales.   Musculoskeletal:         General: Normal range of motion.      Cervical back: Normal range of motion and neck supple.   Lymphadenopathy:      Cervical: No cervical adenopathy.   Skin:     General: Skin is warm and " dry.      Coloration: Skin is not jaundiced or pale.   Neurological:      Mental Status: He is alert and oriented to person, place, and time.   Psychiatric:         Mood and Affect: Mood normal.         Behavior: Behavior normal.         Thought Content: Thought content normal.         Judgment: Judgment normal.        Assessment:       1. Type 2 diabetes mellitus with hyperglycemia, without long-term current use of insulin    2. Essential hypertension    3. Hyperlipidemia, unspecified hyperlipidemia type    4. Class 2 severe obesity due to excess calories with serious comorbidity and body mass index (BMI) of 39.0 to 39.9 in adult        Plan:       Type 2 diabetes mellitus with hyperglycemia, without long-term current use of insulin  -     ertugliflozin (STEGLATRO) 15 mg Tab; Take 15 mg by mouth once daily at 6am.  Dispense: 90 tablet; Refill: 1  -     Lipid Panel; Future; Expected date: 10/21/2022  -     Comprehensive Metabolic Panel; Future; Expected date: 10/21/2022  -     Hemoglobin A1C; Future; Expected date: 10/21/2022   -increase Steglatro and recheck in 3 mo; compliance with diet and meds encouraged     Essential hypertension   -send home BP is 1 week    Hyperlipidemia, unspecified hyperlipidemia type  -     Lipid Panel; Future; Expected date: 10/21/2022  -     Comprehensive Metabolic Panel; Future; Expected date: 10/21/2022   -Limit red meat, butter, fried foods, cheese, and other foods that have a lot of saturated fat. Consume more: lean meats, fish, fruits, vegetables, whole grains, beans, lentils, and nuts.  Weight loss, and 30-45 min of cardiovascular exercise daily.     Class 2 severe obesity due to excess calories with serious comorbidity and body mass index (BMI) of 39.0 to 39.9 in adult        Follow up in about 3 months (around 10/21/2022) for diabetes, HTN.      7/21/2022 HONORIO Coello, FNP    This note was created using [employer] voice recognition software that occasionally misinterprets  phrases or words.

## 2022-08-04 ENCOUNTER — PATIENT MESSAGE (OUTPATIENT)
Dept: FAMILY MEDICINE | Facility: CLINIC | Age: 62
End: 2022-08-04

## 2022-08-04 ENCOUNTER — TELEPHONE (OUTPATIENT)
Dept: FAMILY MEDICINE | Facility: CLINIC | Age: 62
End: 2022-08-04

## 2022-08-08 VITALS — DIASTOLIC BLOOD PRESSURE: 80 MMHG | SYSTOLIC BLOOD PRESSURE: 131 MMHG

## 2022-10-24 PROBLEM — I48.91 AFIB: Status: RESOLVED | Noted: 2022-01-20 | Resolved: 2022-10-24

## 2022-10-24 PROBLEM — Z98.890 STATUS POST CATHETER ABLATION OF ATRIAL FIBRILLATION: Status: ACTIVE | Noted: 2022-10-24

## 2022-10-24 PROBLEM — I48.19 PERSISTENT ATRIAL FIBRILLATION: Status: ACTIVE | Noted: 2022-10-24

## 2022-10-24 NOTE — PROGRESS NOTES
Subjective:     HPI    I had the pleasure of seeing Donald Frey in follow-up for his history of AF. He is a 62M with HTN, HLD, DM2, CAD status-post MI/PCI in 2013, who was first diagnosed with AF in 1/2022 when he presented to [facility] with ADHF and was found to be in AF. An echo showed an EF of 45%. He was diuresed and rate controlled, and discharged. He was loaded with amiodarone, and underwent ARMANDO/DCCV in 3/2022 (ERAF).     On 7/7/2022 a PVI was performed. He was discharged on amiodarone 200 mg bid and eliquis.     Today in the office Mr. Frey feels much better. Energy level is 100% improved. MOSER has resolved.    My interpretation of today's ECG is sinus tachycardia at 102 bpm. He says that at home he checks his HR daily and it is in the 60-70s bpm range.    Review of Systems   Constitutional: Positive for malaise/fatigue. Negative for decreased appetite, weight gain and weight loss.   HENT:  Negative for sore throat.    Eyes:  Negative for blurred vision.   Cardiovascular:  Positive for dyspnea on exertion. Negative for chest pain, irregular heartbeat, leg swelling, near-syncope, orthopnea, palpitations, paroxysmal nocturnal dyspnea and syncope.   Respiratory:  Negative for shortness of breath.    Skin:  Negative for rash.   Musculoskeletal:  Negative for arthritis.   Gastrointestinal:  Negative for abdominal pain.   Neurological:  Negative for focal weakness.   Psychiatric/Behavioral:  Negative for altered mental status.       Objective:    Physical Exam  Constitutional:       General: He is not in acute distress.     Appearance: He is well-developed.   HENT:      Head: Normocephalic and atraumatic.   Eyes:      General: No scleral icterus.     Pupils: Pupils are equal, round, and reactive to light.   Neck:      Thyroid: No thyromegaly.   Cardiovascular:      Rate and Rhythm: Rhythm irregular.      Pulses: Normal pulses.      Heart sounds: Normal heart sounds. No murmur heard.    No friction rub. No gallop.    Pulmonary:      Effort: Pulmonary effort is normal.      Breath sounds: Normal breath sounds.   Abdominal:      General: Bowel sounds are normal. There is no distension.      Palpations: Abdomen is soft.      Tenderness: There is no abdominal tenderness.   Musculoskeletal:      Cervical back: Neck supple.   Skin:     General: Skin is warm and dry.      Findings: No rash.   Neurological:      Mental Status: He is alert and oriented to person, place, and time.   Psychiatric:         Behavior: Behavior normal.         Assessment:       1. Essential hypertension    2. Persistent atrial fibrillation    3. Status post catheter ablation of atrial fibrillation           Plan:       In summary, Donald Frey is a 61M with a history of persistent AF who failed DCCV on amiodarone. EF is mildly impaired. He is now 3 months status-post PVI. No recurrent AF. Plan is to reduce amiodarone to 200 mg daily, continue eliquis, follow-up 6 months.    Thank you for allowing me to participate in the care of this patient. Please do not hesitate to call me with any questions or concerns.

## 2022-10-26 ENCOUNTER — OFFICE VISIT (OUTPATIENT)
Dept: CARDIOLOGY | Facility: CLINIC | Age: 62
End: 2022-10-26
Payer: COMMERCIAL

## 2022-10-26 VITALS
RESPIRATION RATE: 16 BRPM | HEART RATE: 110 BPM | OXYGEN SATURATION: 96 % | SYSTOLIC BLOOD PRESSURE: 128 MMHG | WEIGHT: 309.19 LBS | HEIGHT: 74 IN | BODY MASS INDEX: 39.68 KG/M2 | DIASTOLIC BLOOD PRESSURE: 78 MMHG

## 2022-10-26 DIAGNOSIS — Z98.890 STATUS POST CATHETER ABLATION OF ATRIAL FIBRILLATION: ICD-10-CM

## 2022-10-26 DIAGNOSIS — I10 ESSENTIAL HYPERTENSION: Primary | ICD-10-CM

## 2022-10-26 DIAGNOSIS — I48.19 PERSISTENT ATRIAL FIBRILLATION: ICD-10-CM

## 2022-10-26 PROCEDURE — 1159F PR MEDICATION LIST DOCUMENTED IN MEDICAL RECORD: ICD-10-PCS | Mod: CPTII,S$GLB,, | Performed by: INTERNAL MEDICINE

## 2022-10-26 PROCEDURE — 3074F SYST BP LT 130 MM HG: CPT | Mod: CPTII,S$GLB,, | Performed by: INTERNAL MEDICINE

## 2022-10-26 PROCEDURE — 4010F ACE/ARB THERAPY RXD/TAKEN: CPT | Mod: CPTII,S$GLB,, | Performed by: INTERNAL MEDICINE

## 2022-10-26 PROCEDURE — 1160F RVW MEDS BY RX/DR IN RCRD: CPT | Mod: CPTII,S$GLB,, | Performed by: INTERNAL MEDICINE

## 2022-10-26 PROCEDURE — 93010 ELECTROCARDIOGRAM REPORT: CPT | Mod: S$GLB,,, | Performed by: SPECIALIST

## 2022-10-26 PROCEDURE — 3078F DIAST BP <80 MM HG: CPT | Mod: CPTII,S$GLB,, | Performed by: INTERNAL MEDICINE

## 2022-10-26 PROCEDURE — 99214 OFFICE O/P EST MOD 30 MIN: CPT | Mod: S$GLB,,, | Performed by: INTERNAL MEDICINE

## 2022-10-26 PROCEDURE — 3061F PR NEG MICROALBUMINURIA RESULT DOCUMENTED/REVIEW: ICD-10-PCS | Mod: CPTII,S$GLB,, | Performed by: INTERNAL MEDICINE

## 2022-10-26 PROCEDURE — 1160F PR REVIEW ALL MEDS BY PRESCRIBER/CLIN PHARMACIST DOCUMENTED: ICD-10-PCS | Mod: CPTII,S$GLB,, | Performed by: INTERNAL MEDICINE

## 2022-10-26 PROCEDURE — 3078F PR MOST RECENT DIASTOLIC BLOOD PRESSURE < 80 MM HG: ICD-10-PCS | Mod: CPTII,S$GLB,, | Performed by: INTERNAL MEDICINE

## 2022-10-26 PROCEDURE — 93005 EKG 12-LEAD: ICD-10-PCS | Mod: S$GLB,,, | Performed by: INTERNAL MEDICINE

## 2022-10-26 PROCEDURE — 93005 ELECTROCARDIOGRAM TRACING: CPT | Mod: S$GLB,,, | Performed by: INTERNAL MEDICINE

## 2022-10-26 PROCEDURE — 3061F NEG MICROALBUMINURIA REV: CPT | Mod: CPTII,S$GLB,, | Performed by: INTERNAL MEDICINE

## 2022-10-26 PROCEDURE — 3046F HEMOGLOBIN A1C LEVEL >9.0%: CPT | Mod: CPTII,S$GLB,, | Performed by: INTERNAL MEDICINE

## 2022-10-26 PROCEDURE — 99214 PR OFFICE/OUTPT VISIT, EST, LEVL IV, 30-39 MIN: ICD-10-PCS | Mod: S$GLB,,, | Performed by: INTERNAL MEDICINE

## 2022-10-26 PROCEDURE — 3046F PR MOST RECENT HEMOGLOBIN A1C LEVEL > 9.0%: ICD-10-PCS | Mod: CPTII,S$GLB,, | Performed by: INTERNAL MEDICINE

## 2022-10-26 PROCEDURE — 4010F PR ACE/ARB THEARPY RXD/TAKEN: ICD-10-PCS | Mod: CPTII,S$GLB,, | Performed by: INTERNAL MEDICINE

## 2022-10-26 PROCEDURE — 1159F MED LIST DOCD IN RCRD: CPT | Mod: CPTII,S$GLB,, | Performed by: INTERNAL MEDICINE

## 2022-10-26 PROCEDURE — 3066F PR DOCUMENTATION OF TREATMENT FOR NEPHROPATHY: ICD-10-PCS | Mod: CPTII,S$GLB,, | Performed by: INTERNAL MEDICINE

## 2022-10-26 PROCEDURE — 3074F PR MOST RECENT SYSTOLIC BLOOD PRESSURE < 130 MM HG: ICD-10-PCS | Mod: CPTII,S$GLB,, | Performed by: INTERNAL MEDICINE

## 2022-10-26 PROCEDURE — 93010 EKG 12-LEAD: ICD-10-PCS | Mod: S$GLB,,, | Performed by: SPECIALIST

## 2022-10-26 PROCEDURE — 3066F NEPHROPATHY DOC TX: CPT | Mod: CPTII,S$GLB,, | Performed by: INTERNAL MEDICINE

## 2022-10-26 RX ORDER — AMIODARONE HYDROCHLORIDE 200 MG/1
200 TABLET ORAL DAILY
Qty: 30 TABLET | Refills: 11 | Status: SHIPPED | OUTPATIENT
Start: 2022-10-26 | End: 2024-02-20

## 2022-11-08 ENCOUNTER — OFFICE VISIT (OUTPATIENT)
Dept: SURGERY | Facility: CLINIC | Age: 62
End: 2022-11-08
Payer: COMMERCIAL

## 2022-11-08 VITALS
SYSTOLIC BLOOD PRESSURE: 124 MMHG | TEMPERATURE: 99 F | BODY MASS INDEX: 38.03 KG/M2 | HEART RATE: 100 BPM | DIASTOLIC BLOOD PRESSURE: 84 MMHG | WEIGHT: 296.31 LBS | HEIGHT: 74 IN

## 2022-11-08 DIAGNOSIS — K42.9 UMBILICAL HERNIA WITHOUT OBSTRUCTION AND WITHOUT GANGRENE: Primary | ICD-10-CM

## 2022-11-08 PROCEDURE — 3008F PR BODY MASS INDEX (BMI) DOCUMENTED: ICD-10-PCS | Mod: CPTII,S$GLB,, | Performed by: SURGERY

## 2022-11-08 PROCEDURE — 3008F BODY MASS INDEX DOCD: CPT | Mod: CPTII,S$GLB,, | Performed by: SURGERY

## 2022-11-08 PROCEDURE — 99999 PR PBB SHADOW E&M-EST. PATIENT-LVL V: ICD-10-PCS | Mod: PBBFAC,,, | Performed by: SURGERY

## 2022-11-08 PROCEDURE — 3061F NEG MICROALBUMINURIA REV: CPT | Mod: CPTII,S$GLB,, | Performed by: SURGERY

## 2022-11-08 PROCEDURE — 3079F PR MOST RECENT DIASTOLIC BLOOD PRESSURE 80-89 MM HG: ICD-10-PCS | Mod: CPTII,S$GLB,, | Performed by: SURGERY

## 2022-11-08 PROCEDURE — 4010F ACE/ARB THERAPY RXD/TAKEN: CPT | Mod: CPTII,S$GLB,, | Performed by: SURGERY

## 2022-11-08 PROCEDURE — 3046F HEMOGLOBIN A1C LEVEL >9.0%: CPT | Mod: CPTII,S$GLB,, | Performed by: SURGERY

## 2022-11-08 PROCEDURE — 1160F RVW MEDS BY RX/DR IN RCRD: CPT | Mod: CPTII,S$GLB,, | Performed by: SURGERY

## 2022-11-08 PROCEDURE — 3074F SYST BP LT 130 MM HG: CPT | Mod: CPTII,S$GLB,, | Performed by: SURGERY

## 2022-11-08 PROCEDURE — 99999 PR PBB SHADOW E&M-EST. PATIENT-LVL V: CPT | Mod: PBBFAC,,, | Performed by: SURGERY

## 2022-11-08 PROCEDURE — 3061F PR NEG MICROALBUMINURIA RESULT DOCUMENTED/REVIEW: ICD-10-PCS | Mod: CPTII,S$GLB,, | Performed by: SURGERY

## 2022-11-08 PROCEDURE — 3079F DIAST BP 80-89 MM HG: CPT | Mod: CPTII,S$GLB,, | Performed by: SURGERY

## 2022-11-08 PROCEDURE — 4010F PR ACE/ARB THEARPY RXD/TAKEN: ICD-10-PCS | Mod: CPTII,S$GLB,, | Performed by: SURGERY

## 2022-11-08 PROCEDURE — 99204 OFFICE O/P NEW MOD 45 MIN: CPT | Mod: S$GLB,,, | Performed by: SURGERY

## 2022-11-08 PROCEDURE — 3074F PR MOST RECENT SYSTOLIC BLOOD PRESSURE < 130 MM HG: ICD-10-PCS | Mod: CPTII,S$GLB,, | Performed by: SURGERY

## 2022-11-08 PROCEDURE — 1159F PR MEDICATION LIST DOCUMENTED IN MEDICAL RECORD: ICD-10-PCS | Mod: CPTII,S$GLB,, | Performed by: SURGERY

## 2022-11-08 PROCEDURE — 1160F PR REVIEW ALL MEDS BY PRESCRIBER/CLIN PHARMACIST DOCUMENTED: ICD-10-PCS | Mod: CPTII,S$GLB,, | Performed by: SURGERY

## 2022-11-08 PROCEDURE — 3066F PR DOCUMENTATION OF TREATMENT FOR NEPHROPATHY: ICD-10-PCS | Mod: CPTII,S$GLB,, | Performed by: SURGERY

## 2022-11-08 PROCEDURE — 3046F PR MOST RECENT HEMOGLOBIN A1C LEVEL > 9.0%: ICD-10-PCS | Mod: CPTII,S$GLB,, | Performed by: SURGERY

## 2022-11-08 PROCEDURE — 99204 PR OFFICE/OUTPT VISIT, NEW, LEVL IV, 45-59 MIN: ICD-10-PCS | Mod: S$GLB,,, | Performed by: SURGERY

## 2022-11-08 PROCEDURE — 1159F MED LIST DOCD IN RCRD: CPT | Mod: CPTII,S$GLB,, | Performed by: SURGERY

## 2022-11-08 PROCEDURE — 3066F NEPHROPATHY DOC TX: CPT | Mod: CPTII,S$GLB,, | Performed by: SURGERY

## 2022-11-08 RX ORDER — SODIUM CHLORIDE 9 MG/ML
INJECTION, SOLUTION INTRAVENOUS CONTINUOUS
Status: CANCELLED | OUTPATIENT
Start: 2022-12-22

## 2022-11-08 NOTE — PATIENT INSTRUCTIONS
Your procedure has been scheduled at:    [facility]pre-admit nurse  [phone]  Critical access hospital.........................................................pre_admit  [phone]  [place]  [facility]  [address] [facility] [address]    If you have had heart surgery, atrial fibrillation and are on blood thinners from your cardiologist you will need cardiac clearance prior to surgery!!!      DAY   Thursday    DATE          Someone from the hospital will call you the evening before surgery to let you know what time you need to be at the hospital for surgery.                                               1:  DO NOT EAT OR DRINK ANYTHING AFTER MIDNIGHT THE NIGHT BEFORE SURGERY.     2:  You will need to stop any blood thinners 1 week prior to surgery.  This includes Aspirin, fish oil, Pradaxa, Coumadin, Plavix, Pletal.  Please contact the prescribing doctor to be sure it is ok to stop these medicines.    3:  Pre-admit nurse will go over your medicines and let you know which ones not to take the morning of surgery    4:  Plan to have someone drive you home after you are released from the hospital.  You WILL NOT be able to drive yourself.    5:  If you have any questions or need to change your surgery date, please call Jsoe Keyes or Yi at [phone] or [phone]    6.  You may start taking a stool softener prior to and after surgery to help with constipation that may occur due to anesthesia and pain medication.    AFTER SURGERY:    You can shower 24-48 hours after surgery, incision can get wet but do not soak. You  may have bandages and steri strips or you may just have glue over the incision with no bandage.  The glue will peel away after a few days, steri strips you can remove after 1 week.   If you have not had a bowel movement within 3 days after surgery you may take a laxative of your choice.(Take stool softeners as noted above may help)  Do not lift anything over 5-10 pounds.     You need to have a follow up appointment 7-14 days after surgery, call the office to schedule or if you have questions or concerns.

## 2022-11-13 NOTE — PROGRESS NOTES
Subjective:       Patient ID: Donald Frey is a 62 y.o. male.    Chief Complaint: Hernia      HPI 62-year-old male with an umbilical hernia which has been present for years.  He says it is getting larger.  He denies nausea or vomiting.  He does not have any significant pain.  He is on Eliquis for recent diagnosis of atrial fibrillation.  He is not in atrial fibrillation at this time.  He will need to be off the Eliquis at the time of surgery of course.  He would like to proceed with repair.    Past Medical History:   Diagnosis Date    Diabetes mellitus, type 2     Hypertension     Myocardial infarction 2013     Past Surgical History:   Procedure Laterality Date    ABLATION OF ARRHYTHMOGENIC FOCUS FOR ATRIAL FIBRILLATION N/A 7/7/2022    Procedure: ABLATION, ARRHYTHMOGENIC FOCUS, FOR ATRIAL FIBRILLATION;  Surgeon: Niko Pacheco MD;  Location: [facility] EP LAB;  Service: Cardiology;  Laterality: N/A;  AF, ARMANDO(Cx if SR), PVI, RFA, CARTO, GEN, GP, 3 PREP    CORONARY STENT PLACEMENT  2013    TRANSESOPHAGEAL ECHOCARDIOGRAPHY N/A 7/7/2022    Procedure: ECHOCARDIOGRAM, TRANSESOPHAGEAL;  Surgeon: Conor Chappell MD;  Location: [facility] EP LAB;  Service: Cardiology;  Laterality: N/A;    TREATMENT OF CARDIAC ARRHYTHMIA N/A 3/7/2022    Procedure: CARDIOVERSION;  Surgeon: Gomez Orellana MD;  Location: [facility] CATH/EP LAB;  Service: Cardiology;  Laterality: N/A;    TREATMENT OF CARDIAC ARRHYTHMIA  7/7/2022    Procedure: Cardioversion or Defibrillation;  Surgeon: Conor Chappell MD;  Location: [facility] EP LAB;  Service: Cardiology;;         Current Outpatient Medications:     amiodarone (PACERONE) 200 MG Tab, Take 1 tablet (200 mg total) by mouth once daily., Disp: 30 tablet, Rfl: 11    apixaban (ELIQUIS) 5 mg Tab, Take 1 tablet (5 mg total) by mouth 2 (two) times daily., Disp: 60 tablet, Rfl: 11    diltiaZEM (CARDIZEM CD) 240 MG 24 hr capsule, Take 1 capsule (240 mg total) by mouth 2 (two) times a day., Disp: 60 capsule, Rfl: 11     ertugliflozin (STEGLATRO) 15 mg Tab, Take 15 mg by mouth once daily at 6am., Disp: 90 tablet, Rfl: 1    furosemide (LASIX) 20 MG tablet, Take 1 tablet (20 mg total) by mouth 2 (two) times daily., Disp: 60 tablet, Rfl: 11    glipiZIDE (GLUCOTROL) 5 MG tablet, TAKE 1 TABLET DAILY WITH BREAKFAST, Disp: 90 tablet, Rfl: 1    lisinopriL (PRINIVIL,ZESTRIL) 20 MG tablet, Take 1 tablet (20 mg total) by mouth once daily., Disp: 90 tablet, Rfl: 3    metFORMIN (GLUCOPHAGE) 1000 MG tablet, TAKE 1 TABLET TWICE A DAY WITH MEALS, Disp: 180 tablet, Rfl: 1    rosuvastatin (CRESTOR) 20 MG tablet, Take 20 mg by mouth once daily., Disp: , Rfl:     pantoprazole (PROTONIX) 40 MG tablet, Take 1 tablet (40 mg total) by mouth once daily. (Patient not taking: Reported on 11/8/2022), Disp: 90 tablet, Rfl: 0    Review of patient's allergies indicates:  No Known Allergies    Family History   Problem Relation Age of Onset    Heart attack Father     Cancer Father         prostate     Social History     Socioeconomic History    Marital status:    Tobacco Use    Smoking status: Never    Smokeless tobacco: Never   Substance and Sexual Activity    Alcohol use: Yes     Comment: occ    Drug use: Never       Review of Systems   Respiratory: Negative.     Cardiovascular: Negative.    Gastrointestinal: Negative.    Objective:     Physical Exam  Constitutional:       General: He is not in acute distress.     Appearance: He is well-developed.   HENT:      Head: Normocephalic and atraumatic.   Eyes:      General: No scleral icterus.     Conjunctiva/sclera: Conjunctivae normal.      Pupils: Pupils are equal, round, and reactive to light.   Neck:      Thyroid: No thyromegaly.   Cardiovascular:      Rate and Rhythm: Normal rate and regular rhythm.      Heart sounds: Normal heart sounds. No murmur heard.  Pulmonary:      Effort: Pulmonary effort is normal. No respiratory distress.      Breath sounds: Normal breath sounds. No wheezing or rales.   Abdominal:       General: Bowel sounds are normal. There is no distension.      Palpations: Abdomen is soft.      Tenderness: There is no abdominal tenderness.      Hernia: A hernia (reducible umbilical hernia without significant tenderness.) is present.   Musculoskeletal:         General: No tenderness. Normal range of motion.      Cervical back: Normal range of motion and neck supple.   Lymphadenopathy:      Cervical: No cervical adenopathy.   Skin:     General: Skin is warm and dry.      Findings: No erythema or rash.   Neurological:      Mental Status: He is alert and oriented to person, place, and time.   Psychiatric:         Behavior: Behavior normal.         Judgment: Judgment normal.   Assessment:     Encounter Diagnosis   Name Primary?    Umbilical hernia without obstruction and without gangrene Yes       Plan:      Plan open umbilical hernia repair possibly with mesh implantation.  Risks and benefits of the planned procedure were discussed at length with the patient.  Risks and benefits of not proceeding with the procedure were discussed as well. All questions were answered. The patient expressed clear understanding and would like to proceed with the procedure as discussed.

## 2022-12-11 ENCOUNTER — HOSPITAL ENCOUNTER (INPATIENT)
Facility: HOSPITAL | Age: 62
LOS: 2 days | Discharge: HOME | DRG: 558 | End: 2022-12-14
Attending: EMERGENCY MEDICINE | Admitting: INTERNAL MEDICINE
Payer: COMMERCIAL

## 2022-12-11 DIAGNOSIS — R52 PAIN: ICD-10-CM
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
## 2022-12-11 DIAGNOSIS — T79.6XXA TRAUMATIC RHABDOMYOLYSIS, INITIAL ENCOUNTER: Primary | ICD-10-CM

## 2022-12-11 PROCEDURE — 99285 EMERGENCY DEPT VISIT HI MDM: CPT | Mod: 25

## 2022-12-11 PROCEDURE — 85610 PROTHROMBIN TIME: CPT | Performed by: EMERGENCY MEDICINE

## 2022-12-11 PROCEDURE — 80053 COMPREHEN METABOLIC PANEL: CPT | Performed by: EMERGENCY MEDICINE

## 2022-12-11 PROCEDURE — 96375 TX/PRO/DX INJ NEW DRUG ADDON: CPT

## 2022-12-11 PROCEDURE — 96361 HYDRATE IV INFUSION ADD-ON: CPT

## 2022-12-11 PROCEDURE — 96374 THER/PROPH/DIAG INJ IV PUSH: CPT

## 2022-12-11 PROCEDURE — 82962 GLUCOSE BLOOD TEST: CPT

## 2022-12-11 PROCEDURE — 82550 ASSAY OF CK (CPK): CPT | Performed by: EMERGENCY MEDICINE

## 2022-12-11 PROCEDURE — 85025 COMPLETE CBC W/AUTO DIFF WBC: CPT | Performed by: EMERGENCY MEDICINE

## 2022-12-12 PROBLEM — M62.82 RHABDOMYOLYSIS: Status: ACTIVE | Noted: 2022-12-12

## 2022-12-12 LAB
ALBUMIN SERPL BCP-MCNC: 4.3 G/DL (ref 3.5–5.2)
ALP SERPL-CCNC: 76 U/L (ref 55–135)
ALT SERPL W/O P-5'-P-CCNC: 63 U/L (ref 10–44)
ANION GAP SERPL CALC-SCNC: 13 MMOL/L (ref 8–16)
AST SERPL-CCNC: 71 U/L (ref 10–40)
BASOPHILS # BLD AUTO: 0.02 K/UL (ref 0–0.2)
BASOPHILS NFR BLD: 0.2 % (ref 0–1.9)
BILIRUB SERPL-MCNC: 1.3 MG/DL (ref 0.1–1)
BUN SERPL-MCNC: 28 MG/DL (ref 8–23)
CALCIUM SERPL-MCNC: 9 MG/DL (ref 8.7–10.5)
CHLORIDE SERPL-SCNC: 102 MMOL/L (ref 95–110)
CK SERPL-CCNC: 2234 U/L (ref 20–200)
CO2 SERPL-SCNC: 24 MMOL/L (ref 23–29)
CREAT SERPL-MCNC: 1 MG/DL (ref 0.5–1.4)
CREAT SERPL-MCNC: 1.1 MG/DL (ref 0.5–1.4)
DIFFERENTIAL METHOD: ABNORMAL
EOSINOPHIL # BLD AUTO: 0 K/UL (ref 0–0.5)
EOSINOPHIL NFR BLD: 0.2 % (ref 0–8)
ERYTHROCYTE [DISTWIDTH] IN BLOOD BY AUTOMATED COUNT: 14.3 % (ref 11.5–14.5)
EST. GFR  (NO RACE VARIABLE): >60 ML/MIN/1.73 M^2
GLUCOSE SERPL-MCNC: 215 MG/DL (ref 70–110)
GLUCOSE SERPL-MCNC: 221 MG/DL (ref 70–110)
GLUCOSE SERPL-MCNC: 223 MG/DL (ref 70–110)
GLUCOSE SERPL-MCNC: 262 MG/DL (ref 70–110)
GLUCOSE SERPL-MCNC: 271 MG/DL (ref 70–110)
HCT VFR BLD AUTO: 44.3 % (ref 40–54)
HGB BLD-MCNC: 14.7 G/DL (ref 14–18)
IMM GRANULOCYTES # BLD AUTO: 0.07 K/UL (ref 0–0.04)
IMM GRANULOCYTES NFR BLD AUTO: 0.6 % (ref 0–0.5)
INR PPP: 1.2
LYMPHOCYTES # BLD AUTO: 0.7 K/UL (ref 1–4.8)
LYMPHOCYTES NFR BLD: 5.8 % (ref 18–48)
MCH RBC QN AUTO: 27.9 PG (ref 27–31)
MCHC RBC AUTO-ENTMCNC: 33.2 G/DL (ref 32–36)
MCV RBC AUTO: 84 FL (ref 82–98)
MONOCYTES # BLD AUTO: 1.2 K/UL (ref 0.3–1)
MONOCYTES NFR BLD: 10 % (ref 4–15)
NEUTROPHILS # BLD AUTO: 10.2 K/UL (ref 1.8–7.7)
NEUTROPHILS NFR BLD: 83.2 % (ref 38–73)
NRBC BLD-RTO: 0 /100 WBC
PLATELET # BLD AUTO: 217 K/UL (ref 150–450)
PMV BLD AUTO: 10.6 FL (ref 9.2–12.9)
POTASSIUM SERPL-SCNC: 4.2 MMOL/L (ref 3.5–5.1)
PROT SERPL-MCNC: 7.3 G/DL (ref 6–8.4)
PROTHROMBIN TIME: 14.1 SEC (ref 11.4–13.7)
RBC # BLD AUTO: 5.27 M/UL (ref 4.6–6.2)
SAMPLE: NORMAL
SODIUM SERPL-SCNC: 139 MMOL/L (ref 136–145)
WBC # BLD AUTO: 12.3 K/UL (ref 3.9–12.7)

## 2022-12-12 PROCEDURE — 25000003 PHARM REV CODE 250: Performed by: INTERNAL MEDICINE

## 2022-12-12 PROCEDURE — 36415 COLL VENOUS BLD VENIPUNCTURE: CPT | Performed by: EMERGENCY MEDICINE

## 2022-12-12 PROCEDURE — 63600175 PHARM REV CODE 636 W HCPCS: Performed by: EMERGENCY MEDICINE

## 2022-12-12 PROCEDURE — 63600175 PHARM REV CODE 636 W HCPCS: Performed by: INTERNAL MEDICINE

## 2022-12-12 PROCEDURE — 25500020 PHARM REV CODE 255: Performed by: EMERGENCY MEDICINE

## 2022-12-12 PROCEDURE — 21400001 HC TELEMETRY ROOM

## 2022-12-12 PROCEDURE — 25000003 PHARM REV CODE 250: Performed by: EMERGENCY MEDICINE

## 2022-12-12 RX ORDER — SODIUM CHLORIDE 9 MG/ML
1000 INJECTION, SOLUTION INTRAVENOUS
Status: COMPLETED | OUTPATIENT
Start: 2022-12-12 | End: 2022-12-12

## 2022-12-12 RX ORDER — MORPHINE SULFATE 10 MG/ML
5 INJECTION INTRAMUSCULAR; INTRAVENOUS; SUBCUTANEOUS EVERY 6 HOURS PRN
Status: DISCONTINUED | OUTPATIENT
Start: 2022-12-12 | End: 2022-12-14 | Stop reason: HOSPADM

## 2022-12-12 RX ORDER — HYDROCODONE BITARTRATE AND ACETAMINOPHEN 5; 325 MG/1; MG/1
1 TABLET ORAL EVERY 6 HOURS PRN
Status: DISCONTINUED | OUTPATIENT
Start: 2022-12-12 | End: 2022-12-14 | Stop reason: HOSPADM

## 2022-12-12 RX ORDER — ONDANSETRON 2 MG/ML
4 INJECTION INTRAMUSCULAR; INTRAVENOUS
Status: COMPLETED | OUTPATIENT
Start: 2022-12-12 | End: 2022-12-12

## 2022-12-12 RX ORDER — AMIODARONE HYDROCHLORIDE 200 MG/1
200 TABLET ORAL
Status: COMPLETED | OUTPATIENT
Start: 2022-12-12 | End: 2022-12-12

## 2022-12-12 RX ORDER — MUPIROCIN 20 MG/G
1 OINTMENT TOPICAL
Status: COMPLETED | OUTPATIENT
Start: 2022-12-12 | End: 2022-12-12

## 2022-12-12 RX ORDER — LISINOPRIL 20 MG/1
20 TABLET ORAL DAILY
Status: DISCONTINUED | OUTPATIENT
Start: 2022-12-13 | End: 2022-12-14 | Stop reason: HOSPADM

## 2022-12-12 RX ORDER — INSULIN ASPART 100 [IU]/ML
0-5 INJECTION, SOLUTION INTRAVENOUS; SUBCUTANEOUS
Status: DISCONTINUED | OUTPATIENT
Start: 2022-12-12 | End: 2022-12-14 | Stop reason: HOSPADM

## 2022-12-12 RX ORDER — ONDANSETRON 2 MG/ML
4 INJECTION INTRAMUSCULAR; INTRAVENOUS EVERY 6 HOURS PRN
Status: DISCONTINUED | OUTPATIENT
Start: 2022-12-12 | End: 2022-12-14 | Stop reason: HOSPADM

## 2022-12-12 RX ORDER — LISINOPRIL 20 MG/1
20 TABLET ORAL DAILY
Status: DISCONTINUED | OUTPATIENT
Start: 2022-12-12 | End: 2022-12-12

## 2022-12-12 RX ORDER — AMIODARONE HYDROCHLORIDE 200 MG/1
200 TABLET ORAL DAILY
Status: DISCONTINUED | OUTPATIENT
Start: 2022-12-13 | End: 2022-12-14 | Stop reason: HOSPADM

## 2022-12-12 RX ORDER — DILTIAZEM HYDROCHLORIDE 120 MG/1
240 CAPSULE, COATED, EXTENDED RELEASE ORAL 2 TIMES DAILY
Status: DISCONTINUED | OUTPATIENT
Start: 2022-12-12 | End: 2022-12-14 | Stop reason: HOSPADM

## 2022-12-12 RX ORDER — AMIODARONE HYDROCHLORIDE 200 MG/1
200 TABLET ORAL DAILY
Status: DISCONTINUED | OUTPATIENT
Start: 2022-12-12 | End: 2022-12-12

## 2022-12-12 RX ORDER — DILTIAZEM HYDROCHLORIDE 120 MG/1
240 CAPSULE, COATED, EXTENDED RELEASE ORAL
Status: COMPLETED | OUTPATIENT
Start: 2022-12-12 | End: 2022-12-12

## 2022-12-12 RX ORDER — LABETALOL HYDROCHLORIDE 5 MG/ML
10 INJECTION, SOLUTION INTRAVENOUS EVERY 6 HOURS PRN
Status: DISCONTINUED | OUTPATIENT
Start: 2022-12-12 | End: 2022-12-14 | Stop reason: HOSPADM

## 2022-12-12 RX ORDER — HYDRALAZINE HYDROCHLORIDE 20 MG/ML
10 INJECTION INTRAMUSCULAR; INTRAVENOUS EVERY 8 HOURS PRN
Status: DISCONTINUED | OUTPATIENT
Start: 2022-12-12 | End: 2022-12-14 | Stop reason: HOSPADM

## 2022-12-12 RX ORDER — GLUCAGON 1 MG
1 KIT INJECTION
Status: DISCONTINUED | OUTPATIENT
Start: 2022-12-12 | End: 2022-12-14 | Stop reason: HOSPADM

## 2022-12-12 RX ORDER — ACETAMINOPHEN 325 MG/1
650 TABLET ORAL EVERY 8 HOURS PRN
Status: DISCONTINUED | OUTPATIENT
Start: 2022-12-12 | End: 2022-12-14 | Stop reason: HOSPADM

## 2022-12-12 RX ORDER — SODIUM CHLORIDE 9 MG/ML
INJECTION, SOLUTION INTRAVENOUS CONTINUOUS
Status: DISCONTINUED | OUTPATIENT
Start: 2022-12-12 | End: 2022-12-13

## 2022-12-12 RX ORDER — MORPHINE SULFATE 4 MG/ML
4 INJECTION, SOLUTION INTRAMUSCULAR; INTRAVENOUS
Status: COMPLETED | OUTPATIENT
Start: 2022-12-12 | End: 2022-12-12

## 2022-12-12 RX ORDER — POLYETHYLENE GLYCOL 3350 17 G/17G
17 POWDER, FOR SOLUTION ORAL 2 TIMES DAILY PRN
Status: DISCONTINUED | OUTPATIENT
Start: 2022-12-12 | End: 2022-12-14 | Stop reason: HOSPADM

## 2022-12-12 RX ORDER — SODIUM CHLORIDE 0.9 % (FLUSH) 0.9 %
10 SYRINGE (ML) INJECTION
Status: DISCONTINUED | OUTPATIENT
Start: 2022-12-12 | End: 2022-12-14 | Stop reason: HOSPADM

## 2022-12-12 RX ORDER — AMOXICILLIN 250 MG
1 CAPSULE ORAL 2 TIMES DAILY PRN
Status: DISCONTINUED | OUTPATIENT
Start: 2022-12-12 | End: 2022-12-14 | Stop reason: HOSPADM

## 2022-12-12 RX ORDER — IBUPROFEN 200 MG
24 TABLET ORAL
Status: DISCONTINUED | OUTPATIENT
Start: 2022-12-12 | End: 2022-12-14 | Stop reason: HOSPADM

## 2022-12-12 RX ORDER — HYDROMORPHONE HYDROCHLORIDE 1 MG/ML
1 INJECTION, SOLUTION INTRAMUSCULAR; INTRAVENOUS; SUBCUTANEOUS
Status: COMPLETED | OUTPATIENT
Start: 2022-12-12 | End: 2022-12-12

## 2022-12-12 RX ORDER — IBUPROFEN 200 MG
16 TABLET ORAL
Status: DISCONTINUED | OUTPATIENT
Start: 2022-12-12 | End: 2022-12-14 | Stop reason: HOSPADM

## 2022-12-12 RX ORDER — NALOXONE HCL 0.4 MG/ML
0.02 VIAL (ML) INJECTION
Status: DISCONTINUED | OUTPATIENT
Start: 2022-12-12 | End: 2022-12-14 | Stop reason: HOSPADM

## 2022-12-12 RX ORDER — LISINOPRIL 5 MG/1
20 TABLET ORAL
Status: COMPLETED | OUTPATIENT
Start: 2022-12-12 | End: 2022-12-12

## 2022-12-12 RX ORDER — SIMETHICONE 80 MG
1 TABLET,CHEWABLE ORAL 4 TIMES DAILY PRN
Status: DISCONTINUED | OUTPATIENT
Start: 2022-12-12 | End: 2022-12-14 | Stop reason: HOSPADM

## 2022-12-12 RX ORDER — TALC
6 POWDER (GRAM) TOPICAL NIGHTLY PRN
Status: DISCONTINUED | OUTPATIENT
Start: 2022-12-12 | End: 2022-12-14 | Stop reason: HOSPADM

## 2022-12-12 RX ORDER — PANTOPRAZOLE SODIUM 40 MG/1
40 TABLET, DELAYED RELEASE ORAL
Status: DISCONTINUED | OUTPATIENT
Start: 2022-12-12 | End: 2022-12-14 | Stop reason: HOSPADM

## 2022-12-12 RX ADMIN — DILTIAZEM HYDROCHLORIDE 240 MG: 120 CAPSULE, COATED, EXTENDED RELEASE ORAL at 08:12

## 2022-12-12 RX ADMIN — SODIUM CHLORIDE 1000 ML: 0.9 INJECTION, SOLUTION INTRAVENOUS at 05:12

## 2022-12-12 RX ADMIN — APIXABAN 5 MG: 5 TABLET, FILM COATED ORAL at 05:12

## 2022-12-12 RX ADMIN — INSULIN ASPART 1 UNITS: 100 INJECTION, SOLUTION INTRAVENOUS; SUBCUTANEOUS at 08:12

## 2022-12-12 RX ADMIN — ONDANSETRON 4 MG: 2 INJECTION INTRAMUSCULAR; INTRAVENOUS at 01:12

## 2022-12-12 RX ADMIN — HYDROCODONE BITARTRATE AND ACETAMINOPHEN 1 TABLET: 5; 325 TABLET ORAL at 04:12

## 2022-12-12 RX ADMIN — IOHEXOL 100 ML: 350 INJECTION, SOLUTION INTRAVENOUS at 01:12

## 2022-12-12 RX ADMIN — SODIUM CHLORIDE 1000 ML: 0.9 INJECTION, SOLUTION INTRAVENOUS at 12:12

## 2022-12-12 RX ADMIN — LISINOPRIL 20 MG: 5 TABLET ORAL at 05:12

## 2022-12-12 RX ADMIN — HYDROCODONE BITARTRATE AND ACETAMINOPHEN 1 TABLET: 5; 325 TABLET ORAL at 11:12

## 2022-12-12 RX ADMIN — MORPHINE SULFATE 4 MG: 4 INJECTION, SOLUTION INTRAMUSCULAR; INTRAVENOUS at 06:12

## 2022-12-12 RX ADMIN — HYDROCODONE BITARTRATE AND ACETAMINOPHEN 1 TABLET: 5; 325 TABLET ORAL at 09:12

## 2022-12-12 RX ADMIN — Medication 6 MG: at 08:12

## 2022-12-12 RX ADMIN — AMIODARONE HYDROCHLORIDE 200 MG: 200 TABLET ORAL at 05:12

## 2022-12-12 RX ADMIN — DILTIAZEM HYDROCHLORIDE 240 MG: 120 CAPSULE, COATED, EXTENDED RELEASE ORAL at 06:12

## 2022-12-12 RX ADMIN — MORPHINE SULFATE 5 MG: 10 INJECTION INTRAVENOUS at 08:12

## 2022-12-12 RX ADMIN — HYDROMORPHONE HYDROCHLORIDE 1 MG: 1 INJECTION, SOLUTION INTRAMUSCULAR; INTRAVENOUS; SUBCUTANEOUS at 01:12

## 2022-12-12 RX ADMIN — MUPIROCIN 22 G: 20 OINTMENT TOPICAL at 04:12

## 2022-12-12 RX ADMIN — APIXABAN 5 MG: 5 TABLET, FILM COATED ORAL at 08:12

## 2022-12-12 NOTE — ASSESSMENT & PLAN NOTE
Reason for admission  CK noted to be elevated  Probably due to fall and lying on the floor for hours  IV hydration

## 2022-12-12 NOTE — SUBJECTIVE & OBJECTIVE
Past Medical History:   Diagnosis Date    Diabetes mellitus, type 2     Hypertension     Myocardial infarction 2013       Past Surgical History:   Procedure Laterality Date    ABLATION OF ARRHYTHMOGENIC FOCUS FOR ATRIAL FIBRILLATION N/A 7/7/2022    Procedure: ABLATION, ARRHYTHMOGENIC FOCUS, FOR ATRIAL FIBRILLATION;  Surgeon: Niko Pacheco MD;  Location: [facility] EP LAB;  Service: Cardiology;  Laterality: N/A;  AF, ARMANDO(Cx if SR), PVI, RFA, CARTO, GEN, GP, 3 PREP    CORONARY STENT PLACEMENT  2013    TRANSESOPHAGEAL ECHOCARDIOGRAPHY N/A 7/7/2022    Procedure: ECHOCARDIOGRAM, TRANSESOPHAGEAL;  Surgeon: Conor Chappell MD;  Location: [facility] EP LAB;  Service: Cardiology;  Laterality: N/A;    TREATMENT OF CARDIAC ARRHYTHMIA N/A 3/7/2022    Procedure: CARDIOVERSION;  Surgeon: Gomez Orellana MD;  Location: [facility] CATH/EP LAB;  Service: Cardiology;  Laterality: N/A;    TREATMENT OF CARDIAC ARRHYTHMIA  7/7/2022    Procedure: Cardioversion or Defibrillation;  Surgeon: Conor Chappell MD;  Location: [facility] EP LAB;  Service: Cardiology;;       Review of patient's allergies indicates:  No Known Allergies    No current facility-administered medications on file prior to encounter.     Current Outpatient Medications on File Prior to Encounter   Medication Sig    amiodarone (PACERONE) 200 MG Tab Take 1 tablet (200 mg total) by mouth once daily.    apixaban (ELIQUIS) 5 mg Tab Take 1 tablet (5 mg total) by mouth 2 (two) times daily.    diltiaZEM (CARDIZEM CD) 240 MG 24 hr capsule Take 1 capsule (240 mg total) by mouth 2 (two) times a day.    ertugliflozin (STEGLATRO) 15 mg Tab Take 1 tablet by mouth once daily.    furosemide (LASIX) 20 MG tablet Take 1 tablet (20 mg total) by mouth 2 (two) times daily.    glipiZIDE (GLUCOTROL) 5 MG tablet TAKE 1 TABLET DAILY WITH BREAKFAST (Patient taking differently: Take 5 mg by mouth daily with breakfast.)    lisinopriL (PRINIVIL,ZESTRIL) 20 MG tablet Take 1 tablet (20 mg total) by mouth once daily.     metFORMIN (GLUCOPHAGE) 1000 MG tablet TAKE 1 TABLET TWICE A DAY WITH MEALS (Patient taking differently: Take 1,000 mg by mouth 2 (two) times daily with meals.)    rosuvastatin (CRESTOR) 20 MG tablet Take 20 mg by mouth once daily.    pantoprazole (PROTONIX) 40 MG tablet Take 1 tablet (40 mg total) by mouth once daily. (Patient not taking: Reported on 11/8/2022)     Family History       Problem Relation (Age of Onset)    Cancer Father    Heart attack Father          Tobacco Use    Smoking status: Never    Smokeless tobacco: Never   Substance and Sexual Activity    Alcohol use: Yes     Comment: occ    Drug use: Never    Sexual activity: Not on file     Review of Systems   Constitutional: Negative.    HENT: Negative.     Eyes: Negative.    Respiratory: Negative.     Cardiovascular:  Positive for chest pain.   Gastrointestinal: Negative.    Endocrine: Negative.    Genitourinary: Negative.    Musculoskeletal: Negative.    Allergic/Immunologic: Negative.    Neurological: Negative.    Hematological: Negative.    Objective:     Vital Signs (Most Recent):  Temp: 98.8 °F (37.1 °C) (12/11/22 2341)  Pulse: 96 (12/12/22 0456)  Resp: 20 (12/12/22 0133)  BP: (!) 181/86 (12/12/22 0431)  SpO2: 96 % (12/12/22 0456)   Vital Signs (24h Range):  Temp:  [98.8 °F (37.1 °C)] 98.8 °F (37.1 °C)  Pulse:  [93-97] 96  Resp:  [20] 20  SpO2:  [94 %-97 %] 96 %  BP: (161-181)/(78-86) 181/86     Weight: 136.1 kg (300 lb)  Body mass index is 38.52 kg/m².    Physical Exam  Vitals and nursing note reviewed. Exam conducted with a chaperone present.   Constitutional:       Appearance: He is obese.   HENT:      Head: Normocephalic.      Comments: Bilateral black eyes  No nasal deviation  Eyes:      Pupils: Pupils are equal, round, and reactive to light.   Cardiovascular:      Rate and Rhythm: Normal rate and regular rhythm.   Pulmonary:      Effort: Pulmonary effort is normal.      Breath sounds: Normal breath sounds.   Abdominal:      General: Bowel  sounds are normal.      Comments: Umbilical hernia present easily reducible nontender   Skin:     Capillary Refill: Capillary refill takes less than 2 seconds.      Comments: Multi all abrasions consistent with fall   Neurological:      General: No focal deficit present.      Mental Status: He is alert. Mental status is at baseline.         CRANIAL NERVES     CN III, IV, VI   Pupils are equal, round, and reactive to light.     Significant Labs: All pertinent labs within the past 24 hours have been reviewed.    Significant Imaging: I have reviewed all pertinent imaging results/findings within the past 24 hours.

## 2022-12-12 NOTE — PROGRESS NOTES
Reviewed the H&P  Evaluated the patient independently  Reviewed the imaging obtained in ER  Continue IV fluids  Repeat CK in am  Pain control  Incentive spirometry   PT/OT consultation  Anticipate discharge in am

## 2022-12-12 NOTE — ASSESSMENT & PLAN NOTE
Patient's FSGs are uncontrolled due to hyperglycemia on current medication regimen.  Last A1c reviewed-   Lab Results   Component Value Date    HGBA1C 11.6 (H) 07/18/2022     Most recent fingerstick glucose reviewed- No results for input(s): POCTGLUCOSE in the last 24 hours.  Current correctional scale  Medium  Increase anti-hyperglycemic dose as follows-   Antihyperglycemics (From admission, onward)    None        Hold Oral hypoglycemics while patient is in the hospital.

## 2022-12-12 NOTE — ED PROVIDER NOTES
Encounter Date: 12/11/2022       History     Chief Complaint   Patient presents with    Fall     Both sides are hurting     Chief complaint is fall.  The patient states he was walking outside and tripped on a crack in the concrete falling to the concrete on his face.  This occurred early in the morning.  He states he fell at 5:00 a.m..  He was able to get up going to his house within laid on the floor for most of the day.  He laid down on carpet.  It was difficult to walk.  It was at that point that he decided to come to the ER to get evaluated.  He does have history of diabetes and hypertension.  He is also had an MI.  His only complaint is pain to his forehead nose hands left elbow both feet.  He has abrasions to these areas please refer to the pictures.  He denies loss of consciousness he also has pain to the outer anterior lower ribs.      Review of patient's allergies indicates:  No Known Allergies  Past Medical History:   Diagnosis Date    Diabetes mellitus, type 2     Hypertension     Myocardial infarction 2013     Past Surgical History:   Procedure Laterality Date    ABLATION OF ARRHYTHMOGENIC FOCUS FOR ATRIAL FIBRILLATION N/A 7/7/2022    Procedure: ABLATION, ARRHYTHMOGENIC FOCUS, FOR ATRIAL FIBRILLATION;  Surgeon: Niko Pacheco MD;  Location: [facility] EP LAB;  Service: Cardiology;  Laterality: N/A;  AF, ARMANDO(Cx if SR), PVI, RFA, CARTO, GEN, GP, 3 PREP    CORONARY STENT PLACEMENT  2013    TRANSESOPHAGEAL ECHOCARDIOGRAPHY N/A 7/7/2022    Procedure: ECHOCARDIOGRAM, TRANSESOPHAGEAL;  Surgeon: Conor Chappell MD;  Location: [facility] EP LAB;  Service: Cardiology;  Laterality: N/A;    TREATMENT OF CARDIAC ARRHYTHMIA N/A 3/7/2022    Procedure: CARDIOVERSION;  Surgeon: Gomez Orellana MD;  Location: [facility] CATH/EP LAB;  Service: Cardiology;  Laterality: N/A;    TREATMENT OF CARDIAC ARRHYTHMIA  7/7/2022    Procedure: Cardioversion or Defibrillation;  Surgeon: Conor Chappell MD;  Location: [facility] EP LAB;   Service: Cardiology;;     Family History   Problem Relation Age of Onset    Heart attack Father     Cancer Father         prostate     Social History     Tobacco Use    Smoking status: Never    Smokeless tobacco: Never   Substance Use Topics    Alcohol use: Yes     Comment: occ    Drug use: Never     Review of Systems   Constitutional:  Negative for chills and fever.   HENT:  Negative for ear pain, rhinorrhea and sore throat.    Eyes:  Negative for pain and visual disturbance.   Respiratory:  Negative for cough and shortness of breath.    Cardiovascular:  Negative for chest pain and palpitations.   Gastrointestinal:  Negative for abdominal pain, constipation, diarrhea, nausea and vomiting.   Genitourinary:  Negative for dysuria, frequency, hematuria and urgency.   Musculoskeletal:  Negative for back pain, joint swelling and myalgias.        Multiple abrasions as well as frontal contusion and bilateral black eyes   Skin:  Negative for rash.   Neurological:  Negative for dizziness, seizures, weakness and headaches.   Psychiatric/Behavioral:  Negative for dysphoric mood. The patient is not nervous/anxious.      Physical Exam     Initial Vitals [12/11/22 2341]   BP Pulse Resp Temp SpO2   (!) 161/78 97 20 98.8 °F (37.1 °C) (!) 94 %      MAP       --         Physical Exam    Nursing note and vitals reviewed.  Constitutional: He appears well-developed and well-nourished.   HENT:   Head: Normocephalic and atraumatic.   Eyes: Conjunctivae, EOM and lids are normal. Pupils are equal, round, and reactive to light.   Neck: Trachea normal. Neck supple. No thyroid mass present.   Cardiovascular:  Normal rate, regular rhythm and normal heart sounds.           Pulmonary/Chest: Breath sounds normal. No respiratory distress.   Abdominal: Abdomen is soft. Bowel sounds are normal. There is no abdominal tenderness.   Musculoskeletal:         General: Normal range of motion.      Cervical back: Neck supple.     Neurological: He is alert  and oriented to person, place, and time. He has normal strength and normal reflexes. No cranial nerve deficit or sensory deficit.   Skin: Skin is warm and dry.   Patient with abrasions to the left elbow right elbow right hand on the volar aspect between the thenar and hypothenar eminences.  He also has abrasions to both knees to the skin below the knees.  He also has abrasions to his toes into his left outer ankle.  Please refer to pictures.   Psychiatric: He has a normal mood and affect. His speech is normal and behavior is normal. Judgment and thought content normal.       ED Course   Procedures  Labs Reviewed   CBC W/ AUTO DIFFERENTIAL - Abnormal; Notable for the following components:       Result Value    Immature Granulocytes 0.6 (*)     Gran # (ANC) 10.2 (*)     Immature Grans (Abs) 0.07 (*)     Lymph # 0.7 (*)     Mono # 1.2 (*)     Gran % 83.2 (*)     Lymph % 5.8 (*)     All other components within normal limits   COMPREHENSIVE METABOLIC PANEL - Abnormal; Notable for the following components:    Glucose 215 (*)     BUN 28 (*)     Total Bilirubin 1.3 (*)     AST 71 (*)     ALT 63 (*)     All other components within normal limits   PROTIME-INR - Abnormal; Notable for the following components:    PT 14.1 (*)     All other components within normal limits   CK - Abnormal; Notable for the following components:    CPK 2234 (*)     All other components within normal limits   POCT GLUCOSE - Abnormal; Notable for the following components:    POC Glucose 262 (*)     All other components within normal limits   CK   ISTAT CREATININE   POCT CREATININE          Imaging Results              X-Ray Foot Complete Right (In process)                      X-Ray Ankle Complete Left (In process)                      CT Chest With Contrast (Final result)  Result time 12/12/22 02:48:34      Final result by Donald Francisco MD (12/12/22 02:48:34)                   Narrative:    EXAM DESCRIPTION: CT CHEST WITH CONTRAST 12/12/2022 2:44  AM JAGDISH    CLINICAL HISTORY: 62 years, Male, fall    COMPARISON: None.    FINDINGS:    Multiple transaxial tomograms of the chest were obtained from the lung apices through the adrenal glands, utilizing 2 mm slice thickness at 2 mm interval reconstruction mm interval reconstruction after the administration of IV contrast.  Multiplanar reformats in the sagittal and coronal plane were generated and reviewed.  This exam was performed according to our departmental dose-optimization protocol, which includes automated exposure control, adjustment of the mA and/or kV according to patient size and/or use of iterative reconstruction technique.    The lungs parenchyma demonstrate minimal dependent atelectatic changes. Small trace of left pleural effusion. No evidence for pneumothorax. No masses and/or nodules are identified.  The trachea mainstem bronchus demonstrate to be normal. There is no significant right pleural and/or pericardial effusions.  The thoracic aorta demonstrate minimal intimal aortic arch opacification. The heart is increase in size. There are coronary artery calcifications. The central pulmonary arteries demonstrate to be normal with no symptom major filling defects.  There is no significant mediastinal and/or hilar lymphadenopathy. The axillary regions demonstrate to be clear.  The bone windows demonstrate to be within normal limits. Visualized portions of the clavicles scapula and humeral heads demonstrate no definitive evidence for significant abnormalities. There are degenerative changes bilateral glenohumeral joint. The sternum, thoracic spine, spinous processes visualized bilateral ribs demonstrate to be within normal limits. No evidence for acute bony injuries.    The visualized portions of the upper abdomen demonstrate to be unremarkable.    IMPRESSION:  Small trace of left pleural effusion with minimal dependent atelectatic changes.    No evidence for acute bony injuries.    Mild coronary megaly  with coronary artery calcifications.    Electronically signed by:  Donald Francisco MD  12/12/2022 2:48 AM CST Workstation: [id number]                                     CT Head Without Contrast (Final result)  Result time 12/12/22 02:41:43      Final result by Tee Amin MD (12/12/22 02:41:43)                   Narrative:    EXAMINATION: CT CERVICAL SPINE WITHOUT CONTRAST (accession [id number]), CT MAXILLOFACIAL WITHOUT CONTRAST (accession [id number]), CT HEAD WITHOUT CONTRAST (accession [id number])    CLINICAL HISTORY: fall    COMPARISON: None    TECHNIQUE: Noncontrast axial images of the head, cervical spine and facial bones were obtained. Sagittal and coronal reformatted images obtained.    FINDINGS:  CT head: Frontal scalp soft tissue swelling. No skull fractures demonstrated. The sinuses and mastoid air cells are clear.    Age-related small vessel white matter changes are present. The ventricles are normal in size. Age consistent volume loss. The cerebellar tonsils are in the appropriate position. The pituitary gland is not enlarged.    No intracranial hemorrhage or mass lesion is demonstrated.    CT cervical spine: Degenerative disc disease most prominent at C5-6. Facet degeneration is present. No fracture or acute malalignment demonstrated.    CT facial bones nondisplaced nasal bone fracture is suggested with no other facial bone fracture. The inferior maxillary spine remains intact. Facial and periorbital soft tissue swelling is present with both globes normal in appearance. No retro-orbital hematoma.    IMPRESSION:  1: No acute intracranial finding. Age-related changes.  2: Facial soft tissue swelling.  3: Possible nondisplaced nasal bone fracture.  4: Degenerative changes of the cervical spine without evidence of acute traumatic injury.      This exam was performed according to our departmental dose-optimization program, which includes automated exposure control, adjustment of the mA and/or kV  according to patient size and/or use of iterative reconstruction technique.    Electronically signed by:  Tee Amin MD  12/12/2022 2:41 AM [facility] Workstation: [id number]                                     CT Maxillofacial Without Contrast (Final result)  Result time 12/12/22 02:41:43      Final result by Tee Amin MD (12/12/22 02:41:43)                   Narrative:    EXAMINATION: CT CERVICAL SPINE WITHOUT CONTRAST (accession [id number]), CT MAXILLOFACIAL WITHOUT CONTRAST (accession [id number]), CT HEAD WITHOUT CONTRAST (accession [id number])    CLINICAL HISTORY: fall    COMPARISON: None    TECHNIQUE: Noncontrast axial images of the head, cervical spine and facial bones were obtained. Sagittal and coronal reformatted images obtained.    FINDINGS:  CT head: Frontal scalp soft tissue swelling. No skull fractures demonstrated. The sinuses and mastoid air cells are clear.    Age-related small vessel white matter changes are present. The ventricles are normal in size. Age consistent volume loss. The cerebellar tonsils are in the appropriate position. The pituitary gland is not enlarged.    No intracranial hemorrhage or mass lesion is demonstrated.    CT cervical spine: Degenerative disc disease most prominent at C5-6. Facet degeneration is present. No fracture or acute malalignment demonstrated.    CT facial bones nondisplaced nasal bone fracture is suggested with no other facial bone fracture. The inferior maxillary spine remains intact. Facial and periorbital soft tissue swelling is present with both globes normal in appearance. No retro-orbital hematoma.    IMPRESSION:  1: No acute intracranial finding. Age-related changes.  2: Facial soft tissue swelling.  3: Possible nondisplaced nasal bone fracture.  4: Degenerative changes of the cervical spine without evidence of acute traumatic injury.      This exam was performed according to our departmental dose-optimization program, which includes automated  exposure control, adjustment of the mA and/or kV according to patient size and/or use of iterative reconstruction technique.    Electronically signed by:  Tee Amin MD  12/12/2022 2:41 AM [facility] Workstation: [id number]                                     CT Cervical Spine Without Contrast (Final result)  Result time 12/12/22 02:41:43      Final result by Tee Amin MD (12/12/22 02:41:43)                   Narrative:    EXAMINATION: CT CERVICAL SPINE WITHOUT CONTRAST (accession [id number]), CT MAXILLOFACIAL WITHOUT CONTRAST (accession [id number]), CT HEAD WITHOUT CONTRAST (accession [id number])    CLINICAL HISTORY: fall    COMPARISON: None    TECHNIQUE: Noncontrast axial images of the head, cervical spine and facial bones were obtained. Sagittal and coronal reformatted images obtained.    FINDINGS:  CT head: Frontal scalp soft tissue swelling. No skull fractures demonstrated. The sinuses and mastoid air cells are clear.    Age-related small vessel white matter changes are present. The ventricles are normal in size. Age consistent volume loss. The cerebellar tonsils are in the appropriate position. The pituitary gland is not enlarged.    No intracranial hemorrhage or mass lesion is demonstrated.    CT cervical spine: Degenerative disc disease most prominent at C5-6. Facet degeneration is present. No fracture or acute malalignment demonstrated.    CT facial bones nondisplaced nasal bone fracture is suggested with no other facial bone fracture. The inferior maxillary spine remains intact. Facial and periorbital soft tissue swelling is present with both globes normal in appearance. No retro-orbital hematoma.    IMPRESSION:  1: No acute intracranial finding. Age-related changes.  2: Facial soft tissue swelling.  3: Possible nondisplaced nasal bone fracture.  4: Degenerative changes of the cervical spine without evidence of acute traumatic injury.      This exam was performed according to our departmental  dose-optimization program, which includes automated exposure control, adjustment of the mA and/or kV according to patient size and/or use of iterative reconstruction technique.    Electronically signed by:  Tee Amin MD  12/12/2022 2:41 AM [facility] Workstation: [id number]                                     X-Ray Chest AP Portable (In process)                      Medications   diltiaZEM 24 hr capsule 240 mg (has no administration in time range)   morphine injection 4 mg (has no administration in time range)   0.9%  NaCl infusion (0 mLs Intravenous Stopped 12/12/22 0406)   HYDROmorphone injection 1 mg (1 mg Intravenous Given 12/12/22 0133)   ondansetron injection 4 mg (4 mg Intravenous Given 12/12/22 0133)   iohexoL (OMNIPAQUE 350) injection 100 mL (100 mLs Intravenous Given 12/12/22 0107)   mupirocin 2 % ointment 22 g (22 g Topical (Top) Given 12/12/22 0400)   0.9%  NaCl infusion (1,000 mLs Intravenous New Bag 12/12/22 0550)   amiodarone tablet 200 mg (200 mg Oral Given 12/12/22 0550)   lisinopriL tablet 20 mg (20 mg Oral Given 12/12/22 0549)   apixaban tablet 5 mg (5 mg Oral Given 12/12/22 0550)     Medical Decision Making:   ED Management:  The patient is not tender over the base of the right meta tarsal.  I see no obvious fractures of his right foot or left ankle.  Case discussed with the hospitalist patient to be admitted for rhabdomyolysis with elevated CPK.                        Clinical Impression:   Final diagnoses:  [R52] Pain  [W19.XXXA] Fall               Godfrey Garcia MD  12/12/22 0593

## 2022-12-12 NOTE — PLAN OF CARE
Problem: Adult Inpatient Plan of Care  Goal: Plan of Care Review  Outcome: Ongoing, Progressing  Goal: Patient-Specific Goal (Individualized)  Outcome: Ongoing, Progressing  Goal: Absence of Hospital-Acquired Illness or Injury  Outcome: Ongoing, Progressing  Goal: Optimal Comfort and Wellbeing  Outcome: Ongoing, Progressing  Goal: Readiness for Transition of Care  Outcome: Ongoing, Progressing     Problem: Diabetes Comorbidity  Goal: Blood Glucose Level Within Targeted Range  Outcome: Ongoing, Progressing     Problem: Skin Injury Risk Increased  Goal: Skin Health and Integrity  Outcome: Ongoing, Progressing     Problem: Bariatric Environmental Safety  Goal: Safety Maintained with Care  Outcome: Ongoing, Progressing

## 2022-12-12 NOTE — ED NOTES
LOC: The patient is awake, alert and aware of environment with an appropriate affect, the patient is oriented x 3 and speaking appropriately. Pt reports fall on concrete this morning at 0500. Hematoma noted to forehead along with dried blood to nares. Bruising noted to bilateral eyes. Pt reports bilateral rib pain along with facial pain.   APPEARANCE: Patient resting comfortably and in no acute distress, patient is clean and well groomed, patient's clothing properly fastened.  SKIN: The skin is warm and dry, color consistent with ethnicity, patient has normal skin turgor and moist mucus membranes, skin intact, no breakdown or brusing noted.  MUSKULOSKELETAL: Patient moving all extremities well.   RESPIRATORY: Airway is open and patent, respirations are spontaneous, patient has a normal effort and rate, no accessory muscle use noted.  CARDIAC: Patient has a normal rate and rhythm, no peripheral edema noted, capillary refill < 3 seconds.  ABDOMEN: Soft and non tender to palpation, no distention noted.  NEUROLOGIC: PERRL, 3mm bilaterally, eyes open spontaneously, behavior appropriate to situation, follows commands, facial expression symmetrical, bilateral hand grasp equal and even, purposeful motor response noted, normal sensation in all extremities when touched with a finger.

## 2022-12-12 NOTE — PLAN OF CARE
[facility]  Initial Discharge Assessment       Primary Care Provider: LEANNA Coello    Admission Diagnosis: Pain [R52]    Admission Date: 12/11/2022  Expected Discharge Date:      met with Pt at bedside to complete discharge assessment. Pt AAOx4s. Demographics, PCP, and insurance verified. No home health. No dialysis. Pt reports ability to complete ADLs without assistance. Pt verbalized plan to discharge home via family transport. Pt has no other needs to be addressed at this time.    Discharge Barriers Identified: None    Payor: BLUE CROSS BLUE SHIELD / Plan: BLUE CONNECT / Product Type: HMO /     Extended Emergency Contact Information  Primary Emergency Contact: Cassie Frey  Address: [address]           [place] [address] [place]  Home Phone: [phone]  Mobile Phone: [phone]  Relation: Spouse  Preferred language: English   needed? No    Discharge Plan A: Home with family  Discharge Plan B: Home with family      [facility] - [address] [address]  Phone: [phone] Fax: [phone]    [id number] - [place] - [address] AT [facility] [address]  [address] [address]  Phone: [phone] Fax: [phone]      Initial Assessment (most recent)       Adult Discharge Assessment - 12/12/22 1148          Discharge Assessment    Assessment Type Discharge Planning Assessment     Confirmed/corrected address, phone number and insurance Yes     Confirmed Demographics Correct on Facesheet     Source of Information patient     Does patient/caregiver understand observation status Yes     Communicated ROBINSON with patient/caregiver Yes     Reason For Admission Rhabdomyolysis     People in Home spouse     Facility Arrived From: Home     Do you expect to return to your current living situation? Yes     Do you  have help at home or someone to help you manage your care at home? Yes     Who are your caregiver(s) and their phone number(s)? Cassie Frey (Spouse)   [phone] (Mobile)     Prior to hospitilization cognitive status: Alert/Oriented     Current cognitive status: Alert/Oriented     Home Accessibility wheelchair accessible     Home Layout Able to live on 1st floor     Equipment Currently Used at Home none     Readmission within 30 days? No     Patient currently being followed by outpatient case management? No     Do you currently have service(s) that help you manage your care at home? No     Do you take prescription medications? Yes     Do you have prescription coverage? Yes     Coverage Payor:  BLUE CROSS BLUE SHIELD - BLUE CONNECT     Do you have any problems affording any of your prescribed medications? No     Is the patient taking medications as prescribed? yes     Who is going to help you get home at discharge? Cassie Frey (Spouse)   [phone] (Mobile)     How do you get to doctors appointments? car, drives self     Are you on dialysis? No     Do you take coumadin? No     Discharge Plan A Home with family     Discharge Plan B Home with family     DME Needed Upon Discharge  none     Discharge Plan discussed with: Patient     Discharge Barriers Identified None        OTHER    Name(s) of People in Home Cassie Frey (Spouse)   [phone] (Mobile)

## 2022-12-12 NOTE — H&P
Formerly [facility] - Emergency Dept  Hospital Medicine  History & Physical    Patient Name: Donald Frey  MRN: [id number]  Patient Class: IP- Inpatient  Admission Date: 12/11/2022  Attending Physician: Shawn Morin MD   Primary Care Provider: LEANNA Coello         Patient information was obtained from patient, past medical records and ER records.     Subjective:     Principal Problem:Rhabdomyolysis    Chief Complaint:   Chief Complaint   Patient presents with    Fall     Both sides are hurting        HPI: Patient is a 62-year-old male, who presents emergency room complaint both sides hurting.  Patient fell after tripping on the sidewalk 0.4 hours ago.  He denies dizziness chest pain or shortness for breath.  His recent history of ablation for atrial fibrillation 1 month ago.  He denies palpitations.  He has history of myocardial infarction in the past.  He denies any nausea vomiting.  He states that he is laid on the floor for the last 24 hours.  On exam, patient bilateral black eyes and skin abrasions consistent with fall.  Nasal fracture is noted on CT scan.  Recommend ENT to evaluate.    Plan for admission and IV hydration for rhabdomyolysis.  Will continue telemetry monitoring to ensure cardiac rhythm stable    Trip and fall no evidence for coronary or neurologic findings for etiology of fall      Past Medical History:   Diagnosis Date    Diabetes mellitus, type 2     Hypertension     Myocardial infarction 2013       Past Surgical History:   Procedure Laterality Date    ABLATION OF ARRHYTHMOGENIC FOCUS FOR ATRIAL FIBRILLATION N/A 7/7/2022    Procedure: ABLATION, ARRHYTHMOGENIC FOCUS, FOR ATRIAL FIBRILLATION;  Surgeon: Niko Pacheco MD;  Location: [facility];  Service: Cardiology;  Laterality: N/A;  AF, ARMANDO(Cx if SR), PVI, RFA, CARTO, GEN, GP, 3 PREP    CORONARY STENT PLACEMENT  2013    TRANSESOPHAGEAL ECHOCARDIOGRAPHY N/A 7/7/2022    Procedure: ECHOCARDIOGRAM, TRANSESOPHAGEAL;   Surgeon: Conor Chappell MD;  Location: [facility] EP LAB;  Service: Cardiology;  Laterality: N/A;    TREATMENT OF CARDIAC ARRHYTHMIA N/A 3/7/2022    Procedure: CARDIOVERSION;  Surgeon: Gomez Orellana MD;  Location: [facility] CATH/EP LAB;  Service: Cardiology;  Laterality: N/A;    TREATMENT OF CARDIAC ARRHYTHMIA  7/7/2022    Procedure: Cardioversion or Defibrillation;  Surgeon: Conor Chappell MD;  Location: [facility] EP LAB;  Service: Cardiology;;       Review of patient's allergies indicates:  No Known Allergies    No current facility-administered medications on file prior to encounter.     Current Outpatient Medications on File Prior to Encounter   Medication Sig    amiodarone (PACERONE) 200 MG Tab Take 1 tablet (200 mg total) by mouth once daily.    apixaban (ELIQUIS) 5 mg Tab Take 1 tablet (5 mg total) by mouth 2 (two) times daily.    diltiaZEM (CARDIZEM CD) 240 MG 24 hr capsule Take 1 capsule (240 mg total) by mouth 2 (two) times a day.    ertugliflozin (STEGLATRO) 15 mg Tab Take 1 tablet by mouth once daily.    furosemide (LASIX) 20 MG tablet Take 1 tablet (20 mg total) by mouth 2 (two) times daily.    glipiZIDE (GLUCOTROL) 5 MG tablet TAKE 1 TABLET DAILY WITH BREAKFAST (Patient taking differently: Take 5 mg by mouth daily with breakfast.)    lisinopriL (PRINIVIL,ZESTRIL) 20 MG tablet Take 1 tablet (20 mg total) by mouth once daily.    metFORMIN (GLUCOPHAGE) 1000 MG tablet TAKE 1 TABLET TWICE A DAY WITH MEALS (Patient taking differently: Take 1,000 mg by mouth 2 (two) times daily with meals.)    rosuvastatin (CRESTOR) 20 MG tablet Take 20 mg by mouth once daily.    pantoprazole (PROTONIX) 40 MG tablet Take 1 tablet (40 mg total) by mouth once daily. (Patient not taking: Reported on 11/8/2022)     Family History       Problem Relation (Age of Onset)    Cancer Father    Heart attack Father          Tobacco Use    Smoking status: Never    Smokeless tobacco: Never   Substance and Sexual Activity    Alcohol use:  Yes     Comment: occ    Drug use: Never    Sexual activity: Not on file     Review of Systems   Constitutional: Negative.    HENT: Negative.     Eyes: Negative.    Respiratory: Negative.     Cardiovascular:  Positive for chest pain.   Gastrointestinal: Negative.    Endocrine: Negative.    Genitourinary: Negative.    Musculoskeletal: Negative.    Allergic/Immunologic: Negative.    Neurological: Negative.    Hematological: Negative.    Objective:     Vital Signs (Most Recent):  Temp: 98.8 °F (37.1 °C) (12/11/22 2341)  Pulse: 96 (12/12/22 0456)  Resp: 20 (12/12/22 0133)  BP: (!) 181/86 (12/12/22 0431)  SpO2: 96 % (12/12/22 0456)   Vital Signs (24h Range):  Temp:  [98.8 °F (37.1 °C)] 98.8 °F (37.1 °C)  Pulse:  [93-97] 96  Resp:  [20] 20  SpO2:  [94 %-97 %] 96 %  BP: (161-181)/(78-86) 181/86     Weight: 136.1 kg (300 lb)  Body mass index is 38.52 kg/m².    Physical Exam  Vitals and nursing note reviewed. Exam conducted with a chaperone present.   Constitutional:       Appearance: He is obese.   HENT:      Head: Normocephalic.      Comments: Bilateral black eyes  No nasal deviation  Eyes:      Pupils: Pupils are equal, round, and reactive to light.   Cardiovascular:      Rate and Rhythm: Normal rate and regular rhythm.   Pulmonary:      Effort: Pulmonary effort is normal.      Breath sounds: Normal breath sounds.   Abdominal:      General: Bowel sounds are normal.      Comments: Umbilical hernia present easily reducible nontender   Skin:     Capillary Refill: Capillary refill takes less than 2 seconds.      Comments: Multi all abrasions consistent with fall   Neurological:      General: No focal deficit present.      Mental Status: He is alert. Mental status is at baseline.         CRANIAL NERVES     CN III, IV, VI   Pupils are equal, round, and reactive to light.     Significant Labs: All pertinent labs within the past 24 hours have been reviewed.    Significant Imaging: I have reviewed all pertinent imaging  results/findings within the past 24 hours.    Assessment/Plan:     * Rhabdomyolysis  Reason for admission  CK noted to be elevated  Probably due to fall and lying on the floor for hours  IV hydration      Hyperlipidemia  Obtain fasting lipid panel  Treat as indicated      Type 2 diabetes mellitus with hyperglycemia, without long-term current use of insulin  Patient's FSGs are uncontrolled due to hyperglycemia on current medication regimen.  Last A1c reviewed-   Lab Results   Component Value Date    HGBA1C 11.6 (H) 07/18/2022     Most recent fingerstick glucose reviewed- No results for input(s): POCTGLUCOSE in the last 24 hours.  Current correctional scale  Medium  Increase anti-hyperglycemic dose as follows-   Antihyperglycemics (From admission, onward)    None        Hold Oral hypoglycemics while patient is in the hospital.    Essential hypertension  Monitor and treat        VTE Risk Mitigation (From admission, onward)    None             Shawn Morin MD  Department of Hospital Medicine   [facility] - Emergency Dept

## 2022-12-12 NOTE — ED NOTES
Mupirocin applied to abrasions and covered with non-adherent bandages then wrapped with kerlix as instructed by MD.

## 2022-12-12 NOTE — HPI
Patient is a 62-year-old male, who presents emergency room complaint both sides hurting.  Patient fell after tripping on the sidewalk 0.4 hours ago.  He denies dizziness chest pain or shortness for breath.  His recent history of ablation for atrial fibrillation 1 month ago.  He denies palpitations.  He has history of myocardial infarction in the past.  He denies any nausea vomiting.  He states that he is laid on the floor for the last 24 hours.  On exam, patient bilateral black eyes and skin abrasions consistent with fall.  Nasal fracture is noted on CT scan.  Recommend ENT to evaluate.    Plan for admission and IV hydration for rhabdomyolysis.  Will continue telemetry monitoring to ensure cardiac rhythm stable    Trip and fall no evidence for coronary or neurologic findings for etiology of fall

## 2022-12-13 LAB
ANION GAP SERPL CALC-SCNC: 10 MMOL/L (ref 8–16)
APTT PPP: 35.5 SEC (ref 23.3–35.1)
BASOPHILS # BLD AUTO: 0.04 K/UL (ref 0–0.2)
BASOPHILS NFR BLD: 0.4 % (ref 0–1.9)
BUN SERPL-MCNC: 20 MG/DL (ref 8–23)
CALCIUM SERPL-MCNC: 8.4 MG/DL (ref 8.7–10.5)
CHLORIDE SERPL-SCNC: 103 MMOL/L (ref 95–110)
CK SERPL-CCNC: 697 U/L (ref 20–200)
CO2 SERPL-SCNC: 25 MMOL/L (ref 23–29)
CREAT SERPL-MCNC: 0.8 MG/DL (ref 0.5–1.4)
DIFFERENTIAL METHOD: ABNORMAL
EOSINOPHIL # BLD AUTO: 0.3 K/UL (ref 0–0.5)
EOSINOPHIL NFR BLD: 3.3 % (ref 0–8)
ERYTHROCYTE [DISTWIDTH] IN BLOOD BY AUTOMATED COUNT: 14.6 % (ref 11.5–14.5)
EST. GFR  (NO RACE VARIABLE): >60 ML/MIN/1.73 M^2
GLUCOSE SERPL-MCNC: 148 MG/DL (ref 70–110)
GLUCOSE SERPL-MCNC: 162 MG/DL (ref 70–110)
GLUCOSE SERPL-MCNC: 164 MG/DL (ref 70–110)
GLUCOSE SERPL-MCNC: 180 MG/DL (ref 70–110)
GLUCOSE SERPL-MCNC: 251 MG/DL (ref 70–110)
HCT VFR BLD AUTO: 42.6 % (ref 40–54)
HGB BLD-MCNC: 13.7 G/DL (ref 14–18)
IMM GRANULOCYTES # BLD AUTO: 0.05 K/UL (ref 0–0.04)
IMM GRANULOCYTES NFR BLD AUTO: 0.5 % (ref 0–0.5)
LYMPHOCYTES # BLD AUTO: 1.2 K/UL (ref 1–4.8)
LYMPHOCYTES NFR BLD: 12.5 % (ref 18–48)
MAGNESIUM SERPL-MCNC: 2.3 MG/DL (ref 1.6–2.6)
MCH RBC QN AUTO: 28.4 PG (ref 27–31)
MCHC RBC AUTO-ENTMCNC: 32.2 G/DL (ref 32–36)
MCV RBC AUTO: 88 FL (ref 82–98)
MONOCYTES # BLD AUTO: 0.9 K/UL (ref 0.3–1)
MONOCYTES NFR BLD: 9.8 % (ref 4–15)
NEUTROPHILS # BLD AUTO: 6.8 K/UL (ref 1.8–7.7)
NEUTROPHILS NFR BLD: 73.5 % (ref 38–73)
NRBC BLD-RTO: 0 /100 WBC
PLATELET # BLD AUTO: 174 K/UL (ref 150–450)
PMV BLD AUTO: 10.9 FL (ref 9.2–12.9)
POTASSIUM SERPL-SCNC: 4.3 MMOL/L (ref 3.5–5.1)
RBC # BLD AUTO: 4.83 M/UL (ref 4.6–6.2)
SODIUM SERPL-SCNC: 138 MMOL/L (ref 136–145)
WBC # BLD AUTO: 9.26 K/UL (ref 3.9–12.7)

## 2022-12-13 PROCEDURE — 85025 COMPLETE CBC W/AUTO DIFF WBC: CPT | Performed by: INTERNAL MEDICINE

## 2022-12-13 PROCEDURE — 85730 THROMBOPLASTIN TIME PARTIAL: CPT | Performed by: ANESTHESIOLOGY

## 2022-12-13 PROCEDURE — 97535 SELF CARE MNGMENT TRAINING: CPT

## 2022-12-13 PROCEDURE — 97161 PT EVAL LOW COMPLEX 20 MIN: CPT

## 2022-12-13 PROCEDURE — 97116 GAIT TRAINING THERAPY: CPT

## 2022-12-13 PROCEDURE — 25000003 PHARM REV CODE 250: Performed by: INTERNAL MEDICINE

## 2022-12-13 PROCEDURE — 21400001 HC TELEMETRY ROOM

## 2022-12-13 PROCEDURE — 97165 OT EVAL LOW COMPLEX 30 MIN: CPT

## 2022-12-13 PROCEDURE — 36415 COLL VENOUS BLD VENIPUNCTURE: CPT | Performed by: ANESTHESIOLOGY

## 2022-12-13 PROCEDURE — 36415 COLL VENOUS BLD VENIPUNCTURE: CPT | Performed by: INTERNAL MEDICINE

## 2022-12-13 PROCEDURE — 82550 ASSAY OF CK (CPK): CPT | Performed by: INTERNAL MEDICINE

## 2022-12-13 PROCEDURE — 63600175 PHARM REV CODE 636 W HCPCS: Performed by: INTERNAL MEDICINE

## 2022-12-13 PROCEDURE — 83735 ASSAY OF MAGNESIUM: CPT | Performed by: INTERNAL MEDICINE

## 2022-12-13 PROCEDURE — 80048 BASIC METABOLIC PNL TOTAL CA: CPT | Performed by: INTERNAL MEDICINE

## 2022-12-13 RX ORDER — MUPIROCIN 20 MG/G
OINTMENT TOPICAL DAILY
Status: DISCONTINUED | OUTPATIENT
Start: 2022-12-13 | End: 2022-12-14 | Stop reason: HOSPADM

## 2022-12-13 RX ADMIN — HYDROCODONE BITARTRATE AND ACETAMINOPHEN 1 TABLET: 5; 325 TABLET ORAL at 08:12

## 2022-12-13 RX ADMIN — MORPHINE SULFATE 5 MG: 10 INJECTION INTRAVENOUS at 05:12

## 2022-12-13 RX ADMIN — APIXABAN 5 MG: 5 TABLET, FILM COATED ORAL at 08:12

## 2022-12-13 RX ADMIN — AMIODARONE HYDROCHLORIDE 200 MG: 200 TABLET ORAL at 08:12

## 2022-12-13 RX ADMIN — LISINOPRIL 20 MG: 20 TABLET ORAL at 08:12

## 2022-12-13 RX ADMIN — INSULIN ASPART 3 UNITS: 100 INJECTION, SOLUTION INTRAVENOUS; SUBCUTANEOUS at 12:12

## 2022-12-13 RX ADMIN — DILTIAZEM HYDROCHLORIDE 240 MG: 120 CAPSULE, COATED, EXTENDED RELEASE ORAL at 08:12

## 2022-12-13 RX ADMIN — PANTOPRAZOLE SODIUM 40 MG: 40 TABLET, DELAYED RELEASE ORAL at 05:12

## 2022-12-13 RX ADMIN — MUPIROCIN: 20 OINTMENT TOPICAL at 08:12

## 2022-12-13 RX ADMIN — HYDROCODONE BITARTRATE AND ACETAMINOPHEN 1 TABLET: 5; 325 TABLET ORAL at 06:12

## 2022-12-13 NOTE — PT/OT/SLP EVAL
Occupational Therapy   Evaluation    Name: Donald Frey  MRN: [id number]  Admitting Diagnosis: Rhabdomyolysis  Recent Surgery: * No surgery found *      Recommendations:     Discharge Recommendations: home health OT  Discharge Equipment Recommendations:  walker, rolling, shower chair  Barriers to discharge:  None    Assessment:     Donald Frey is a 62 y.o. male with a medical diagnosis of Rhabdomyolysis.  Pt agreeable to OT evaluation this AM. Performance deficits affecting function: weakness, impaired endurance, impaired self care skills, impaired functional mobility, gait instability, impaired balance, decreased safety awareness, pain, impaired cardiopulmonary response to activity.  Pt limited this date due to pain.    Rehab Prognosis: Fair; patient would benefit from acute skilled OT services to address these deficits and reach maximum level of function.       Plan:     Patient to be seen 5 x/week to address the above listed problems via self-care/home management, therapeutic activities, therapeutic exercises  Plan of Care Expires: 01/13/23  Plan of Care Reviewed with: patient    Subjective     Chief Complaint: pain, mostly at left flank  Patient/Family Comments/goals: to return home    Occupational Profile:  Living Environment: Pt lives with wife in a 1 story home with no steps to enter. Pt has a walk-in shower and raised toilets.  Previous level of function: Independent with ADLs, IADLs, and functional mobility. Pt does the cooking and shares responsibility of cleaning with wife.  Roles and Routines: ; retired; drives  Equipment Used at Home: none  Assistance upon Discharge: yes, from wife    Pain/Comfort:  Pain Rating 1:  (not rated)  Location - Side 1: Left  Location 1: flank  Pain Addressed 1: Reposition, Distraction, Cessation of Activity    Patients cultural, spiritual, Yazidism conflicts given the current situation:      Objective:     Communicated with: nursing prior to session.  Patient  found sitting edge of bed with peripheral IV, telemetry upon OT entry to room.    General Precautions: Standard, fall  Orthopedic Precautions: N/A  Braces: N/A  Respiratory Status: Room air    Occupational Performance:    Bed Mobility:    Patient completed Sit to Supine with moderate assistance due to pain    Functional Mobility/Transfers:  Patient completed Sit <> Stand Transfer with minimum assistance and of 2 persons  with  rolling walker ; pt unable to come to complete stand due to onset of pain    Activities of Daily Living:  Feeding:  independence    Grooming: stand by assistance seated EOB to wash face  Lower Body Dressing: total assistance for socks at this time due to pain    Cognitive/Visual Perceptual:  Cognitive/Psychosocial Skills:     -       Oriented to: Person, Place, Time, and Situation   -       Follows Commands/attention:Follows one-step commands  -       Communication: clear/fluent  -       Memory: No Deficits noted  -       Safety awareness/insight to disability: impaired   -       Mood/Affect/Coping skills/emotional control: Appropriate to situation, Cooperative, and Pleasant    Physical Exam:  Balance:    -       SBA seated balance; Min A standing balance  Upper Extremity Range of Motion:     -       Right Upper Extremity: WFL  -       Left Upper Extremity: WFL  Upper Extremity Strength:    -       Right Upper Extremity: WFL  -       Left Upper Extremity: WFL   Strength:    -       Right Upper Extremity: WFL  -       Left Upper Extremity: WFL  Fine Motor Coordination:    -       Intact  Gross motor coordination: WFL    AMPAC 6 Click ADL:  AMPAC Total Score: 18    Treatment & Education:  Pt educated on role of OT/POC, importance of OOB/EOB activity, use of call bell, and safety during ADLs, transfers, and functional mobility.    Patient left HOB elevated with all lines intact, call button in reach, and RN notified    GOALS:   Multidisciplinary Problems       Occupational Therapy Goals           Problem: Occupational Therapy    Goal Priority Disciplines Outcome Interventions   Occupational Therapy Goal     OT, PT/OT     Description: Goals to be met by: 1/13/23     Patient will increase functional independence with ADLs by performing:    UE Dressing with Supervision.  LE Dressing with Supervision.  Grooming while standing at sink with Supervision.  Toileting from bedside commode with Supervision for hygiene and clothing management.   Toilet transfer to bedside commode with Supervision.                         History:     Past Medical History:   Diagnosis Date    Diabetes mellitus, type 2     Hypertension     Myocardial infarction 2013         Past Surgical History:   Procedure Laterality Date    ABLATION OF ARRHYTHMOGENIC FOCUS FOR ATRIAL FIBRILLATION N/A 7/7/2022    Procedure: ABLATION, ARRHYTHMOGENIC FOCUS, FOR ATRIAL FIBRILLATION;  Surgeon: Niko Pacheco MD;  Location: [facility] EP LAB;  Service: Cardiology;  Laterality: N/A;  AF, ARMANDO(Cx if SR), PVI, RFA, CARTO, GEN, GP, 3 PREP    CORONARY STENT PLACEMENT  2013    TRANSESOPHAGEAL ECHOCARDIOGRAPHY N/A 7/7/2022    Procedure: ECHOCARDIOGRAM, TRANSESOPHAGEAL;  Surgeon: Conor Chappell MD;  Location: [facility] EP LAB;  Service: Cardiology;  Laterality: N/A;    TREATMENT OF CARDIAC ARRHYTHMIA N/A 3/7/2022    Procedure: CARDIOVERSION;  Surgeon: Gomez Orellana MD;  Location: [facility] CATH/EP LAB;  Service: Cardiology;  Laterality: N/A;    TREATMENT OF CARDIAC ARRHYTHMIA  7/7/2022    Procedure: Cardioversion or Defibrillation;  Surgeon: Conor Chappell MD;  Location: [facility] EP LAB;  Service: Cardiology;;       Time Tracking:     OT Date of Treatment: 12/13/22  OT Start Time: 0907  OT Stop Time: 0922  OT Total Time (min): 15 min    Billable Minutes:Evaluation 7  Self Care/Home Management 8    12/13/2022

## 2022-12-13 NOTE — PT/OT/SLP EVAL
Physical Therapy Evaluation    Patient Name:  Donald Frey   MRN:  [id number]    Recommendations:     Discharge Recommendations: home health PT   Discharge Equipment Recommendations: walker, rolling   Barriers to discharge:  medical status    Assessment:     Donald Frey is a 62 y.o. male admitted with a medical diagnosis of Rhabdomyolysis.  He presents with the following impairments/functional limitations: weakness, impaired endurance, impaired self care skills, impaired functional mobility, gait instability, impaired balance, decreased safety awareness, decreased lower extremity function, pain, impaired cardiopulmonary response to activity.    Pt found sitting EOB. Pt found on O2 but tolerates session on RA. Pt reports pain around mid section but does not quantify. Sit to stand from elevated EOB with min A. Min A to achieve static standing balance. Ambulated 40 ft with RW and CGA. Educated on benefit of RW for mobility at home for increase safety and pain management    Rehab Prognosis: Fair; patient would benefit from acute skilled PT services to address these deficits and reach maximum level of function.    Recent Surgery: * No surgery found *      Plan:     During this hospitalization, patient to be seen 6 x/week to address the identified rehab impairments via gait training, therapeutic activities, therapeutic exercises, neuromuscular re-education and progress toward the following goals:    Plan of Care Expires:  01/13/23    Subjective     Chief Complaint: pain  Patient/Family Comments/goals: pain management  Pain/Comfort:  Pain Rating 1: other (see comments) (did not quantify)    Patients cultural, spiritual, Cheondoism conflicts given the current situation: no    Living Environment:  Pt lives with his wife (was also injured in the fall) in a one story home with no DANIEL. Retired No O2 at home.  Prior to admission, patients level of function was Independent.  Equipment used at home: none.  DME owned (not  currently used):  none .  Upon discharge, patient will have assistance from wife.    Objective:     Communicated with RN prior to session.  Patient found sitting edge of bed with peripheral IV, telemetry  upon PT entry to room.    General Precautions: Standard, fall  Orthopedic Precautions:N/A   Braces: N/A  Respiratory Status: Nasal cannula, flow 3 L/min    Exams:  Cognitive Exam:  Patient is oriented to Person, Place, Time, and Situation  RLE ROM: WFL  RLE Strength: 4+/5 throughout  LLE ROM: WFL  LLE Strength: 4+/5 throughout    Functional Mobility:  Transfers:     Sit to Stand:  minimum assistance and elevated EOB with rolling walker  Gait: 40 ft with RW and CGA      AM-PAC 6 CLICK MOBILITY  Total Score:17       Treatment & Education:  Pt educated on POC, discharge recommendation, importance of time OOB, proper form with transfers, benefit of RW, DME needs, need for assist with mobility, use of call bell to seek assistance as needed and fall prevention      Patient left sitting edge of bed with all lines intact and call button in reach.    GOALS:   Multidisciplinary Problems       Physical Therapy Goals          Problem: Physical Therapy    Goal Priority Disciplines Outcome Goal Variances Interventions   Physical Therapy Goal     PT, PT/OT Ongoing, Progressing     Description: Goals to be met by: 23     Patient will increase functional independence with mobility by performin. Supine to sit with Supervision  2. Sit to stand transfer with Supervision  3. Bed to chair transfer with Supervision using Rolling Walker  4. Gait  x 150 feet with Supervision using Rolling Walker.                              History:     Past Medical History:   Diagnosis Date    Diabetes mellitus, type 2     Hypertension     Myocardial infarction        Past Surgical History:   Procedure Laterality Date    ABLATION OF ARRHYTHMOGENIC FOCUS FOR ATRIAL FIBRILLATION N/A 2022    Procedure: ABLATION, ARRHYTHMOGENIC FOCUS, FOR  ATRIAL FIBRILLATION;  Surgeon: Niko Pacheco MD;  Location: [facility] EP LAB;  Service: Cardiology;  Laterality: N/A;  AF, ARMANDO(Cx if SR), PVI, RFA, CARTO, GEN, GP, 3 PREP    CORONARY STENT PLACEMENT  2013    TRANSESOPHAGEAL ECHOCARDIOGRAPHY N/A 7/7/2022    Procedure: ECHOCARDIOGRAM, TRANSESOPHAGEAL;  Surgeon: Conor Chappell MD;  Location: [facility] EP LAB;  Service: Cardiology;  Laterality: N/A;    TREATMENT OF CARDIAC ARRHYTHMIA N/A 3/7/2022    Procedure: CARDIOVERSION;  Surgeon: Gomez Orellana MD;  Location: [facility] CATH/EP LAB;  Service: Cardiology;  Laterality: N/A;    TREATMENT OF CARDIAC ARRHYTHMIA  7/7/2022    Procedure: Cardioversion or Defibrillation;  Surgeon: Conor Chappell MD;  Location: [facility] EP LAB;  Service: Cardiology;;       Time Tracking:     PT Received On: 12/13/22  PT Start Time: 0840     PT Stop Time: 0856  PT Total Time (min): 16 min     Billable Minutes: Evaluation 8 and Gait Training 8      12/13/2022

## 2022-12-14 ENCOUNTER — HOSPITAL ENCOUNTER (OUTPATIENT)
Dept: PREADMISSION TESTING | Facility: HOSPITAL | Age: 62
Discharge: HOME | End: 2022-12-14
Payer: COMMERCIAL

## 2022-12-14 VITALS
BODY MASS INDEX: 42.66 KG/M2 | DIASTOLIC BLOOD PRESSURE: 78 MMHG | TEMPERATURE: 98 F | RESPIRATION RATE: 16 BRPM | HEIGHT: 72 IN | WEIGHT: 315 LBS | SYSTOLIC BLOOD PRESSURE: 167 MMHG | HEART RATE: 86 BPM | OXYGEN SATURATION: 93 %

## 2022-12-14 PROBLEM — M62.82 RHABDOMYOLYSIS: Status: RESOLVED | Noted: 2022-12-12 | Resolved: 2022-12-14

## 2022-12-14 LAB
GLUCOSE SERPL-MCNC: 151 MG/DL (ref 70–110)
GLUCOSE SERPL-MCNC: 218 MG/DL (ref 70–110)

## 2022-12-14 PROCEDURE — 25000003 PHARM REV CODE 250: Performed by: INTERNAL MEDICINE

## 2022-12-14 PROCEDURE — 63600175 PHARM REV CODE 636 W HCPCS: Performed by: INTERNAL MEDICINE

## 2022-12-14 PROCEDURE — 97116 GAIT TRAINING THERAPY: CPT

## 2022-12-14 PROCEDURE — 97535 SELF CARE MNGMENT TRAINING: CPT

## 2022-12-14 RX ORDER — HYDROCODONE BITARTRATE AND ACETAMINOPHEN 5; 325 MG/1; MG/1
1 TABLET ORAL EVERY 6 HOURS PRN
Qty: 28 TABLET | Refills: 0 | Status: SHIPPED | OUTPATIENT
Start: 2022-12-14 | End: 2022-12-27 | Stop reason: SDUPTHER

## 2022-12-14 RX ORDER — HYDROCODONE BITARTRATE AND ACETAMINOPHEN 5; 325 MG/1; MG/1
1 TABLET ORAL ONCE
Status: COMPLETED | OUTPATIENT
Start: 2022-12-14 | End: 2022-12-14

## 2022-12-14 RX ADMIN — DILTIAZEM HYDROCHLORIDE 240 MG: 120 CAPSULE, COATED, EXTENDED RELEASE ORAL at 08:12

## 2022-12-14 RX ADMIN — PANTOPRAZOLE SODIUM 40 MG: 40 TABLET, DELAYED RELEASE ORAL at 06:12

## 2022-12-14 RX ADMIN — HYDROCODONE BITARTRATE AND ACETAMINOPHEN 1 TABLET: 5; 325 TABLET ORAL at 11:12

## 2022-12-14 RX ADMIN — AMIODARONE HYDROCHLORIDE 200 MG: 200 TABLET ORAL at 08:12

## 2022-12-14 RX ADMIN — MORPHINE SULFATE 5 MG: 10 INJECTION INTRAVENOUS at 01:12

## 2022-12-14 RX ADMIN — HYDROCODONE BITARTRATE AND ACETAMINOPHEN 1 TABLET: 5; 325 TABLET ORAL at 06:12

## 2022-12-14 RX ADMIN — APIXABAN 5 MG: 5 TABLET, FILM COATED ORAL at 08:12

## 2022-12-14 RX ADMIN — MUPIROCIN: 20 OINTMENT TOPICAL at 08:12

## 2022-12-14 RX ADMIN — LISINOPRIL 20 MG: 20 TABLET ORAL at 08:12

## 2022-12-14 NOTE — PT/OT/SLP PROGRESS
Physical Therapy Treatment    Patient Name:  Donald Frey   MRN:  [id number]    Recommendations:     Discharge Recommendations: home health PT  Discharge Equipment Recommendations: walker, rolling  Barriers to discharge:  medical status    Assessment:     Donald Frey is a 62 y.o. male admitted with a medical diagnosis of Rhabdomyolysis.  He presents with the following impairments/functional limitations: weakness, impaired endurance, impaired self care skills, impaired functional mobility, gait instability, impaired balance, decreased lower extremity function, decreased safety awareness, impaired cardiopulmonary response to activity.    Pt found in bed with HOB elevated. Pt reports improved pain but does not quantify. Surgical shoes for R foot due to toe fx/ applied shoe and educated on wearing a shoe on the other foot to improve balance and safety. Pt with improved transfer to CGA and improve ambulation distance to 80 ft with CGA and RW.     Rehab Prognosis: Fair; patient would benefit from acute skilled PT services to address these deficits and reach maximum level of function.    Recent Surgery: * No surgery found *      Plan:     During this hospitalization, patient to be seen 5 x/week to address the identified rehab impairments via gait training, therapeutic activities, therapeutic exercises, neuromuscular re-education and progress toward the following goals:    Plan of Care Expires:  01/14/23    Subjective     Chief Complaint: mild pain  Patient/Family Comments/goals: go home  Pain/Comfort:  Pain Rating 1: other (see comments) (did not quantify)      Objective:     Communicated with RN prior to session.  Patient found HOB elevated with peripheral IV, telemetry upon PT entry to room.     General Precautions: Standard, fall  Orthopedic Precautions: N/A  Braces: N/A  Respiratory Status: Room air     Functional Mobility:  Bed Mobility:     Supine to Sit: minimum assistance  Transfers:     Sit to Stand:  contact  guard assistance with rolling walker  Gait: 80 ft with RW and CGA      AM-PAC 6 CLICK MOBILITY  Turning over in bed (including adjusting bedclothes, sheets and blankets)?: 3  Sitting down on and standing up from a chair with arms (e.g., wheelchair, bedside commode, etc.): 3  Moving from lying on back to sitting on the side of the bed?: 3  Moving to and from a bed to a chair (including a wheelchair)?: 3  Need to walk in hospital room?: 3  Climbing 3-5 steps with a railing?: 3  Basic Mobility Total Score: 18       Treatment & Education:  Pt educated on POC, discharge recommendation, importance of time OOB, DME needs, beneifit of RW, pacing/energy conservation, need for assist with mobility, use of call bell to seek assistance as needed and fall prevention      Patient left sitting edge of bed with all lines intact and call button in reach..    GOALS:   Multidisciplinary Problems       Physical Therapy Goals          Problem: Physical Therapy    Goal Priority Disciplines Outcome Goal Variances Interventions   Physical Therapy Goal     PT, PT/OT Ongoing, Progressing     Description: Goals to be met by: 23     Patient will increase functional independence with mobility by performin. Supine to sit with Supervision  2. Sit to stand transfer with Supervision  3. Bed to chair transfer with Supervision using Rolling Walker  4. Gait  x 150 feet with Supervision using Rolling Walker.                              Time Tracking:     PT Received On: 22  PT Start Time: 859     PT Stop Time: 907  PT Total Time (min): 8 min     Billable Minutes: Gait Training 8    Treatment Type: Treatment  PT/PTA: PT     PTA Visit Number: 0     2022

## 2022-12-14 NOTE — PLAN OF CARE
[facility]  Discharge Final Note    Primary Care Provider: LEANNA Coello    Expected Discharge Date: 12/14/2022     met with Pt at bedside to confirm discharge plans. Pt verbalized plan to discharge home via family transport.  sent email to case  to aid in scheduling Pt's follow-up appointment. With permission from Shalini with [facility],  pulled a heavy duty rolling walker from the HME storage room and delivered to patient at bedside along with paper instructions and contact information for [facility].  Delivery ticket completed, signed by patient, and returned to HME storage room. Pt has no other needs to be addressed by case management. Pt cleared to discharge by case management.    Final Discharge Note (most recent)       Final Note - 12/14/22 1008          Final Note    Assessment Type Final Discharge Note     Anticipated Discharge Disposition Home or Self Care     What phone number can be called within the next 1-3 days to see how you are doing after discharge? [phone]     Hospital Resources/Appts/Education Provided Provided patient/caregiver with written discharge plan information;Appointments scheduled by Navigator/Coordinator        Post-Acute Status    [facility]/Auth obtained     Coverage Payor:  BLUE CROSS BLUE SHIELD - BLUE CONNECT     Discharge Delays None known at this time                     Important Message from Medicare             Contact Info       LEANNA Coello   Specialty: Family Medicine   Relationship: PCP - General    [facility]  [address]  [facility] [address]   Phone: [phone]       Next Steps: Schedule an appointment as soon as possible for a visit in 2 week(s)    Instructions: post hospital discharge follow up for fall    Car Mistry MD   Specialty: Otolaryngology    [facility] [facility] EAR, NOSE AND THROAT  [place]  [place] [address]   Phone: [phone]       Next Steps: Follow up    Instructions: As needed for nasal bone fracture

## 2022-12-14 NOTE — PT/OT/SLP PROGRESS
"Occupational Therapy   Treatment    Name: Donald Frey  MRN: [id number]  Admitting Diagnosis:  Rhabdomyolysis       Recommendations:     Discharge Recommendations: home health OT  Discharge Equipment Recommendations:  walker, rolling, shower chair  Barriers to discharge:  None    Assessment:     Donald Frey is a 62 y.o. male with a medical diagnosis of Rhabdomyolysis.  Pt agreeable to OT therapy session this AM. Performance deficits affecting function are weakness, impaired endurance, impaired self care skills, impaired functional mobility, gait instability, impaired balance, pain, decreased safety awareness, decreased upper extremity function, decreased lower extremity function.     Rehab Prognosis:  Good; patient would benefit from acute skilled OT services to address these deficits and reach maximum level of function.       Plan:     Patient to be seen 5 x/week to address the above listed problems via self-care/home management, therapeutic exercises, therapeutic activities  Plan of Care Expires: 01/13/23  Plan of Care Reviewed with: patient    Subjective     Pain/Comfort:  Pain Rating 1:  (not rated)  Location - Side 1: Left  Location 1: flank  Pain Addressed 1: Reposition, Distraction, Cessation of Activity    Objective:     Communicated with: nursing prior to session.  Patient found sitting edge of bed with telemetry, peripheral IV upon OT entry to room.    General Precautions: Standard, fall    Orthopedic Precautions:N/A  Braces: N/A  Respiratory Status: Room air     Occupational Performance:    Functional Mobility/Transfers:  Pt performed simulated toilet t/f from bed (simulated same height of toilet at home--due to toilet in bathroom too low) with SBA with RW.     Activities of Daily Living:  Lower Body Dressing: pt still with difficulty with LBD due to pain; OT offered to educate and demonstrate use of AD to assist with this task; pt declined and stated that his wife "would take care of " "it"    Treatment & Education:  Pt educated on role of OT/POC, importance of OOB/EOB activity, use of call bell, and safety during ADLs, transfers, and functional mobility.    Patient left sitting edge of bed with all lines intact and call button in reach; RN notified of patient asking for a urinal to take home for home use for during the night.    GOALS:   Multidisciplinary Problems       Occupational Therapy Goals          Problem: Occupational Therapy    Goal Priority Disciplines Outcome Interventions   Occupational Therapy Goal     OT, PT/OT     Description: Goals to be met by: 1/13/23     Patient will increase functional independence with ADLs by performing:    UE Dressing with Supervision.  LE Dressing with Supervision.  Grooming while standing at sink with Supervision.  Toileting from bedside commode with Supervision for hygiene and clothing management.   Toilet transfer to bedside commode with Supervision.                         Time Tracking:     OT Date of Treatment: 12/14/22  OT Start Time: 0914  OT Stop Time: 0922  OT Total Time (min): 8 min    Billable Minutes:Self Care/Home Management 8    OT/BARBARA: OT          12/14/2022  "

## 2022-12-14 NOTE — PROGRESS NOTES
[facility] Medicine  Progress Note    Patient name: Donald Frey  MRN: [id number]  Admit Date: 12/11/2022   LOS: 1 day     SUBJECTIVE:     Principal problem: Rhabdomyolysis    Interval History:  Patient admitted with rhabdomyolysis s/p mechanical fall.  Imaging significant for suspected nasal bridge fracture and right fifth metatarsal fracture.  CPK was 2000.  He was admitted on IVFs and CPK has decreased to 600 and renal function has remained normal.  He was seen by PT/OT.  HH has been ordered . A rolling walker has been ordered.  I have placed him on room air to see if he desaturates. His wife also fell and fell onto his chest and he has pain with inspiration.  Chest imaging did not reveal any fractures. He will need help getting out of his personal car and into his home and thus will try and coordinate this tomorrow with planned discharge for tomorrow if no acute issues.     Hospital Course:    Scheduled Meds:   amiodarone  200 mg Oral Daily    apixaban  5 mg Oral BID    diltiaZEM  240 mg Oral BID    lisinopriL  20 mg Oral Daily    mupirocin   Topical (Top) Daily    pantoprazole  40 mg Oral Before breakfast     Continuous Infusions:   sodium chloride 0.9% 125 mL/hr at 12/12/22 0830     PRN Meds:acetaminophen, dextrose 10%, dextrose 10%, glucagon (human recombinant), glucose, glucose, hydrALAZINE, HYDROcodone-acetaminophen, insulin aspart U-100, labetalol, melatonin, morphine, naloxone, ondansetron, polyethylene glycol, senna-docusate 8.6-50 mg, simethicone, sodium chloride 0.9%    Review of patient's allergies indicates:  No Known Allergies    Review of Systems: As per interval history    OBJECTIVE:     Vital Signs (Most Recent)  Temp: 98 °F (36.7 °C) (12/13/22 2010)  Pulse: 78 (12/13/22 2010)  Resp: 16 (12/13/22 1817)  BP: (!) 145/94 (12/13/22 2010)  SpO2: (!) 92 % (12/13/22 2010)    Vital Signs Range (Last 24H):  Temp:  [97.9 °F (36.6 °C)-98.9 °F (37.2 °C)]   Pulse:  [78-89]   Resp:   [16-20]   BP: (143-177)/(76-98)   SpO2:  [92 %-98 %]     I & O (Last 24H):  Intake/Output Summary (Last 24 hours) at 12/13/2022 2013  Last data filed at 12/13/2022 1640  Gross per 24 hour   Intake --   Output 1600 ml   Net -1600 ml       Physical Exam:    Vitals and nursing note reviewed.     Constitutional:       General: Not in acute distress.     Appearance: Well-developed.   HENT:      Head: Normocephalic   Abrasions to nose with swelling  Racoon eyes bilaterally  Eyes:      Pupils: Pupils are equal, round, and reactive to light.   Cardiovascular:      Rate and Rhythm: Regular rhythm.   Pulmonary:      Effort: Pulmonary effort is normal.      Breath sounds: Normal breath sounds. No wheezing.   Abdominal:      General: There is no distension.      Palpations: Abdomen is soft.      Tenderness: There is no abdominal tenderness. There is no guarding or rebound.   Musculoskeletal:         General: Normal range of motion.      Cervical back: Normal range of motion.   Skin:     Findings: No rash.   Abrasions from fall to extremities  Right foot wrapped  Neurological:      Mental Status: Alert and oriented to person, place, and time.      Cranial Nerves: No cranial nerve deficit.      Sensory: No sensory deficit.     Laboratory:  I have reviewed all pertinent lab results within the past 24 hours.  CBC:   Recent Labs   Lab 12/13/22  0339   WBC 9.26   RBC 4.83   HGB 13.7*   HCT 42.6      MCV 88   MCH 28.4   MCHC 32.2     CMP:   Recent Labs   Lab 12/11/22  2344 12/13/22  0339   * 162*   CALCIUM 9.0 8.4*   ALBUMIN 4.3  --    PROT 7.3  --     138   K 4.2 4.3   CO2 24 25    103   BUN 28* 20   CREATININE 1.1 0.8   ALKPHOS 76  --    ALT 63*  --    AST 71*  --    BILITOT 1.3*  --        Diagnostic Results:      ASSESSMENT/PLAN:         Active Hospital Problems    Diagnosis  POA    *Rhabdomyolysis [M62.82]  Yes    Hyperlipidemia [E78.5]  Yes    Essential hypertension [I10]  Yes    Type 2 diabetes  mellitus with hyperglycemia, without long-term current use of insulin [E11.65]  Yes      Resolved Hospital Problems   No resolved problems to display.         Plan:     -IVFs for rhabdo. CPK trended down.  Renal function is normal.  I have stopped IVFs.  -Pain control with po and IV narcotics  -Assessing on room air for continued oxygen needs  -PT/OT.  I have ordered HH PT/OT and rolling walker  -Will see if we can coordinate maybe the fire department to help him into his home versus other options.    -ISS. Accuchecks  -On apixaban.  Head CT with no bleed.  -Surgical boot ordered for acute right fifth toe fracture  -Discussed outpatient ENT follow up for nasal bridge fracture.        VTE Risk Mitigation (From admission, onward)           Ordered     apixaban tablet 5 mg  2 times daily         12/12/22 0916     IP VTE HIGH RISK PATIENT  Once         12/12/22 0829     Place sequential compression device  Until discontinued         12/12/22 0829                      Department Hospital Medicine  [facility]  Bryce Wick MD  Date of service: 12/13/2022

## 2022-12-15 NOTE — DISCHARGE SUMMARY
[facility] Medicine  Discharge Summary      Patient Name: Donald Frey  MRN: [id number]  DEIRDRE: [id number]  Patient Class: IP- Inpatient  Admission Date: 12/11/2022  Hospital Length of Stay: 2 days  Discharge Date and Time: 12/14/2022 12:09 PM  Attending Physician: No att. providers found   Discharging Provider: Bryce Wick MD  Primary Care Provider: LEANNA Coello    Primary Care Team: Networked reference to record PCT     HPI:   Patient is a 62-year-old male, who presents emergency room complaint both sides hurting.  Patient fell after tripping on the sidewalk 0.4 hours ago.  He denies dizziness chest pain or shortness for breath.  His recent history of ablation for atrial fibrillation 1 month ago.  He denies palpitations.  He has history of myocardial infarction in the past.  He denies any nausea vomiting.  He states that he is laid on the floor for the last 24 hours.  On exam, patient bilateral black eyes and skin abrasions consistent with fall.  Nasal fracture is noted on CT scan.  Recommend ENT to evaluate.    Plan for admission and IV hydration for rhabdomyolysis.  Will continue telemetry monitoring to ensure cardiac rhythm stable    Trip and fall no evidence for coronary or neurologic findings for etiology of fall      * No surgery found *      Hospital Course:    Patient admitted with rhabdomyolysis s/p mechanical fall.  Imaging significant for suspected nasal bridge fracture and right fifth metatarsal fracture.  CPK was 2000.  He was admitted on IVFs and CPK has decreased to 600 and renal function has remained normal.  He was seen by PT/OT.  HH has been ordered . A rolling walker has been ordered.  I have placed him on room air to see if he desaturates. His wife also fell and fell onto his chest and he has pain with inspiration.  Chest imaging did not reveal any fractures. He will need help getting out of his personal car and into his home and thus will try and  coordinate this tomorrow with planned discharge for tomorrow if no acute issues. 12/14 He is getting in and out of bed easier and did much better with PT today.  He no longer wishes for HH and he feels he will get out of the car and into his home without issue today.  I have provided Rx for pain medication.  He has been provided a surgical shoe to wear when ambulating given fifth toe fracture. ENT information provided in the event he wishes to follow up for his suspected nasal bridge fracture. Walker will be obtained.  Examined on day of discharge and alert, NAD, comfortable respirations on room air.  He does not require home oxygen.  Return precautions given.       Goals of Care Treatment Preferences:  Code Status: Full Code      Consults:   Consults (From admission, onward)        Status Ordering Provider     Inpatient consult to Registered Dietitian/Nutritionist  Once        Provider:  (Not yet assigned)    ARMEN Negron          No new Assessment & Plan notes have been filed under this hospital service since the last note was generated.  Service: Hospital Medicine    Final Active Diagnoses:    Diagnosis Date Noted POA    Hyperlipidemia [E78.5] 10/21/2020 Yes    Essential hypertension [I10] 10/28/2019 Yes    Type 2 diabetes mellitus with hyperglycemia, without long-term current use of insulin [E11.65] 10/28/2019 Yes      Problems Resolved During this Admission:    Diagnosis Date Noted Date Resolved POA    PRINCIPAL PROBLEM:  Rhabdomyolysis [M62.82] 12/12/2022 12/14/2022 Yes       Discharged Condition: good    Disposition: Home or Self Care    Follow Up:   Follow-up Information     LEANNA Coello. Schedule an appointment as soon as possible for a visit in 2 week(s).    Specialty: Family Medicine  Why: post hospital discharge follow up for fall  Contact information:  [address] [place] [address]  [address]  [facility] [address]  [phone]             Car Mistry MD Follow up.    Specialty:  "Otolaryngology  Why: As needed for nasal bone fracture  Contact information:  [address]  [facility] EAR, NOSE AND THROAT  [place] [address]  [phone]                       Patient Instructions:      WALKER FOR HOME USE     Order Specific Question Answer Comments   Type of Walker: Adult (5'4"-6'6")    With wheels? Yes    Height: 6' (1.829 m)    Weight: 140.6 kg (309 lb 15.5 oz)    Length of need (1-99 months): 99    Does patient have medical equipment at home? none    Please check all that apply: Patient's condition impairs ambulation.    Please check all that apply: Patient needs help to get in and out of chair.    Please check all that apply: Walker will be used for gait training.      WALKER FOR HOME USE     Order Specific Question Answer Comments   Type of Walker: Heavy duty (300+ lbs)    With wheels? Yes    Height: 6' (1.829 m)    Weight: 155.2 kg (342 lb 2.5 oz)    Length of need (1-99 months): 99    Does patient have medical equipment at home? none    Please check all that apply: Patient's condition impairs ambulation.    Please check all that apply: Patient needs help to get in and out of chair.    Please check all that apply: Walker will be used for gait training.      Diet Cardiac     Diet diabetic     Notify your health care provider if you experience any of the following:  severe uncontrolled pain     Notify your health care provider if you experience any of the following:  difficulty breathing or increased cough     Activity as tolerated       Significant Diagnostic Studies: Labs:   CMP   Recent Labs   Lab 12/13/22  0339      K 4.3      CO2 25   *   BUN 20   CREATININE 0.8   CALCIUM 8.4*   ANIONGAP 10    and CBC   Recent Labs   Lab 12/13/22  0339   WBC 9.26   HGB 13.7*   HCT 42.6          Pending Diagnostic Studies:     None         Medications:  Reconciled Home Medications:      Medication List      START taking these medications    HYDROcodone-acetaminophen " 5-325 mg per tablet  Commonly known as: NORCO  Take 1 tablet by mouth every 6 (six) hours as needed for Pain.        CHANGE how you take these medications    glipiZIDE 5 MG tablet  Commonly known as: GLUCOTROL  TAKE 1 TABLET DAILY WITH BREAKFAST  What changed: when to take this        CONTINUE taking these medications    amiodarone 200 MG Tab  Commonly known as: PACERONE  Take 1 tablet (200 mg total) by mouth once daily.     apixaban 5 mg Tab  Commonly known as: ELIQUIS  Take 1 tablet (5 mg total) by mouth 2 (two) times daily.     diltiaZEM 240 MG 24 hr capsule  Commonly known as: CARDIZEM CD  Take 1 capsule (240 mg total) by mouth 2 (two) times a day.     furosemide 20 MG tablet  Commonly known as: LASIX  Take 1 tablet (20 mg total) by mouth 2 (two) times daily.     lisinopriL 20 MG tablet  Commonly known as: PRINIVIL,ZESTRIL  Take 1 tablet (20 mg total) by mouth once daily.     metFORMIN 1000 MG tablet  Commonly known as: GLUCOPHAGE  TAKE 1 TABLET TWICE A DAY WITH MEALS     rosuvastatin 20 MG tablet  Commonly known as: CRESTOR  Take 20 mg by mouth once daily.     STEGLATRO 15 mg Tab  Generic drug: ertugliflozin  Take 1 tablet by mouth once daily.            Indwelling Lines/Drains at time of discharge:   Lines/Drains/Airways     None                 Time spent on the discharge of patient: 34 minutes         Bryce Wick MD  Department of Hospital Medicine  [facility]

## 2022-12-15 NOTE — HOSPITAL COURSE
Patient admitted with rhabdomyolysis s/p mechanical fall.  Imaging significant for suspected nasal bridge fracture and right fifth metatarsal fracture.  CPK was 2000.  He was admitted on IVFs and CPK has decreased to 600 and renal function has remained normal.  He was seen by PT/OT.  HH has been ordered . A rolling walker has been ordered.  I have placed him on room air to see if he desaturates. His wife also fell and fell onto his chest and he has pain with inspiration.  Chest imaging did not reveal any fractures. He will need help getting out of his personal car and into his home and thus will try and coordinate this tomorrow with planned discharge for tomorrow if no acute issues. 12/14 He is getting in and out of bed easier and did much better with PT today.  He no longer wishes for HH and he feels he will get out of the car and into his home without issue today.  I have provided Rx for pain medication.  He has been provided a surgical shoe to wear when ambulating given fifth toe fracture. ENT information provided in the event he wishes to follow up for his suspected nasal bridge fracture. Walker will be obtained.  Examined on day of discharge and alert, NAD, comfortable respirations on room air.  He does not require home oxygen.  Return precautions given.

## 2022-12-16 NOTE — PT/OT/SLP DISCHARGE
Occupational Therapy Discharge Summary    Donald Frey  MRN: [id number]   Principal Problem: Rhabdomyolysis      Patient Discharged from acute Occupational Therapy on 12/14/22.  Please refer to prior OT note dated 12/14/22 for functional status.    Assessment:      Patient appropriate for care in another setting.    Objective:     GOALS:   Multidisciplinary Problems       Occupational Therapy Goals          Problem: Occupational Therapy    Goal Priority Disciplines Outcome Interventions   Occupational Therapy Goal     OT, PT/OT     Description: Goals to be met by: 1/13/23     Patient will increase functional independence with ADLs by performing:    UE Dressing with Supervision.  LE Dressing with Supervision.  Grooming while standing at sink with Supervision.  Toileting from bedside commode with Supervision for hygiene and clothing management.   Toilet transfer to bedside commode with Supervision.                         Reasons for Discontinuation of Therapy Services  Transfer to alternate level of care.      Plan:     Patient Discharged to:  Home (HHOT recommended)    12/16/2022

## 2022-12-20 ENCOUNTER — TELEPHONE (OUTPATIENT)
Dept: SURGERY | Facility: CLINIC | Age: 62
End: 2022-12-20
Payer: COMMERCIAL

## 2022-12-20 NOTE — TELEPHONE ENCOUNTER
I contacted patient regarding his surgery scheduled on Thursday.December 22.  Dr Boothe feels it is in his best interest to postpone surgery in light of his recent hospitalization and elevated enzymes, he had an ablation for A-fib after seeing his cardiologist.  Recommend that he schedule with Dr Orellana for clearance and we can get him rescheduled after the first of the year.  Patient was disappointed but voiced understanding.  He will contact this office when he is ready to reschedule.

## 2022-12-21 ENCOUNTER — HOSPITAL ENCOUNTER (OUTPATIENT)
Dept: PREADMISSION TESTING | Facility: HOSPITAL | Age: 62
Discharge: HOME | End: 2022-12-21
Payer: COMMERCIAL

## 2022-12-27 ENCOUNTER — OFFICE VISIT (OUTPATIENT)
Dept: FAMILY MEDICINE | Facility: CLINIC | Age: 62
End: 2022-12-27
Payer: COMMERCIAL

## 2022-12-27 VITALS — SYSTOLIC BLOOD PRESSURE: 126 MMHG | DIASTOLIC BLOOD PRESSURE: 84 MMHG

## 2022-12-27 DIAGNOSIS — E78.5 HYPERLIPIDEMIA, UNSPECIFIED HYPERLIPIDEMIA TYPE: ICD-10-CM

## 2022-12-27 DIAGNOSIS — E11.65 TYPE 2 DIABETES MELLITUS WITH HYPERGLYCEMIA, WITHOUT LONG-TERM CURRENT USE OF INSULIN: Primary | ICD-10-CM

## 2022-12-27 DIAGNOSIS — I10 ESSENTIAL HYPERTENSION: ICD-10-CM

## 2022-12-27 DIAGNOSIS — R07.81 RIB PAIN: ICD-10-CM

## 2022-12-27 PROCEDURE — 3074F SYST BP LT 130 MM HG: CPT | Mod: CPTII,95,, | Performed by: NURSE PRACTITIONER

## 2022-12-27 PROCEDURE — 99214 OFFICE O/P EST MOD 30 MIN: CPT | Mod: 95,,, | Performed by: NURSE PRACTITIONER

## 2022-12-27 PROCEDURE — 1111F DSCHRG MED/CURRENT MED MERGE: CPT | Mod: CPTII,95,, | Performed by: NURSE PRACTITIONER

## 2022-12-27 PROCEDURE — 99214 PR OFFICE/OUTPT VISIT, EST, LEVL IV, 30-39 MIN: ICD-10-PCS | Mod: 95,,, | Performed by: NURSE PRACTITIONER

## 2022-12-27 PROCEDURE — 4010F PR ACE/ARB THEARPY RXD/TAKEN: ICD-10-PCS | Mod: CPTII,95,, | Performed by: NURSE PRACTITIONER

## 2022-12-27 PROCEDURE — 3074F PR MOST RECENT SYSTOLIC BLOOD PRESSURE < 130 MM HG: ICD-10-PCS | Mod: CPTII,95,, | Performed by: NURSE PRACTITIONER

## 2022-12-27 PROCEDURE — 1111F PR DISCHARGE MEDS RECONCILED W/ CURRENT OUTPATIENT MED LIST: ICD-10-PCS | Mod: CPTII,95,, | Performed by: NURSE PRACTITIONER

## 2022-12-27 PROCEDURE — 3066F PR DOCUMENTATION OF TREATMENT FOR NEPHROPATHY: ICD-10-PCS | Mod: CPTII,95,, | Performed by: NURSE PRACTITIONER

## 2022-12-27 PROCEDURE — 3079F PR MOST RECENT DIASTOLIC BLOOD PRESSURE 80-89 MM HG: ICD-10-PCS | Mod: CPTII,95,, | Performed by: NURSE PRACTITIONER

## 2022-12-27 PROCEDURE — 1160F RVW MEDS BY RX/DR IN RCRD: CPT | Mod: CPTII,95,, | Performed by: NURSE PRACTITIONER

## 2022-12-27 PROCEDURE — 3046F HEMOGLOBIN A1C LEVEL >9.0%: CPT | Mod: CPTII,95,, | Performed by: NURSE PRACTITIONER

## 2022-12-27 PROCEDURE — 1160F PR REVIEW ALL MEDS BY PRESCRIBER/CLIN PHARMACIST DOCUMENTED: ICD-10-PCS | Mod: CPTII,95,, | Performed by: NURSE PRACTITIONER

## 2022-12-27 PROCEDURE — 1159F PR MEDICATION LIST DOCUMENTED IN MEDICAL RECORD: ICD-10-PCS | Mod: CPTII,95,, | Performed by: NURSE PRACTITIONER

## 2022-12-27 PROCEDURE — 4010F ACE/ARB THERAPY RXD/TAKEN: CPT | Mod: CPTII,95,, | Performed by: NURSE PRACTITIONER

## 2022-12-27 PROCEDURE — 3061F PR NEG MICROALBUMINURIA RESULT DOCUMENTED/REVIEW: ICD-10-PCS | Mod: CPTII,95,, | Performed by: NURSE PRACTITIONER

## 2022-12-27 PROCEDURE — 3061F NEG MICROALBUMINURIA REV: CPT | Mod: CPTII,95,, | Performed by: NURSE PRACTITIONER

## 2022-12-27 PROCEDURE — 1159F MED LIST DOCD IN RCRD: CPT | Mod: CPTII,95,, | Performed by: NURSE PRACTITIONER

## 2022-12-27 PROCEDURE — 3079F DIAST BP 80-89 MM HG: CPT | Mod: CPTII,95,, | Performed by: NURSE PRACTITIONER

## 2022-12-27 PROCEDURE — 3046F PR MOST RECENT HEMOGLOBIN A1C LEVEL > 9.0%: ICD-10-PCS | Mod: CPTII,95,, | Performed by: NURSE PRACTITIONER

## 2022-12-27 PROCEDURE — 3066F NEPHROPATHY DOC TX: CPT | Mod: CPTII,95,, | Performed by: NURSE PRACTITIONER

## 2022-12-27 RX ORDER — HYDROCODONE BITARTRATE AND ACETAMINOPHEN 5; 325 MG/1; MG/1
1 TABLET ORAL EVERY 8 HOURS PRN
Qty: 20 TABLET | Refills: 0 | Status: SHIPPED | OUTPATIENT
Start: 2022-12-27 | End: 2023-05-17

## 2022-12-27 NOTE — PROGRESS NOTES
Subjective:        The chief complaint leading to consultation is: f/u DM, HTN   The patient location is:  Home  Visit type: Virtual visit with synchronous audio/video or audio only  This was a video visit in lieu of in-person visit due to the coronavirus emergency. Patient acknowledged and consented to the video visit encounter.     Donald is here for follow-up on diabetes, hypertension, and hyperlipidemia. Recheck of labs overdue since 10/22- pending.  Taking all meds as prescribed-denies any side effects from his current medication regimen.  He is finally out of A-fib, recent EP notes reviewed. He is trying to walk more and eat a healthier diet as well. He fell while walking a few weeks ago in a pot hole- was seen in ER and had numerous xrays and diagnosed with bruised ribs and rhabdo after fall. Feeling well at this time, still c/o pain in his rib area and cannot take NSAIDS d/t Eliquis. Blood pressures have been running below goal at home.    Diabetes  He presents for his follow-up diabetic visit. He has type 2 diabetes mellitus. No MedicAlert identification noted. The initial diagnosis of diabetes was made 9 years ago. His disease course has been worsening. Hypoglycemia symptoms include hunger and sleepiness. Pertinent negatives for hypoglycemia include no confusion, dizziness, headaches, mood changes, nervousness/anxiousness, pallor, seizures, speech difficulty, sweats or tremors. Associated symptoms include foot paresthesias. Pertinent negatives for diabetes include no blurred vision, no chest pain, no fatigue, no foot ulcerations, no polydipsia, no polyphagia, no polyuria, no visual change, no weakness and no weight loss. Hypoglycemia complications include hospitalization. Pertinent negatives for hypoglycemia complications include no blackouts, no nocturnal hypoglycemia, no required assistance and no required glucagon injection. Symptoms are worsening. Diabetic complications include heart disease,  impotence and peripheral neuropathy. Pertinent negatives for diabetic complications include no autonomic neuropathy, CVA, nephropathy, PVD or retinopathy. Risk factors for coronary artery disease include dyslipidemia, hypertension, obesity, diabetes mellitus, male sex and sedentary lifestyle. Current diabetic treatment includes oral agent (triple therapy) and diet. He is compliant with treatment most of the time. His weight is decreasing steadily. He is following a low salt and generally healthy diet. When asked about meal planning, he reported none. He has not had a previous visit with a dietitian. He rarely participates in exercise. He monitors blood glucose at home 1-2 x per week. Blood glucose monitoring compliance is inadequate. His home blood glucose trend is increasing steadily. His breakfast blood glucose range is generally >200 mg/dl. An ACE inhibitor/angiotensin II receptor blocker is being taken. He does not see a podiatrist.Eye exam is current.   Hypertension  This is a chronic problem. The current episode started more than 1 year ago. The problem has been waxing and waning since onset. The problem is controlled. Associated symptoms include malaise/fatigue. Pertinent negatives include no anxiety, blurred vision, chest pain, headaches, neck pain, palpitations, peripheral edema, shortness of breath or sweats. There are no associated agents to hypertension. Risk factors for coronary artery disease include male gender, obesity, dyslipidemia, diabetes mellitus and sedentary lifestyle. Past treatments include calcium channel blockers, ACE inhibitors, lifestyle changes and diuretics (Digoxin, amiodarone ). The current treatment provides moderate improvement. Compliance problems include diet and exercise.  There is no history of CVA, PVD or retinopathy. There is no history of chronic renal disease, sleep apnea or a thyroid problem.   Hyperlipidemia  This is a chronic problem. The current episode started more  than 1 year ago. The problem is controlled (trigs 216). Recent lipid tests were reviewed and are normal. Exacerbating diseases include diabetes and obesity. He has no history of chronic renal disease, hypothyroidism or liver disease. Factors aggravating his hyperlipidemia include beta blockers. Pertinent negatives include no chest pain, leg pain, myalgias or shortness of breath. Current antihyperlipidemic treatment includes statins and diet change. The current treatment provides moderate improvement of lipids. Compliance problems include adherence to exercise and adherence to diet.  Risk factors for coronary artery disease include hypertension, male sex, obesity, a sedentary lifestyle, dyslipidemia, family history and diabetes mellitus.     Past Surgical History:   Procedure Laterality Date    ABLATION OF ARRHYTHMOGENIC FOCUS FOR ATRIAL FIBRILLATION N/A 7/7/2022    Procedure: ABLATION, ARRHYTHMOGENIC FOCUS, FOR ATRIAL FIBRILLATION;  Surgeon: Niko Pacheco MD;  Location: [facility] EP LAB;  Service: Cardiology;  Laterality: N/A;  AF, ARMANDO(Cx if SR), PVI, RFA, CARTO, GEN, GP, 3 PREP    CORONARY STENT PLACEMENT  2013    TRANSESOPHAGEAL ECHOCARDIOGRAPHY N/A 7/7/2022    Procedure: ECHOCARDIOGRAM, TRANSESOPHAGEAL;  Surgeon: Conor Chappell MD;  Location: [facility] EP LAB;  Service: Cardiology;  Laterality: N/A;    TREATMENT OF CARDIAC ARRHYTHMIA N/A 3/7/2022    Procedure: CARDIOVERSION;  Surgeon: Gomez Orellana MD;  Location: [facility] CATH/EP LAB;  Service: Cardiology;  Laterality: N/A;    TREATMENT OF CARDIAC ARRHYTHMIA  7/7/2022    Procedure: Cardioversion or Defibrillation;  Surgeon: Conor Chappell MD;  Location: [facility] EP LAB;  Service: Cardiology;;     Past Medical History:   Diagnosis Date    Diabetes mellitus, type 2     Hypertension     Myocardial infarction 2013     Family History   Problem Relation Age of Onset    Heart attack Father     Cancer Father         prostate        Social History:   Marital Status:   Alcohol  History:  reports current alcohol use.  Tobacco History:  reports that he has never smoked. He has never used smokeless tobacco.  Drug History:  reports no history of drug use.    Review of patient's allergies indicates:  No Known Allergies    Current Outpatient Medications   Medication Sig Dispense Refill    amiodarone (PACERONE) 200 MG Tab Take 1 tablet (200 mg total) by mouth once daily. 30 tablet 11    apixaban (ELIQUIS) 5 mg Tab Take 1 tablet (5 mg total) by mouth 2 (two) times daily. 60 tablet 11    diltiaZEM (CARDIZEM CD) 240 MG 24 hr capsule Take 1 capsule (240 mg total) by mouth 2 (two) times a day. 60 capsule 11    ertugliflozin (STEGLATRO) 15 mg Tab Take 1 tablet by mouth once daily. 90 tablet 0    furosemide (LASIX) 20 MG tablet Take 1 tablet (20 mg total) by mouth 2 (two) times daily. 60 tablet 11    glipiZIDE (GLUCOTROL) 5 MG tablet TAKE 1 TABLET DAILY WITH BREAKFAST (Patient taking differently: Take 5 mg by mouth daily with breakfast.) 90 tablet 1    lisinopriL (PRINIVIL,ZESTRIL) 20 MG tablet Take 1 tablet (20 mg total) by mouth once daily. 90 tablet 3    metFORMIN (GLUCOPHAGE) 1000 MG tablet TAKE 1 TABLET TWICE A DAY WITH MEALS (Patient taking differently: Take 1,000 mg by mouth 2 (two) times daily with meals.) 180 tablet 1    rosuvastatin (CRESTOR) 20 MG tablet Take 20 mg by mouth once daily.      HYDROcodone-acetaminophen (NORCO) 5-325 mg per tablet Take 1 tablet by mouth every 8 (eight) hours as needed for Pain. 20 tablet 0     No current facility-administered medications for this visit.       Review of Systems   Constitutional:  Positive for malaise/fatigue. Negative for activity change, appetite change, chills, fatigue, fever, unexpected weight change and weight loss.   HENT:  Negative for congestion, hearing loss, rhinorrhea and trouble swallowing.    Eyes:  Negative for blurred vision, discharge and visual disturbance.   Respiratory:  Negative for cough, chest tightness, shortness of breath  and wheezing.    Cardiovascular:  Negative for chest pain, palpitations and leg swelling.   Gastrointestinal:  Negative for abdominal pain, blood in stool, constipation, diarrhea, nausea and vomiting.   Endocrine: Negative for cold intolerance, heat intolerance, polydipsia, polyphagia and polyuria.   Genitourinary:  Positive for impotence. Negative for difficulty urinating, dysuria, frequency, hematuria and urgency.   Musculoskeletal:  Positive for arthralgias. Negative for back pain, joint swelling, myalgias and neck pain.   Skin:  Negative for color change, pallor, rash and wound.   Neurological:  Positive for numbness (toes ). Negative for dizziness, tremors, seizures, speech difficulty, weakness and headaches.   Hematological:  Negative for adenopathy. Bruises/bleeds easily.   Psychiatric/Behavioral:  Negative for confusion and dysphoric mood. The patient is not nervous/anxious.        Objective:        Physical Exam:   Physical Exam  Constitutional:       General: He is not in acute distress.     Appearance: Normal appearance. He is obese. He is not ill-appearing.   Pulmonary:      Effort: Pulmonary effort is normal.   Musculoskeletal:      Cervical back: Normal range of motion.   Skin:     Coloration: Skin is not jaundiced or pale.   Neurological:      Mental Status: He is alert and oriented to person, place, and time.   Psychiatric:         Mood and Affect: Mood normal.         Behavior: Behavior normal.         Thought Content: Thought content normal.         Judgment: Judgment normal.            Assessment:       1. Type 2 diabetes mellitus with hyperglycemia, without long-term current use of insulin    2. Essential hypertension    3. Hyperlipidemia, unspecified hyperlipidemia type    4. Rib pain      Plan:   Type 2 diabetes mellitus with hyperglycemia, without long-term current use of insulin   -GET LABS; A1C goal below 7.0; recommended flu vaccine at pharmacy     Essential hypertension   -stable on  meds    Hyperlipidemia, unspecified hyperlipidemia type   -get labs     Rib pain  -     HYDROcodone-acetaminophen (NORCO) 5-325 mg per tablet; Take 1 tablet by mouth every 8 (eight) hours as needed for Pain.  Dispense: 20 tablet; Refill: 0   -unable to take NSAIDS d/t eliquis therapy; short one time refill for pain of Norco- use sparingly; then switch to Tylenol prn; ortho if pain continues     Follow up in about 3 months (around 3/27/2023) for diabetes, HTN.    Total time spent with patient: 30 minutes     Each patient to whom he or she provides medical services by telemedicine is:  (1) informed of the relationship between the physician and patient and the respective role of any other health care provider with respect to management of the patient; and (2) notified that he or she may decline to receive medical services by telemedicine and may withdraw from such care at any time.

## 2023-04-03 DIAGNOSIS — E11.9 TYPE 2 DIABETES MELLITUS WITHOUT COMPLICATION, WITHOUT LONG-TERM CURRENT USE OF INSULIN: ICD-10-CM

## 2023-04-03 RX ORDER — METFORMIN HYDROCHLORIDE 1000 MG/1
TABLET ORAL
Qty: 180 TABLET | Refills: 0 | Status: SHIPPED | OUTPATIENT
Start: 2023-04-03 | End: 2023-05-17 | Stop reason: SDUPTHER

## 2023-04-03 RX ORDER — GLIPIZIDE 5 MG/1
TABLET ORAL
Qty: 90 TABLET | Refills: 0 | Status: SHIPPED | OUTPATIENT
Start: 2023-04-03 | End: 2023-05-17 | Stop reason: SDUPTHER

## 2023-04-03 NOTE — TELEPHONE ENCOUNTER
Pt was supposed to have labs done; his A1C was high; need to be done before appt for further refills

## 2023-04-04 NOTE — TELEPHONE ENCOUNTER
Tried calling patient regarding labs needed. Received voice mal. Left message with call back number.

## 2023-05-15 ENCOUNTER — LAB VISIT (OUTPATIENT)
Dept: LAB | Facility: HOSPITAL | Age: 63
End: 2023-05-15
Attending: NURSE PRACTITIONER
Payer: MEDICARE

## 2023-05-15 DIAGNOSIS — E78.5 HYPERLIPIDEMIA, UNSPECIFIED HYPERLIPIDEMIA TYPE: ICD-10-CM

## 2023-05-15 DIAGNOSIS — E11.65 TYPE 2 DIABETES MELLITUS WITH HYPERGLYCEMIA, WITHOUT LONG-TERM CURRENT USE OF INSULIN: ICD-10-CM

## 2023-05-15 LAB
ALBUMIN SERPL BCP-MCNC: 4.8 G/DL (ref 3.5–5.2)
ALP SERPL-CCNC: 60 U/L (ref 55–135)
ALT SERPL W/O P-5'-P-CCNC: 30 U/L (ref 10–44)
ANION GAP SERPL CALC-SCNC: 15 MMOL/L (ref 8–16)
AST SERPL-CCNC: 24 U/L (ref 10–40)
BILIRUB SERPL-MCNC: 1 MG/DL (ref 0.1–1)
BUN SERPL-MCNC: 26 MG/DL (ref 8–23)
CALCIUM SERPL-MCNC: 10.1 MG/DL (ref 8.7–10.5)
CHLORIDE SERPL-SCNC: 100 MMOL/L (ref 95–110)
CHOLEST SERPL-MCNC: 134 MG/DL (ref 120–199)
CHOLEST/HDLC SERPL: 3.3 {RATIO} (ref 2–5)
CO2 SERPL-SCNC: 27 MMOL/L (ref 23–29)
CREAT SERPL-MCNC: 1.4 MG/DL (ref 0.5–1.4)
EST. GFR  (NO RACE VARIABLE): 56.1 ML/MIN/1.73 M^2
GLUCOSE SERPL-MCNC: 230 MG/DL (ref 70–110)
HDLC SERPL-MCNC: 41 MG/DL (ref 40–75)
HDLC SERPL: 30.6 % (ref 20–50)
LDLC SERPL CALC-MCNC: 58.6 MG/DL (ref 63–159)
NONHDLC SERPL-MCNC: 93 MG/DL
POTASSIUM SERPL-SCNC: 4.5 MMOL/L (ref 3.5–5.1)
PROT SERPL-MCNC: 8.1 G/DL (ref 6–8.4)
SODIUM SERPL-SCNC: 142 MMOL/L (ref 136–145)
TRIGL SERPL-MCNC: 172 MG/DL (ref 30–150)

## 2023-05-15 PROCEDURE — 83036 HEMOGLOBIN GLYCOSYLATED A1C: CPT | Performed by: NURSE PRACTITIONER

## 2023-05-15 PROCEDURE — 36415 COLL VENOUS BLD VENIPUNCTURE: CPT | Performed by: NURSE PRACTITIONER

## 2023-05-15 PROCEDURE — 80061 LIPID PANEL: CPT | Performed by: NURSE PRACTITIONER

## 2023-05-15 PROCEDURE — 80053 COMPREHEN METABOLIC PANEL: CPT | Performed by: NURSE PRACTITIONER

## 2023-05-16 LAB
ESTIMATED AVG GLUCOSE: 194 MG/DL (ref 68–131)
HBA1C MFR BLD: 8.4 % (ref 4.5–6.2)

## 2023-05-17 ENCOUNTER — OFFICE VISIT (OUTPATIENT)
Dept: FAMILY MEDICINE | Facility: CLINIC | Age: 63
End: 2023-05-17
Payer: MEDICARE

## 2023-05-17 VITALS
DIASTOLIC BLOOD PRESSURE: 53 MMHG | RESPIRATION RATE: 20 BRPM | WEIGHT: 312.38 LBS | TEMPERATURE: 98 F | OXYGEN SATURATION: 97 % | BODY MASS INDEX: 42.31 KG/M2 | HEIGHT: 72 IN | HEART RATE: 95 BPM | SYSTOLIC BLOOD PRESSURE: 136 MMHG

## 2023-05-17 DIAGNOSIS — E78.5 HYPERLIPIDEMIA, UNSPECIFIED HYPERLIPIDEMIA TYPE: ICD-10-CM

## 2023-05-17 DIAGNOSIS — E66.01 CLASS 3 SEVERE OBESITY WITH SERIOUS COMORBIDITY AND BODY MASS INDEX (BMI) OF 40.0 TO 44.9 IN ADULT, UNSPECIFIED OBESITY TYPE: ICD-10-CM

## 2023-05-17 DIAGNOSIS — I10 ESSENTIAL HYPERTENSION: ICD-10-CM

## 2023-05-17 DIAGNOSIS — E11.65 TYPE 2 DIABETES MELLITUS WITH HYPERGLYCEMIA, WITHOUT LONG-TERM CURRENT USE OF INSULIN: Primary | ICD-10-CM

## 2023-05-17 DIAGNOSIS — Z12.5 SCREENING FOR PROSTATE CANCER: ICD-10-CM

## 2023-05-17 PROCEDURE — 99214 OFFICE O/P EST MOD 30 MIN: CPT | Mod: S$PBB,,, | Performed by: NURSE PRACTITIONER

## 2023-05-17 PROCEDURE — 99215 OFFICE O/P EST HI 40 MIN: CPT | Performed by: NURSE PRACTITIONER

## 2023-05-17 PROCEDURE — 99214 PR OFFICE/OUTPT VISIT, EST, LEVL IV, 30-39 MIN: ICD-10-PCS | Mod: S$PBB,,, | Performed by: NURSE PRACTITIONER

## 2023-05-17 RX ORDER — METFORMIN HYDROCHLORIDE 1000 MG/1
1000 TABLET ORAL 2 TIMES DAILY WITH MEALS
Qty: 180 TABLET | Refills: 1 | Status: SHIPPED | OUTPATIENT
Start: 2023-05-17 | End: 2024-02-07

## 2023-05-17 RX ORDER — GLIPIZIDE 5 MG/1
5 TABLET ORAL
Qty: 90 TABLET | Refills: 1 | Status: SHIPPED | OUTPATIENT
Start: 2023-05-17 | End: 2024-01-09 | Stop reason: SDUPTHER

## 2023-05-17 NOTE — PROGRESS NOTES
SUBJECTIVE:      Patient ID: Donald Frey is a 63 y.o. male.    Chief Complaint: Diabetes, Hypertension, and Hyperlipidemia    Donald is here for follow-up on diabetes, hypertension, and hyperlipidemia.  Recent lab work reviewed today with patient. Taking all meds as prescribed-denies any side effects from his current medication regimen.  A1c is down to 8.4-patient has been working hard to lose weight with healthy diet and exercise.  He is down 30 lb since our last visit.  Blood pressure is controlled today on current regimen.  He has a follow-up visit with his cardiologist in June.  Eye exam is due at this time and patient needs referral as he did change insurance plans.  Patient would like to change from Steglatro back to Jardiance- he feels it is not working as well to lower his blood sugar and since he changed insurance, he is able to afford Jardiance again.  He denies any medication side effects when he was taking Jardiance.     Diabetes  He presents for his follow-up diabetic visit. He has type 2 diabetes mellitus. No MedicAlert identification noted. The initial diagnosis of diabetes was made 9 years ago. His disease course has been worsening. Hypoglycemia symptoms include hunger and sleepiness. Pertinent negatives for hypoglycemia include no confusion, dizziness, headaches, mood changes, nervousness/anxiousness, pallor, seizures, speech difficulty, sweats or tremors. Pertinent negatives for diabetes include no blurred vision, no chest pain, no fatigue, no foot paresthesias, no foot ulcerations, no polydipsia, no polyphagia, no polyuria, no visual change, no weakness and no weight loss. Hypoglycemia complications include hospitalization. Pertinent negatives for hypoglycemia complications include no blackouts, no nocturnal hypoglycemia, no required assistance and no required glucagon injection. Symptoms are improving. Diabetic complications include heart disease, impotence and peripheral neuropathy.  Pertinent negatives for diabetic complications include no autonomic neuropathy, CVA, nephropathy, PVD or retinopathy. Risk factors for coronary artery disease include dyslipidemia, hypertension, obesity, diabetes mellitus, male sex and sedentary lifestyle. Current diabetic treatment includes diet and oral agent (triple therapy). He is compliant with treatment most of the time. His weight is decreasing steadily. He is following a low salt and generally healthy diet. Meal planning includes avoidance of concentrated sweets. He has not had a previous visit with a dietitian. Home blood sugar record trend: NOT CHECKING. An ACE inhibitor/angiotensin II receptor blocker is being taken. He does not see a podiatrist.Eye exam is not current.   Hypertension  This is a chronic problem. The current episode started more than 1 year ago. The problem has been waxing and waning since onset. The problem is controlled. Associated symptoms include malaise/fatigue. Pertinent negatives include no anxiety, blurred vision, chest pain, headaches, neck pain, palpitations, peripheral edema, shortness of breath or sweats. There are no associated agents to hypertension. Risk factors for coronary artery disease include male gender, obesity, dyslipidemia, diabetes mellitus and sedentary lifestyle. Past treatments include calcium channel blockers, ACE inhibitors, lifestyle changes and diuretics (Digoxin, amiodarone ). The current treatment provides moderate improvement. Compliance problems include diet and exercise.  There is no history of CVA, PVD or retinopathy. There is no history of chronic renal disease, sleep apnea or a thyroid problem.   Hyperlipidemia  This is a chronic problem. The current episode started more than 1 year ago. The problem is controlled (trigs 170). Recent lipid tests were reviewed and are normal. Exacerbating diseases include diabetes and obesity. He has no history of chronic renal disease, hypothyroidism or liver disease.  Factors aggravating his hyperlipidemia include beta blockers. Pertinent negatives include no chest pain, leg pain, myalgias or shortness of breath. Current antihyperlipidemic treatment includes statins and diet change. The current treatment provides moderate improvement of lipids. Compliance problems include adherence to exercise and adherence to diet.  Risk factors for coronary artery disease include hypertension, male sex, obesity, a sedentary lifestyle, dyslipidemia, family history and diabetes mellitus.     Family History   Problem Relation Age of Onset    Heart attack Father     Cancer Father         prostate      Social History     Socioeconomic History    Marital status:    Tobacco Use    Smoking status: Never    Smokeless tobacco: Never   Substance and Sexual Activity    Alcohol use: Yes     Comment: occ    Drug use: Never     Current Outpatient Medications   Medication Sig Dispense Refill    amiodarone (PACERONE) 200 MG Tab Take 1 tablet (200 mg total) by mouth once daily. 30 tablet 11    apixaban (ELIQUIS) 5 mg Tab Take 1 tablet (5 mg total) by mouth 2 (two) times daily. 60 tablet 11    diltiaZEM (CARDIZEM CD) 240 MG 24 hr capsule Take 1 capsule (240 mg total) by mouth 2 (two) times a day. 60 capsule 11    furosemide (LASIX) 20 MG tablet Take 1 tablet (20 mg total) by mouth 2 (two) times daily. 60 tablet 11    lisinopriL (PRINIVIL,ZESTRIL) 20 MG tablet Take 1 tablet (20 mg total) by mouth once daily. 90 tablet 3    rosuvastatin (CRESTOR) 20 MG tablet Take 20 mg by mouth once daily.      empagliflozin (JARDIANCE) 10 mg tablet Take 1 tablet (10 mg total) by mouth once daily. 30 tablet 2    glipiZIDE (GLUCOTROL) 5 MG tablet Take 1 tablet (5 mg total) by mouth daily with breakfast. 90 tablet 1    metFORMIN (GLUCOPHAGE) 1000 MG tablet Take 1 tablet (1,000 mg total) by mouth 2 (two) times daily with meals. 180 tablet 1     No current facility-administered medications for this visit.     Review of  patient's allergies indicates:  No Known Allergies   Past Medical History:   Diagnosis Date    Diabetes mellitus, type 2     Hypertension     Myocardial infarction 2013     Past Surgical History:   Procedure Laterality Date    ABLATION OF ARRHYTHMOGENIC FOCUS FOR ATRIAL FIBRILLATION N/A 7/7/2022    Procedure: ABLATION, ARRHYTHMOGENIC FOCUS, FOR ATRIAL FIBRILLATION;  Surgeon: Niko Pacheco MD;  Location: [facility] EP LAB;  Service: Cardiology;  Laterality: N/A;  AF, ARMANDO(Cx if SR), PVI, RFA, CARTO, GEN, GP, 3 PREP    CORONARY STENT PLACEMENT  2013    TRANSESOPHAGEAL ECHOCARDIOGRAPHY N/A 7/7/2022    Procedure: ECHOCARDIOGRAM, TRANSESOPHAGEAL;  Surgeon: Conor Chappell MD;  Location: [facility] EP LAB;  Service: Cardiology;  Laterality: N/A;    TREATMENT OF CARDIAC ARRHYTHMIA N/A 3/7/2022    Procedure: CARDIOVERSION;  Surgeon: Gomez Orellana MD;  Location: [facility] CATH/EP LAB;  Service: Cardiology;  Laterality: N/A;    TREATMENT OF CARDIAC ARRHYTHMIA  7/7/2022    Procedure: Cardioversion or Defibrillation;  Surgeon: Conor Chappell MD;  Location: [facility] EP LAB;  Service: Cardiology;;       Review of Systems   Constitutional:  Positive for malaise/fatigue. Negative for activity change, appetite change, chills, fatigue, fever, unexpected weight change and weight loss.   HENT:  Negative for congestion, hearing loss, rhinorrhea and trouble swallowing.    Eyes:  Negative for blurred vision, discharge and visual disturbance.   Respiratory:  Negative for cough, chest tightness, shortness of breath and wheezing.    Cardiovascular:  Negative for chest pain, palpitations and leg swelling.   Gastrointestinal:  Negative for abdominal pain, blood in stool, constipation, diarrhea, nausea and vomiting.   Endocrine: Negative for cold intolerance, heat intolerance, polydipsia, polyphagia and polyuria.   Genitourinary:  Positive for impotence. Negative for difficulty urinating, dysuria and frequency.   Musculoskeletal:  Negative for arthralgias,  back pain, joint swelling, myalgias and neck pain.   Skin:  Negative for color change, pallor, rash and wound.   Neurological:  Positive for numbness. Negative for dizziness, tremors, seizures, speech difficulty, weakness and headaches.   Hematological:  Negative for adenopathy. Bruises/bleeds easily.   Psychiatric/Behavioral:  Negative for confusion and dysphoric mood. The patient is not nervous/anxious.     OBJECTIVE:      Vitals:    05/17/23 1422 05/17/23 1425   BP: (!) 145/69 (!) 136/53   BP Location: Right arm Right arm   Patient Position: Sitting Sitting   BP Method: Large (Automatic) Large (Automatic)   Pulse: 95    Resp: 20    Temp: 98.2 °F (36.8 °C)    TempSrc: Oral    SpO2: 97%    Weight: (!) 141.7 kg (312 lb 6.4 oz)    Height: 6' (1.829 m)      Physical Exam  Vitals and nursing note reviewed.   Constitutional:       General: He is not in acute distress.     Appearance: Normal appearance. He is well-developed. He is obese. He is not ill-appearing or diaphoretic.      Comments: Morbid obesity    HENT:      Head: Normocephalic.      Nose: Nose normal.      Mouth/Throat:      Mouth: Mucous membranes are moist.   Eyes:      General: No scleral icterus.     Extraocular Movements: Extraocular movements intact.      Pupils: Pupils are equal, round, and reactive to light.   Neck:      Thyroid: No thyroid mass or thyromegaly.   Cardiovascular:      Rate and Rhythm: Normal rate and regular rhythm.      Heart sounds: Normal heart sounds. No murmur heard.  Pulmonary:      Effort: Pulmonary effort is normal. No respiratory distress.      Breath sounds: Normal breath sounds. No wheezing, rhonchi or rales.   Musculoskeletal:         General: Normal range of motion.      Cervical back: Normal range of motion and neck supple.      Right lower leg: No edema.      Left lower leg: No edema.   Lymphadenopathy:      Cervical: No cervical adenopathy.   Skin:     General: Skin is warm and dry.      Coloration: Skin is not  jaundiced or pale.   Neurological:      Mental Status: He is alert and oriented to person, place, and time.   Psychiatric:         Mood and Affect: Mood normal.         Behavior: Behavior normal.         Thought Content: Thought content normal.         Judgment: Judgment normal.      Assessment:       1. Type 2 diabetes mellitus with hyperglycemia, without long-term current use of insulin    2. Essential hypertension    3. Hyperlipidemia, unspecified hyperlipidemia type    4. Class 3 severe obesity with serious comorbidity and body mass index (BMI) of 40.0 to 44.9 in adult, unspecified obesity type        Plan:       Type 2 diabetes mellitus with hyperglycemia, without long-term current use of insulin  -     empagliflozin (JARDIANCE) 10 mg tablet; Take 1 tablet (10 mg total) by mouth once daily.  Dispense: 30 tablet; Refill: 2  -     glipiZIDE (GLUCOTROL) 5 MG tablet; Take 1 tablet (5 mg total) by mouth daily with breakfast.  Dispense: 90 tablet; Refill: 1  -     metFORMIN (GLUCOPHAGE) 1000 MG tablet; Take 1 tablet (1,000 mg total) by mouth 2 (two) times daily with meals.  Dispense: 180 tablet; Refill: 1  -     Ambulatory referral/consult to Ophthalmology; Future; Expected date: 05/24/2023  -A1C IS IMPROVING, down to 8.4.; switch to Jardiance 10 mg daily, stay hydrated and monitor kidney function; will increase to 25 mg if needed at next check or if sugars are high after 1 month; foot exam at follow-up; pneumonia vaccine at follow up     Essential hypertension   -stable, continue medications    Hyperlipidemia, unspecified hyperlipidemia type   -improving, continue medication, diet and wt loss    Class 3 severe obesity with serious comorbidity and body mass index (BMI) of 40.0 to 44.9 in adult, unspecified obesity type    I spent a total of 24 minutes on the day of the visit.This includes face to face time and non-face to face time preparing to see the patient (eg, review of tests), obtaining and/or reviewing  separately obtained history, documenting clinical information in the electronic or other health record, independently interpreting results and communicating results to the patient/family/caregiver, or care coordinator.     Follow up in about 3 months (around 8/17/2023) for diabetes, HTN.      5/17/2023 HONORIO Coello, FNP    This note was created using [employer] voice recognition software that occasionally misinterprets phrases or words.

## 2023-06-14 LAB
LEFT EYE DM RETINOPATHY: POSITIVE
RIGHT EYE DM RETINOPATHY: POSITIVE

## 2023-07-10 ENCOUNTER — TELEPHONE (OUTPATIENT)
Dept: FAMILY MEDICINE | Facility: CLINIC | Age: 63
End: 2023-07-10

## 2023-07-10 NOTE — TELEPHONE ENCOUNTER
Patient called stating he had an appointment with his cardiologist and his A1C was 9.2. He said when he was put on Jardiance it was unsure if it was enough. Patient is requesting that it be bumped up to 25 mg with a 90 prescription sent to [facility] on [address].  Please advise.

## 2023-07-11 DIAGNOSIS — E11.65 TYPE 2 DIABETES MELLITUS WITH HYPERGLYCEMIA, WITHOUT LONG-TERM CURRENT USE OF INSULIN: ICD-10-CM

## 2023-07-11 NOTE — TELEPHONE ENCOUNTER
Patient called stating he is out of his 30 day supply of Jardiance. He is still requesting 90 days of 25 mg  Last Office Visit  5/17/23  Next Office Visit  8/21/23

## 2023-07-11 NOTE — TELEPHONE ENCOUNTER
Attempted to call patient. Phone rang several times then just stopped ring, no person or voicemail picked up.

## 2023-07-12 ENCOUNTER — TELEPHONE (OUTPATIENT)
Dept: FAMILY MEDICINE | Facility: CLINIC | Age: 63
End: 2023-07-12

## 2023-07-12 ENCOUNTER — PATIENT MESSAGE (OUTPATIENT)
Dept: FAMILY MEDICINE | Facility: CLINIC | Age: 63
End: 2023-07-12

## 2023-07-12 NOTE — TELEPHONE ENCOUNTER
Will send a 90 day supply of the 10 mg if he would like, I am not increasing the medication until he comes to his follow-up visit; please remind patient to do his lab work before he comes to his appointment next month

## 2023-07-18 ENCOUNTER — HOSPITAL ENCOUNTER (EMERGENCY)
Facility: HOSPITAL | Age: 63
Discharge: HOME | End: 2023-07-18
Attending: STUDENT IN AN ORGANIZED HEALTH CARE EDUCATION/TRAINING PROGRAM
Payer: COMMERCIAL

## 2023-07-18 VITALS
SYSTOLIC BLOOD PRESSURE: 174 MMHG | BODY MASS INDEX: 40.43 KG/M2 | RESPIRATION RATE: 16 BRPM | HEIGHT: 74 IN | TEMPERATURE: 98 F | WEIGHT: 315 LBS | HEART RATE: 108 BPM | OXYGEN SATURATION: 95 % | DIASTOLIC BLOOD PRESSURE: 85 MMHG

## 2023-07-18 DIAGNOSIS — R73.9 HYPERGLYCEMIA: ICD-10-CM

## 2023-07-18 DIAGNOSIS — E86.0 DEHYDRATION: ICD-10-CM

## 2023-07-18 DIAGNOSIS — R00.2 PALPITATIONS: Primary | ICD-10-CM

## 2023-07-18 LAB
ALBUMIN SERPL BCP-MCNC: 4.6 G/DL (ref 3.5–5.2)
ALLENS TEST: ABNORMAL
ALP SERPL-CCNC: 68 U/L (ref 55–135)
ALT SERPL W/O P-5'-P-CCNC: 34 U/L (ref 10–44)
AMPHET+METHAMPHET UR QL: NEGATIVE
ANION GAP SERPL CALC-SCNC: 18 MMOL/L (ref 8–16)
AST SERPL-CCNC: 22 U/L (ref 10–40)
B-OH-BUTYR BLD STRIP-SCNC: 0.4 MMOL/L (ref 0–0.5)
BACTERIA #/AREA URNS HPF: NEGATIVE /HPF
BARBITURATES UR QL SCN>200 NG/ML: NEGATIVE
BASOPHILS # BLD AUTO: 0.06 K/UL (ref 0–0.2)
BASOPHILS NFR BLD: 0.8 % (ref 0–1.9)
BENZODIAZ UR QL SCN>200 NG/ML: NEGATIVE
BILIRUB SERPL-MCNC: 0.7 MG/DL (ref 0.1–1)
BILIRUB UR QL STRIP: NEGATIVE
BNP SERPL-MCNC: 44 PG/ML (ref 0–99)
BUN SERPL-MCNC: 25 MG/DL (ref 8–23)
BZE UR QL SCN: NEGATIVE
CALCIUM SERPL-MCNC: 9.7 MG/DL (ref 8.7–10.5)
CANNABINOIDS UR QL SCN: NEGATIVE
CHLORIDE SERPL-SCNC: 103 MMOL/L (ref 95–110)
CLARITY UR: CLEAR
CO2 SERPL-SCNC: 20 MMOL/L (ref 23–29)
COLOR UR: YELLOW
CREAT SERPL-MCNC: 1.1 MG/DL (ref 0.5–1.4)
CREAT SERPL-MCNC: 1.2 MG/DL (ref 0.5–1.4)
CREAT UR-MCNC: 91 MG/DL (ref 23–375)
DELSYS: ABNORMAL
DIFFERENTIAL METHOD: NORMAL
EOSINOPHIL # BLD AUTO: 0.1 K/UL (ref 0–0.5)
EOSINOPHIL NFR BLD: 1.4 % (ref 0–8)
ERYTHROCYTE [DISTWIDTH] IN BLOOD BY AUTOMATED COUNT: 14.1 % (ref 11.5–14.5)
EST. GFR  (NO RACE VARIABLE): >60 ML/MIN/1.73 M^2
ESTIMATED AVG GLUCOSE: 220 MG/DL (ref 68–131)
GLUCOSE SERPL-MCNC: 241 MG/DL (ref 70–110)
GLUCOSE SERPL-MCNC: 253 MG/DL (ref 70–110)
GLUCOSE SERPL-MCNC: 279 MG/DL (ref 70–110)
GLUCOSE UR QL STRIP: ABNORMAL
HBA1C MFR BLD: 9.3 % (ref 4.5–6.2)
HCO3 UR-SCNC: 25.7 MMOL/L (ref 24–28)
HCT VFR BLD AUTO: 47.1 % (ref 40–54)
HCT VFR BLD CALC: 45 %PCV (ref 36–54)
HGB BLD-MCNC: 16.3 G/DL (ref 14–18)
HGB UR QL STRIP: NEGATIVE
HYALINE CASTS #/AREA URNS LPF: 1 /LPF
IMM GRANULOCYTES # BLD AUTO: 0.03 K/UL (ref 0–0.04)
IMM GRANULOCYTES NFR BLD AUTO: 0.4 % (ref 0–0.5)
KETONES UR QL STRIP: ABNORMAL
LEUKOCYTE ESTERASE UR QL STRIP: NEGATIVE
LYMPHOCYTES # BLD AUTO: 3.4 K/UL (ref 1–4.8)
LYMPHOCYTES NFR BLD: 44.8 % (ref 18–48)
MAGNESIUM SERPL-MCNC: 1.5 MG/DL (ref 1.6–2.6)
MCH RBC QN AUTO: 28.8 PG (ref 27–31)
MCHC RBC AUTO-ENTMCNC: 34.6 G/DL (ref 32–36)
MCV RBC AUTO: 83 FL (ref 82–98)
MICROSCOPIC COMMENT: NORMAL
MODE: ABNORMAL
MONOCYTES # BLD AUTO: 0.6 K/UL (ref 0.3–1)
MONOCYTES NFR BLD: 7.6 % (ref 4–15)
NEUTROPHILS # BLD AUTO: 3.4 K/UL (ref 1.8–7.7)
NEUTROPHILS NFR BLD: 45 % (ref 38–73)
NITRITE UR QL STRIP: NEGATIVE
NRBC BLD-RTO: 0 /100 WBC
OPIATES UR QL SCN: NEGATIVE
PCO2 BLDA: 46.3 MMHG (ref 35–45)
PCP UR QL SCN>25 NG/ML: NEGATIVE
PH SMN: 7.35 [PH] (ref 7.35–7.45)
PH UR STRIP: 6 [PH] (ref 5–8)
PLATELET # BLD AUTO: 221 K/UL (ref 150–450)
PMV BLD AUTO: 10.8 FL (ref 9.2–12.9)
PO2 BLDA: 33 MMHG (ref 40–60)
POC BE: 0 MMOL/L
POC IONIZED CALCIUM: 1.22 MMOL/L (ref 1.06–1.42)
POC SATURATED O2: 60 % (ref 95–100)
POC TCO2: 27 MMOL/L (ref 24–29)
POTASSIUM BLD-SCNC: 4 MMOL/L (ref 3.5–5.1)
POTASSIUM SERPL-SCNC: 4 MMOL/L (ref 3.5–5.1)
PROT SERPL-MCNC: 7.9 G/DL (ref 6–8.4)
PROT UR QL STRIP: ABNORMAL
RBC # BLD AUTO: 5.66 M/UL (ref 4.6–6.2)
RBC #/AREA URNS HPF: 0 /HPF (ref 0–4)
SAMPLE: ABNORMAL
SAMPLE: NORMAL
SITE: ABNORMAL
SODIUM BLD-SCNC: 140 MMOL/L (ref 136–145)
SODIUM SERPL-SCNC: 141 MMOL/L (ref 136–145)
SP GR UR STRIP: 1.02 (ref 1–1.03)
SP02: 97
SQUAMOUS #/AREA URNS HPF: 0 /HPF
TOXICOLOGY INFORMATION: NORMAL
TROPONIN I SERPL HS-MCNC: 7 PG/ML (ref 0–14.9)
TSH SERPL DL<=0.005 MIU/L-ACNC: 4.24 UIU/ML (ref 0.34–5.6)
URN SPEC COLLECT METH UR: ABNORMAL
UROBILINOGEN UR STRIP-ACNC: NEGATIVE EU/DL
WBC # BLD AUTO: 7.64 K/UL (ref 3.9–12.7)
WBC #/AREA URNS HPF: 0 /HPF (ref 0–5)
YEAST URNS QL MICRO: NORMAL

## 2023-07-18 PROCEDURE — 82803 BLOOD GASES ANY COMBINATION: CPT

## 2023-07-18 PROCEDURE — 80307 DRUG TEST PRSMV CHEM ANLYZR: CPT | Performed by: STUDENT IN AN ORGANIZED HEALTH CARE EDUCATION/TRAINING PROGRAM

## 2023-07-18 PROCEDURE — 84443 ASSAY THYROID STIM HORMONE: CPT | Performed by: STUDENT IN AN ORGANIZED HEALTH CARE EDUCATION/TRAINING PROGRAM

## 2023-07-18 PROCEDURE — 96361 HYDRATE IV INFUSION ADD-ON: CPT

## 2023-07-18 PROCEDURE — 99900035 HC TECH TIME PER 15 MIN (STAT)

## 2023-07-18 PROCEDURE — 81001 URINALYSIS AUTO W/SCOPE: CPT | Mod: 59 | Performed by: STUDENT IN AN ORGANIZED HEALTH CARE EDUCATION/TRAINING PROGRAM

## 2023-07-18 PROCEDURE — 83880 ASSAY OF NATRIURETIC PEPTIDE: CPT | Performed by: STUDENT IN AN ORGANIZED HEALTH CARE EDUCATION/TRAINING PROGRAM

## 2023-07-18 PROCEDURE — 80053 COMPREHEN METABOLIC PANEL: CPT | Performed by: STUDENT IN AN ORGANIZED HEALTH CARE EDUCATION/TRAINING PROGRAM

## 2023-07-18 PROCEDURE — 25000003 PHARM REV CODE 250: Performed by: STUDENT IN AN ORGANIZED HEALTH CARE EDUCATION/TRAINING PROGRAM

## 2023-07-18 PROCEDURE — 96374 THER/PROPH/DIAG INJ IV PUSH: CPT

## 2023-07-18 PROCEDURE — 82962 GLUCOSE BLOOD TEST: CPT

## 2023-07-18 PROCEDURE — 36415 COLL VENOUS BLD VENIPUNCTURE: CPT | Performed by: STUDENT IN AN ORGANIZED HEALTH CARE EDUCATION/TRAINING PROGRAM

## 2023-07-18 PROCEDURE — 93005 ELECTROCARDIOGRAM TRACING: CPT | Performed by: INTERNAL MEDICINE

## 2023-07-18 PROCEDURE — 94761 N-INVAS EAR/PLS OXIMETRY MLT: CPT

## 2023-07-18 PROCEDURE — 83036 HEMOGLOBIN GLYCOSYLATED A1C: CPT | Performed by: STUDENT IN AN ORGANIZED HEALTH CARE EDUCATION/TRAINING PROGRAM

## 2023-07-18 PROCEDURE — 25000003 PHARM REV CODE 250

## 2023-07-18 PROCEDURE — 99285 EMERGENCY DEPT VISIT HI MDM: CPT | Mod: 25

## 2023-07-18 PROCEDURE — 83735 ASSAY OF MAGNESIUM: CPT | Performed by: STUDENT IN AN ORGANIZED HEALTH CARE EDUCATION/TRAINING PROGRAM

## 2023-07-18 PROCEDURE — 93010 ELECTROCARDIOGRAM REPORT: CPT | Mod: 76,,, | Performed by: INTERNAL MEDICINE

## 2023-07-18 PROCEDURE — 84484 ASSAY OF TROPONIN QUANT: CPT | Performed by: STUDENT IN AN ORGANIZED HEALTH CARE EDUCATION/TRAINING PROGRAM

## 2023-07-18 PROCEDURE — 63600175 PHARM REV CODE 636 W HCPCS: Performed by: STUDENT IN AN ORGANIZED HEALTH CARE EDUCATION/TRAINING PROGRAM

## 2023-07-18 PROCEDURE — 93010 EKG 12-LEAD: ICD-10-PCS | Mod: 76,,, | Performed by: INTERNAL MEDICINE

## 2023-07-18 PROCEDURE — 85025 COMPLETE CBC W/AUTO DIFF WBC: CPT | Performed by: STUDENT IN AN ORGANIZED HEALTH CARE EDUCATION/TRAINING PROGRAM

## 2023-07-18 PROCEDURE — 82010 KETONE BODYS QUAN: CPT | Performed by: STUDENT IN AN ORGANIZED HEALTH CARE EDUCATION/TRAINING PROGRAM

## 2023-07-18 RX ORDER — DILTIAZEM HYDROCHLORIDE 5 MG/ML
20 INJECTION INTRAVENOUS
Status: COMPLETED | OUTPATIENT
Start: 2023-07-18 | End: 2023-07-18

## 2023-07-18 RX ORDER — DILTIAZEM HYDROCHLORIDE 120 MG/1
240 CAPSULE, COATED, EXTENDED RELEASE ORAL
Status: COMPLETED | OUTPATIENT
Start: 2023-07-18 | End: 2023-07-18

## 2023-07-18 RX ORDER — DILTIAZEM HYDROCHLORIDE 5 MG/ML
INJECTION INTRAVENOUS
Status: COMPLETED
Start: 2023-07-18 | End: 2023-07-18

## 2023-07-18 RX ADMIN — SODIUM CHLORIDE, SODIUM LACTATE, POTASSIUM CHLORIDE, AND CALCIUM CHLORIDE 1000 ML: .6; .31; .03; .02 INJECTION, SOLUTION INTRAVENOUS at 03:07

## 2023-07-18 RX ADMIN — DILTIAZEM HYDROCHLORIDE 20 MG: 5 INJECTION INTRAVENOUS at 02:07

## 2023-07-18 RX ADMIN — SODIUM CHLORIDE, SODIUM LACTATE, POTASSIUM CHLORIDE, AND CALCIUM CHLORIDE 1000 ML: .6; .31; .03; .02 INJECTION, SOLUTION INTRAVENOUS at 02:07

## 2023-07-18 RX ADMIN — DILTIAZEM HYDROCHLORIDE 240 MG: 120 CAPSULE, COATED, EXTENDED RELEASE ORAL at 03:07

## 2023-07-18 NOTE — ED PROVIDER NOTES
Encounter Date: 7/18/2023       History     Chief Complaint   Patient presents with    Palpitations     X30min. Hx of Afib     63-year-old male with history of AFib, hypertension, diabetes, prior MI with stent presents for 30 minutes palpitations, acute onset, occurring while he was in bed.  He has a history of AFib with an ablation with Dr. Palma a year ago and has not had AFib since that time.  He denies any chest pain at this time.  He denies any shortness of breath.  Patient is on amiodarone, Eliquis, Cardizem, Lasix and lisinopril.    Review of patient's allergies indicates:  No Known Allergies  Past Medical History:   Diagnosis Date    Diabetes mellitus, type 2     Hypertension     Myocardial infarction 2013     Past Surgical History:   Procedure Laterality Date    ABLATION OF ARRHYTHMOGENIC FOCUS FOR ATRIAL FIBRILLATION N/A 7/7/2022    Procedure: ABLATION, ARRHYTHMOGENIC FOCUS, FOR ATRIAL FIBRILLATION;  Surgeon: Niko Pacheco MD;  Location: [facility] EP LAB;  Service: Cardiology;  Laterality: N/A;  AF, ARMANDO(Cx if SR), PVI, RFA, CARTO, GEN, GP, 3 PREP    CORONARY STENT PLACEMENT  2013    TRANSESOPHAGEAL ECHOCARDIOGRAPHY N/A 7/7/2022    Procedure: ECHOCARDIOGRAM, TRANSESOPHAGEAL;  Surgeon: Conor Chappell MD;  Location: [facility] EP LAB;  Service: Cardiology;  Laterality: N/A;    TREATMENT OF CARDIAC ARRHYTHMIA N/A 3/7/2022    Procedure: CARDIOVERSION;  Surgeon: Gomez Orellana MD;  Location: [facility] CATH/EP LAB;  Service: Cardiology;  Laterality: N/A;    TREATMENT OF CARDIAC ARRHYTHMIA  7/7/2022    Procedure: Cardioversion or Defibrillation;  Surgeon: Conor Chappell MD;  Location: [facility] EP LAB;  Service: Cardiology;;     Family History   Problem Relation Age of Onset    Heart attack Father     Cancer Father         prostate     Social History     Tobacco Use    Smoking status: Never    Smokeless tobacco: Never   Substance Use Topics    Alcohol use: Yes     Comment: occ    Drug use: Never     Review of Systems    Constitutional:  Negative for activity change and appetite change.   HENT:  Negative for congestion and drooling.    Eyes:  Negative for discharge and itching.   Respiratory:  Negative for cough and chest tightness.    Cardiovascular:  Positive for palpitations. Negative for chest pain and leg swelling.   Gastrointestinal:  Negative for abdominal distention and abdominal pain.   Genitourinary:  Negative for difficulty urinating and dysuria.   Musculoskeletal:  Negative for arthralgias.   Skin:  Negative for color change and pallor.   Neurological:  Negative for dizziness and facial asymmetry.   Psychiatric/Behavioral:  Negative for agitation and behavioral problems.      Physical Exam     Initial Vitals [07/18/23 0152]   BP Pulse Resp Temp SpO2   (!) 202/104 (!) 127 16 98.4 °F (36.9 °C) 96 %      MAP       --         Physical Exam    Nursing note and vitals reviewed.  Constitutional: He appears well-developed and well-nourished.   HENT:   Head: Normocephalic and atraumatic.   Mouth/Throat: Oropharynx is clear and moist.   Eyes: Conjunctivae and EOM are normal. Pupils are equal, round, and reactive to light.   Neck: No thyromegaly present.   Normal range of motion.  Cardiovascular:  Intact distal pulses.           Tachycardic   Pulmonary/Chest: Breath sounds normal. No respiratory distress. He has no wheezes.   Abdominal: Abdomen is soft. Bowel sounds are normal. He exhibits no distension. There is no abdominal tenderness.   Musculoskeletal:         General: No tenderness or edema. Normal range of motion.      Cervical back: Normal range of motion.     Neurological: He is alert and oriented to person, place, and time. He has normal strength. No cranial nerve deficit.   Skin: Skin is warm and dry. No rash noted.   Psychiatric: He has a normal mood and affect. His behavior is normal. Thought content normal.       ED Course   Procedures  Labs Reviewed   MAGNESIUM - Abnormal; Notable for the following components:        Result Value    Magnesium 1.5 (*)     All other components within normal limits   COMPREHENSIVE METABOLIC PANEL - Abnormal; Notable for the following components:    CO2 20 (*)     Glucose 253 (*)     BUN 25 (*)     Anion Gap 18 (*)     All other components within normal limits   URINALYSIS, REFLEX TO URINE CULTURE - Abnormal; Notable for the following components:    Protein, UA Trace (*)     Glucose, UA 4+ (*)     Ketones, UA 2+ (*)     All other components within normal limits    Narrative:     Specimen Source->Urine   POCT GLUCOSE - Abnormal; Notable for the following components:    POC Glucose 241 (*)     All other components within normal limits   CBC W/ AUTO DIFFERENTIAL   TROPONIN I HIGH SENSITIVITY   B-TYPE NATRIURETIC PEPTIDE   TSH   BETA - HYDROXYBUTYRATE, SERUM   DRUG SCREEN PANEL, URINE EMERGENCY    Narrative:     Specimen Source->Urine   URINALYSIS MICROSCOPIC    Narrative:     Specimen Source->Urine   HEMOGLOBIN A1C   HEMOGLOBIN A1C   POCT GLUCOSE MONITORING CONTINUOUS   POCT CREATININE     EKG Readings: (Independently Interpreted)   EKG shows sinus tachycardia, regular rhythm, no acute ST elevations or depressions, Q-wave in inferior leads consistent with prior.   ECG Results              EKG 12-lead (In process)  Result time 07/18/23 05:30:35      In process by Interface, Lab In [facility] (07/18/23 05:30:35)                   Narrative:    Test Reason : R07.9,    Vent. Rate : 125 BPM     Atrial Rate : 125 BPM     P-R Int : 128 ms          QRS Dur : 096 ms      QT Int : 370 ms       P-R-T Axes : 000 071 037 degrees     QTc Int : 534 ms    Sinus tachycardia  Low voltage QRS  Cannot rule out Anteroseptal infarct (cited on or before 20-JAN-2022)  Inferior injury pattern    ACUTE MI / STEMI    Consider right ventricular involvement in acute inferior infarct  Abnormal ECG  When compared with ECG of 26-OCT-2022 09:03,  CA interval has decreased  The axis Shifted right  ST elevation now present in Inferior  leads    Referred By: [email or web address]   SELF           Confirmed By:                                   Imaging Results              X-Ray Chest AP Portable (In process)                      Medications   diltiaZEM injection 20 mg (20 mg Intravenous Given 7/18/23 0204)   lactated ringers bolus 1,000 mL (0 mLs Intravenous Stopped 7/18/23 0316)   lactated ringers bolus 1,000 mL (0 mLs Intravenous Stopped 7/18/23 0504)   diltiaZEM 24 hr capsule 240 mg (240 mg Oral Given 7/18/23 0336)                            63-year-old male with history of hypertension, diabetes, AFib status post ablation presents for palpitations starting this evening.  He denies chest pain or shortness of breath.  Patient does report that he has been out of his Jardiance due to new insurance for the past 2 weeks.  Vitals notable for tachycardia, hypertension, other vitals within normal limits.  Lungs are clear to auscultation.  Chest x-ray without acute cardiopulmonary abnormality.  BNP within limits, doubt heart failure.  POCT glucose elevated 251, CMP notable for hyperglycemia with anion gap. TSH WNL. Drug screen negative. CBC notable for hemoconcentration.  Likely he is dehydrated, treated with 2 L IV fluids. Patient given his home diltiazem with improvement in symptoms, blood pressure and heart rate.  Suspect underlying medication noncompliance reason for his hypertension and tachycardia.  Patient feeling much better after IV fluids and wants to go home.  He is stable for discharge with outpatient follow-up and return precautions for worsening symptoms.  Advised that he increase his metformin to 1000 t.i.d. for the next few days until his Jardiance prescription is approved.  Patient reports having his other medications.    Clinical Impression:   Final diagnoses:  [R00.2] Palpitations (Primary)  [R73.9] Hyperglycemia  [E86.0] Dehydration        ED Disposition Condition    Discharge Stable          ED Prescriptions    None       Follow-up Information        Follow up With Specialties Details Why Contact Info    [facility] Call in 1 day To discuss recent ED visit, To set up a follow-up appointment [address] [address]  [phone]               Vidal Garza MD  07/18/23 0613

## 2023-07-18 NOTE — ED NOTES
Pt to ED for palpitations x1 hour ago, that started while at rest. Pt has hx of AF, had an ablation last July, on eliquis. Initial HR in the 130s, slightly hypertensive. Pt denies chest pain or SOB. Pt A&O, resps E/U, skin W/D, attached to monitoring devices, call light within reach, wife at bedside. MD at bedside for assessment upon pt arrival to room.

## 2023-07-18 NOTE — DISCHARGE INSTRUCTIONS

## 2023-07-18 NOTE — CARE UPDATE
07/18/23 0317   Patient Assessment/Suction   Level of Consciousness (AVPU) alert   Respiratory Effort Normal;Unlabored   Expansion/Accessory Muscles/Retractions no use of accessory muscles   PRE-TX-O2   Device (Oxygen Therapy) room air   SpO2 96 %   Pulse Oximetry Type Continuous   $ Pulse Oximetry - Multiple Charge Pulse Oximetry - Multiple   Pulse (!) 114   Resp 16   Labs   $ Was an ABG obtained? Venous Line;ISTAT - Blood gas   $ Labs Tech Time 15 min   Respiratory Evaluation   $ Care Plan Tech Time 15 min

## 2023-07-19 ENCOUNTER — PATIENT MESSAGE (OUTPATIENT)
Dept: FAMILY MEDICINE | Facility: CLINIC | Age: 63
End: 2023-07-19

## 2023-07-20 ENCOUNTER — PATIENT MESSAGE (OUTPATIENT)
Dept: FAMILY MEDICINE | Facility: CLINIC | Age: 63
End: 2023-07-20

## 2023-08-16 ENCOUNTER — LAB VISIT (OUTPATIENT)
Dept: LAB | Facility: HOSPITAL | Age: 63
End: 2023-08-16
Attending: NURSE PRACTITIONER
Payer: MEDICARE

## 2023-08-16 DIAGNOSIS — I10 ESSENTIAL HYPERTENSION: ICD-10-CM

## 2023-08-16 DIAGNOSIS — Z12.5 SCREENING FOR PROSTATE CANCER: ICD-10-CM

## 2023-08-16 DIAGNOSIS — E78.5 HYPERLIPIDEMIA, UNSPECIFIED HYPERLIPIDEMIA TYPE: ICD-10-CM

## 2023-08-16 DIAGNOSIS — E11.65 TYPE 2 DIABETES MELLITUS WITH HYPERGLYCEMIA, WITHOUT LONG-TERM CURRENT USE OF INSULIN: ICD-10-CM

## 2023-08-16 LAB
ALBUMIN SERPL BCP-MCNC: 4.3 G/DL (ref 3.5–5.2)
ALBUMIN/CREAT UR: 7.5 UG/MG (ref 0–30)
ALP SERPL-CCNC: 56 U/L (ref 55–135)
ALT SERPL W/O P-5'-P-CCNC: 24 U/L (ref 10–44)
ANION GAP SERPL CALC-SCNC: 13 MMOL/L (ref 8–16)
AST SERPL-CCNC: 19 U/L (ref 10–40)
BACTERIA #/AREA URNS HPF: NEGATIVE /HPF
BASOPHILS # BLD AUTO: 0.05 K/UL (ref 0–0.2)
BASOPHILS NFR BLD: 0.8 % (ref 0–1.9)
BILIRUB SERPL-MCNC: 1 MG/DL (ref 0.1–1)
BILIRUB UR QL STRIP: NEGATIVE
BUN SERPL-MCNC: 27 MG/DL (ref 8–23)
CALCIUM SERPL-MCNC: 9.1 MG/DL (ref 8.7–10.5)
CHLORIDE SERPL-SCNC: 100 MMOL/L (ref 95–110)
CHOLEST SERPL-MCNC: 144 MG/DL (ref 120–199)
CHOLEST/HDLC SERPL: 3.7 {RATIO} (ref 2–5)
CLARITY UR: CLEAR
CO2 SERPL-SCNC: 25 MMOL/L (ref 23–29)
COLOR UR: YELLOW
COMPLEXED PSA SERPL-MCNC: 0.82 NG/ML (ref 0–4)
CREAT SERPL-MCNC: 1.1 MG/DL (ref 0.5–1.4)
CREAT UR-MCNC: 117 MG/DL (ref 23–375)
DIFFERENTIAL METHOD: ABNORMAL
EOSINOPHIL # BLD AUTO: 0.1 K/UL (ref 0–0.5)
EOSINOPHIL NFR BLD: 1.8 % (ref 0–8)
ERYTHROCYTE [DISTWIDTH] IN BLOOD BY AUTOMATED COUNT: 14.6 % (ref 11.5–14.5)
EST. GFR  (NO RACE VARIABLE): >60 ML/MIN/1.73 M^2
ESTIMATED AVG GLUCOSE: 212 MG/DL (ref 68–131)
GLUCOSE SERPL-MCNC: 224 MG/DL (ref 70–110)
GLUCOSE UR QL STRIP: ABNORMAL
HBA1C MFR BLD: 9 % (ref 4.5–6.2)
HCT VFR BLD AUTO: 48.7 % (ref 40–54)
HDLC SERPL-MCNC: 39 MG/DL (ref 40–75)
HDLC SERPL: 27.1 % (ref 20–50)
HGB BLD-MCNC: 16.1 G/DL (ref 14–18)
HGB UR QL STRIP: NEGATIVE
HYALINE CASTS #/AREA URNS LPF: 1 /LPF
IMM GRANULOCYTES # BLD AUTO: 0.02 K/UL (ref 0–0.04)
IMM GRANULOCYTES NFR BLD AUTO: 0.3 % (ref 0–0.5)
KETONES UR QL STRIP: ABNORMAL
LDLC SERPL CALC-MCNC: 75 MG/DL (ref 63–159)
LEUKOCYTE ESTERASE UR QL STRIP: NEGATIVE
LYMPHOCYTES # BLD AUTO: 1.9 K/UL (ref 1–4.8)
LYMPHOCYTES NFR BLD: 28.9 % (ref 18–48)
MCH RBC QN AUTO: 29.1 PG (ref 27–31)
MCHC RBC AUTO-ENTMCNC: 33.1 G/DL (ref 32–36)
MCV RBC AUTO: 88 FL (ref 82–98)
MICROALBUMIN UR DL<=1MG/L-MCNC: 8.8 UG/ML
MICROSCOPIC COMMENT: NORMAL
MONOCYTES # BLD AUTO: 0.6 K/UL (ref 0.3–1)
MONOCYTES NFR BLD: 9.2 % (ref 4–15)
NEUTROPHILS # BLD AUTO: 3.9 K/UL (ref 1.8–7.7)
NEUTROPHILS NFR BLD: 59 % (ref 38–73)
NITRITE UR QL STRIP: NEGATIVE
NONHDLC SERPL-MCNC: 105 MG/DL
NRBC BLD-RTO: 0 /100 WBC
PH UR STRIP: 6 [PH] (ref 5–8)
PLATELET # BLD AUTO: 214 K/UL (ref 150–450)
PMV BLD AUTO: 11.1 FL (ref 9.2–12.9)
POTASSIUM SERPL-SCNC: 4.2 MMOL/L (ref 3.5–5.1)
PROT SERPL-MCNC: 7.7 G/DL (ref 6–8.4)
PROT UR QL STRIP: NEGATIVE
RBC # BLD AUTO: 5.54 M/UL (ref 4.6–6.2)
RBC #/AREA URNS HPF: 0 /HPF (ref 0–4)
SODIUM SERPL-SCNC: 138 MMOL/L (ref 136–145)
SP GR UR STRIP: >1.03 (ref 1–1.03)
SQUAMOUS #/AREA URNS HPF: 0 /HPF
TRIGL SERPL-MCNC: 150 MG/DL (ref 30–150)
TSH SERPL DL<=0.005 MIU/L-ACNC: 3.72 UIU/ML (ref 0.34–5.6)
URN SPEC COLLECT METH UR: ABNORMAL
UROBILINOGEN UR STRIP-ACNC: NEGATIVE EU/DL
WBC # BLD AUTO: 6.62 K/UL (ref 3.9–12.7)
WBC #/AREA URNS HPF: 0 /HPF (ref 0–5)
YEAST URNS QL MICRO: NORMAL

## 2023-08-16 PROCEDURE — 36415 COLL VENOUS BLD VENIPUNCTURE: CPT | Performed by: NURSE PRACTITIONER

## 2023-08-16 PROCEDURE — 80053 COMPREHEN METABOLIC PANEL: CPT | Performed by: NURSE PRACTITIONER

## 2023-08-16 PROCEDURE — 84153 ASSAY OF PSA TOTAL: CPT | Performed by: NURSE PRACTITIONER

## 2023-08-16 PROCEDURE — 83036 HEMOGLOBIN GLYCOSYLATED A1C: CPT | Performed by: NURSE PRACTITIONER

## 2023-08-16 PROCEDURE — 80061 LIPID PANEL: CPT | Performed by: NURSE PRACTITIONER

## 2023-08-16 PROCEDURE — 82570 ASSAY OF URINE CREATININE: CPT | Performed by: NURSE PRACTITIONER

## 2023-08-16 PROCEDURE — 81001 URINALYSIS AUTO W/SCOPE: CPT | Performed by: NURSE PRACTITIONER

## 2023-08-16 PROCEDURE — 84443 ASSAY THYROID STIM HORMONE: CPT | Performed by: NURSE PRACTITIONER

## 2023-08-16 PROCEDURE — 85025 COMPLETE CBC W/AUTO DIFF WBC: CPT | Performed by: NURSE PRACTITIONER

## 2023-08-21 ENCOUNTER — OFFICE VISIT (OUTPATIENT)
Dept: FAMILY MEDICINE | Facility: CLINIC | Age: 63
End: 2023-08-21
Payer: MEDICARE

## 2023-08-21 VITALS
SYSTOLIC BLOOD PRESSURE: 138 MMHG | HEART RATE: 89 BPM | RESPIRATION RATE: 20 BRPM | BODY MASS INDEX: 40.4 KG/M2 | WEIGHT: 314.81 LBS | HEIGHT: 74 IN | OXYGEN SATURATION: 97 % | DIASTOLIC BLOOD PRESSURE: 72 MMHG | TEMPERATURE: 99 F

## 2023-08-21 DIAGNOSIS — Z23 NEED FOR PNEUMOCOCCAL VACCINATION: ICD-10-CM

## 2023-08-21 DIAGNOSIS — E66.01 CLASS 3 SEVERE OBESITY WITH SERIOUS COMORBIDITY AND BODY MASS INDEX (BMI) OF 40.0 TO 44.9 IN ADULT, UNSPECIFIED OBESITY TYPE: ICD-10-CM

## 2023-08-21 DIAGNOSIS — I10 ESSENTIAL HYPERTENSION: ICD-10-CM

## 2023-08-21 DIAGNOSIS — E11.65 TYPE 2 DIABETES MELLITUS WITH HYPERGLYCEMIA, WITHOUT LONG-TERM CURRENT USE OF INSULIN: Primary | ICD-10-CM

## 2023-08-21 DIAGNOSIS — E78.5 HYPERLIPIDEMIA, UNSPECIFIED HYPERLIPIDEMIA TYPE: ICD-10-CM

## 2023-08-21 DIAGNOSIS — M75.41 IMPINGEMENT SYNDROME OF RIGHT SHOULDER: ICD-10-CM

## 2023-08-21 PROCEDURE — 99999PBSHW PNEUMOCOCCAL CONJUGATE VACCINE 13-VALENT LESS THAN 5YO & GREATER THAN: ICD-10-PCS | Mod: PBBFAC,,,

## 2023-08-21 PROCEDURE — 90670 PCV13 VACCINE IM: CPT | Mod: PBBFAC | Performed by: NURSE PRACTITIONER

## 2023-08-21 PROCEDURE — G0009 ADMIN PNEUMOCOCCAL VACCINE: HCPCS | Mod: PBBFAC | Performed by: NURSE PRACTITIONER

## 2023-08-21 PROCEDURE — 99214 PR OFFICE/OUTPT VISIT, EST, LEVL IV, 30-39 MIN: ICD-10-PCS | Mod: S$PBB,,, | Performed by: NURSE PRACTITIONER

## 2023-08-21 PROCEDURE — 99214 OFFICE O/P EST MOD 30 MIN: CPT | Mod: S$PBB,,, | Performed by: NURSE PRACTITIONER

## 2023-08-21 PROCEDURE — 99999PBSHW PNEUMOCOCCAL CONJUGATE VACCINE 13-VALENT LESS THAN 5YO & GREATER THAN: Mod: PBBFAC,,,

## 2023-08-21 NOTE — PROGRESS NOTES
SUBJECTIVE:      Patient ID: Donald Frey is a 63 y.o. male.    Chief Complaint: Hyperlipidemia, Hypertension, and Diabetes    Donald is here for follow-up on diabetes, hypertension, and hyperlipidemia.  Recent lab work from 8/23 reviewed today with patient. Taking all meds as prescribed-denies any side effects from his current medication regimen.  A1c is down to 9.0 since starting low dose Jardiance 10 mg daily- trying to diet but has been unable to exercise due to extreme heat outside.  Blood pressure is controlled today on current regimen.  He has a follow-up visit with his cardiologist in October.  Eye exam is up-to-date-need copies.  Foot exam due today.  Patient is also due for pneumonia vaccination since he is now age 63.     Hyperlipidemia  This is a chronic problem. The current episode started more than 1 year ago. The problem is controlled. Recent lipid tests were reviewed and are normal. Exacerbating diseases include diabetes and obesity. He has no history of chronic renal disease, hypothyroidism or liver disease. There are no known factors aggravating his hyperlipidemia. Pertinent negatives include no chest pain, leg pain, myalgias or shortness of breath. Current antihyperlipidemic treatment includes statins and diet change. The current treatment provides significant improvement of lipids. Compliance problems include adherence to exercise and adherence to diet.  Risk factors for coronary artery disease include diabetes mellitus, dyslipidemia, male sex, hypertension, obesity, a sedentary lifestyle and family history.   Hypertension  This is a chronic problem. The current episode started more than 1 year ago. The problem has been gradually improving since onset. The problem is controlled. Associated symptoms include malaise/fatigue. Pertinent negatives include no anxiety, blurred vision, chest pain, headaches, neck pain, palpitations, peripheral edema, shortness of breath or sweats. There are no  associated agents to hypertension. Risk factors for coronary artery disease include male gender, obesity, dyslipidemia, diabetes mellitus and sedentary lifestyle. Past treatments include calcium channel blockers, ACE inhibitors, lifestyle changes and diuretics (Digoxin, amiodarone ). The current treatment provides moderate improvement. Compliance problems include diet and exercise.  There is no history of CVA, PVD or retinopathy. There is no history of chronic renal disease, sleep apnea or a thyroid problem.   Diabetes  He presents for his follow-up diabetic visit. He has type 2 diabetes mellitus. No MedicAlert identification noted. The initial diagnosis of diabetes was made 9 years ago. His disease course has been improving. Hypoglycemia symptoms include hunger and sleepiness. Pertinent negatives for hypoglycemia include no confusion, dizziness, headaches, mood changes, nervousness/anxiousness, pallor, seizures, speech difficulty, sweats or tremors. Pertinent negatives for diabetes include no blurred vision, no chest pain, no fatigue, no foot paresthesias, no foot ulcerations, no polydipsia, no polyphagia, no polyuria, no visual change, no weakness and no weight loss. Hypoglycemia complications include hospitalization. Pertinent negatives for hypoglycemia complications include no blackouts, no nocturnal hypoglycemia, no required assistance and no required glucagon injection. Symptoms are improving. Diabetic complications include heart disease, impotence and peripheral neuropathy. Pertinent negatives for diabetic complications include no autonomic neuropathy, CVA, nephropathy, PVD or retinopathy. Risk factors for coronary artery disease include dyslipidemia, hypertension, obesity, diabetes mellitus, male sex and sedentary lifestyle. Current diabetic treatment includes diet and oral agent (triple therapy). He is compliant with treatment most of the time. His weight is stable. He is following a low salt and generally  healthy diet. Meal planning includes avoidance of concentrated sweets. He has not had a previous visit with a dietitian. He participates in exercise intermittently. Home blood sugar record trend: NOT CHECKING. An ACE inhibitor/angiotensin II receptor blocker is being taken. He does not see a podiatrist.Eye exam is current.   Shoulder Pain   The pain is present in the right shoulder. This is a recurrent problem. The current episode started more than 1 month ago. There has been no history of extremity trauma. The problem occurs daily. The problem has been gradually worsening. The quality of the pain is described as aching. The pain is at a severity of 3/10. Associated symptoms include a limited range of motion and numbness (feet). Pertinent negatives include no fever, headaches, inability to bear weight, itching, joint locking, joint swelling, stiffness, tingling or visual symptoms. The symptoms are aggravated by activity. He has tried nothing for the symptoms. His past medical history is significant for diabetes. There is no history of Injuries to Extremity.       Family History   Problem Relation Age of Onset    Heart attack Father     Cancer Father         prostate      Social History     Socioeconomic History    Marital status:    Tobacco Use    Smoking status: Never    Smokeless tobacco: Never   Substance and Sexual Activity    Alcohol use: Yes     Comment: occ    Drug use: Never     Social Determinants of Health     Stress: No Stress Concern Present (10/28/2019)    [employer] - Occupational Stress Questionnaire     Feeling of Stress : Not at all     Current Outpatient Medications   Medication Sig Dispense Refill    amiodarone (PACERONE) 200 MG Tab Take 1 tablet (200 mg total) by mouth once daily. (Patient taking differently: Take 100 mg by mouth once daily.) 30 tablet 11    apixaban (ELIQUIS) 5 mg Tab Take 1 tablet (5 mg total) by mouth 2 (two) times daily. 60 tablet 11     diltiaZEM (CARDIZEM CD) 240 MG 24 hr capsule Take 1 capsule (240 mg total) by mouth 2 (two) times a day. 60 capsule 11    furosemide (LASIX) 20 MG tablet Take 1 tablet (20 mg total) by mouth 2 (two) times daily. 60 tablet 11    glipiZIDE (GLUCOTROL) 5 MG tablet Take 1 tablet (5 mg total) by mouth daily with breakfast. 90 tablet 1    lisinopriL (PRINIVIL,ZESTRIL) 20 MG tablet Take 1 tablet (20 mg total) by mouth once daily. 90 tablet 3    metFORMIN (GLUCOPHAGE) 1000 MG tablet Take 1 tablet (1,000 mg total) by mouth 2 (two) times daily with meals. 180 tablet 1    rosuvastatin (CRESTOR) 20 MG tablet Take 20 mg by mouth once daily.      empagliflozin (JARDIANCE) 25 mg tablet Take 1 tablet (25 mg total) by mouth once daily. 90 tablet 1     No current facility-administered medications for this visit.     Review of patient's allergies indicates:  No Known Allergies   Past Medical History:   Diagnosis Date    Diabetes mellitus, type 2     Hypertension     Myocardial infarction 2013     Past Surgical History:   Procedure Laterality Date    ABLATION OF ARRHYTHMOGENIC FOCUS FOR ATRIAL FIBRILLATION N/A 7/7/2022    Procedure: ABLATION, ARRHYTHMOGENIC FOCUS, FOR ATRIAL FIBRILLATION;  Surgeon: Niko Pacheco MD;  Location: [facility] EP LAB;  Service: Cardiology;  Laterality: N/A;  AF, ARMANDO(Cx if SR), PVI, RFA, CARTO, GEN, GP, 3 PREP    CORONARY STENT PLACEMENT  2013    TRANSESOPHAGEAL ECHOCARDIOGRAPHY N/A 7/7/2022    Procedure: ECHOCARDIOGRAM, TRANSESOPHAGEAL;  Surgeon: Conor Chappell MD;  Location: [facility] EP LAB;  Service: Cardiology;  Laterality: N/A;    TREATMENT OF CARDIAC ARRHYTHMIA N/A 3/7/2022    Procedure: CARDIOVERSION;  Surgeon: Gomez Orellana MD;  Location: [facility] CATH/EP LAB;  Service: Cardiology;  Laterality: N/A;    TREATMENT OF CARDIAC ARRHYTHMIA  7/7/2022    Procedure: Cardioversion or Defibrillation;  Surgeon: Conor Chappell MD;  Location: [facility] EP LAB;  Service: Cardiology;;       Review of Systems   Constitutional:   "Positive for malaise/fatigue. Negative for activity change, appetite change, chills, fatigue, fever, unexpected weight change and weight loss.   HENT:  Negative for congestion, hearing loss, rhinorrhea and trouble swallowing.    Eyes:  Negative for blurred vision, discharge and visual disturbance.   Respiratory:  Negative for cough, chest tightness, shortness of breath and wheezing.    Cardiovascular:  Negative for chest pain, palpitations and leg swelling.   Gastrointestinal:  Negative for abdominal pain, blood in stool, constipation, diarrhea, nausea and vomiting.   Endocrine: Negative for cold intolerance, heat intolerance, polydipsia, polyphagia and polyuria.   Genitourinary:  Positive for impotence. Negative for difficulty urinating, dysuria and frequency.   Musculoskeletal:  Negative for arthralgias, back pain, joint swelling, myalgias, neck pain and stiffness.   Skin:  Negative for color change, itching, pallor, rash and wound.   Neurological:  Positive for numbness (feet). Negative for dizziness, tingling, tremors, seizures, speech difficulty, weakness and headaches.   Hematological:  Negative for adenopathy. Bruises/bleeds easily.   Psychiatric/Behavioral:  Negative for confusion and dysphoric mood. The patient is not nervous/anxious.       OBJECTIVE:      Vitals:    08/21/23 1424 08/21/23 1428   BP: (!) 152/78 138/72   BP Location: Left arm Left arm   Patient Position: Sitting Sitting   BP Method: Large (Manual) Large (Manual)   Pulse: 89    Resp: 20    Temp: 98.9 °F (37.2 °C)    TempSrc: Oral    SpO2: 97%    Weight: (!) 142.8 kg (314 lb 12.8 oz)    Height: 6' 2" (1.88 m)      Physical Exam  Vitals and nursing note reviewed.   Constitutional:       General: He is not in acute distress.     Appearance: Normal appearance. He is well-developed. He is obese. He is not ill-appearing or diaphoretic.   HENT:      Head: Normocephalic.      Nose: Nose normal.      Mouth/Throat:      Mouth: Mucous membranes are " moist.   Eyes:      General: No scleral icterus.     Extraocular Movements: Extraocular movements intact.      Pupils: Pupils are equal, round, and reactive to light.   Neck:      Thyroid: No thyroid mass or thyromegaly.   Cardiovascular:      Rate and Rhythm: Normal rate and regular rhythm.      Pulses:           Dorsalis pedis pulses are 1+ on the right side and 1+ on the left side.        Posterior tibial pulses are 1+ on the right side and 1+ on the left side.      Heart sounds: Normal heart sounds. No murmur heard.  Pulmonary:      Effort: Pulmonary effort is normal. No respiratory distress.      Breath sounds: Normal breath sounds. No wheezing, rhonchi or rales.   Musculoskeletal:      Right shoulder: Tenderness present. No swelling, deformity, effusion, laceration, bony tenderness or crepitus. Decreased range of motion. Normal strength. Normal pulse.        Arms:       Cervical back: Normal range of motion and neck supple.      Right lower leg: No edema.      Left lower leg: No edema.      Right foot: Normal range of motion. No deformity.      Left foot: Normal range of motion. No deformity.   Feet:      Right foot:      Protective Sensation: 10 sites tested.  8 sites sensed.      Skin integrity: No ulcer, blister, skin breakdown, erythema, warmth, callus or dry skin.      Toenail Condition: Right toenails are normal.      Left foot:      Protective Sensation: 10 sites tested.  8 sites sensed.      Skin integrity: No ulcer, blister, skin breakdown, erythema, warmth, callus or dry skin.      Toenail Condition: Left toenails are normal.   Lymphadenopathy:      Cervical: No cervical adenopathy.   Skin:     General: Skin is warm and dry.      Capillary Refill: Capillary refill takes less than 2 seconds.      Coloration: Skin is not jaundiced or pale.      Findings: No bruising.   Neurological:      Mental Status: He is alert and oriented to person, place, and time.   Psychiatric:         Mood and Affect: Mood  normal.         Behavior: Behavior normal.         Thought Content: Thought content normal.         Judgment: Judgment normal.        Assessment:       1. Type 2 diabetes mellitus with hyperglycemia, without long-term current use of insulin    2. Essential hypertension    3. Hyperlipidemia, unspecified hyperlipidemia type    4. Impingement syndrome of right shoulder    5. Need for pneumococcal vaccination    6. Class 3 severe obesity with serious comorbidity and body mass index (BMI) of 40.0 to 44.9 in adult, unspecified obesity type        Plan:       Type 2 diabetes mellitus with hyperglycemia, without long-term current use of insulin  -     empagliflozin (JARDIANCE) 25 mg tablet; Take 1 tablet (25 mg total) by mouth once daily.  Dispense: 90 tablet; Refill: 1  -     Comprehensive Metabolic Panel; Future; Expected date: 11/24/2023  -     Hemoglobin A1C; Future; Expected date: 11/24/2023  -A1c is 9.0, goal of less than 8.0 discussed; increase Jardiance to 25 mg daily; continue plenty of water intake, diet, and exercise when able; monitor blood sugar closely at home, get copies of eye exam; foot exam done today; recheck labs in 3 months or sooner if needed    Essential hypertension   -blood pressure below goal, continue medications and lifestyle changes    Hyperlipidemia, unspecified hyperlipidemia type   -cholesterol improving, continue statin daily; HDL boosting foods and exercise encouraged    Impingement syndrome of right shoulder  -     Ambulatory referral/consult to Orthopedics; Future; Expected date: 08/28/2023  -handouts provided/literature; pt requesting to see ortho for possible injection and referral given today    Need for pneumococcal vaccination  -     (In Office Administered) Pneumococcal Conjugate Vaccine (13 Valent) (IM)    Class 3 severe obesity with serious comorbidity and body mass index (BMI) of 40.0 to 44.9 in adult, unspecified obesity type        I spent a total of 30 minutes on the day of  the visit.This includes face to face time and non-face to face time preparing to see the patient (eg, review of tests), obtaining and/or reviewing separately obtained history, documenting clinical information in the electronic or other health record, independently interpreting results and communicating results to the patient/family/caregiver, or care coordinator.         Follow up in about 3 months (around 11/21/2023) for f/u DM, labs.      8/21/2023 Radha Pinzon, HONORIO, FNP    This note was created using [employer] voice recognition software that occasionally misinterprets phrases or words.

## 2023-08-25 ENCOUNTER — PATIENT OUTREACH (OUTPATIENT)
Dept: ADMINISTRATIVE | Facility: HOSPITAL | Age: 63
End: 2023-08-25
Payer: MEDICARE

## 2023-08-25 NOTE — LETTER
AUTHORIZATION FOR RELEASE OF   CONFIDENTIAL INFORMATION    [facility] ,    We are seeing Donald Frey, date of birth 1960, in the clinic at [address] [facility]. Radha Pinzon FNP is the patient's PCP. Donald Frey has an outstanding lab/procedure at the time we reviewed his chart. In order to help keep his health information updated, he has authorized us to request the following medical record(s):       (  x)  DILATED EYE EXAM              Please fax records to [facility], Berel, Kimberly C., FNP, [phone]     If you have any questions, please contact       Yodit Carlos  Nurse Clinical Care Coordinator  [facility]  Phone: [phone]  Fax: [phone]        Patient Name: Donald Frey  : 1960  Patient Phone #: [phone]         Dnoald Frey  MRN: [id number]  : 1960  Age: 63 y.o.  Sex: male       MEDICARE-PATIENTS CERTIFICATION, AUTHORIZATION TO RELEASE INFORMATION AND PAYMENT REQUEST:  I certify that the information given by me in applying under the Title XVII of Social Security Act is correct.  I authorize any weber of medical or other information about me to release to the Social Security Administration or its intermediaries or carriers any information needed for this or a related Medicare claim.  I request that payment of authorized benefits be made on my behalf to [facility] and [facility] ([facility]).  I also acknowledge upon admission, that I received the Important Message from Medicare.     AUTHORIZATION TO PAY INSURANCE BENEFITS:  For and in consideration of medical services rendered to the patient named herein, I hereby assign and transfer to [facility], including but not limited to hospital based physicians, attending physicians, consulting physicians, nurse practitioners and physicians assistants the rights for the payment of medical  benefits which I may have under the policy/policies identified by me during registration or any policy which may be determined hereafter to pay benefits otherwise payable to me or to a beneficiary designated in the policy.  By this assignment, I authorize payment directly to [facility], hospital based physicians, attending physicians and consulting physicians of all medical benefits payable under the aforesaid policy/policies, but not to exceed the hospitals and/or clinic regular charges.     GUARANTEE OF ACCOUNT:  I/We certify that the information given is true and correct to the best of my/our knowledge.  I/We understand that bills are payable within thirty (30) days of the date of service.  If it becomes necessary for the account to be referred to an  or collection agency, the undersigned agrees to pay the reasonable s fees or collection expenses. I/We silvia permission and consent to [facility], our assignees, and third party collection agents to contact myself/us by any telephone number associated with myself/us, including wireless numbers and to leave answering machine and voicemail messages and include in any such messages, information required by law (including debt collection laws) and/or messages regarding amounts owed; to send text messages or emails using any email addresses I/we provided; to use pre-recorded/artificial voice messages  and/or an automatic dialing device in connection with any communications.   I/We agree to be responsible for the payment of all charges of this medical service and hospital based physicians, attending physicians and consulting physicians services rendered to the above named patient     COMMUNICATION AUTHORIZATION:   I hereby authorize Scarlett Lilly, to contact me on my cell phone and/or home phone using prerecorded messages, artificial voice messages, automatic telephone dialing devices or other computer assisted technology, or by electronic  mail, text messaging, or by any other form of electronic communication. This includes, but is not limited to, appointment reminders, yearly physical exam reminders, preventive care reminders, patient campaigns and welcome calls. I understand I have the right to opt out of these communications at any time.        Page 1 of 3                 CONSENT AND ACKNOWLEDGEMENT FORM CONTINUED     AUTHORIZATION TO RELEASE INFORMATION:  I hereby authorize [facility] and hospital based physicians to release the information for this occasion of service requested by my insurance company or third party payor for the purpose of obtaining payment for services rendered during this admission and/or to other healthcare providers for the purpose of follow-up care or evaluation of care.  This information may or may not include mental health and/or substance abuse information.     AUTHORIZATION FOR MEDICAL AND/OR SURGICAL TREATMENT:  I hereby authorize [facility] and its employees or agents to provide hospital care incident to this admission, including without limitations, consent to routine diagnostic procedures and medical treatment, which is to include whatever procedures that are deemed necessary by the admitting doctor and such other physicians or assistants as he may designate.     PERSONAL VALUABLES:      It is understood and agreed that the [facility] maintains a safe for the safekeeping of money and valuables and the hospital shall not be liable for the loss of damage to any money, jewelry, glasses, documents, dentures, hearing aids or other articles of unusual value, unless placed therein, and shall not be liable for loss or damage to any other personal property, unless deposited with the hospital for safekeeping.  VALUABLES ARE NOT TO BE LEFT IN THE PATIENTS ROOM.     ADVANCE DIRECTIVES:  I understand that I am not required to have Advance Directives in order to be treated.  I have received written information about my  rights to formulate Advance Directives.     NOTICE OF PRIVACY PRACTICES/PATIENT RIGHTS/ADMISSION PACKET:  I acknowledge that I have received copies of the [facility] Notice of Privacy Practices, Patient Rights, and the Admission packet, which contains Smoking Cessation information. I understand that weapons, illegal drugs, or any other items considered  contraband, are not allowed on the [facility] campus, and that I do not have such items in my possession.        CONSENT TO PHOTOGRAPH AND/OR VIDEO TAPE DOCUMENTATION OF CARE:  I understand that photographs, videotapes, digital, or other images may be recorded to document my care.  I acknowledge that [facility] will retain the ownership rights to these photographs, videotapes, digital, or other images, and that I will be allowed access to view or obtain copies of any photographs, videotapes, digital, or other images created as part of the documentation of my care.  I understand that these images will be stored in a secure manner that will protect my privacy and that they will be kept for the time period required by law or by policy at [facility]. Images that identify me will be released and/or used outside the institution only upon written authorization from me or my legal representative (PINEDA, 2001).                                                                                                                                Page 2 of 3                    CONSENT AND ACKNOWLEDGEMENT FORM CONTINUED     [place] IMM[employer]IZATION NETWORK (LINKS) PARTICIPATION:  I acknowledge that I have been informed about [place] Immunization Network, or LINKS.  I understand that it is a means to keep track of my immunization records for myself, doctors offices, hospitals and other health care providers through secure, electronic means.     INSURANCE NETWORK ACKNOWLEDGEMENT:                                                                            I acknowledge that I have  received notice, based on the information available at this time, regarding the status of my insurance plan as in or out of network at [facility]. I understand that a full listing of accepted insurance plans can be found at the [facility] website.     NOTICE     HEALTH CARE SERVICES MAY BE PROVIDED TO YOU AT A NETWORK HEALTH CARE FACILITY BY FACILITY-BASED PHYSICIANS WHO ARE NOT IN YOUR HEALTH PLAN.  YOU MAY BE RESPONSIBLE FOR PAYMENT OF ALL OR PART OF THE FEES FOR THOSE OUT-OF-NETWORK SERVICES, IN ADDITION TO APPLICABLE AMOUNTS DUE FOR CO-PAYMENTS, COINSURANCE, DEDUCTIBLES, AND NON-COVERED SERVICES.  SPECIFIC INFORMATION ABOUT IN-NETWORK AND OUT-OF NETWORK FACILITY-BASED PHYSICIANS CAN BE FOUND AT THE WEBSITE ADDRESS OF YOUR HEALTH PLAN OR BY CALLING THE [employer] TELEPHONE NUMBER OF YOUR HEALTH PLAN.        I/WE HAVE READ, UNDERSTAND AND AGREE TO THE ABOVE.        _______________________________   Patient/Legal Guardian Signature     This signature was collected at 05/17/2023     Time (if no electronic signature):____________     self       _______________________________   Printed Name/Relationship to Patient       Witness  Sign Here  _______________________________   Witness Signature     This signature was collected at 05/17/2023        _______________________________   Printed Name         Page 3 of 3

## 2023-08-25 NOTE — PROGRESS NOTES
Population Health Chart Review & Patient Outreach Details:     Reason for Outreach Encounter:     []  Non-Compliant Report   []  Payor Report (Humana, PHN, BCBS, MSSP, MCIP, UHC, etc.)   [x]   Chart Review     Updates Requested / Reviewed:     [x]  Care Everywhere    [x]     [x]  External Sources ([facility], [facility], DIS, etc.)   [x]  Care Team Updated    Patient Outreach Method:    []  Telephone Outreach Completed   [] Successful   [] Left Voicemail   [] Unable to Contact (wrong number, no voicemail)  []  [email or web address] Portal Outreach Sent  []  Letter Outreach Mailed  [x]  Fax Sent for External Records  []  External Records Upload    Health Maintenance Topics Addressed and Outreach Outcomes / Actions Taken:        []      Breast Cancer Screening []  Mammo Scheduled      []  External Records Requested     []  Added Reminder to Complete to Upcoming Primary Care Appt Notes     []  Patient Declined     []  Patient Will Call Back to Schedule     []  Patient Will Schedule with External Provider / Order Routed if Applicable             []       Cervical Cancer Screening []  Pap Scheduled      []  External Records Requested     []  Added Reminder to Complete to Upcoming Primary Care Appt Notes     []  Patient Declined     []  Patient Will Call Back to Schedule     []  Patient Will Schedule with External Provider               [x]          Colorectal Cancer Screening []  Colonoscopy Case Request or Referral Placed     []  External Records Requested     []  Added Reminder to Complete to Upcoming Primary Care Appt Notes     []  Patient Declined     []  Patient Will Call Back to Schedule     []  Patient Will Schedule with External Provider     []  Fit Kit Mailed (add the SmartPhrase under additional notes)     []  Reminded Patient to Complete Home Test             [x]      Diabetic Eye Exam []  Eye Camera Scheduled or Optometry Referral Placed     [x]  External Records Requested     []  Added Reminder to Complete to  Upcoming Primary Care Appt Notes     []  Patient Declined     []  Patient Will Call Back to Schedule     []  Patient Will Schedule with External Provider             []      Blood Pressure Control []  Primary Care Follow Up Visit Scheduled     []  Remote Blood Pressure Reading Captured     []  Added Reminder to Complete to Upcoming Primary Care Appt Notes     []  Patient Declined     []  Patient Will Call Back / Patient Will Send Portal Message with Reading     []  Patient Will Call Back to Schedule Provider Visit             []       HbA1c & Other Labs []  Lab Appt Scheduled for Due Labs     []  Primary Care Follow Up Visit Scheduled      []  Reminded Patient to Complete Home Test     []  Added Reminder to Complete to Upcoming Primary Care Appt Notes     []  Patient Declined     []  Patient Will Call Back to Schedule     []  Patient Will Schedule with External Provider / Order Routed if Applicable           []    Schedule Primary Care Appt []  Primary Care Appt Scheduled     []  Patient Declined     []  Patient Will Call Back to Schedule     []  Pt Established with External Provider & Updated Care Team             []      Medication Adherence []  Primary Care Appointment Scheduled     []  Added Reminder to Upcoming Primary Care Appt Notes     []  Patient Reminded to  Prescription     []  Patient Declined, Provider Notified if Needed     []  Sent Provider Message to Review and/or Add Exclusion to Problem List             []      Osteoporosis Screening []  DXA Appointment Scheduled     []  External Records Requested     []  Added Reminder to Complete to Upcoming Primary Care Appt Notes     []  Patient Declined     []  Patient Will Call Back to Schedule     []  Patient Will Schedule with External Provider / Order Routed if Applicable     Additional Care Coordinator Notes:         Further Action Needed If Patient Returns Outreach:

## 2023-08-29 ENCOUNTER — OFFICE VISIT (OUTPATIENT)
Dept: ORTHOPEDICS | Facility: CLINIC | Age: 63
End: 2023-08-29
Payer: MEDICARE

## 2023-08-29 VITALS — BODY MASS INDEX: 40.3 KG/M2 | WEIGHT: 314 LBS | HEIGHT: 74 IN

## 2023-08-29 DIAGNOSIS — M75.41 IMPINGEMENT SYNDROME OF RIGHT SHOULDER: Primary | ICD-10-CM

## 2023-08-29 PROCEDURE — 99203 PR OFFICE/OUTPT VISIT, NEW, LEVL III, 30-44 MIN: ICD-10-PCS | Mod: S$GLB,,, | Performed by: ORTHOPAEDIC SURGERY

## 2023-08-29 PROCEDURE — 99203 OFFICE O/P NEW LOW 30 MIN: CPT | Mod: S$GLB,,, | Performed by: ORTHOPAEDIC SURGERY

## 2023-08-29 NOTE — PROGRESS NOTES
[facility]    Subjective:     Chief Complaint:   Chief Complaint   Patient presents with    Right Shoulder - Pain     Patient is here with complaints of Right Shoulder pain x 1 year, ROM is painful & limited. No known injury       Past Medical History:   Diagnosis Date    Diabetes mellitus, type 2     Hypertension     Myocardial infarction 2013       Past Surgical History:   Procedure Laterality Date    ABLATION OF ARRHYTHMOGENIC FOCUS FOR ATRIAL FIBRILLATION N/A 7/7/2022    Procedure: ABLATION, ARRHYTHMOGENIC FOCUS, FOR ATRIAL FIBRILLATION;  Surgeon: Niko Pacheco MD;  Location: [facility] EP LAB;  Service: Cardiology;  Laterality: N/A;  AF, ARMANDO(Cx if SR), PVI, RFA, CARTO, GEN, GP, 3 PREP    CORONARY STENT PLACEMENT  2013    TRANSESOPHAGEAL ECHOCARDIOGRAPHY N/A 7/7/2022    Procedure: ECHOCARDIOGRAM, TRANSESOPHAGEAL;  Surgeon: Conor Chappell MD;  Location: [facility] EP LAB;  Service: Cardiology;  Laterality: N/A;    TREATMENT OF CARDIAC ARRHYTHMIA N/A 3/7/2022    Procedure: CARDIOVERSION;  Surgeon: Gomez Orellana MD;  Location: [facility] CATH/EP LAB;  Service: Cardiology;  Laterality: N/A;    TREATMENT OF CARDIAC ARRHYTHMIA  7/7/2022    Procedure: Cardioversion or Defibrillation;  Surgeon: Conor Chappell MD;  Location: [facility] EP LAB;  Service: Cardiology;;       Current Outpatient Medications   Medication Sig    amiodarone (PACERONE) 200 MG Tab Take 1 tablet (200 mg total) by mouth once daily. (Patient taking differently: Take 100 mg by mouth once daily.)    apixaban (ELIQUIS) 5 mg Tab Take 1 tablet (5 mg total) by mouth 2 (two) times daily.    diltiaZEM (CARDIZEM CD) 240 MG 24 hr capsule Take 1 capsule (240 mg total) by mouth 2 (two) times a day.    empagliflozin (JARDIANCE) 25 mg tablet Take 1 tablet (25 mg total) by mouth once daily.    furosemide (LASIX) 20 MG tablet Take 1 tablet (20 mg total) by mouth 2 (two) times daily.    glipiZIDE (GLUCOTROL) 5 MG tablet Take 1 tablet (5 mg total) by mouth daily with  breakfast.    lisinopriL (PRINIVIL,ZESTRIL) 20 MG tablet Take 1 tablet (20 mg total) by mouth once daily.    metFORMIN (GLUCOPHAGE) 1000 MG tablet Take 1 tablet (1,000 mg total) by mouth 2 (two) times daily with meals.    rosuvastatin (CRESTOR) 20 MG tablet Take 20 mg by mouth once daily.     No current facility-administered medications for this visit.       Review of patient's allergies indicates:  No Known Allergies    Family History   Problem Relation Age of Onset    Heart attack Father     Cancer Father         prostate       Social History     Socioeconomic History    Marital status:    Tobacco Use    Smoking status: Never    Smokeless tobacco: Never   Substance and Sexual Activity    Alcohol use: Yes     Comment: occ    Drug use: Never     Social Determinants of Health     Stress: No Stress Concern Present (10/28/2019)    [employer] [place] [employer] - Occupational Stress Questionnaire     Feeling of Stress : Not at all       History of present illness:  Donald comes in today as a new patient chief complaint of right shoulder pain that has been going on for several years.  He is not had any formal evaluation or treatment.    Review of Systems:    Constitution: Negative for chills, fever, and sweats.  Negative for unexplained weight loss.    HENT:  Negative for headaches and blurry vision.    Cardiovascular:Negative for chest pain or irregular heart beat. Negative for hypertension.    Respiratory:  Negative for cough and shortness of breath.    Gastrointestinal: Negative for abdominal pain, heartburn, melena, nausea, and vomitting.    Genitourinary:  Negative bladder incontinence and dysuria.    Musculoskeletal:  See HPI for details.     Neurological: Negative for numbness.    Psychiatric/Behavioral: Negative for depression.  The patient is not nervous/anxious.      Endocrine: Negative for polyuria    Hematologic/Lymphatic: Negative for bleeding problem.  Does not bruise/bleed easily.    Skin:  "Negative for poor would healing and rash    Objective:      Physical Examination:    Vital Signs:  There were no vitals filed for this visit.    Body mass index is 40.32 kg/m².    This a well-developed, well nourished patient in no acute distress.  They are alert and oriented and cooperative to examination.        Right shoulder exam:  Skin to the right shoulder is clean dry and intact.  No erythema or ecchymosis.  No signs or symptoms of infection.  He is neurovascularly intact throughout the right upper extremity.  He can forward flex 110° and externally rotate 60°.  His right rotator cuff tendon is intact to resisted testing.  He does have crepitus with range of motioning of the right shoulder.      Pertinent New Results:    XRAY Report / Interpretation:   Two views were taken of the right shoulder today: AP and Y-view.  They reveal no acute fractures or dislocations.  Visualized soft tissues appear unremarkable.  Patient has severe arthrosis in the right glenohumeral joint with bone-on-bone deformity and inferior osteophyte of the humeral head is present.    Assessment/Plan:      1. Right shoulder glenohumeral joint osteoarthritis, severe.      Risks and benefits of proceeding with right total shoulder arthroplasty were discussed with the patient today in detail.  We discussed the outpatient nature of the procedure.  All of his questions were answered.  He clearly understood and wished to proceed.  Surgical consents were obtained today.    Risks of the procedure were reviewed with the patient.  This includes, but is not limited to: bleeding, pain, swelling, decreased range of motion, nerve injury/damage, wound dehiscence, hardware failure, deep vein thrombosis, pulmonary embolism, reflex sympathetic dystrophy, dislocation, upper arm/humerus pain, and possible need for further surgery.    Roger Britton, Physician Assistant, served in the capacity as a "scribe" for this patient encounter.  A " ""face-to-face" encounter occurred with Dr. Branden Hernandez on this date.  The treatment plan and medical decision-making is outlined above. Patient was seen and examined with a chaperone.       This note was created using Dragon voice recognition software that occasionally misinterpreted phrases or words.          "

## 2023-08-30 ENCOUNTER — PATIENT OUTREACH (OUTPATIENT)
Dept: ADMINISTRATIVE | Facility: HOSPITAL | Age: 63
End: 2023-08-30
Payer: MEDICARE

## 2023-08-30 DIAGNOSIS — M19.011 GLENOHUMERAL ARTHRITIS, RIGHT: ICD-10-CM

## 2023-08-30 DIAGNOSIS — Z79.01 CURRENT USE OF ANTICOAGULANT THERAPY: Primary | ICD-10-CM

## 2023-08-30 DIAGNOSIS — Z01.818 PRE-OP TESTING: ICD-10-CM

## 2023-10-30 ENCOUNTER — TELEPHONE (OUTPATIENT)
Dept: FAMILY MEDICINE | Facility: CLINIC | Age: 63
End: 2023-10-30

## 2023-10-30 NOTE — TELEPHONE ENCOUNTER
Outside labs from [employer] drawn 10-25-23 for your review in patient's media.      ----- Message from Romy Arroyo sent at 10/27/2023  6:48 PM CDT -----  LAB

## 2023-11-03 ENCOUNTER — ANESTHESIA EVENT (OUTPATIENT)
Dept: SURGERY | Facility: HOSPITAL | Age: 63
End: 2023-11-03
Payer: MEDICARE

## 2023-11-03 RX ORDER — FENTANYL CITRATE 50 UG/ML
100 INJECTION, SOLUTION INTRAMUSCULAR; INTRAVENOUS SEE ADMIN INSTRUCTIONS
Status: CANCELLED | OUTPATIENT
Start: 2023-11-03

## 2023-11-03 RX ORDER — MIDAZOLAM HYDROCHLORIDE 1 MG/ML
2 INJECTION INTRAMUSCULAR; INTRAVENOUS
Status: CANCELLED | OUTPATIENT
Start: 2023-11-03

## 2023-11-06 ENCOUNTER — HOSPITAL ENCOUNTER (OUTPATIENT)
Dept: PREADMISSION TESTING | Facility: HOSPITAL | Age: 63
Discharge: HOME | End: 2023-11-06
Attending: ORTHOPAEDIC SURGERY
Payer: MEDICARE

## 2023-11-06 VITALS — HEIGHT: 74 IN | WEIGHT: 314 LBS | BODY MASS INDEX: 40.3 KG/M2

## 2023-11-06 DIAGNOSIS — Z01.818 PRE-OP TESTING: ICD-10-CM

## 2023-11-06 DIAGNOSIS — M19.011 GLENOHUMERAL ARTHRITIS, RIGHT: ICD-10-CM

## 2023-11-06 DIAGNOSIS — Z01.818 PREOP TESTING: Primary | ICD-10-CM

## 2023-11-06 DIAGNOSIS — Z79.01 CURRENT USE OF ANTICOAGULANT THERAPY: ICD-10-CM

## 2023-11-06 RX ORDER — ASPIRIN 325 MG
325 TABLET ORAL DAILY
COMMUNITY
End: 2023-11-09

## 2023-11-06 NOTE — DISCHARGE INSTRUCTIONS
To confirm, Your doctor has instructed you that surgery is scheduled for: 11/13/23    Please report to [facility], Registration the morning of surgery. You must check-in and receive a wristband before going to your procedure.  [address] [place] [address] [place] [address]    Pre-Op will call the afternoon prior to surgery between 1:00 and 6:00 PM with the final arrival time.  Phone number: [phone]    PLEASE NOTE:  The surgery schedule has many variables which may affect the time of your surgery case.  Family members should be available if your surgery time changes.  Plan to be here the day of your procedure between 4-6 hours.    MEDICATIONS:  TAKE ONLY THESE MEDICATIONS WITH A SMALL SIP OF WATER THE MORNING OF YOUR PROCEDURE:    SEE LIST    DO NOT TAKE THESE MEDICATIONS 5-7 DAYS PRIOR to your procedure or per your surgeon's request:   ASPIRIN, ALEVE, ADVIL, IBUPROFEN, FISH OIL VITAMIN E, HERBALS    (May take Tylenol)                                      Off  ASA and Eliquis after 11/5/23                                        ONLY if you are prescribed any types of blood thinners such as:  Aspirin, Coumadin, Plavix, Pradaxa, Xarelto, Aggrenox, Effient, Eliquis, Savasya, Brilinta, or any other, ask your surgeon whether you should stop taking them and how long before surgery you should stop.  You may also need to verify with the prescribing physician if it is ok to stop your medication.      INSTRUCTIONS IMPORTANT!!  Do not eat or drink anything between midnight and the time of your procedure- this includes gum, mints, and candy.  Do not smoke or drink alcoholic beverages 24 hours prior to your procedure.  Shower the night before AND the morning of your procedure with a Chlorhexidine wash such as Hibiclens or Dial antibacterial soap from the neck down.  Do not get it on your face or in your eyes.  You may use your own shampoo and face wash. This helps your skin to be as bacteria free as possible.     If you wear contact lenses, dentures, hearing aids or glasses, bring a container to put them in during surgery and give to a family member for safe keeping.  Please leave all jewelry, piercing's and valuables at home. You must remove your false eyelashes prior to surgery.    DO NOT remove hair from the surgery site.  Do not shave the incision site unless you are given specific instructions to do so.    ONLY if you have been diagnosed with sleep apnea please bring your C-PAP machine.  ONLY if you wear home oxygen please bring your portable oxygen tank the day of your procedure.  ONLY if you have a history of OPEN HEART SURGERY you will need a clearance from your Cardiologist per Anesthesia.      ONLY for patients requiring bowel prep, written instructions will be given by your doctor's office.  ONLY if you have a neuro stimulator, please bring the controller with you the morning of surgery  ONLY if a type and screen test is needed before surgery, please return:  If your doctor has scheduled you for an overnight stay, bring a small overnight bag with any personal items you need.  Make arrangements in advance for transportation home by a responsible adult.  It is not safe to drive a vehicle during the 24 hours after anesthesia.          All  facilities and properties are tobacco free.  Smoking is NOT allowed.   If you have any questions about these instructions, call Pre-Op Admit  Nursing at [phone] or the Pre-Op Day Surgery Unit at [phone].

## 2023-11-09 DIAGNOSIS — M19.011 GLENOHUMERAL ARTHRITIS, RIGHT: Primary | ICD-10-CM

## 2023-11-09 RX ORDER — DOCUSATE SODIUM 100 MG/1
100 CAPSULE, LIQUID FILLED ORAL 2 TIMES DAILY
Qty: 60 CAPSULE | Refills: 0 | Status: SHIPPED | OUTPATIENT
Start: 2023-11-09 | End: 2023-12-10

## 2023-11-09 RX ORDER — CELECOXIB 200 MG/1
200 CAPSULE ORAL 2 TIMES DAILY
Qty: 60 CAPSULE | Refills: 0 | Status: SHIPPED | OUTPATIENT
Start: 2023-11-09 | End: 2023-12-10

## 2023-11-09 RX ORDER — GABAPENTIN 300 MG/1
300 CAPSULE ORAL 2 TIMES DAILY
Qty: 60 CAPSULE | Refills: 0 | Status: SHIPPED | OUTPATIENT
Start: 2023-11-09 | End: 2024-01-09

## 2023-11-09 RX ORDER — OXYCODONE AND ACETAMINOPHEN 7.5; 325 MG/1; MG/1
1 TABLET ORAL EVERY 6 HOURS PRN
Qty: 28 TABLET | Refills: 0 | Status: SHIPPED | OUTPATIENT
Start: 2023-11-09 | End: 2023-11-16 | Stop reason: SINTOL

## 2023-11-09 RX ORDER — ASPIRIN 81 MG/1
81 TABLET ORAL EVERY 12 HOURS
Qty: 60 TABLET | Refills: 0 | Status: SHIPPED | OUTPATIENT
Start: 2023-11-09 | End: 2023-11-14 | Stop reason: ALTCHOICE

## 2023-11-10 ENCOUNTER — TELEPHONE (OUTPATIENT)
Dept: ORTHOPEDICS | Facility: CLINIC | Age: 63
End: 2023-11-10

## 2023-11-10 DIAGNOSIS — M19.011 GLENOHUMERAL ARTHRITIS, RIGHT: Primary | ICD-10-CM

## 2023-11-13 ENCOUNTER — ANESTHESIA (OUTPATIENT)
Dept: SURGERY | Facility: HOSPITAL | Age: 63
End: 2023-11-13
Payer: MEDICARE

## 2023-11-13 ENCOUNTER — HOSPITAL ENCOUNTER (OUTPATIENT)
Facility: HOSPITAL | Age: 63
Discharge: HOME | End: 2023-11-13
Attending: ORTHOPAEDIC SURGERY | Admitting: ORTHOPAEDIC SURGERY
Payer: MEDICARE

## 2023-11-13 DIAGNOSIS — Z79.01 CURRENT USE OF ANTICOAGULANT THERAPY: ICD-10-CM

## 2023-11-13 DIAGNOSIS — M19.011 GLENOHUMERAL ARTHRITIS, RIGHT: Primary | ICD-10-CM

## 2023-11-13 DIAGNOSIS — Z01.818 PRE-OP TESTING: ICD-10-CM

## 2023-11-13 PROCEDURE — 27200750 HC INSULATED NEEDLE/ STIMUPLEX: Performed by: ANESTHESIOLOGY

## 2023-11-13 PROCEDURE — 71000015 HC POSTOP RECOV 1ST HR: Performed by: ORTHOPAEDIC SURGERY

## 2023-11-13 PROCEDURE — 25000003 PHARM REV CODE 250: Performed by: ANESTHESIOLOGY

## 2023-11-13 PROCEDURE — 23430 REPAIR BICEPS TENDON: CPT | Mod: 59,RT,, | Performed by: ORTHOPAEDIC SURGERY

## 2023-11-13 PROCEDURE — 63600175 PHARM REV CODE 636 W HCPCS: Performed by: ORTHOPAEDIC SURGERY

## 2023-11-13 PROCEDURE — 94799 UNLISTED PULMONARY SVC/PX: CPT | Mod: XB

## 2023-11-13 PROCEDURE — D9220A PRA ANESTHESIA: ICD-10-PCS | Mod: ANES,,, | Performed by: ANESTHESIOLOGY

## 2023-11-13 PROCEDURE — 63600175 PHARM REV CODE 636 W HCPCS: Performed by: NURSE ANESTHETIST, CERTIFIED REGISTERED

## 2023-11-13 PROCEDURE — 25000003 PHARM REV CODE 250: Performed by: NURSE ANESTHETIST, CERTIFIED REGISTERED

## 2023-11-13 PROCEDURE — 25000003 PHARM REV CODE 250: Performed by: ORTHOPAEDIC SURGERY

## 2023-11-13 PROCEDURE — C1769 GUIDE WIRE: HCPCS | Performed by: ORTHOPAEDIC SURGERY

## 2023-11-13 PROCEDURE — C1776 JOINT DEVICE (IMPLANTABLE): HCPCS | Performed by: ORTHOPAEDIC SURGERY

## 2023-11-13 PROCEDURE — D9220A PRA ANESTHESIA: ICD-10-PCS | Mod: CRNA,,, | Performed by: NURSE ANESTHETIST, CERTIFIED REGISTERED

## 2023-11-13 PROCEDURE — 63600175 PHARM REV CODE 636 W HCPCS

## 2023-11-13 PROCEDURE — 64415 NJX AA&/STRD BRCH PLXS IMG: CPT | Mod: 59,RT,, | Performed by: ANESTHESIOLOGY

## 2023-11-13 PROCEDURE — 23430 PR REPAIR BICEPS LONG TENDON: ICD-10-PCS | Mod: 59,RT,, | Performed by: ORTHOPAEDIC SURGERY

## 2023-11-13 PROCEDURE — C1713 ANCHOR/SCREW BN/BN,TIS/BN: HCPCS | Performed by: ORTHOPAEDIC SURGERY

## 2023-11-13 PROCEDURE — 64415 NJX AA&/STRD BRCH PLXS IMG: CPT | Performed by: ANESTHESIOLOGY

## 2023-11-13 PROCEDURE — 37000008 HC ANESTHESIA 1ST 15 MINUTES: Performed by: ORTHOPAEDIC SURGERY

## 2023-11-13 PROCEDURE — 63600175 PHARM REV CODE 636 W HCPCS: Performed by: ANESTHESIOLOGY

## 2023-11-13 PROCEDURE — D9220A PRA ANESTHESIA: Mod: ANES,,, | Performed by: ANESTHESIOLOGY

## 2023-11-13 PROCEDURE — 71000039 HC RECOVERY, EACH ADD'L HOUR: Performed by: ORTHOPAEDIC SURGERY

## 2023-11-13 PROCEDURE — 37000009 HC ANESTHESIA EA ADD 15 MINS: Performed by: ORTHOPAEDIC SURGERY

## 2023-11-13 PROCEDURE — 27201423 OPTIME MED/SURG SUP & DEVICES STERILE SUPPLY: Performed by: ORTHOPAEDIC SURGERY

## 2023-11-13 PROCEDURE — 64415 PR NERVE BLOCK INJ, ANES/STEROID, BRACHIAL PLEXUS, INCL IMAG GUIDANCE: ICD-10-PCS | Mod: 59,RT,, | Performed by: ANESTHESIOLOGY

## 2023-11-13 PROCEDURE — 36000710: Performed by: ORTHOPAEDIC SURGERY

## 2023-11-13 PROCEDURE — D9220A PRA ANESTHESIA: Mod: CRNA,,, | Performed by: NURSE ANESTHETIST, CERTIFIED REGISTERED

## 2023-11-13 PROCEDURE — 36000711: Performed by: ORTHOPAEDIC SURGERY

## 2023-11-13 PROCEDURE — 23472 RECONSTRUCT SHOULDER JOINT: CPT | Mod: RT,,, | Performed by: ORTHOPAEDIC SURGERY

## 2023-11-13 PROCEDURE — 23472 PR RECONSTR TOTAL SHOULDER IMPLANT: ICD-10-PCS | Mod: RT,,, | Performed by: ORTHOPAEDIC SURGERY

## 2023-11-13 PROCEDURE — 71000033 HC RECOVERY, INTIAL HOUR: Performed by: ORTHOPAEDIC SURGERY

## 2023-11-13 PROCEDURE — C9290 INJ, BUPIVACAINE LIPOSOME: HCPCS | Performed by: ANESTHESIOLOGY

## 2023-11-13 DEVICE — CEMENT BONE SIMPLEX HV RADPQ: Type: IMPLANTABLE DEVICE | Site: SHOULDER | Status: FUNCTIONAL

## 2023-11-13 DEVICE — STEM HUM ANATOMIC ALTIVATE NT: Type: IMPLANTABLE DEVICE | Site: SHOULDER | Status: FUNCTIONAL

## 2023-11-13 RX ORDER — FENTANYL CITRATE 50 UG/ML
INJECTION, SOLUTION INTRAMUSCULAR; INTRAVENOUS
Status: DISCONTINUED | OUTPATIENT
Start: 2023-11-13 | End: 2023-11-13

## 2023-11-13 RX ORDER — MIDAZOLAM HYDROCHLORIDE 1 MG/ML
INJECTION INTRAMUSCULAR; INTRAVENOUS
Status: DISCONTINUED | OUTPATIENT
Start: 2023-11-13 | End: 2023-11-13

## 2023-11-13 RX ORDER — EPHEDRINE SULFATE 50 MG/ML
INJECTION, SOLUTION INTRAVENOUS
Status: DISCONTINUED | OUTPATIENT
Start: 2023-11-13 | End: 2023-11-13

## 2023-11-13 RX ORDER — PROPOFOL 10 MG/ML
VIAL (ML) INTRAVENOUS
Status: DISCONTINUED | OUTPATIENT
Start: 2023-11-13 | End: 2023-11-13

## 2023-11-13 RX ORDER — BUPIVACAINE HYDROCHLORIDE 5 MG/ML
INJECTION, SOLUTION EPIDURAL; INTRACAUDAL
Status: DISCONTINUED | OUTPATIENT
Start: 2023-11-13 | End: 2023-11-13

## 2023-11-13 RX ORDER — ONDANSETRON HYDROCHLORIDE 2 MG/ML
INJECTION, SOLUTION INTRAMUSCULAR; INTRAVENOUS
Status: DISCONTINUED | OUTPATIENT
Start: 2023-11-13 | End: 2023-11-13

## 2023-11-13 RX ORDER — ACETAMINOPHEN 10 MG/ML
INJECTION, SOLUTION INTRAVENOUS
Status: DISCONTINUED | OUTPATIENT
Start: 2023-11-13 | End: 2023-11-13

## 2023-11-13 RX ORDER — SUCCINYLCHOLINE CHLORIDE 20 MG/ML
INJECTION INTRAMUSCULAR; INTRAVENOUS
Status: DISCONTINUED | OUTPATIENT
Start: 2023-11-13 | End: 2023-11-13

## 2023-11-13 RX ORDER — METOCLOPRAMIDE HYDROCHLORIDE 5 MG/ML
10 INJECTION INTRAMUSCULAR; INTRAVENOUS EVERY 10 MIN PRN
Status: COMPLETED | OUTPATIENT
Start: 2023-11-13 | End: 2023-11-13

## 2023-11-13 RX ORDER — CEFAZOLIN SODIUM 2 G/50ML
2 SOLUTION INTRAVENOUS
Status: COMPLETED | OUTPATIENT
Start: 2023-11-13 | End: 2023-11-13

## 2023-11-13 RX ORDER — DEXAMETHASONE SODIUM PHOSPHATE 4 MG/ML
INJECTION, SOLUTION INTRA-ARTICULAR; INTRALESIONAL; INTRAMUSCULAR; INTRAVENOUS; SOFT TISSUE
Status: DISCONTINUED | OUTPATIENT
Start: 2023-11-13 | End: 2023-11-13

## 2023-11-13 RX ORDER — SODIUM CHLORIDE, SODIUM LACTATE, POTASSIUM CHLORIDE, CALCIUM CHLORIDE 600; 310; 30; 20 MG/100ML; MG/100ML; MG/100ML; MG/100ML
INJECTION, SOLUTION INTRAVENOUS CONTINUOUS
Status: ACTIVE | OUTPATIENT
Start: 2023-11-13

## 2023-11-13 RX ORDER — ROCURONIUM BROMIDE 10 MG/ML
INJECTION, SOLUTION INTRAVENOUS
Status: DISCONTINUED | OUTPATIENT
Start: 2023-11-13 | End: 2023-11-13

## 2023-11-13 RX ORDER — PHENYLEPHRINE HYDROCHLORIDE 10 MG/ML
INJECTION INTRAVENOUS
Status: DISCONTINUED | OUTPATIENT
Start: 2023-11-13 | End: 2023-11-13

## 2023-11-13 RX ORDER — LIDOCAINE HYDROCHLORIDE 20 MG/ML
INJECTION INTRAVENOUS
Status: DISCONTINUED | OUTPATIENT
Start: 2023-11-13 | End: 2023-11-13

## 2023-11-13 RX ORDER — LIDOCAINE HYDROCHLORIDE AND EPINEPHRINE 10; 10 MG/ML; UG/ML
INJECTION, SOLUTION INFILTRATION; PERINEURAL
Status: DISCONTINUED | OUTPATIENT
Start: 2023-11-13 | End: 2023-11-13 | Stop reason: HOSPADM

## 2023-11-13 RX ORDER — FENTANYL CITRATE 50 UG/ML
25 INJECTION, SOLUTION INTRAMUSCULAR; INTRAVENOUS EVERY 5 MIN PRN
Status: ACTIVE | OUTPATIENT
Start: 2023-11-13

## 2023-11-13 RX ORDER — OXYCODONE HYDROCHLORIDE 5 MG/1
5 TABLET ORAL ONCE AS NEEDED
Status: ACTIVE | OUTPATIENT
Start: 2023-11-13 | End: 2035-04-11

## 2023-11-13 RX ORDER — LIDOCAINE HYDROCHLORIDE 10 MG/ML
1 INJECTION, SOLUTION EPIDURAL; INFILTRATION; INTRACAUDAL; PERINEURAL ONCE
Status: ACTIVE | OUTPATIENT
Start: 2023-11-13

## 2023-11-13 RX ADMIN — DEXAMETHASONE SODIUM PHOSPHATE 8 MG: 4 INJECTION, SOLUTION INTRA-ARTICULAR; INTRALESIONAL; INTRAMUSCULAR; INTRAVENOUS; SOFT TISSUE at 11:11

## 2023-11-13 RX ADMIN — FENTANYL CITRATE 50 MCG: 50 INJECTION, SOLUTION INTRAMUSCULAR; INTRAVENOUS at 10:11

## 2023-11-13 RX ADMIN — LIDOCAINE HYDROCHLORIDE 100 MG: 20 INJECTION, SOLUTION INTRAVENOUS at 10:11

## 2023-11-13 RX ADMIN — PHENYLEPHRINE HYDROCHLORIDE 200 MCG: 10 INJECTION INTRAVENOUS at 12:11

## 2023-11-13 RX ADMIN — METOCLOPRAMIDE 10 MG: 5 INJECTION, SOLUTION INTRAMUSCULAR; INTRAVENOUS at 02:11

## 2023-11-13 RX ADMIN — PROPOFOL 160 MG: 10 INJECTION, EMULSION INTRAVENOUS at 10:11

## 2023-11-13 RX ADMIN — SUGAMMADEX 200 MG: 100 INJECTION, SOLUTION INTRAVENOUS at 01:11

## 2023-11-13 RX ADMIN — PHENYLEPHRINE HYDROCHLORIDE 100 MCG: 10 INJECTION INTRAVENOUS at 11:11

## 2023-11-13 RX ADMIN — MIDAZOLAM HYDROCHLORIDE 1 MG: 1 INJECTION, SOLUTION INTRAMUSCULAR; INTRAVENOUS at 10:11

## 2023-11-13 RX ADMIN — CEFAZOLIN SODIUM 2 G: 2 SOLUTION INTRAVENOUS at 11:11

## 2023-11-13 RX ADMIN — SUCCINYLCHOLINE CHLORIDE 160 MG: 20 INJECTION, SOLUTION INTRAMUSCULAR; INTRAVENOUS at 10:11

## 2023-11-13 RX ADMIN — PHENYLEPHRINE HYDROCHLORIDE 200 MCG: 10 INJECTION INTRAVENOUS at 11:11

## 2023-11-13 RX ADMIN — ACETAMINOPHEN 1000 MG: 10 INJECTION, SOLUTION INTRAVENOUS at 11:11

## 2023-11-13 RX ADMIN — EPHEDRINE SULFATE 25 MG: 50 INJECTION, SOLUTION INTRAMUSCULAR; INTRAVENOUS; SUBCUTANEOUS at 01:11

## 2023-11-13 RX ADMIN — ONDANSETRON 4 MG: 2 INJECTION INTRAMUSCULAR; INTRAVENOUS at 11:11

## 2023-11-13 RX ADMIN — EPHEDRINE SULFATE 25 MG: 50 INJECTION, SOLUTION INTRAMUSCULAR; INTRAVENOUS; SUBCUTANEOUS at 12:11

## 2023-11-13 RX ADMIN — ROCURONIUM BROMIDE 35 MG: 10 INJECTION, SOLUTION INTRAVENOUS at 11:11

## 2023-11-13 RX ADMIN — ROCURONIUM BROMIDE 5 MG: 10 INJECTION, SOLUTION INTRAVENOUS at 10:11

## 2023-11-13 RX ADMIN — BUPIVACAINE HYDROCHLORIDE 5 ML: 5 INJECTION, SOLUTION EPIDURAL; INTRACAUDAL; PERINEURAL at 10:11

## 2023-11-13 RX ADMIN — BUPIVACAINE 10 MG: 13.3 INJECTION, SUSPENSION, LIPOSOMAL INFILTRATION at 10:11

## 2023-11-13 RX ADMIN — TRANEXAMIC ACID 1000 MG: 100 INJECTION, SOLUTION INTRAVENOUS at 11:11

## 2023-11-13 RX ADMIN — SODIUM CHLORIDE, SODIUM GLUCONATE, SODIUM ACETATE, POTASSIUM CHLORIDE, MAGNESIUM CHLORIDE, SODIUM PHOSPHATE, DIBASIC, AND POTASSIUM PHOSPHATE: .53; .5; .37; .037; .03; .012; .00082 INJECTION, SOLUTION INTRAVENOUS at 08:11

## 2023-11-13 RX ADMIN — SODIUM CHLORIDE, SODIUM GLUCONATE, SODIUM ACETATE, POTASSIUM CHLORIDE, MAGNESIUM CHLORIDE, SODIUM PHOSPHATE, DIBASIC, AND POTASSIUM PHOSPHATE: .53; .5; .37; .037; .03; .012; .00082 INJECTION, SOLUTION INTRAVENOUS at 12:11

## 2023-11-13 NOTE — PLAN OF CARE
Pre-op complete. Wife at bedside. Text notifications initiated. Pt denies need for safe. Belongings in post op.

## 2023-11-13 NOTE — PLAN OF CARE
Pt and family given discharge instructions. Prescriptions brought to pt's bedside via pharmacy. Educated on post anesthesia precautions, dressing care, and when to notify MD. All belongings returned to pt. Transferred to personal vehicle via wheelchair.

## 2023-11-13 NOTE — ANESTHESIA PREPROCEDURE EVALUATION
11/13/2023  Donald Frey is a 63 y.o., male.      Pre-op Assessment    I have reviewed the Patient Summary Reports.     I have reviewed the Nursing Notes. I have reviewed the NPO Status.   I have reviewed the Medications.     Review of Systems  Cardiovascular:     Hypertension  Past MI CAD    Dysrhythmias atrial fibrillation         ECG has been reviewed. ·BNP - normal since ablation/2022     The left ventricle is normal in size with mildly decreased systolic function. The estimated ejection fraction is 40-45%.  · Mild right ventricular enlargement with normal right ventricular systolic function.  · No interatrial septal defect present.  · Plaque present in the descending aorta.  · Normal appearing left atrial appendage. No thrombus is present in the appendage. Normal appendage velocities.                           Pulmonary:  Pulmonary Normal                       Hepatic/GI:  Hepatic/GI Normal                 Musculoskeletal:     Right shoulder            Neurological:  Neurology Normal                                      Endocrine:  Diabetes         Morbid Obesity / BMI > 40      Physical Exam  General: Well nourished    Airway:  Mallampati: III   TM Distance: Normal    Dental:  Intact    Chest/Lungs:  Normal Respiratory Rate    Heart:  Rate: Normal  Rhythm: Regular Rhythm        Anesthesia Plan  Type of Anesthesia, risks & benefits discussed:    Anesthesia Type: Gen ETT  Intra-op Monitoring Plan: Standard ASA Monitors  Post Op Pain Control Plan: multimodal analgesia, IV/PO Opioids PRN and peripheral nerve block  Induction:  IV and Inhalation  Informed Consent: Informed consent signed with the Patient and all parties understand the risks and agree with anesthesia plan.  All questions answered.   ASA Score: 3    Ready For Surgery From Anesthesia Perspective.     .

## 2023-11-13 NOTE — H&P
CC/Indication for Procedure: 63 y.o. male with Current use of anticoagulant therapy [Z79.01]  Pre-op testing [Z01.818]  Glenohumeral arthritis, right [M19.011].    Patient scheduled for DC RECONSTR TOTAL SHOULDER IMPLANT [30847] (ARTHROPLASTY, SHOULDER, RIGHT).    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 01/13/2022    EYE EXAM  DR KATHY PARMAR    Coronary artery disease     1 coronary stent - Dr Orellana    Diabetes mellitus, type 2     Hypertension     Myocardial infarction 2013     Past Surgical History:   Procedure Laterality Date    ABLATION OF ARRHYTHMOGENIC FOCUS FOR ATRIAL FIBRILLATION N/A 7/7/2022    Procedure: ABLATION, ARRHYTHMOGENIC FOCUS, FOR ATRIAL FIBRILLATION;  Surgeon: Niko Pacheco MD;  Location: [facility] EP LAB;  Service: Cardiology;  Laterality: N/A;  AF, ARMANDO(Cx if SR), PVI, RFA, CARTO, GEN, GP, 3 PREP    CORONARY STENT PLACEMENT  2013    TRANSESOPHAGEAL ECHOCARDIOGRAPHY N/A 7/7/2022    Procedure: ECHOCARDIOGRAM, TRANSESOPHAGEAL;  Surgeon: Conor Chappell MD;  Location: [facility] EP LAB;  Service: Cardiology;  Laterality: N/A;    TREATMENT OF CARDIAC ARRHYTHMIA N/A 3/7/2022    Procedure: CARDIOVERSION;  Surgeon: Gomez Orellana MD;  Location: [facility] CATH/EP LAB;  Service: Cardiology;  Laterality: N/A;    TREATMENT OF CARDIAC ARRHYTHMIA  7/7/2022    Procedure: Cardioversion or Defibrillation;  Surgeon: Conor Chappell MD;  Location: [facility] EP LAB;  Service: Cardiology;;     Family History   Problem Relation Age of Onset    Heart attack Father     Cancer Father         prostate     Social History     Socioeconomic History    Marital status:    Tobacco Use    Smoking status: Never    Smokeless tobacco: Never   Substance and Sexual Activity    Alcohol use: Yes     Comment: occ    Drug use: Never     Social Determinants of Health     Stress: No Stress Concern Present (10/28/2019)    [employer] - Occupational  Stress Questionnaire     Feeling of Stress : Not at all       Review of patient's allergies indicates:  No Known Allergies    No current facility-administered medications for this encounter.    ROS:    Denies chest pain or palpitations  Denies shortness of breath  Denies fevers or chills  Denies chest pain  Denies abdominal pain    PE:    General Appearance: Well nourished  Orientation: Oriented to time, place, person  Mental Status: Alert  Heart: RRR  Lungs: CTA  Abdomen: Soft and non-tender    Anesthesia/Surgery risks, benefits and alternative options discussed and understood by patient/family.    This note was created using Dragon voice recognition software that occasionally misinterpreted phrases or words.

## 2023-11-13 NOTE — DISCHARGE INSTRUCTIONS
General Information:    1.  Do not drink alcoholic beverages including beer for 24 hours or as long as you are on pain medication..  2.  Do not drive a motor vehicle, operate machinery or power tools, or signs legal papers for 24 hours or as long as you are on pain medication.   3.  You may experience light-headedness, dizziness, and sleepiness following surgery. Please do not stay alone. A responsible adult should be with you for this 24 hour period.  4.  Go home and rest.    5. Progress slowly to a normal diet unless instructed.  Otherwise, begin with liquids such as soft drinks, then soup and crackers working up to solid foods. Drink plenty of nonalcoholic fluids.  6.  Certain anesthetics and pain medications produce nausea and vomiting in certain       individuals. If nausea becomes a problem at home, call you doctor.    7. A nurse will be calling you sometime after surgery. Do not be alarmed. This is our way of finding out how you are doing.    8. Several times every hour while you are awake, take 2-3 deep breaths and cough. If you had stomach surgery hold a pillow or rolled towel firmly against your stomach before you cough. This will help with any pain the cough might cause.  9. Several times every hour while you are awake, pump and flex your feet 5-6 times and do foot circles. This will help prevent blood clots.    10.Call your doctor for severe pain, bleeding, fever, or signs or symptoms of infection (pain, swelling, redness, foul odor, drainage).  Post op instructions for prevention of DVT  What is deep vein thrombosis?  Deep vein thrombosis (DVT) is the medical term for blood clots in the deep veins of the leg.  These blood clots can be dangerous.  A DVT can block a blood vessel and keep blood from getting where it needs to go.  Another problem is that the clot can travel to other parts of the body such as the lungs.  A clot that travels to the lungs is called a pulmonary embolus (PE) and can cause  serious problems with breathing which can lead to death.  Am I at risk for DVT/PE?  If you are not very active, you are at risk of DVT.  Anyone confined to bed, sitting for long periods of time, recovering from surgery, etc. increases the risk of DVT.  Other risk factors are cancer diagnosis, certain medications, estrogen replacement in any form,older age, obesity, pregnancy, smoking, history of clotting disorders, and dehydration.  How will I know if I have a DVT?  Swelling in the lower leg  Pain  Warmth, redness, hardness or bulging of the vein  If you have any of these symptoms, call your doctors office right away.  Some people will not have any symptoms until the clot moves to the lungs.  What are the symptoms of a PE?  Panting, shortness of breath, or trouble breathing  Sharp, knife-like chest pain when you breathe  Coughing or coughing up blood  Rapid heartbeat  If you have any of these symptoms or get worse quickly, call 911 for emergency treatment.  How can I prevent a DVT?  Avoid long periods of inactivity and dont cross your legs--get up and walk around every hour or so.  Stay active--walking after surgery is highly encouraged.  This means you should get out of the house and walk in the neighborhood.  Going up and down stairs will not impair healing (unless advised against such activity by your doctor).    Drink plenty of noncaffeinated, nonalcoholic fluids each day to prevent dehydration.  Wear special support stockings, if they have been advised by your doctor.  If you travel, stop at least once an hour and walk around.  Avoid smoking (assistance with stopping is available through your healthcare provider)  Always notify your doctor if you are not able to follow the post operative instructions that are given to you at the time of discharge.  It may be necessary to prescribe one of the medications available to prevent DVT.Using an Incentive Spirometer    An incentive spirometer is a device that helps  you do deep breathing exercises. These exercises expand your lungs, aid in circulation, and help prevent pneumonia. Deep breathing exercises also help you breathe better and improve the function of your lungs by:  Keeping your lungs clear  Strengthening your breathing muscles  Helping prevent respiratory complications or problems  The incentive spirometer gives you a way to take an active part in recover. A nurse or therapist will teach you breathing exercises. To do these exercises, you will breathe in through your mouth and not your nose. The incentive spirometer only works correctly if you breathe in through your mouth.  Steps to clear lungs  Step 1. Exhale normally. Then, inhale normally.  Relax and breathe out.  Step 2. Place your lips tightly around the mouthpiece.  Make sure the device is upright and not tilted.  Step 3. Inhale as much air as you can through the mouthpiece (don't breath through your nose).  Inhale slowly and deeply.  Hold your breath long enough to keep the balls or disk raised for at least 3 to 5 seconds, or as instructed by your healthcare provider.  Some spirometers have an indicator to let you know that you are breathing in too fast. If the indicator goes off, breathe in more slowly.  Step 4. Repeat the exercise regularly.  Do this exercise every hour while you're awake, or as instructed by your healthcare provider.  If you were taught deep breathing and coughing exercises, do them regularly as instructed by your healthcare provider.     We hope your stay was comfortable as you heal now, mend and rest.    For we have enjoyed taking care of you by giving your our best.    And as you get better, by regaining your health and strength;   We count it as a privilege to have served you and hope your time at [facility] was well spent.      Thank  You!!!

## 2023-11-13 NOTE — PLAN OF CARE
Noted on pt's DC paperwork to ignore the aspirin. Restart his eliquids b.I.d beginning 6 hours postop for VTE postop.

## 2023-11-13 NOTE — TRANSFER OF CARE
"Anesthesia Transfer of Care Note    Patient: Donald Frey    Procedure(s) Performed: Procedure(s) (LRB):  ARTHROPLASTY, SHOULDER, RIGHT (Right)    Patient location: PACU    Anesthesia Type: general    Transport from OR: Transported from OR on 2-3 L/min O2 by NC with adequate spontaneous ventilation    Post pain: adequate analgesia    Post assessment: no apparent anesthetic complications and tolerated procedure well    Post vital signs: stable    Level of consciousness: awake, confused and responds to stimulation    Nausea/Vomiting: no nausea/vomiting    Complications: none    Transfer of care protocol was followed    Last vitals: Visit Vitals  BP (!) 184/87 (BP Location: Left arm, Patient Position: Lying)   Pulse 79   Temp 36.7 °C (98.1 °F) (Skin)   Resp 18   Ht 6' 2" (1.88 m)   Wt (!) 142.4 kg (314 lb)   SpO2 96%   BMI 40.32 kg/m²     "

## 2023-11-13 NOTE — ANESTHESIA PROCEDURE NOTES
Intubation    Date/Time: 11/13/2023 10:57 AM    Performed by: Amanuel Gaviria CRNA  Authorized by: Ever Palacios MD    Intubation:     Induction:  Intravenous    Intubated:  Postinduction    Mask Ventilation:  Easy mask    Attempts:  1    Attempted By:  CRNA    Method of Intubation:  Video laryngoscopy    Blade:  Cruz 3    Laryngeal View Grade: Grade I - full view of cords      Difficult Airway Encountered?: No      Complications:  None    Airway Device:  Oral endotracheal tube    Airway Device Size:  8.0    Style/Cuff Inflation:  Cuffed (inflated to minimal occlusive pressure)    Inflation Amount (mL):  5    Tube secured:  22    Secured at:  The lips    Placement Verified By:  Capnometry    Complicating Factors:  Oropharyngeal edema or fat, obesity and short neck    Findings Post-Intubation:  BS equal bilateral and atraumatic/condition of teeth unchanged

## 2023-11-13 NOTE — OP NOTE
[facility]  Surgery Department  Operative Note    SUMMARY     Date of Procedure: 11/13/2023     Procedure:  Total shoulder arthroplasty right                       Open biceps tenodesis right    Surgeon(s) and Role:     * Branden Hernandez MD - Primary    Assisting Surgeon:  Edward    Pre-Operative Diagnosis: Current use of anticoagulant therapy [Z79.01]  Pre-op testing [Z01.818]  Glenohumeral arthritis, right [M19.011]    Post-Operative Diagnosis: Post-Op Diagnosis Codes:     * Current use of anticoagulant therapy [Z79.01]     * Pre-op testing [Z01.818]     * Glenohumeral arthritis, right [M19.011]    Anesthesia: General    Intraoperative Findings:  Severe bone-on-bone arthritis right shoulder.  Intact rotator cuff right shoulder.  Shredded biceps tendon right shoulder.    Description of the Findings of the Procedure:  Patient was placed on the operative table in supine position.  He was then moved into the beach chair position or.  We spent a lot of time positioning him and protecting him.  He was now prepped and draped in the usual sterile manner for surgery.  Incision was made from the coracoid distally was carried down sharply through the skin.  The deltopectoral groove was easily identified.  The vein was tied off as it had multiple branches.  The subscapularis was visualized and was taken down full-thickness with a good cuff for later reattachment.  The biceps tendon was noted to be extremely flattened and it was released and tagged for later biceps tenodesis.  The humeral head was extremely arthritic with almost no cartilage.  The glenoid was arthritic but the bony construct was intact.  At this point we irrigated.  We subluxed the humerus anteriorly.  We made a preliminary head cut at 30° of retroversion.  We now reamed up to a 14 and then broached to a 14.  That gave us excellent fit and fill.  We calcar planed in turned our attention to the glenoid.  Retractors were placed  anteriorly and posteriorly.  The glenoid was skeletonized.  We used the guide and drilled a central guidewire.  We now reamed so that the glenoid would be congruent with the back of the glenoid component.  We now drilled out of 3 pegs and then our central peg.  We pulsed and irrigated a mixed up bone cement and cemented only the pegs.  The glenoid was now cemented into position.  At this point we had an excellent fit.  We turned our attention back to the humerus.  We placed our humeral head into position and began trialing.  The construct trialed perfectly.  It was very stable anteriorly inferiorly and posteriorly.  There was good contact between the glenoid and the humeral head.  We pulsed and irrigated.  I closed the subscapularis with 2. FiberWire.  The biceps tendon was tenodesed in its groove with 2. FiberWire.  I irrigated again.  I.  I irrigated again and closed the subcu with 2-0 Vicryl.  The skin was closed with 4-0 Monocryl.  Sterile dressings were applied and the patient was noted to be in stable condition    Complications: No    Estimated Blood Loss (EBL): 350 mL           Implants:   Implant Name Type Inv. Item Serial No.  Lot No. LRB No. Used Action   guide pin 175mm    [employer]  Right 1 Implanted and Explanted   GUIDEWIRE MADELINE ALTIVATE 2.4MM - [id number]  GUIDEWIRE MADELINE ALTIVATE 2.4MM  DJO  Right 2 Implanted and Explanted   CEMENT BONE SIMPLEX HV RADPQ - [id number]  CEMENT BONE SIMPLEX HV RADPQ  [employer].  Right 1 Implanted   all poly pegged glenoid e plus 46mm    DJO [id number] Right 1 Implanted   short humeral stem 14mm x 70mm    DJO [id number] Right 1 Implanted   offset humera head 50mm x 18mm    DJO  Right 1 Implanted   STEM HUM ANATOMIC ALTIVATE NT - [id number]  STEM HUM ANATOMIC ALTIVATE NT  DJO  Right 1 Implanted       Specimens:   Specimen (24h ago, onward)      None                    Condition: Good    Disposition: PACU - hemodynamically stable.              Discharge  Note    SUMMARY     Admit Date: 11/13/2023    Discharge Date and Time:  11/13/2023 12:57 PM    Hospital Course (synopsis of major diagnoses, care, treatment, and services provided during the course of the hospital stay): Uneventful      Final Diagnosis: Post-Op Diagnosis Codes:     * Current use of anticoagulant therapy [Z79.01]     * Pre-op testing [Z01.818]     * Glenohumeral arthritis, right [M19.011]    Disposition: Home or Self Care    Follow Up/Patient Instructions:     Medications:  Reconciled Home Medications:   Current Discharge Medication List        CONTINUE these medications which have NOT CHANGED    Details   amiodarone (PACERONE) 200 MG Tab Take 1 tablet (200 mg total) by mouth once daily.  Qty: 30 tablet, Refills: 11      diltiaZEM (CARDIZEM CD) 240 MG 24 hr capsule Take 1 capsule (240 mg total) by mouth 2 (two) times a day.  Qty: 60 capsule, Refills: 11    Comments: .      empagliflozin (JARDIANCE) 25 mg tablet Take 1 tablet (25 mg total) by mouth once daily.  Qty: 90 tablet, Refills: 1    Associated Diagnoses: Type 2 diabetes mellitus with hyperglycemia, without long-term current use of insulin      metFORMIN (GLUCOPHAGE) 1000 MG tablet Take 1 tablet (1,000 mg total) by mouth 2 (two) times daily with meals.  Qty: 180 tablet, Refills: 1    Associated Diagnoses: Type 2 diabetes mellitus with hyperglycemia, without long-term current use of insulin      rosuvastatin (CRESTOR) 20 MG tablet Take 20 mg by mouth once daily.      apixaban (ELIQUIS) 5 mg Tab Take 1 tablet (5 mg total) by mouth 2 (two) times daily.  Qty: 60 tablet, Refills: 11      aspirin (ECOTRIN) 81 MG EC tablet Take 1 tablet (81 mg total) by mouth every 12 (twelve) hours.  Qty: 60 tablet, Refills: 0    Associated Diagnoses: Glenohumeral arthritis, right      celecoxib (CELEBREX) 200 MG capsule Take 1 capsule (200 mg total) by mouth 2 (two) times daily.  Qty: 60 capsule, Refills: 0    Associated Diagnoses: Glenohumeral arthritis, right       docusate sodium (COLACE) 100 MG capsule Take 1 capsule (100 mg total) by mouth 2 (two) times daily.  Qty: 60 capsule, Refills: 0    Associated Diagnoses: Glenohumeral arthritis, right      furosemide (LASIX) 20 MG tablet Take 1 tablet (20 mg total) by mouth 2 (two) times daily.  Qty: 60 tablet, Refills: 11      gabapentin (NEURONTIN) 300 MG capsule Take 1 capsule (300 mg total) by mouth 2 (two) times daily.  Qty: 60 capsule, Refills: 0    Associated Diagnoses: Glenohumeral arthritis, right      glipiZIDE (GLUCOTROL) 5 MG tablet Take 1 tablet (5 mg total) by mouth daily with breakfast.  Qty: 90 tablet, Refills: 1    Associated Diagnoses: Type 2 diabetes mellitus with hyperglycemia, without long-term current use of insulin      lisinopriL (PRINIVIL,ZESTRIL) 20 MG tablet Take 1 tablet (20 mg total) by mouth once daily.  Qty: 90 tablet, Refills: 3    Comments: .      oxyCODONE-acetaminophen (PERCOCET) 7.5-325 mg per tablet Take 1 tablet by mouth every 6 (six) hours as needed for Pain.  Qty: 28 tablet, Refills: 0    Comments: This prescription in the attached four other prescriptions are for postoperative use for the patient's scheduled total joint arthroplasty on Monday November 13th.  This is for the meds to bed program.  Associated Diagnoses: Glenohumeral arthritis, right           Discharge Procedure Orders   Diet Adult Regular     Leave dressing on - Keep it clean, dry, and intact until clinic visit     Activity as tolerated

## 2023-11-13 NOTE — ANESTHESIA PROCEDURE NOTES
Peripheral Block    Patient location during procedure: pre-op   Block not for primary anesthetic.  Reason for block: at surgeon's request and post-op pain management   Post-op Pain Location: right total shoulder   Start time: 11/13/2023 10:25 AM  Timeout: 11/13/2023 10:25 AM   End time: 11/13/2023 10:32 AM    Staffing  Authorizing Provider: Ever Palacios MD  Performing Provider: Ever Palacios MD    Staffing  Performed by: Ever Palacios MD  Authorized by: Ever Palacios MD    Preanesthetic Checklist  Completed: patient identified, IV checked, site marked, risks and benefits discussed, surgical consent, monitors and equipment checked, pre-op evaluation and timeout performed  Peripheral Block  Patient position: sitting  Prep: ChloraPrep  Patient monitoring: heart rate, cardiac monitor, continuous pulse ox, continuous capnometry and frequent blood pressure checks  Block type: interscalene  Laterality: right  Injection technique: single shot  Needle  Needle type: Stimuplex   Needle gauge: 22 G  Needle length: 4 in  Needle localization: anatomical landmarks and ultrasound guidance   -ultrasound image captured on disc.  Assessment  Injection assessment: negative aspiration, negative parasthesia and local visualized surrounding nerve  Paresthesia pain: none  Heart rate change: no  Slow fractionated injection: yes  Pain Tolerance: comfortable throughout block and no complaints      Additional Notes  VSS.  [facility] RN monitoring vitals throughout procedure.  Patient tolerated procedure well.

## 2023-11-13 NOTE — ANESTHESIA POSTPROCEDURE EVALUATION
Anesthesia Post Evaluation    Patient: Donald Frey    Procedure(s) Performed: Procedure(s) (LRB):  ARTHROPLASTY, SHOULDER, RIGHT (Right)    Final Anesthesia Type: general      Patient location during evaluation: PACU  Patient participation: Yes- Able to Participate  Level of consciousness: sedated and awake  Post-procedure vital signs: reviewed and stable  Pain management: adequate  Airway patency: patent    PONV status at discharge: No PONV  Anesthetic complications: no      Cardiovascular status: hypertensive and blood pressure returned to baseline  Respiratory status: spontaneous ventilation  Hydration status: euvolemic  Follow-up not needed.          Vitals Value Taken Time   /69 11/13/23 1431   Temp 36.5 °C (97.7 °F) 11/13/23 1415   Pulse 74 11/13/23 1431   Resp 16 11/13/23 1431   SpO2 96 % 11/13/23 1415   Vitals shown include unvalidated device data.      No case tracking events are documented in the log.      Pain/Codie Score: Codie Score: 10 (11/13/2023  2:15 PM)

## 2023-11-14 ENCOUNTER — OFFICE VISIT (OUTPATIENT)
Dept: ORTHOPEDICS | Facility: CLINIC | Age: 63
End: 2023-11-14
Payer: MEDICARE

## 2023-11-14 VITALS
DIASTOLIC BLOOD PRESSURE: 77 MMHG | TEMPERATURE: 98 F | HEART RATE: 77 BPM | HEIGHT: 74 IN | BODY MASS INDEX: 40.3 KG/M2 | SYSTOLIC BLOOD PRESSURE: 152 MMHG | WEIGHT: 314 LBS | OXYGEN SATURATION: 99 % | RESPIRATION RATE: 18 BRPM

## 2023-11-14 VITALS — HEIGHT: 74 IN | WEIGHT: 314 LBS | BODY MASS INDEX: 40.3 KG/M2

## 2023-11-14 DIAGNOSIS — Z96.611 STATUS POST TOTAL REPLACEMENT OF RIGHT SHOULDER: Primary | ICD-10-CM

## 2023-11-14 PROCEDURE — 99024 PR POST-OP FOLLOW-UP VISIT: ICD-10-PCS | Mod: S$GLB,,, | Performed by: ORTHOPAEDIC SURGERY

## 2023-11-14 PROCEDURE — 99024 POSTOP FOLLOW-UP VISIT: CPT | Mod: S$GLB,,, | Performed by: ORTHOPAEDIC SURGERY

## 2023-11-14 PROCEDURE — G0180 PR HOME HEALTH MD CERTIFICATION: ICD-10-PCS | Mod: ,,, | Performed by: ORTHOPAEDIC SURGERY

## 2023-11-14 PROCEDURE — G0180 MD CERTIFICATION HHA PATIENT: HCPCS | Mod: ,,, | Performed by: ORTHOPAEDIC SURGERY

## 2023-11-14 NOTE — PROGRESS NOTES
11/14/23 Very pleased with care given.  States all staff were excellent.  States he has read and  understands all discharge instructions.

## 2023-11-14 NOTE — PROGRESS NOTES
[facility] POST-OP NOTE    Subjective:           Chief Complaint:   Chief Complaint   Patient presents with    Right Shoulder - Post-op Evaluation     POD 1 Right TSA. States he is doing       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 01/13/2022    EYE EXAM  DR KATHY PARMAR    Coronary artery disease     1 coronary stent - Dr Orellana    Diabetes mellitus, type 2     Hypertension     Myocardial infarction 2013       Past Surgical History:   Procedure Laterality Date    ABLATION OF ARRHYTHMOGENIC FOCUS FOR ATRIAL FIBRILLATION N/A 7/7/2022    Procedure: ABLATION, ARRHYTHMOGENIC FOCUS, FOR ATRIAL FIBRILLATION;  Surgeon: Niko Pacheco MD;  Location: [facility] EP LAB;  Service: Cardiology;  Laterality: N/A;  AF, ARMANDO(Cx if SR), PVI, RFA, CARTO, GEN, GP, 3 PREP    CORONARY STENT PLACEMENT  2013    TRANSESOPHAGEAL ECHOCARDIOGRAPHY N/A 7/7/2022    Procedure: ECHOCARDIOGRAM, TRANSESOPHAGEAL;  Surgeon: Conor Chappell MD;  Location: [facility] EP LAB;  Service: Cardiology;  Laterality: N/A;    TREATMENT OF CARDIAC ARRHYTHMIA N/A 3/7/2022    Procedure: CARDIOVERSION;  Surgeon: Gomez Orellana MD;  Location: [facility] CATH/EP LAB;  Service: Cardiology;  Laterality: N/A;    TREATMENT OF CARDIAC ARRHYTHMIA  7/7/2022    Procedure: Cardioversion or Defibrillation;  Surgeon: Conor Chappell MD;  Location: [facility] EP LAB;  Service: Cardiology;;       Current Outpatient Medications   Medication Sig    amiodarone (PACERONE) 200 MG Tab Take 1 tablet (200 mg total) by mouth once daily. (Patient taking differently: Take 100 mg by mouth once daily.)    apixaban (ELIQUIS) 5 mg Tab Take 1 tablet (5 mg total) by mouth 2 (two) times daily.    aspirin (ECOTRIN) 81 MG EC tablet Take 1 tablet (81 mg total) by mouth every 12 (twelve) hours.    celecoxib (CELEBREX) 200 MG capsule Take 1 capsule (200 mg total) by mouth 2 (two) times daily.    diltiaZEM (CARDIZEM CD) 240 MG 24 hr capsule  Take 1 capsule (240 mg total) by mouth 2 (two) times a day.    docusate sodium (COLACE) 100 MG capsule Take 1 capsule (100 mg total) by mouth 2 (two) times daily.    empagliflozin (JARDIANCE) 25 mg tablet Take 1 tablet (25 mg total) by mouth once daily.    furosemide (LASIX) 20 MG tablet Take 1 tablet (20 mg total) by mouth 2 (two) times daily.    gabapentin (NEURONTIN) 300 MG capsule Take 1 capsule (300 mg total) by mouth 2 (two) times daily.    glipiZIDE (GLUCOTROL) 5 MG tablet Take 1 tablet (5 mg total) by mouth daily with breakfast.    lisinopriL (PRINIVIL,ZESTRIL) 20 MG tablet Take 1 tablet (20 mg total) by mouth once daily.    metFORMIN (GLUCOPHAGE) 1000 MG tablet Take 1 tablet (1,000 mg total) by mouth 2 (two) times daily with meals.    oxyCODONE-acetaminophen (PERCOCET) 7.5-325 mg per tablet Take 1 tablet by mouth every 6 (six) hours as needed for Pain.    rosuvastatin (CRESTOR) 20 MG tablet Take 20 mg by mouth once daily.     No current facility-administered medications for this visit.     Facility-Administered Medications Ordered in Other Visits   Medication    electrolyte-S (ISOLYTE)    fentaNYL 50 mcg/mL injection 25 mcg    lactated ringers infusion    LIDOcaine (PF) 10 mg/ml (1%) injection 10 mg    oxyCODONE immediate release tablet 5 mg       Review of patient's allergies indicates:  No Known Allergies    Family History   Problem Relation Age of Onset    Heart attack Father     Cancer Father         prostate       Social History     Socioeconomic History    Marital status:    Tobacco Use    Smoking status: Never    Smokeless tobacco: Never   Substance and Sexual Activity    Alcohol use: Yes     Comment: occ    Drug use: Never     Social Determinants of Health     Stress: No Stress Concern Present (10/28/2019)    [employer] - Occupational Stress Questionnaire     Feeling of Stress : Not at all       History of present illness:  63-year-old male, presents to clinic  today status post right total shoulder arthroplasty done November 13th.  He is here today for wound check.      Review of Systems:    Musculoskeletal:  See HPI      Objective:        Physical Examination:    Vital Signs:  There were no vitals filed for this visit.    Body mass index is 40.32 kg/m².    This a well-developed, well nourished patient in no acute distress.  They are alert and oriented and cooperative to examination.        Examination of the right shoulder, the patient's surgical incision, skin is dry intact, no erythema ecchymosis, no signs symptoms no evidence of wound failure dehiscence.  He does have some bruising over biceps region.  He is still experiencing some affects of the regional anesthesia.  He no range of motion of the elbow but he can move the wrist hand and fingers.    Pertinent New Results:    XRAY Report / Interpretation:       Assessment/Plan:      Stable status post right total shoulder arthroplasty yesterday.  We reviewed his expected postoperative course.  He still has some residual affects from the regional anesthesia.  He will continue the use of his sling and abduction pillow until has full range of motion in the right extremity.  We also discussed wearing a sling night while he is sleeping.  Scheduled for physical therapy evaluation this afternoon.  He will do home health therapy.  We will check him back 2 weeks for wound check and suture removal.    Wound Care review: on approximately day 3 after surgery, or 72 hours after your initial surgery date, you may begin cleaning your wounds (AS LONG AS THERE IS NO DRAINAGE PRESENT).     Gentle scrubbing with a mild soap and water is recommended. Pat the area dry, and then cover the wound with a clean, sterile dressing.  Do this at least once daily.  Do not apply any ointments creams or lotions to the wounds until told to do so.  Avoid submerging or immersing the wounds in water such as a sink, tub, or pool for at least 1 month after  "surgery.    At the time of your surgery you were prescribed a combination of various medications which may have included up to six prescriptions:      1.  You were already on Eliquis, no additional blood thinners were prescribed.  If you took aspirin prior to your procedure, he can resume your normal aspirin regimen.    2. Second prescription was for a narcotic pain medication (Percocet 7.5 mg or similar) with instructions to take 1 tablet by mouth every 6 hours as needed for pain. Over the next 3 months, we will continue to taper your medications decreasing both the frequency and strength of the medications over time.    3. Third prescription was for an anti-inflammatory, Celebrex 200 mg with instructions to take 1 tab by mouth as needed every 12 hours for 1 month postoperatively.     4. Fourth prescription is for Neurontin 300 mg to be taken as needed 1 tablet by mouth every 12 hours for 1 month postoperatively.    5. Fifth prescription was for Colace 100 mg to be taken as needed every 12 hours for constipation associated with narcotic use.    Torito Rodriguez, Physician Assistant, served in the capacity as a "scribe" for this patient encounter.  A "face-to-face" encounter occurred with Dr. Branden Hernandez on this date.  The treatment plan and medical decision-making is outlined above. Patient was seen and examined with a chaperone.     This note was created using Dragon voice recognition software that occasionally misinterpreted phrases or words.      "

## 2023-11-16 DIAGNOSIS — Z96.611 STATUS POST TOTAL REPLACEMENT OF RIGHT SHOULDER: Primary | ICD-10-CM

## 2023-11-16 RX ORDER — HYDROCODONE BITARTRATE AND ACETAMINOPHEN 10; 325 MG/1; MG/1
1 TABLET ORAL EVERY 6 HOURS PRN
Qty: 28 TABLET | Refills: 0 | Status: CANCELLED | OUTPATIENT
Start: 2023-11-16 | End: 2023-11-23

## 2023-11-16 RX ORDER — HYDROCODONE BITARTRATE AND ACETAMINOPHEN 7.5; 325 MG/1; MG/1
1 TABLET ORAL EVERY 6 HOURS PRN
Qty: 28 TABLET | Refills: 0 | Status: SHIPPED | OUTPATIENT
Start: 2023-11-16 | End: 2023-11-22 | Stop reason: SDUPTHER

## 2023-11-16 NOTE — TELEPHONE ENCOUNTER
----- Message from Amira Jacobo sent at 11/16/2023  8:12 AM CST -----  Phone #: [phone]  Patient is requesting a script for pain/OXYCODONE IS MAKING HIM SICK        Pharmacy:   [id number] - [place] - [address] AT [facility] [address]  [facility] [place] [address]  Phone: [phone] Fax: [phone]

## 2023-11-22 ENCOUNTER — OFFICE VISIT (OUTPATIENT)
Dept: ORTHOPEDICS | Facility: CLINIC | Age: 63
End: 2023-11-22
Payer: COMMERCIAL

## 2023-11-22 VITALS — HEIGHT: 74 IN | WEIGHT: 308 LBS | BODY MASS INDEX: 39.53 KG/M2

## 2023-11-22 DIAGNOSIS — Z96.611 STATUS POST TOTAL REPLACEMENT OF RIGHT SHOULDER: Primary | ICD-10-CM

## 2023-11-22 PROCEDURE — 99024 POSTOP FOLLOW-UP VISIT: CPT | Mod: S$GLB,,, | Performed by: PHYSICIAN ASSISTANT

## 2023-11-22 PROCEDURE — 99024 PR POST-OP FOLLOW-UP VISIT: ICD-10-PCS | Mod: S$GLB,,, | Performed by: PHYSICIAN ASSISTANT

## 2023-11-22 RX ORDER — HYDROCODONE BITARTRATE AND ACETAMINOPHEN 7.5; 325 MG/1; MG/1
1 TABLET ORAL EVERY 6 HOURS PRN
Qty: 28 TABLET | Refills: 0 | Status: SHIPPED | OUTPATIENT
Start: 2023-11-23 | End: 2024-01-09

## 2023-11-22 NOTE — PROGRESS NOTES
[facility]  [address]  [place] [address]  Phone: [phone]   [phone]    Patient's PCP:Branden Hernandez MD  Referring Provider: No ref. provider found    POST-OP Note:    Subjective:        Chief Complaint:   Chief Complaint   Patient presents with    Right Shoulder - Post-op Evaluation     Patient is here for a PO f/up Right TSA 11.13.23, states his pain is better than his visit.        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 01/13/2022    EYE EXAM  DR KATHY PARMAR    Coronary artery disease     1 coronary stent - Dr Orellana    Diabetes mellitus, type 2     Hypertension     Myocardial infarction 2013       Past Surgical History:   Procedure Laterality Date    ABLATION OF ARRHYTHMOGENIC FOCUS FOR ATRIAL FIBRILLATION N/A 7/7/2022    Procedure: ABLATION, ARRHYTHMOGENIC FOCUS, FOR ATRIAL FIBRILLATION;  Surgeon: Niko Pacheco MD;  Location: [facility] EP LAB;  Service: Cardiology;  Laterality: N/A;  AF, ARMANDO(Cx if SR), PVI, RFA, CARTO, GEN, GP, 3 PREP    ARTHROPLASTY OF SHOULDER Right 11/13/2023    Procedure: ARTHROPLASTY, SHOULDER, RIGHT;  Surgeon: Branden Hernandez MD;  Location: [facility] OR;  Service: Orthopedics;  Laterality: Right;  DJO    CORONARY STENT PLACEMENT  2013    TRANSESOPHAGEAL ECHOCARDIOGRAPHY N/A 7/7/2022    Procedure: ECHOCARDIOGRAM, TRANSESOPHAGEAL;  Surgeon: Conor Chappell MD;  Location: [facility] EP LAB;  Service: Cardiology;  Laterality: N/A;    TREATMENT OF CARDIAC ARRHYTHMIA N/A 3/7/2022    Procedure: CARDIOVERSION;  Surgeon: Gomez Orellana MD;  Location: [facility] CATH/EP LAB;  Service: Cardiology;  Laterality: N/A;    TREATMENT OF CARDIAC ARRHYTHMIA  7/7/2022    Procedure: Cardioversion or Defibrillation;  Surgeon: Conor Chappell MD;  Location: [facility] EP LAB;  Service: Cardiology;;       Current Outpatient Medications   Medication Sig    amiodarone (PACERONE) 200 MG Tab Take 1 tablet (200 mg total) by mouth  once daily. (Patient taking differently: Take 100 mg by mouth once daily.)    apixaban (ELIQUIS) 5 mg Tab Take 1 tablet (5 mg total) by mouth 2 (two) times daily.    celecoxib (CELEBREX) 200 MG capsule Take 1 capsule (200 mg total) by mouth 2 (two) times daily.    diltiaZEM (CARDIZEM CD) 240 MG 24 hr capsule Take 1 capsule (240 mg total) by mouth 2 (two) times a day.    docusate sodium (COLACE) 100 MG capsule Take 1 capsule (100 mg total) by mouth 2 (two) times daily.    empagliflozin (JARDIANCE) 25 mg tablet Take 1 tablet (25 mg total) by mouth once daily.    furosemide (LASIX) 20 MG tablet Take 1 tablet (20 mg total) by mouth 2 (two) times daily.    gabapentin (NEURONTIN) 300 MG capsule Take 1 capsule (300 mg total) by mouth 2 (two) times daily.    glipiZIDE (GLUCOTROL) 5 MG tablet Take 1 tablet (5 mg total) by mouth daily with breakfast.    lisinopriL (PRINIVIL,ZESTRIL) 20 MG tablet Take 1 tablet (20 mg total) by mouth once daily.    metFORMIN (GLUCOPHAGE) 1000 MG tablet Take 1 tablet (1,000 mg total) by mouth 2 (two) times daily with meals.    rosuvastatin (CRESTOR) 20 MG tablet Take 20 mg by mouth once daily.    [START ON 11/23/2023] HYDROcodone-acetaminophen (NORCO) 7.5-325 mg per tablet Take 1 tablet by mouth every 6 (six) hours as needed for Pain.     No current facility-administered medications for this visit.     Facility-Administered Medications Ordered in Other Visits   Medication    electrolyte-S (ISOLYTE)    fentaNYL 50 mcg/mL injection 25 mcg    lactated ringers infusion    LIDOcaine (PF) 10 mg/ml (1%) injection 10 mg    oxyCODONE immediate release tablet 5 mg       Review of patient's allergies indicates:  No Known Allergies    Family History   Problem Relation Age of Onset    Heart attack Father     Cancer Father         prostate       Social History     Socioeconomic History    Marital status:    Tobacco Use    Smoking status: Never    Smokeless tobacco: Never   Substance and Sexual Activity     Alcohol use: Yes     Comment: occ    Drug use: Never     Social Determinants of Health     Stress: No Stress Concern Present (10/28/2019)    Citizen of [place] [employer] Occupational Health - Occupational Stress Questionnaire     Feeling of Stress : Not at all       History of present illness:  Donald comes in today for follow-up for his right total shoulder arthroplasty.  He is doing well.  Comes in today for wound check and suture removal and plain film radiographs.  He is ready to transition from home health physical therapy to outpatient physical therapy.  He does come in today wearing his sling, swath and abduction pillow to the right upper extremity.    Review of Systems:    Musculoskeletal:  See HPI       Objective:        Physical Examination:    Vital Signs: There were no vitals filed for this visit.    Body mass index is 39.54 kg/m².    This a well-developed, well nourished patient in no acute distress.  They are alert and oriented and cooperative to examination.        Right shoulder exam:  Skin to right shoulder is clean dry and intact.  No erythema.  Some resolving ecchymosis about the anterior aspect of the shoulder.  Right anterior shoulder incision is healing well without wound dehiscence or drainage.  He is neurovascularly intact throughout the right upper extremity.  He can open and close right hand into a fist.  He can oppose her right thumb to all digits in the right hand.  Can flex/extend at the right wrist and pronate/supinate the right forearm.  Capillary refill is brisk to all digits in the right hand.    Pertinent New Results:     XRAY Report / Interpretation:  Two views were taken of the right shoulder today:  AP and Y-view.  They reveal no acute fractures or dislocations.  Visualized soft tissues appear unremarkable.  He does have an anatomically placed right total shoulder arthroplasty without evidence of hardware failure or subsidence.     Assessment:       1. Status post total replacement of  right shoulder      Plan:     Status post total replacement of right shoulder  -     X-ray Shoulder 2 or More Views Right  -     Ambulatory referral/consult to Physical/Occupational Therapy; Future; Expected date: 11/29/2023  -     HYDROcodone-acetaminophen (NORCO) 7.5-325 mg per tablet; Take 1 tablet by mouth every 6 (six) hours as needed for Pain.  Dispense: 28 tablet; Refill: 0        Follow up in about 4 weeks (around 12/20/2023) for PO Right TSA 11/14/23 Tues or Thurs.    Sutures were removed from the right shoulder today.  He tolerated this well.  He was advised to clean the operative site once a day with warm soapy water not apply any topical creams or ointments.  He was instructed to not submerge operative site underwater in a pool or bathtub type environment for least 1 more week.  He was given a prescription to transition from home health PT to outpatient therapy to continue with range of motion and strengthening exercises.  He will continue with his sling, swath and abduction pillow while sleeping and while out of the home for least 2 more weeks.  He can come out of it while at rest at home.  He was specifically instructed to not perform any external rotation maneuvers of the right shoulder unless instructed to do so with physical therapy.  I did provide a refill of his Norco 7.5/325.      Roger Britton, MIRELLAS, PA-C    This note was created using [employer] voice recognition software that occasionally misinterprets words or phrases.

## 2023-11-28 ENCOUNTER — EXTERNAL HOME HEALTH (OUTPATIENT)
Dept: HOME HEALTH SERVICES | Facility: HOSPITAL | Age: 63
End: 2023-11-28
Payer: MEDICARE

## 2023-12-11 ENCOUNTER — PATIENT MESSAGE (OUTPATIENT)
Dept: ADMINISTRATIVE | Facility: HOSPITAL | Age: 63
End: 2023-12-11
Payer: MEDICARE

## 2023-12-12 ENCOUNTER — PATIENT OUTREACH (OUTPATIENT)
Dept: ADMINISTRATIVE | Facility: HOSPITAL | Age: 63
End: 2023-12-12
Payer: MEDICARE

## 2023-12-12 NOTE — PROGRESS NOTES
Population Health Chart Review & Patient Outreach Details    Outreach Performed: YES Portal    Additional [facility] Health Notes:           Updates Requested / Reviewed:      Updated Care Coordination Note, Care Everywhere, , Care Team Updated, and Immunizations Reconciliation Completed or Queried: [place]         Health Maintenance Topics Overdue:    Health Maintenance Due   Topic Date Due    COVID-19 Vaccine (1) Never done    TETANUS VACCINE  Never done    RSV Vaccine (Age 60+ and Pregnant patients) (1 - 1-dose 60+ series) Never done    Eye Exam  01/13/2023    Influenza Vaccine (1) 09/01/2023    Colorectal Cancer Screening  11/02/2023    Hemoglobin A1c  11/16/2023         Health Maintenance Topic(s) Outreach Outcomes & Actions Taken:    Eye Exam - Outreach Outcomes & Actions Taken  : External Records Requested & Care Team Updated if Applicable    Labs will  be scheduled.

## 2023-12-12 NOTE — LETTER
AUTHORIZATION FOR RELEASE OF   CONFIDENTIAL INFORMATION    Dear [facility],    We are seeing Donald Frey, date of birth 1960, in the clinic at [address] FAMILY [facility] INTERNAL MEDIC[facility]. Radha Pinzon FNP is the patient's PCP. Donald Frey has an outstanding lab/procedure at the time we reviewed his chart. In order to help keep his health information updated, he has authorized us to request the following medical record(s):       ( x )  DILATED EYE EXAM            Please fax records to [facility], Berel, Kimberly C., FNP, [phone]     If you have any questions, please contact       Yodit Carlos  Nurse Clinical Care Coordinator  [facility]  Phone: [phone]  Fax: [phone]    Patient Name: Donald Frey  : 1960  Patient Phone #: [phone]         CONSENT AND ACKNOWLEDGEMENT         FORM       Donald Frey  MRN: [id number]  : 1960  Age: 63 y.o.  Sex: male       MEDICARE-PATIENTS CERTIFICATION, AUTHORIZATION TO RELEASE INFORMATION AND PAYMENT REQUEST:  I certify that the information given by me in applying under the Title XVII of Social Security Act is correct.  I authorize any weber of medical or other information about me to release to the Social Security Administration or its intermediaries or carriers any information needed for this or a related Medicare claim.  I request that payment of authorized benefits be made on my behalf to [facility] ([facility]).  I also acknowledge upon admission, that I received the Important Message from Medicare.     AUTHORIZATION TO PAY INSURANCE BENEFITS:  For and in consideration of medical services rendered to the patient named herein, I hereby assign and transfer to [facility], including but not limited to hospital based physicians, attending physicians, consulting physicians, nurse practitioners and physicians  assistants the rights for the payment of medical benefits which I may have under the policy/policies identified by me during registration or any policy which may be determined hereafter to pay benefits otherwise payable to me or to a beneficiary designated in the policy.  By this assignment, I authorize payment directly to [facility], hospital based physicians, attending physicians and consulting physicians of all medical benefits payable under the aforesaid policy/policies, but not to exceed the hospitals and/or clinic regular charges.     GUARANTEE OF ACCOUNT:  I/We certify that the information given is true and correct to the best of my/our knowledge.  I/We understand that bills are payable within thirty (30) days of the date of service.  If it becomes necessary for the account to be referred to an  or collection agency, the undersigned agrees to pay the reasonable s fees or collection expenses. I/We silvia permission and consent to [facility], our assignees, and third party collection agents to contact myself/us by any telephone number associated with myself/us, including wireless numbers and to leave answering machine and voicemail messages and include in any such messages, information required by law (including debt collection laws) and/or messages regarding amounts owed; to send text messages or emails using any email addresses I/we provided; to use pre-recorded/artificial voice messages  and/or an automatic dialing device in connection with any communications.   I/We agree to be responsible for the payment of all charges of this medical service and hospital based physicians, attending physicians and consulting physicians services rendered to the above named patient     COMMUNICATION AUTHORIZATION:   I hereby authorize Scarlett Lilly, to contact me on my cell phone and/or home phone using prerecorded messages, artificial voice messages, automatic telephone dialing devices or  other computer assisted technology, or by electronic mail, text messaging, or by any other form of electronic communication. This includes, but is not limited to, appointment reminders, yearly physical exam reminders, preventive care reminders, patient campaigns and welcome calls. I understand I have the right to opt out of these communications at any time.        Page 1 of 3                 CONSENT AND ACKNOWLEDGEMENT FORM CONTINUED     AUTHORIZATION TO RELEASE INFORMATION:  I hereby authorize [facility] and hospital based physicians to release the information for this occasion of service requested by my insurance company or third party payor for the purpose of obtaining payment for services rendered during this admission and/or to other healthcare providers for the purpose of follow-up care or evaluation of care.  This information may or may not include mental health and/or substance abuse information.     AUTHORIZATION FOR MEDICAL AND/OR SURGICAL TREATMENT:  I hereby authorize [facility] and its employees or agents to provide hospital care incident to this admission, including without limitations, consent to routine diagnostic procedures and medical treatment, which is to include whatever procedures that are deemed necessary by the admitting doctor and such other physicians or assistants as he may designate.     PERSONAL VALUABLES:      It is understood and agreed that the hospital maintains a safe for the safekeeping of money and valuables and the hospital shall not be liable for the loss of damage to any money, jewelry, glasses, documents, dentures, hearing aids or other articles of unusual value, unless placed therein, and shall not be liable for loss or damage to any other personal property, unless deposited with the hospital for safekeeping.  VALUABLES ARE NOT TO BE LEFT IN THE PATIENTS ROOM.     ADVANCE DIRECTIVES:  I understand that I am not required to have Advance Directives in order to be  treated.  I have received written information about my rights to formulate Advance Directives.     NOTICE OF PRIVACY PRACTICES/PATIENT RIGHTS/ADMISSION PACKET:  I acknowledge that I have received copies of the [facility] Notice of Privacy Practices, Patient Rights, and the Admission packet, which contains Smoking Cessation information. I understand that weapons, illegal drugs, or any other items considered  contraband, are not allowed on the [facility] campus, and that I do not have such items in my possession.        CONSENT TO PHOTOGRAPH AND/OR VIDEO TAPE DOCUMENTATION OF CARE:  I understand that photographs, videotapes, digital, or other images may be recorded to document my care.  I acknowledge that [facility] will retain the ownership rights to these photographs, videotapes, digital, or other images, and that I will be allowed access to view or obtain copies of any photographs, videotapes, digital, or other images created as part of the documentation of my care.  I understand that these images will be stored in a secure manner that will protect my privacy and that they will be kept for the time period required by law or by policy at [facility]. Images that identify me will be released and/or used outside the institution only upon written authorization from me or my legal representative (PINEDA, 2001).                                                                                                                                Page 2 of 3                    CONSENT AND ACKNOWLEDGEMENT FORM CONTINUED     [place] IMM[employer]ATION NETWORK (LINKS) PARTICIPATION:  I acknowledge that I have been informed about [place] [employer] Network, or LINKS.  I understand that it is a means to keep track of my immunization records for myself, doctors offices, hospitals and other health care providers through secure, electronic means.     INSURANCE NETWORK ACKNOWLEDGEMENT:                                                                             I acknowledge that I have received notice, based on the information available at this time, regarding the status of my insurance plan as in or out of network at [facility]. I understand that a full listing of accepted insurance plans can be found at the [facility] website.     NOTICE     HEALTH CARE SERVICES MAY BE PROVIDED TO YOU AT A NETWORK HEALTH CARE FACILITY BY FACILITY-BASED PHYSICIANS WHO ARE NOT IN YOUR HEALTH PLAN.  YOU MAY BE RESPONSIBLE FOR PAYMENT OF ALL OR PART OF THE FEES FOR THOSE OUT-OF-NETWORK SERVICES, IN ADDITION TO APPLICABLE AMOUNTS DUE FOR CO-PAYMENTS, COINSURANCE, DEDUCTIBLES, AND NON-COVERED SERVICES.  SPECIFIC INFORMATION ABOUT IN-NETWORK AND OUT-OF NETWORK FACILITY-BASED PHYSICIANS CAN BE FOUND AT THE WEBSITE ADDRESS OF YOUR HEALTH PLAN OR BY CALLING THE [employer] TELEPHONE NUMBER OF YOUR HEALTH PLAN.        I/WE HAVE READ, UNDERSTAND AND AGREE TO THE ABOVE.        _______________________________   Patient/Legal Guardian Signature     This signature was collected at 05/17/2023     Time (if no electronic signature):____________     self       _______________________________   Printed Name/Relationship to Patient       Witness  _______________________________   Witness Signature     This signature was collected at 05/17/2023        _______________________________   Printed Name         Page 3 of 3

## 2023-12-15 ENCOUNTER — PATIENT OUTREACH (OUTPATIENT)
Dept: ADMINISTRATIVE | Facility: HOSPITAL | Age: 63
End: 2023-12-15
Payer: MEDICARE

## 2023-12-15 NOTE — PROGRESS NOTES
Population Health Chart Review & Patient Outreach Details    Outreach Performed: NO    Additional Pop Health Notes:           Updates Requested / Reviewed:      Updated Care Coordination Note, , and Immunizations Reconciliation Completed or Queried: [facility] Topics Overdue:    Health Maintenance Due   Topic Date Due    COVID-19 Vaccine (1) Never done    TETANUS VACCINE  Never done    RSV Vaccine (Age 60+ and Pregnant patients) (1 - 1-dose 60+ series) Never done    Influenza Vaccine (1) 09/01/2023    Colorectal Cancer Screening  11/02/2023    Hemoglobin A1c  11/16/2023         Health Maintenance Topic(s) Outreach Outcomes & Actions Taken:    Eye Exam - Outreach Outcomes & Actions Taken  : Diabetic Eye External Records Uploaded, Care Team & History Updated if Applicable

## 2023-12-19 ENCOUNTER — OFFICE VISIT (OUTPATIENT)
Dept: ORTHOPEDICS | Facility: CLINIC | Age: 63
End: 2023-12-19
Payer: MEDICARE

## 2023-12-19 VITALS — BODY MASS INDEX: 39.53 KG/M2 | HEIGHT: 74 IN | WEIGHT: 308 LBS

## 2023-12-19 DIAGNOSIS — Z96.611 STATUS POST TOTAL REPLACEMENT OF RIGHT SHOULDER: Primary | ICD-10-CM

## 2023-12-19 PROCEDURE — 99024 POSTOP FOLLOW-UP VISIT: CPT | Mod: S$GLB,POP,, | Performed by: ORTHOPAEDIC SURGERY

## 2023-12-19 PROCEDURE — 99024 PR POST-OP FOLLOW-UP VISIT: ICD-10-PCS | Mod: S$GLB,POP,, | Performed by: ORTHOPAEDIC SURGERY

## 2023-12-19 NOTE — PROGRESS NOTES
[facility] ORTHOPEDICS POST-OP NOTE    Subjective:           Chief Complaint:   Chief Complaint   Patient presents with    Right Shoulder - Post-op Evaluation     PO Right TSA 11/14/23. States that she is doing well.       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 01/13/2022    EYE EXAM  DR KATHY PARMAR    Coronary artery disease     1 coronary stent - Dr Orellana    Diabetes mellitus, type 2     Hypertension     Myocardial infarction 2013       Past Surgical History:   Procedure Laterality Date    ABLATION OF ARRHYTHMOGENIC FOCUS FOR ATRIAL FIBRILLATION N/A 7/7/2022    Procedure: ABLATION, ARRHYTHMOGENIC FOCUS, FOR ATRIAL FIBRILLATION;  Surgeon: Niko Pacheco MD;  Location: [facility] EP LAB;  Service: Cardiology;  Laterality: N/A;  AF, ARMANDO(Cx if SR), PVI, RFA, CARTO, GEN, GP, 3 PREP    ARTHROPLASTY OF SHOULDER Right 11/13/2023    Procedure: ARTHROPLASTY, SHOULDER, RIGHT;  Surgeon: Branden Hernandez MD;  Location: [facility] OR;  Service: Orthopedics;  Laterality: Right;  DJO    CORONARY STENT PLACEMENT  2013    TRANSESOPHAGEAL ECHOCARDIOGRAPHY N/A 7/7/2022    Procedure: ECHOCARDIOGRAM, TRANSESOPHAGEAL;  Surgeon: Conor Chappell MD;  Location: [facility] EP LAB;  Service: Cardiology;  Laterality: N/A;    TREATMENT OF CARDIAC ARRHYTHMIA N/A 3/7/2022    Procedure: CARDIOVERSION;  Surgeon: Gomez Orellana MD;  Location: [facility] CATH/EP LAB;  Service: Cardiology;  Laterality: N/A;    TREATMENT OF CARDIAC ARRHYTHMIA  7/7/2022    Procedure: Cardioversion or Defibrillation;  Surgeon: Conor Chappell MD;  Location: [facility] EP LAB;  Service: Cardiology;;       Current Outpatient Medications   Medication Sig    amiodarone (PACERONE) 200 MG Tab Take 1 tablet (200 mg total) by mouth once daily. (Patient taking differently: Take 100 mg by mouth once daily.)    apixaban (ELIQUIS) 5 mg Tab Take 1 tablet (5 mg total) by mouth 2 (two) times daily.    diltiaZEM (CARDIZEM CD) 240 MG 24 hr  capsule Take 1 capsule (240 mg total) by mouth 2 (two) times a day.    empagliflozin (JARDIANCE) 25 mg tablet Take 1 tablet (25 mg total) by mouth once daily.    furosemide (LASIX) 20 MG tablet Take 1 tablet (20 mg total) by mouth 2 (two) times daily.    gabapentin (NEURONTIN) 300 MG capsule Take 1 capsule (300 mg total) by mouth 2 (two) times daily.    glipiZIDE (GLUCOTROL) 5 MG tablet Take 1 tablet (5 mg total) by mouth daily with breakfast.    HYDROcodone-acetaminophen (NORCO) 7.5-325 mg per tablet Take 1 tablet by mouth every 6 (six) hours as needed for Pain.    lisinopriL (PRINIVIL,ZESTRIL) 20 MG tablet Take 1 tablet (20 mg total) by mouth once daily.    metFORMIN (GLUCOPHAGE) 1000 MG tablet Take 1 tablet (1,000 mg total) by mouth 2 (two) times daily with meals.    rosuvastatin (CRESTOR) 20 MG tablet Take 20 mg by mouth once daily.     No current facility-administered medications for this visit.     Facility-Administered Medications Ordered in Other Visits   Medication    electrolyte-S (ISOLYTE)    fentaNYL 50 mcg/mL injection 25 mcg    lactated ringers infusion    LIDOcaine (PF) 10 mg/ml (1%) injection 10 mg    oxyCODONE immediate release tablet 5 mg       Review of patient's allergies indicates:  No Known Allergies    Family History   Problem Relation Age of Onset    Heart attack Father     Cancer Father         prostate       Social History     Socioeconomic History    Marital status:    Tobacco Use    Smoking status: Never    Smokeless tobacco: Never   Substance and Sexual Activity    Alcohol use: Yes     Comment: occ    Drug use: Never     Social Determinants of Health     Stress: No Stress Concern Present (10/28/2019)    [employer] - Occupational Stress Questionnaire     Feeling of Stress : Not at all       History of present illness:  63-year-old male, returns to clinic today status post right shoulder arthroplasty November 13th.  Five weeks postop yesterday.  Doing  "well.  Pain is improved.  Range of motion is increasing.      Review of Systems:    Musculoskeletal:  See HPI      Objective:        Physical Examination:    Vital Signs:  There were no vitals filed for this visit.    Body mass index is 39.54 kg/m².    This a well-developed, well nourished patient in no acute distress.  They are alert and oriented and cooperative to examination.        Examination of the right shoulder, well-healed surgical incisions.  No evidence of wound failure dehiscence.  Skin is dry and intact, no erythema ecchymosis, no signs symptoms of infection.  Range of motion he has about 100° of forward flexion 80° of abduction, internal rotation is about 30°.  Internal rotation to the right hip.  Pertinent New Results:    XRAY Report / Interpretation:   AP lateral views of the right shoulder taken today in the office demonstrate a right total shoulder arthroplasty in appropriate position without evidence of hardware failure or loosening.    Assessment/Plan:      Stable status post right shoulder arthroplasty 5 weeks ago.  Continue with physical therapy for strengthening and range of motion.  Follow-up in 2 months.    Torito Rodriguez, Physician Assistant, served in the capacity as a "scribe" for this patient encounter.  A "face-to-face" encounter occurred with Dr. Branden Hernandez on this date.  The treatment plan and medical decision-making is outlined above. Patient was seen and examined with a chaperone.     This note was created using Dragon voice recognition software that occasionally misinterpreted phrases or words.      "

## 2024-01-05 ENCOUNTER — LAB VISIT (OUTPATIENT)
Dept: LAB | Facility: HOSPITAL | Age: 64
End: 2024-01-05
Attending: NURSE PRACTITIONER
Payer: MEDICARE

## 2024-01-05 ENCOUNTER — PATIENT MESSAGE (OUTPATIENT)
Dept: ADMINISTRATIVE | Facility: HOSPITAL | Age: 64
End: 2024-01-05
Payer: MEDICARE

## 2024-01-05 DIAGNOSIS — E11.65 TYPE 2 DIABETES MELLITUS WITH HYPERGLYCEMIA, WITHOUT LONG-TERM CURRENT USE OF INSULIN: ICD-10-CM

## 2024-01-05 LAB
ALBUMIN SERPL BCP-MCNC: 4.5 G/DL (ref 3.5–5.2)
ALP SERPL-CCNC: 65 U/L (ref 55–135)
ALT SERPL W/O P-5'-P-CCNC: 18 U/L (ref 10–44)
ANION GAP SERPL CALC-SCNC: 13 MMOL/L (ref 8–16)
AST SERPL-CCNC: 13 U/L (ref 10–40)
BILIRUB SERPL-MCNC: 0.6 MG/DL (ref 0.1–1)
BUN SERPL-MCNC: 31 MG/DL (ref 8–23)
CALCIUM SERPL-MCNC: 9.9 MG/DL (ref 8.7–10.5)
CHLORIDE SERPL-SCNC: 98 MMOL/L (ref 95–110)
CO2 SERPL-SCNC: 24 MMOL/L (ref 23–29)
CREAT SERPL-MCNC: 1.2 MG/DL (ref 0.5–1.4)
EST. GFR  (NO RACE VARIABLE): >60 ML/MIN/1.73 M^2
ESTIMATED AVG GLUCOSE: 263 MG/DL (ref 68–131)
GLUCOSE SERPL-MCNC: 297 MG/DL (ref 70–110)
HBA1C MFR BLD: 10.8 % (ref 4.5–6.2)
POTASSIUM SERPL-SCNC: 4.3 MMOL/L (ref 3.5–5.1)
PROT SERPL-MCNC: 7.5 G/DL (ref 6–8.4)
SODIUM SERPL-SCNC: 135 MMOL/L (ref 136–145)

## 2024-01-05 PROCEDURE — 36415 COLL VENOUS BLD VENIPUNCTURE: CPT | Performed by: NURSE PRACTITIONER

## 2024-01-05 PROCEDURE — 80053 COMPREHEN METABOLIC PANEL: CPT | Performed by: NURSE PRACTITIONER

## 2024-01-05 PROCEDURE — 83036 HEMOGLOBIN GLYCOSYLATED A1C: CPT | Performed by: NURSE PRACTITIONER

## 2024-01-06 DIAGNOSIS — Z12.11 SCREENING FOR COLON CANCER: ICD-10-CM

## 2024-01-08 NOTE — PROGRESS NOTES
Please notify patient that his results are abnormal.  A1c has worsened to 10.8; Please verify patient has a follow-up appointment to review the results.

## 2024-01-09 ENCOUNTER — OFFICE VISIT (OUTPATIENT)
Dept: FAMILY MEDICINE | Facility: CLINIC | Age: 64
End: 2024-01-09
Payer: COMMERCIAL

## 2024-01-09 ENCOUNTER — TELEPHONE (OUTPATIENT)
Dept: FAMILY MEDICINE | Facility: CLINIC | Age: 64
End: 2024-01-09

## 2024-01-09 DIAGNOSIS — E11.65 TYPE 2 DIABETES MELLITUS WITH HYPERGLYCEMIA, WITHOUT LONG-TERM CURRENT USE OF INSULIN: Primary | ICD-10-CM

## 2024-01-09 PROCEDURE — 99214 OFFICE O/P EST MOD 30 MIN: CPT | Mod: 95,,, | Performed by: NURSE PRACTITIONER

## 2024-01-09 RX ORDER — GLIPIZIDE 5 MG/1
5 TABLET ORAL 2 TIMES DAILY WITH MEALS
Qty: 180 TABLET | Refills: 1 | Status: SHIPPED | OUTPATIENT
Start: 2024-01-09

## 2024-01-09 RX ORDER — DULAGLUTIDE 0.75 MG/.5ML
0.75 INJECTION, SOLUTION SUBCUTANEOUS
Qty: 12 PEN | Refills: 0 | Status: SHIPPED | OUTPATIENT
Start: 2024-01-09

## 2024-01-09 NOTE — TELEPHONE ENCOUNTER
Patient is wanting to have his lab orders switched from [facility] to [facility]. Says he does not want to  paper order to take to [facility].

## 2024-01-09 NOTE — PROGRESS NOTES
Subjective:        The chief complaint leading to consultation is: F/U DM  The patient location is:  Home  Visit type: Virtual visit with synchronous audio/video or audio only  This was a video visit in lieu of in-person visit due to the coronavirus emergency. Patient acknowledged and consented to the video visit encounter.     Donald is here for follow-up on diabetes and recent lab results.  Recent lab work from 1/24 reviewed today with patient- A1c WORSENED TO 10.8. Taking all meds as prescribed-denies any side effects from his current medication regimen. Amits he has not been eating healthy and not active since having his shoulder surgery this fall.  Eye exam is up-to-date.     Diabetes  He presents for his follow-up diabetic visit. He has type 2 diabetes mellitus. No MedicAlert identification noted. The initial diagnosis of diabetes was made 9 years ago. His disease course has been worsening. Pertinent negatives for hypoglycemia include no confusion, dizziness, mood changes, nervousness/anxiousness, pallor, seizures, speech difficulty or tremors. Pertinent negatives for diabetes include no chest pain, no fatigue, no foot paresthesias, no foot ulcerations, no polydipsia, no polyphagia, no polyuria, no visual change, no weakness and no weight loss. There are no hypoglycemic complications. Pertinent negatives for hypoglycemia complications include no blackouts, no nocturnal hypoglycemia, no required assistance and no required glucagon injection. Symptoms are worsening. Diabetic complications include heart disease, impotence and peripheral neuropathy. Pertinent negatives for diabetic complications include no autonomic neuropathy, CVA or nephropathy. Risk factors for coronary artery disease include diabetes mellitus, dyslipidemia, male sex, hypertension and sedentary lifestyle. Current diabetic treatment includes diet and oral agent (triple therapy). He is compliant with treatment most of the time. His weight is  increasing steadily. He is following a generally healthy and low salt diet. When asked about meal planning, he reported none. He has not had a previous visit with a dietitian. He rarely participates in exercise. Home blood sugar record trend: NOT CHECKING. His breakfast blood glucose range is generally >200 mg/dl. An ACE inhibitor/angiotensin II receptor blocker is being taken. He does not see a podiatrist.Eye exam is current.       Past Surgical History:   Procedure Laterality Date    ABLATION OF ARRHYTHMOGENIC FOCUS FOR ATRIAL FIBRILLATION N/A 7/7/2022    Procedure: ABLATION, ARRHYTHMOGENIC FOCUS, FOR ATRIAL FIBRILLATION;  Surgeon: Niko Pacheco MD;  Location: [facility] EP LAB;  Service: Cardiology;  Laterality: N/A;  AF, ARMANDO(Cx if SR), PVI, RFA, CARTO, GEN, GP, 3 PREP    ARTHROPLASTY OF SHOULDER Right 11/13/2023    Procedure: ARTHROPLASTY, SHOULDER, RIGHT;  Surgeon: Branden Hernandez MD;  Location: [facility] OR;  Service: Orthopedics;  Laterality: Right;  DJO    CORONARY STENT PLACEMENT  2013    TRANSESOPHAGEAL ECHOCARDIOGRAPHY N/A 7/7/2022    Procedure: ECHOCARDIOGRAM, TRANSESOPHAGEAL;  Surgeon: Conor Chappell MD;  Location: [facility] EP LAB;  Service: Cardiology;  Laterality: N/A;    TREATMENT OF CARDIAC ARRHYTHMIA N/A 3/7/2022    Procedure: CARDIOVERSION;  Surgeon: Gomez Orellana MD;  Location: [facility] CATH/EP LAB;  Service: Cardiology;  Laterality: N/A;    TREATMENT OF CARDIAC ARRHYTHMIA  7/7/2022    Procedure: Cardioversion or Defibrillation;  Surgeon: Conor Chappell MD;  Location: [facility] EP LAB;  Service: Cardiology;;     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 01/13/2022    EYE EXAM  DR KATHY PARMAR    Coronary artery disease     1 coronary stent - Dr Orellana    Diabetes mellitus, type 2     Hypertension     Myocardial infarction 2013     Family History   Problem Relation Age of Onset    Heart attack Father     Cancer Father         prostate        Social  History:   Marital Status:   Alcohol History:  reports current alcohol use.  Tobacco History:  reports that he has never smoked. He has never used smokeless tobacco.  Drug History:  reports no history of drug use.    Review of patient's allergies indicates:  No Known Allergies    Current Outpatient Medications   Medication Sig Dispense Refill    amiodarone (PACERONE) 200 MG Tab Take 1 tablet (200 mg total) by mouth once daily. (Patient taking differently: Take 100 mg by mouth once daily.) 30 tablet 11    apixaban (ELIQUIS) 5 mg Tab Take 1 tablet (5 mg total) by mouth 2 (two) times daily. 60 tablet 11    diltiaZEM (CARDIZEM CD) 240 MG 24 hr capsule Take 1 capsule (240 mg total) by mouth 2 (two) times a day. 60 capsule 11    dulaglutide (TRULICITY) 0.75 mg/0.5 mL pen injector Inject 0.75 mg into the skin every 7 days. 12 pen 0    empagliflozin (JARDIANCE) 25 mg tablet Take 1 tablet (25 mg total) by mouth once daily. 90 tablet 1    furosemide (LASIX) 20 MG tablet Take 1 tablet (20 mg total) by mouth 2 (two) times daily. 60 tablet 11    lisinopriL (PRINIVIL,ZESTRIL) 20 MG tablet Take 1 tablet (20 mg total) by mouth once daily. 90 tablet 3    metFORMIN (GLUCOPHAGE) 1000 MG tablet Take 1 tablet (1,000 mg total) by mouth 2 (two) times daily with meals. 180 tablet 1    rosuvastatin (CRESTOR) 20 MG tablet Take 20 mg by mouth once daily.      glipiZIDE (GLUCOTROL) 5 MG tablet Take 1 tablet (5 mg total) by mouth 2 (two) times daily with meals. 180 tablet 1     No current facility-administered medications for this visit.     Facility-Administered Medications Ordered in Other Visits   Medication Dose Route Frequency Provider Last Rate Last Admin    electrolyte-S (ISOLYTE)   Intravenous Continuous Diogo Kent MD   Stopped at 11/13/23 1444    fentaNYL 50 mcg/mL injection 25 mcg  25 mcg Intravenous Q5 Min PRN Diogo Kent MD        lactated ringers infusion   Intravenous Continuous Diogo Kent MD         LIDOcaine (PF) 10 mg/ml (1%) injection 10 mg  1 mL Intradermal Once Diogo Kent MD        oxyCODONE immediate release tablet 5 mg  5 mg Oral Once PRN Diogo Kent MD           Review of Systems   Constitutional:  Positive for activity change. Negative for chills, fatigue, fever, unexpected weight change and weight loss.   HENT:  Negative for hearing loss, rhinorrhea and trouble swallowing.    Eyes:  Negative for discharge and visual disturbance.   Respiratory:  Negative for chest tightness, shortness of breath and wheezing.    Cardiovascular:  Negative for chest pain and palpitations.   Gastrointestinal:  Negative for abdominal pain, blood in stool, constipation, diarrhea, nausea and vomiting.   Endocrine: Negative for polydipsia, polyphagia and polyuria.   Genitourinary:  Positive for impotence. Negative for difficulty urinating, dysuria, frequency, hematuria and urgency.   Musculoskeletal:  Negative for arthralgias and joint swelling.   Skin:  Negative for pallor and wound.   Neurological:  Negative for dizziness, tremors, seizures, speech difficulty and weakness.   Hematological:  Negative for adenopathy.   Psychiatric/Behavioral:  Negative for confusion and dysphoric mood. The patient is not nervous/anxious.          Objective:        Physical Exam:   Physical Exam  Constitutional:       General: He is not in acute distress.     Appearance: Normal appearance. He is obese. He is not ill-appearing.   Pulmonary:      Effort: Pulmonary effort is normal.   Musculoskeletal:      Cervical back: Normal range of motion.   Skin:     Coloration: Skin is not jaundiced or pale.   Neurological:      Mental Status: He is alert and oriented to person, place, and time.   Psychiatric:         Mood and Affect: Mood normal.         Behavior: Behavior normal.              Assessment:       1. Type 2 diabetes mellitus with hyperglycemia, without long-term current use of insulin      Plan:   Type 2 diabetes mellitus with  hyperglycemia, without long-term current use of insulin  -     dulaglutide (TRULICITY) 0.75 mg/0.5 mL pen injector; Inject 0.75 mg into the skin every 7 days.  Dispense: 12 pen ; Refill: 0  -     glipiZIDE (GLUCOTROL) 5 MG tablet; Take 1 tablet (5 mg total) by mouth 2 (two) times daily with meals.  Dispense: 180 tablet; Refill: 1  -discussed need for tx options- DM is uncontrolled and worsened despite jardiance daily, metformin BID and glipizide once daily; states Ozempic and Mounjaro are not covered by his insurance; will try trulicity 0.75 mg weekly; no fam hx of medullary thyroid cancer, personal hx of pancreatitis, or severe retinopathy; possible SE and proper use reviewed; cut portions in half- diet changes reviewed; f/u in 1 mo if any issues, 3 mo if doing ok; Glipizide increased to BID at 5 mg- monitor BS; labs in 3 mo       Follow up in about 3 months (around 4/9/2024) for diabetes, HTN.    Total time spent with patient: 26 minutes    I spent 31 minutes with this patient. This includes face to face time and non-face to face time preparing to see the patient (eg, review of tests), obtaining and/or reviewing separately obtained history, documenting clinical information in the electronic or other health record, independently interpreting results and communicating results to the patient/family/caregiver, or care coordinator.     Each patient to whom he or she provides medical services by telemedicine is:  (1) informed of the relationship between the physician and patient and the respective role of any other health care provider with respect to management of the patient; and (2) notified that he or she may decline to receive medical services by telemedicine and may withdraw from such care at any time.

## 2024-01-09 NOTE — TELEPHONE ENCOUNTER
Ok I will send the orders to [facility]; if he wants to get them done at the office lab, please add him to the lab schedule

## 2024-01-13 ENCOUNTER — HOSPITAL ENCOUNTER (INPATIENT)
Facility: HOSPITAL | Age: 64
LOS: 7 days | Discharge: HOME HEALTH | DRG: 234 | End: 2024-01-21
Attending: STUDENT IN AN ORGANIZED HEALTH CARE EDUCATION/TRAINING PROGRAM | Admitting: INTERNAL MEDICINE
Payer: MEDICARE

## 2024-01-13 DIAGNOSIS — Z95.1 S/P CABG X 4: ICD-10-CM

## 2024-01-13 DIAGNOSIS — R07.9 CHEST PAIN: ICD-10-CM

## 2024-01-13 DIAGNOSIS — I25.10 CAD (CORONARY ARTERY DISEASE): ICD-10-CM

## 2024-01-13 DIAGNOSIS — Z95.1 S/P CABG (CORONARY ARTERY BYPASS GRAFT): ICD-10-CM

## 2024-01-13 LAB
ALBUMIN SERPL BCP-MCNC: 4.7 G/DL (ref 3.5–5.2)
ALP SERPL-CCNC: 75 U/L (ref 55–135)
ALT SERPL W/O P-5'-P-CCNC: 25 U/L (ref 10–44)
ANION GAP SERPL CALC-SCNC: 13 MMOL/L (ref 8–16)
AST SERPL-CCNC: 16 U/L (ref 10–40)
BASOPHILS # BLD AUTO: 0.06 K/UL (ref 0–0.2)
BASOPHILS NFR BLD: 0.6 % (ref 0–1.9)
BILIRUB SERPL-MCNC: 0.5 MG/DL (ref 0.1–1)
BNP SERPL-MCNC: 57 PG/ML (ref 0–99)
BUN SERPL-MCNC: 27 MG/DL (ref 8–23)
CALCIUM SERPL-MCNC: 9.5 MG/DL (ref 8.7–10.5)
CHLORIDE SERPL-SCNC: 102 MMOL/L (ref 95–110)
CO2 SERPL-SCNC: 22 MMOL/L (ref 23–29)
CREAT SERPL-MCNC: 1.1 MG/DL (ref 0.5–1.4)
DIFFERENTIAL METHOD BLD: NORMAL
EOSINOPHIL # BLD AUTO: 0.1 K/UL (ref 0–0.5)
EOSINOPHIL NFR BLD: 1.2 % (ref 0–8)
ERYTHROCYTE [DISTWIDTH] IN BLOOD BY AUTOMATED COUNT: 14.5 % (ref 11.5–14.5)
EST. GFR  (NO RACE VARIABLE): >60 ML/MIN/1.73 M^2
GLUCOSE SERPL-MCNC: 272 MG/DL (ref 70–110)
HCT VFR BLD AUTO: 50 % (ref 40–54)
HGB BLD-MCNC: 16.5 G/DL (ref 14–18)
IMM GRANULOCYTES # BLD AUTO: 0.03 K/UL (ref 0–0.04)
IMM GRANULOCYTES NFR BLD AUTO: 0.3 % (ref 0–0.5)
INR PPP: 1 (ref 0.8–1.2)
LYMPHOCYTES # BLD AUTO: 3.1 K/UL (ref 1–4.8)
LYMPHOCYTES NFR BLD: 33.4 % (ref 18–48)
MAGNESIUM SERPL-MCNC: 1.9 MG/DL (ref 1.6–2.6)
MCH RBC QN AUTO: 28 PG (ref 27–31)
MCHC RBC AUTO-ENTMCNC: 33 G/DL (ref 32–36)
MCV RBC AUTO: 85 FL (ref 82–98)
MONOCYTES # BLD AUTO: 0.7 K/UL (ref 0.3–1)
MONOCYTES NFR BLD: 8 % (ref 4–15)
NEUTROPHILS # BLD AUTO: 5.2 K/UL (ref 1.8–7.7)
NEUTROPHILS NFR BLD: 56.5 % (ref 38–73)
NRBC BLD-RTO: 0 /100 WBC
PLATELET # BLD AUTO: 209 K/UL (ref 150–450)
PMV BLD AUTO: 10.4 FL (ref 9.2–12.9)
POTASSIUM SERPL-SCNC: 3.9 MMOL/L (ref 3.5–5.1)
PROT SERPL-MCNC: 8 G/DL (ref 6–8.4)
PROTHROMBIN TIME: 11 SEC (ref 9–12.5)
RBC # BLD AUTO: 5.9 M/UL (ref 4.6–6.2)
SODIUM SERPL-SCNC: 137 MMOL/L (ref 136–145)
TROPONIN I SERPL HS-MCNC: 6.3 PG/ML (ref 0–14.9)
WBC # BLD AUTO: 9.27 K/UL (ref 3.9–12.7)

## 2024-01-13 PROCEDURE — 84484 ASSAY OF TROPONIN QUANT: CPT | Performed by: STUDENT IN AN ORGANIZED HEALTH CARE EDUCATION/TRAINING PROGRAM

## 2024-01-13 PROCEDURE — 25000003 PHARM REV CODE 250: Performed by: STUDENT IN AN ORGANIZED HEALTH CARE EDUCATION/TRAINING PROGRAM

## 2024-01-13 PROCEDURE — 25000242 PHARM REV CODE 250 ALT 637 W/ HCPCS: Performed by: STUDENT IN AN ORGANIZED HEALTH CARE EDUCATION/TRAINING PROGRAM

## 2024-01-13 PROCEDURE — 93010 ELECTROCARDIOGRAM REPORT: CPT | Mod: ,,, | Performed by: GENERAL PRACTICE

## 2024-01-13 PROCEDURE — 83880 ASSAY OF NATRIURETIC PEPTIDE: CPT | Performed by: STUDENT IN AN ORGANIZED HEALTH CARE EDUCATION/TRAINING PROGRAM

## 2024-01-13 PROCEDURE — 93005 ELECTROCARDIOGRAM TRACING: CPT | Performed by: GENERAL PRACTICE

## 2024-01-13 PROCEDURE — 83735 ASSAY OF MAGNESIUM: CPT | Performed by: STUDENT IN AN ORGANIZED HEALTH CARE EDUCATION/TRAINING PROGRAM

## 2024-01-13 PROCEDURE — 80053 COMPREHEN METABOLIC PANEL: CPT | Performed by: STUDENT IN AN ORGANIZED HEALTH CARE EDUCATION/TRAINING PROGRAM

## 2024-01-13 PROCEDURE — 99285 EMERGENCY DEPT VISIT HI MDM: CPT | Mod: 25

## 2024-01-13 PROCEDURE — 85610 PROTHROMBIN TIME: CPT | Performed by: STUDENT IN AN ORGANIZED HEALTH CARE EDUCATION/TRAINING PROGRAM

## 2024-01-13 PROCEDURE — 85025 COMPLETE CBC W/AUTO DIFF WBC: CPT | Performed by: STUDENT IN AN ORGANIZED HEALTH CARE EDUCATION/TRAINING PROGRAM

## 2024-01-13 RX ORDER — ALUMINUM HYDROXIDE, MAGNESIUM HYDROXIDE, AND SIMETHICONE 1200; 120; 1200 MG/30ML; MG/30ML; MG/30ML
30 SUSPENSION ORAL ONCE
Status: COMPLETED | OUTPATIENT
Start: 2024-01-14 | End: 2024-01-14

## 2024-01-13 RX ORDER — NITROGLYCERIN 0.4 MG/1
0.4 TABLET SUBLINGUAL
Status: DISPENSED | OUTPATIENT
Start: 2024-01-13 | End: 2024-01-13

## 2024-01-13 RX ORDER — LISINOPRIL 20 MG/1
20 TABLET ORAL ONCE
Status: COMPLETED | OUTPATIENT
Start: 2024-01-13 | End: 2024-01-13

## 2024-01-13 RX ORDER — FAMOTIDINE 10 MG/ML
20 INJECTION INTRAVENOUS
Status: COMPLETED | OUTPATIENT
Start: 2024-01-13 | End: 2024-01-14

## 2024-01-13 RX ORDER — DILTIAZEM HYDROCHLORIDE 120 MG/1
240 CAPSULE, COATED, EXTENDED RELEASE ORAL
Status: COMPLETED | OUTPATIENT
Start: 2024-01-13 | End: 2024-01-13

## 2024-01-13 RX ORDER — LIDOCAINE HYDROCHLORIDE 20 MG/ML
15 SOLUTION OROPHARYNGEAL ONCE
Status: COMPLETED | OUTPATIENT
Start: 2024-01-14 | End: 2024-01-14

## 2024-01-13 RX ADMIN — DILTIAZEM HYDROCHLORIDE 240 MG: 120 CAPSULE, COATED, EXTENDED RELEASE ORAL at 10:01

## 2024-01-13 RX ADMIN — LISINOPRIL 20 MG: 20 TABLET ORAL at 10:01

## 2024-01-13 RX ADMIN — NITROGLYCERIN 0.4 MG: 0.4 TABLET SUBLINGUAL at 10:01

## 2024-01-13 NOTE — Clinical Note
.Radial flush administered by Dr. Emmanuel.   Radial Flush: 5 mg of Verapamil, & 400 mcg of Nitro mixed in 100 cc of Normal Saline.

## 2024-01-13 NOTE — Clinical Note
The catheter was inserted into the left ventricle. Hemodynamics were performed.  and Pullback was recorded.  An angiography was performed of the LV. The angiography was performed via power injection. The injected amount was 24 mL contrast at 12 mL/s. The PSI from the power injection was 600.

## 2024-01-13 NOTE — Clinical Note
The catheter was repositioned into the ostium   right coronary artery. An angiography was performed of the right coronary arteries. The angiography was performed via power injection.  The catheter was unable to engage the area..

## 2024-01-14 PROBLEM — R07.9 CHEST PAIN: Status: ACTIVE | Noted: 2024-01-14

## 2024-01-14 LAB
APTT PPP: 28.5 SEC (ref 21–32)
APTT PPP: 32.8 SEC (ref 21–32)
BASOPHILS # BLD AUTO: 0.04 K/UL (ref 0–0.2)
BASOPHILS NFR BLD: 0.5 % (ref 0–1.9)
DIFFERENTIAL METHOD BLD: NORMAL
EOSINOPHIL # BLD AUTO: 0 K/UL (ref 0–0.5)
EOSINOPHIL NFR BLD: 0.4 % (ref 0–8)
ERYTHROCYTE [DISTWIDTH] IN BLOOD BY AUTOMATED COUNT: 14.5 % (ref 11.5–14.5)
GLUCOSE SERPL-MCNC: 346 MG/DL (ref 70–110)
GLUCOSE SERPL-MCNC: 366 MG/DL (ref 70–110)
HCT VFR BLD AUTO: 47.4 % (ref 40–54)
HGB BLD-MCNC: 15.8 G/DL (ref 14–18)
IMM GRANULOCYTES # BLD AUTO: 0.02 K/UL (ref 0–0.04)
IMM GRANULOCYTES NFR BLD AUTO: 0.3 % (ref 0–0.5)
INR PPP: 1.1 (ref 0.8–1.2)
LYMPHOCYTES # BLD AUTO: 1.5 K/UL (ref 1–4.8)
LYMPHOCYTES NFR BLD: 20.5 % (ref 18–48)
MCH RBC QN AUTO: 28.1 PG (ref 27–31)
MCHC RBC AUTO-ENTMCNC: 33.3 G/DL (ref 32–36)
MCV RBC AUTO: 84 FL (ref 82–98)
MONOCYTES # BLD AUTO: 0.6 K/UL (ref 0.3–1)
MONOCYTES NFR BLD: 7.9 % (ref 4–15)
NEUTROPHILS # BLD AUTO: 5.2 K/UL (ref 1.8–7.7)
NEUTROPHILS NFR BLD: 70.4 % (ref 38–73)
NRBC BLD-RTO: 0 /100 WBC
PLATELET # BLD AUTO: 167 K/UL (ref 150–450)
PMV BLD AUTO: 10.2 FL (ref 9.2–12.9)
PROTHROMBIN TIME: 12 SEC (ref 9–12.5)
RBC # BLD AUTO: 5.63 M/UL (ref 4.6–6.2)
TROPONIN I SERPL HS-MCNC: 138.2 PG/ML (ref 0–14.9)
TROPONIN I SERPL HS-MCNC: 429.4 PG/ML (ref 0–14.9)
TROPONIN I SERPL HS-MCNC: 522.3 PG/ML (ref 0–14.9)
WBC # BLD AUTO: 7.45 K/UL (ref 3.9–12.7)

## 2024-01-14 PROCEDURE — 63600175 PHARM REV CODE 636 W HCPCS: Performed by: INTERNAL MEDICINE

## 2024-01-14 PROCEDURE — 85730 THROMBOPLASTIN TIME PARTIAL: CPT | Performed by: INTERNAL MEDICINE

## 2024-01-14 PROCEDURE — 25000003 PHARM REV CODE 250: Performed by: STUDENT IN AN ORGANIZED HEALTH CARE EDUCATION/TRAINING PROGRAM

## 2024-01-14 PROCEDURE — 85025 COMPLETE CBC W/AUTO DIFF WBC: CPT | Performed by: INTERNAL MEDICINE

## 2024-01-14 PROCEDURE — 84484 ASSAY OF TROPONIN QUANT: CPT | Mod: 91 | Performed by: INTERNAL MEDICINE

## 2024-01-14 PROCEDURE — 93005 ELECTROCARDIOGRAM TRACING: CPT | Performed by: GENERAL PRACTICE

## 2024-01-14 PROCEDURE — 93010 ELECTROCARDIOGRAM REPORT: CPT | Mod: ,,, | Performed by: GENERAL PRACTICE

## 2024-01-14 PROCEDURE — 25000003 PHARM REV CODE 250: Performed by: INTERNAL MEDICINE

## 2024-01-14 PROCEDURE — 96365 THER/PROPH/DIAG IV INF INIT: CPT

## 2024-01-14 PROCEDURE — 96375 TX/PRO/DX INJ NEW DRUG ADDON: CPT

## 2024-01-14 PROCEDURE — 94761 N-INVAS EAR/PLS OXIMETRY MLT: CPT

## 2024-01-14 PROCEDURE — 36415 COLL VENOUS BLD VENIPUNCTURE: CPT | Performed by: INTERNAL MEDICINE

## 2024-01-14 PROCEDURE — 84484 ASSAY OF TROPONIN QUANT: CPT | Performed by: INTERNAL MEDICINE

## 2024-01-14 PROCEDURE — 85610 PROTHROMBIN TIME: CPT | Performed by: INTERNAL MEDICINE

## 2024-01-14 PROCEDURE — 21400001 HC TELEMETRY ROOM

## 2024-01-14 RX ORDER — GLIPIZIDE 5 MG/1
5 TABLET ORAL 2 TIMES DAILY WITH MEALS
Status: DISCONTINUED | OUTPATIENT
Start: 2024-01-14 | End: 2024-01-14

## 2024-01-14 RX ORDER — GLUCAGON 1 MG
1 KIT INJECTION
Status: DISCONTINUED | OUTPATIENT
Start: 2024-01-14 | End: 2024-01-17

## 2024-01-14 RX ORDER — NAPROXEN SODIUM 220 MG/1
81 TABLET, FILM COATED ORAL DAILY
Status: DISCONTINUED | OUTPATIENT
Start: 2024-01-14 | End: 2024-01-21 | Stop reason: HOSPADM

## 2024-01-14 RX ORDER — IBUPROFEN 200 MG
16 TABLET ORAL
Status: DISCONTINUED | OUTPATIENT
Start: 2024-01-14 | End: 2024-01-17

## 2024-01-14 RX ORDER — SODIUM,POTASSIUM PHOSPHATES 280-250MG
2 POWDER IN PACKET (EA) ORAL
Status: DISCONTINUED | OUTPATIENT
Start: 2024-01-14 | End: 2024-01-17

## 2024-01-14 RX ORDER — LANOLIN ALCOHOL/MO/W.PET/CERES
800 CREAM (GRAM) TOPICAL
Status: DISCONTINUED | OUTPATIENT
Start: 2024-01-14 | End: 2024-01-17

## 2024-01-14 RX ORDER — IBUPROFEN 200 MG
24 TABLET ORAL
Status: DISCONTINUED | OUTPATIENT
Start: 2024-01-14 | End: 2024-01-17

## 2024-01-14 RX ORDER — FUROSEMIDE 20 MG/1
20 TABLET ORAL 2 TIMES DAILY
Status: DISCONTINUED | OUTPATIENT
Start: 2024-01-14 | End: 2024-01-15

## 2024-01-14 RX ORDER — ONDANSETRON HYDROCHLORIDE 2 MG/ML
4 INJECTION, SOLUTION INTRAVENOUS EVERY 6 HOURS PRN
Status: DISCONTINUED | OUTPATIENT
Start: 2024-01-14 | End: 2024-01-21 | Stop reason: HOSPADM

## 2024-01-14 RX ORDER — SODIUM CHLORIDE 0.9 % (FLUSH) 0.9 %
10 SYRINGE (ML) INJECTION EVERY 12 HOURS PRN
Status: DISCONTINUED | OUTPATIENT
Start: 2024-01-14 | End: 2024-01-17

## 2024-01-14 RX ORDER — ALUMINUM HYDROXIDE, MAGNESIUM HYDROXIDE, AND SIMETHICONE 1200; 120; 1200 MG/30ML; MG/30ML; MG/30ML
30 SUSPENSION ORAL EVERY 6 HOURS PRN
Status: DISCONTINUED | OUTPATIENT
Start: 2024-01-14 | End: 2024-01-21 | Stop reason: HOSPADM

## 2024-01-14 RX ORDER — INSULIN ASPART 100 [IU]/ML
0-5 INJECTION, SOLUTION INTRAVENOUS; SUBCUTANEOUS
Status: DISCONTINUED | OUTPATIENT
Start: 2024-01-14 | End: 2024-01-17

## 2024-01-14 RX ORDER — ACETAMINOPHEN 325 MG/1
650 TABLET ORAL EVERY 6 HOURS PRN
Status: DISCONTINUED | OUTPATIENT
Start: 2024-01-14 | End: 2024-01-21 | Stop reason: HOSPADM

## 2024-01-14 RX ORDER — DILTIAZEM HYDROCHLORIDE 120 MG/1
240 CAPSULE, COATED, EXTENDED RELEASE ORAL 2 TIMES DAILY
Status: DISCONTINUED | OUTPATIENT
Start: 2024-01-14 | End: 2024-01-21 | Stop reason: HOSPADM

## 2024-01-14 RX ORDER — ATORVASTATIN CALCIUM 20 MG/1
20 TABLET, FILM COATED ORAL DAILY
Status: DISCONTINUED | OUTPATIENT
Start: 2024-01-14 | End: 2024-01-18

## 2024-01-14 RX ORDER — HEPARIN SODIUM,PORCINE/D5W 25000/250
0-40 INTRAVENOUS SOLUTION INTRAVENOUS CONTINUOUS
Status: DISCONTINUED | OUTPATIENT
Start: 2024-01-14 | End: 2024-01-15

## 2024-01-14 RX ORDER — NALOXONE HCL 0.4 MG/ML
0.02 VIAL (ML) INJECTION
Status: DISCONTINUED | OUTPATIENT
Start: 2024-01-14 | End: 2024-01-17

## 2024-01-14 RX ORDER — LISINOPRIL 20 MG/1
20 TABLET ORAL DAILY
Status: DISCONTINUED | OUTPATIENT
Start: 2024-01-14 | End: 2024-01-17

## 2024-01-14 RX ORDER — AMIODARONE HYDROCHLORIDE 200 MG/1
200 TABLET ORAL DAILY
Status: DISCONTINUED | OUTPATIENT
Start: 2024-01-14 | End: 2024-01-21 | Stop reason: HOSPADM

## 2024-01-14 RX ADMIN — LIDOCAINE HYDROCHLORIDE 15 ML: 20 SOLUTION ORAL at 12:01

## 2024-01-14 RX ADMIN — INSULIN ASPART 3 UNITS: 100 INJECTION, SOLUTION INTRAVENOUS; SUBCUTANEOUS at 08:01

## 2024-01-14 RX ADMIN — FAMOTIDINE 20 MG: 10 INJECTION INTRAVENOUS at 12:01

## 2024-01-14 RX ADMIN — ASPIRIN 81 MG 81 MG: 81 TABLET ORAL at 09:01

## 2024-01-14 RX ADMIN — ATORVASTATIN CALCIUM 20 MG: 20 TABLET, FILM COATED ORAL at 08:01

## 2024-01-14 RX ADMIN — HEPARIN SODIUM 12 UNITS/KG/HR: 10000 INJECTION, SOLUTION INTRAVENOUS at 11:01

## 2024-01-14 RX ADMIN — LISINOPRIL 20 MG: 20 TABLET ORAL at 09:01

## 2024-01-14 RX ADMIN — INSULIN ASPART 4 UNITS: 100 INJECTION, SOLUTION INTRAVENOUS; SUBCUTANEOUS at 05:01

## 2024-01-14 RX ADMIN — FUROSEMIDE 20 MG: 20 TABLET ORAL at 09:01

## 2024-01-14 RX ADMIN — DILTIAZEM HYDROCHLORIDE 240 MG: 120 CAPSULE, COATED, EXTENDED RELEASE ORAL at 08:01

## 2024-01-14 RX ADMIN — ALUMINUM HYDROXIDE, MAGNESIUM HYDROXIDE, AND SIMETHICONE 30 ML: 200; 200; 20 SUSPENSION ORAL at 12:01

## 2024-01-14 RX ADMIN — AMIODARONE HYDROCHLORIDE 200 MG: 200 TABLET ORAL at 09:01

## 2024-01-14 RX ADMIN — DILTIAZEM HYDROCHLORIDE 240 MG: 120 CAPSULE, COATED, EXTENDED RELEASE ORAL at 09:01

## 2024-01-14 NOTE — ED PROVIDER NOTES
Encounter Date: 1/13/2024       History     Chief Complaint   Patient presents with    Chest Pain     Chest pain x 15 min      HPI  63-year-old presenting with chest pain.  Reports that he has been having chest pain intermittently only with exertion over the last week but today while he was at rest had severe chest pain.  Not ripping chest pain.  Not associated with shortness of breath or lightheadedness or nausea or vomiting.  No radiation of the chest pain.  Chest pain is retrosternal.  History of MI, hypertension, diabetes, hyperlipidemia, atrial fibrillation.   Review of patient's allergies indicates:  No Known Allergies  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 01/13/2022    EYE EXAM  DR KATHY PARMAR    Coronary artery disease     1 coronary stent - Dr Orellana    Diabetes mellitus, type 2     Hypertension     Myocardial infarction 2013     Past Surgical History:   Procedure Laterality Date    ABLATION OF ARRHYTHMOGENIC FOCUS FOR ATRIAL FIBRILLATION N/A 7/7/2022    Procedure: ABLATION, ARRHYTHMOGENIC FOCUS, FOR ATRIAL FIBRILLATION;  Surgeon: Niko Pacheco MD;  Location: [facility] EP LAB;  Service: Cardiology;  Laterality: N/A;  AF, ARMANDO(Cx if SR), PVI, RFA, CARTO, GEN, GP, 3 PREP    ARTHROPLASTY OF SHOULDER Right 11/13/2023    Procedure: ARTHROPLASTY, SHOULDER, RIGHT;  Surgeon: Branden Hernandez MD;  Location: [facility] OR;  Service: Orthopedics;  Laterality: Right;  DJO    CORONARY STENT PLACEMENT  2013    TRANSESOPHAGEAL ECHOCARDIOGRAPHY N/A 7/7/2022    Procedure: ECHOCARDIOGRAM, TRANSESOPHAGEAL;  Surgeon: Conor Chappell MD;  Location: [facility] EP LAB;  Service: Cardiology;  Laterality: N/A;    TREATMENT OF CARDIAC ARRHYTHMIA N/A 3/7/2022    Procedure: CARDIOVERSION;  Surgeon: Gomez Orellana MD;  Location: [facility] CATH/EP LAB;  Service: Cardiology;  Laterality: N/A;    TREATMENT OF CARDIAC ARRHYTHMIA  7/7/2022    Procedure: Cardioversion or Defibrillation;   Surgeon: Conor Chappell MD;  Location: [facility] EP LAB;  Service: Cardiology;;     Family History   Problem Relation Age of Onset    Heart attack Father     Cancer Father         prostate     Social History     Tobacco Use    Smoking status: Never    Smokeless tobacco: Never   Substance Use Topics    Alcohol use: Yes     Comment: occ    Drug use: Never     Review of Systems   Constitutional:  Negative for chills and fever.   HENT:  Negative for congestion and sore throat.    Respiratory:  Negative for cough and shortness of breath.    Cardiovascular:  Positive for chest pain. Negative for leg swelling.   Gastrointestinal:  Negative for abdominal pain, blood in stool, constipation, diarrhea, nausea and vomiting.   Genitourinary:  Negative for dysuria and frequency.   Skin:  Negative for rash.       Physical Exam     Initial Vitals [01/13/24 2144]   BP Pulse Resp Temp SpO2   (!) 214/105 99 17 98.2 °F (36.8 °C) 99 %      MAP       --         Physical Exam    Nursing note and vitals reviewed.  Constitutional: He appears well-developed and well-nourished. He is not diaphoretic.   Answering questions in full sentences without increased work of breathing.    HENT:   Head: Normocephalic.   Eyes: Right eye exhibits no discharge. Left eye exhibits no discharge. No scleral icterus.   Neck: Neck supple. No tracheal deviation present.   Normal range of motion.  Cardiovascular:  Normal rate and regular rhythm.           Pulmonary/Chest: No stridor. No respiratory distress. He has no wheezes. He has no rhonchi. He has no rales. He exhibits no tenderness.   Abdominal: Abdomen is soft. There is no abdominal tenderness. There is no rebound and no guarding.   Musculoskeletal:         General: No edema.      Cervical back: Normal range of motion and neck supple.     Neurological: He is alert and oriented to person, place, and time.   Moving all extremities   Skin: Skin is warm and dry.         ED Course   Procedures  Labs Reviewed   CBC  W/ AUTO DIFFERENTIAL   COMPREHENSIVE METABOLIC PANEL   B-TYPE NATRIURETIC PEPTIDE   MAGNESIUM   TROPONIN I HIGH SENSITIVITY   PROTIME-INR          Imaging Results    None          Medications - No data to display  Medical Decision Making  Risk  OTC drugs.  Prescription drug management.    63-year-old presenting with chest pain.  Reports that he has been having chest pain intermittently only with exertion over the last week but today while he was at rest had severe chest pain.  Not ripping chest pain.  Not associated with shortness of breath or lightheadedness or nausea or vomiting.  No radiation of the chest pain.  Chest pain is retrosternal.  History of MI, hypertension, diabetes, hyperlipidemia, atrial fibrillation.  Differential includes ACS, GERD, pneumothorax, pneumonia, pancreatitis, hypertensive emergency.  Vitals within acceptable limits on presentation except for blood pressure of 214/105 initially.  Patient given 3 rounds of nitro as well as his home blood pressure and AFib meds that were due at night with reduction of blood pressure to the 170s over 90s.  Patient reported his chest pain resolved after nitro but was replaced soon after by a burning pain in the same area of his chest.  Still no abdominal pain.  Considered aortic dissection but clinical presentation consistent with this therefore we will monitor at this time.  Cardiac workup within acceptable limits at this time but patient admitted due to his risk factors for high-risk chest pain.  CBC, CMP, Mag, BNP, troponin, EKG, chest x-ray within acceptable limits.  Patient had taken 3 baby aspirin at home therefore no further aspirin given here.  Considered PE but low clinical suspicion low Wells score therefore no further PE workup at this time.  Jelly Day MD  Emergency Medicine Staff Physician  2:55 AM                                     Clinical Impression:  Final diagnoses:  [R07.9] Chest pain                 Jelly Day MD  01/14/24  [id number]

## 2024-01-14 NOTE — NURSING
Nurses Note -- 4 Eyes      1/14/2024   4:04 PM      Skin assessed during: Admit      [x] No Altered Skin Integrity Present    []Prevention Measures Documented      [] Yes- Altered Skin Integrity Present or Discovered   [] LDA Added if Not in Epic (Describe Wound)   [] New Altered Skin Integrity was Present on Admit and Documented in LDA   [] Wound Image Taken    Wound Care Consulted? No    Attending Nurse:  Kinza August LPN  Second RN/Staff Member:   princess

## 2024-01-14 NOTE — ASSESSMENT & PLAN NOTE
Chronic, uncontrolled. Latest blood pressure and vitals reviewed-     Temp:  [98.2 °F (36.8 °C)]   Pulse:  [81-99]   Resp:  [17-19]   BP: (174-214)/()   SpO2:  [96 %-99 %] .   Home meds for hypertension were reviewed and noted below.   Hypertension Medications               diltiaZEM (CARDIZEM CD) 240 MG 24 hr capsule Take 1 capsule (240 mg total) by mouth 2 (two) times a day.    furosemide (LASIX) 20 MG tablet Take 1 tablet (20 mg total) by mouth 2 (two) times daily.    lisinopriL (PRINIVIL,ZESTRIL) 20 MG tablet Take 1 tablet (20 mg total) by mouth once daily.            While in the hospital, will manage blood pressure as follows;     Will utilize p.r.n. blood pressure medication only if patient's blood pressure greater than 160/100 and he develops symptoms such as worsening chest pain or shortness of breath.

## 2024-01-14 NOTE — SUBJECTIVE & OBJECTIVE
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 01/13/2022    EYE EXAM  DR KATHY PARMAR    Coronary artery disease     1 coronary stent - Dr Orellana    Diabetes mellitus, type 2     Hypertension     Myocardial infarction 2013       Past Surgical History:   Procedure Laterality Date    ABLATION OF ARRHYTHMOGENIC FOCUS FOR ATRIAL FIBRILLATION N/A 7/7/2022    Procedure: ABLATION, ARRHYTHMOGENIC FOCUS, FOR ATRIAL FIBRILLATION;  Surgeon: Niko Pacheco MD;  Location: [facility] EP LAB;  Service: Cardiology;  Laterality: N/A;  AF, ARMANDO(Cx if SR), PVI, RFA, CARTO, GEN, GP, 3 PREP    ARTHROPLASTY OF SHOULDER Right 11/13/2023    Procedure: ARTHROPLASTY, SHOULDER, RIGHT;  Surgeon: Branden Hernandez MD;  Location: [facility] OR;  Service: Orthopedics;  Laterality: Right;  DJO    CORONARY STENT PLACEMENT  2013    TRANSESOPHAGEAL ECHOCARDIOGRAPHY N/A 7/7/2022    Procedure: ECHOCARDIOGRAM, TRANSESOPHAGEAL;  Surgeon: Conor Chappell MD;  Location: [facility] EP LAB;  Service: Cardiology;  Laterality: N/A;    TREATMENT OF CARDIAC ARRHYTHMIA N/A 3/7/2022    Procedure: CARDIOVERSION;  Surgeon: Goemz Orellana MD;  Location: [facility] CATH/EP LAB;  Service: Cardiology;  Laterality: N/A;    TREATMENT OF CARDIAC ARRHYTHMIA  7/7/2022    Procedure: Cardioversion or Defibrillation;  Surgeon: Conor Chappell MD;  Location: [facility] EP LAB;  Service: Cardiology;;       Review of patient's allergies indicates:  No Known Allergies    Current Facility-Administered Medications on File Prior to Encounter   Medication    electrolyte-S (ISOLYTE)    fentaNYL 50 mcg/mL injection 25 mcg    lactated ringers infusion    LIDOcaine (PF) 10 mg/ml (1%) injection 10 mg    oxyCODONE immediate release tablet 5 mg     Current Outpatient Medications on File Prior to Encounter   Medication Sig    amiodarone (PACERONE) 200 MG Tab Take 1 tablet (200 mg total) by mouth once daily. (Patient taking differently: Take 100 mg by mouth once  daily.)    apixaban (ELIQUIS) 5 mg Tab Take 1 tablet (5 mg total) by mouth 2 (two) times daily.    diltiaZEM (CARDIZEM CD) 240 MG 24 hr capsule Take 1 capsule (240 mg total) by mouth 2 (two) times a day.    dulaglutide (TRULICITY) 0.75 mg/0.5 mL pen injector Inject 0.75 mg into the skin every 7 days.    empagliflozin (JARDIANCE) 25 mg tablet Take 1 tablet (25 mg total) by mouth once daily.    furosemide (LASIX) 20 MG tablet Take 1 tablet (20 mg total) by mouth 2 (two) times daily.    glipiZIDE (GLUCOTROL) 5 MG tablet Take 1 tablet (5 mg total) by mouth 2 (two) times daily with meals.    lisinopriL (PRINIVIL,ZESTRIL) 20 MG tablet Take 1 tablet (20 mg total) by mouth once daily.    metFORMIN (GLUCOPHAGE) 1000 MG tablet Take 1 tablet (1,000 mg total) by mouth 2 (two) times daily with meals.    rosuvastatin (CRESTOR) 20 MG tablet Take 20 mg by mouth once daily.     Family History       Problem Relation (Age of Onset)    Cancer Father    Heart attack Father          Tobacco Use    Smoking status: Never    Smokeless tobacco: Never   Substance and Sexual Activity    Alcohol use: Yes     Comment: occ    Drug use: Never    Sexual activity: Not on file     Review of Systems   Constitutional:  Negative for activity change, appetite change and diaphoresis.   HENT:  Negative for congestion, facial swelling and sinus pressure.    Respiratory:  Positive for chest tightness. Negative for shortness of breath and wheezing.    Cardiovascular:  Negative for chest pain and palpitations.   Gastrointestinal:  Negative for abdominal distention and abdominal pain.   Genitourinary:  Negative for dysuria.   Skin:  Negative for color change and pallor.   Neurological:  Negative for dizziness, facial asymmetry, speech difficulty and headaches.   Psychiatric/Behavioral:  Negative for agitation and confusion.      Objective:     Vital Signs (Most Recent):  Temp: 98.2 °F (36.8 °C) (01/13/24 2144)  Pulse: 83 (01/14/24 0029)  Resp: 17 (01/14/24  0029)  BP: (!) 178/86 (01/14/24 0029)  SpO2: 96 % (01/14/24 0029) Vital Signs (24h Range):  Temp:  [98.2 °F (36.8 °C)] 98.2 °F (36.8 °C)  Pulse:  [81-99] 83  Resp:  [17-19] 17  SpO2:  [96 %-99 %] 96 %  BP: (174-214)/() 178/86     Weight: (!) 142.9 kg (315 lb)  Body mass index is 40.44 kg/m².     Physical Exam  Constitutional:       General: He is not in acute distress.     Appearance: He is well-developed.   HENT:      Head: Normocephalic.   Eyes:      Pupils: Pupils are equal, round, and reactive to light.   Cardiovascular:      Rate and Rhythm: Normal rate and regular rhythm.   Pulmonary:      Effort: Pulmonary effort is normal. No respiratory distress.   Abdominal:      General: Abdomen is flat. There is no distension.      Tenderness: There is no abdominal tenderness.   Musculoskeletal:         General: Normal range of motion.      Cervical back: Neck supple.   Skin:     General: Skin is warm.      Findings: No rash.   Neurological:      Mental Status: He is alert and oriented to person, place, and time.              CRANIAL NERVES     CN III, IV, VI   Pupils are equal, round, and reactive to light.       Significant Labs: All pertinent labs within the past 24 hours have been reviewed.  BMP:   Recent Labs   Lab 01/13/24 2151   *      K 3.9      CO2 22*   BUN 27*   CREATININE 1.1   CALCIUM 9.5   MG 1.9     CBC:   Recent Labs   Lab 01/13/24  2151   WBC 9.27   HGB 16.5   HCT 50.0        CMP:   Recent Labs   Lab 01/13/24 2151      K 3.9      CO2 22*   *   BUN 27*   CREATININE 1.1   CALCIUM 9.5   PROT 8.0   ALBUMIN 4.7   BILITOT 0.5   ALKPHOS 75   AST 16   ALT 25   ANIONGAP 13       Significant Imaging: I have reviewed all pertinent imaging results/findings within the past 24 hours.

## 2024-01-14 NOTE — ASSESSMENT & PLAN NOTE
Possible GI origin   Trend cardiac enz  Lexiscan stress test tomorrow   Cardiology Cx if needed   Resume home meds including eliquis   Asa]  Statin

## 2024-01-14 NOTE — HPI
63 M with HTN, HLD, DM2, CAD status-post MI/PCI in 2013, who was first diagnosed with AF in 1/2022  and  s/p ablation . CHF  came with CP .  His chest pain is more like epigastric area and started this evening and got better with taking antacid med he still came to the hospital.  Currently he is chest pain-free and EKG did not show any ischemia and sinus rhythm with loss of PVC otherwise normal.  He ran out of the Eliquis secondary to insurance issues and currently he is taking aspirin and Plavix.  Normally he takes aspirin and Eliquis in the past.  Chest x-ray negative and cardiac enzymes are negative.  He did echocardiogram with Dr. Xavi fierro about 6 months ago and he was told normal and no recent stress test.

## 2024-01-14 NOTE — PROGRESS NOTES
[facility] Medicine  Progress Note    Patient name: Donald Frey  MRN: [id number]  Admit Date: 1/13/2024   LOS: 0 days     SUBJECTIVE:     Principal problem: Chest pain    Interval History:  63 M with HTN, HLD, DM2, CAD status-post MI/PCI in 2013, who was first diagnosed with AF in 1/2022  and  s/p ablation, CHF presented with chest pain/epigastric pain. EKG with a good deal of artifact but does not appear to have ST elevation. CP resolved with treatment and he was admitted for a cardiac workup. First troponin was normal. Stress test ordered.  2nd troponin resulted 138, 3rd 429.  I have cancelled the stress test, started a heparin gtt, held his Xarelto and consulted Cardiology.  He follows with Dr. Orellana. No current chest pain.  He does have some lower abdominal pain that feels like indigestion.  It was helped by Maalox last night.     Hospital Course:    Scheduled Meds:   amiodarone  200 mg Oral Daily    aspirin  81 mg Oral Daily    atorvastatin  20 mg Oral Daily    diltiaZEM  240 mg Oral BID    furosemide  20 mg Oral BID    heparin (PORCINE)  37.6 Units/kg (Adjusted) Intravenous Once    lisinopriL  20 mg Oral Daily     Continuous Infusions:   heparin (porcine) in D5W       PRN Meds:acetaminophen, aluminum-magnesium hydroxide-simethicone, dextrose 50%, dextrose 50%, glucagon (human recombinant), glucose, glucose, heparin (PORCINE), heparin (PORCINE), magnesium oxide, magnesium oxide, naloxone, ondansetron, potassium bicarbonate, potassium bicarbonate, potassium bicarbonate, potassium, sodium phosphates, potassium, sodium phosphates, potassium, sodium phosphates, sodium chloride 0.9%    Review of patient's allergies indicates:  No Known Allergies    Review of Systems: As per interval history    OBJECTIVE:     Vital Signs (Most Recent)  Temp: 98.2 °F (36.8 °C) (01/13/24 2144)  Pulse: 68 (01/14/24 0759)  Resp: 15 (01/14/24 0759)  BP: (!) 143/68 (01/14/24 0759)  SpO2: 95 % (01/14/24  "0759)    Vital Signs Range (Last 24H):  Temp:  [98.2 °F (36.8 °C)]   Pulse:  [68-99]   Resp:  [15-25]   BP: (141-214)/()   SpO2:  [95 %-99 %]     I & O (Last 24H):No intake or output data in the 24 hours ending 01/14/24 0993    Physical Exam:    Vitals and nursing note reviewed.     Constitutional:       General: Not in acute distress.     Appearance: Well-developed.   HENT:      Head: Normocephalic and atraumatic.   Eyes:      Pupils: Pupils are equal, round, and reactive to light.   Cardiovascular:      Rate and Rhythm: Regular rhythm.   No LE edema  Pulmonary:      Effort: Pulmonary effort is normal.      Breath sounds: Normal breath sounds. No wheezing.   Abdominal:      General: There is no distension.      Palpations: Abdomen is soft.      Tenderness: There is no abdominal tenderness. There is no guarding or rebound.   Musculoskeletal:         General: Normal range of motion.      Cervical back: Normal range of motion.   Skin:     Findings: No rash.   Neurological:      Mental Status: Alert and oriented to person, place, and time.      Cranial Nerves: No cranial nerve deficit.      Sensory: No sensory deficit.     Laboratory:  I have reviewed all pertinent lab results within the past 24 hours.  CBC:   Recent Labs   Lab 01/13/24  2151   WBC 9.27   RBC 5.90   HGB 16.5   HCT 50.0      MCV 85   MCH 28.0   MCHC 33.0     CMP:   Recent Labs   Lab 01/13/24  2151   *   CALCIUM 9.5   ALBUMIN 4.7   PROT 8.0      K 3.9   CO2 22*      BUN 27*   CREATININE 1.1   ALKPHOS 75   ALT 25   AST 16   BILITOT 0.5     Cardiac markers: No results for input(s): "CKMB", "CPKMB", "TROPONINT", "TROPONINI", "MYOGLOBIN" in the last 168 hours.    Diagnostic Results:      ASSESSMENT/PLAN:         Active Hospital Problems    Diagnosis  POA    *Chest pain [R07.9]  Unknown    Persistent atrial fibrillation [I48.19]  Yes    Status post catheter ablation of atrial fibrillation [Z98.890]  Not Applicable    " Hyperlipidemia [E78.5]  Yes    Essential hypertension [I10]  Yes    Hx of myocardial infarction [I25.2]  Not Applicable    Type 2 diabetes mellitus with hyperglycemia, without long-term current use of insulin [E11.65]  Yes      Resolved Hospital Problems   No resolved problems to display.         Plan:     -Mr. Frey was admitted for chest pain evaluation.  First troponin was normal but subsequent two checks are progressively rising and concerning for NSTEMI.  -Stress test cancelled. Heparin gtt ordered. Cardiology consulted.  -Next troponin ordered for 1pm  -Continuing to hold NPO until can clarify with Cardiology if needs a LHC and the timing of this.  -On ASA, Statin, ACEi  -Xarelto held in the event he needs a procedure.  He has not received it here.  He gets samples from Dr. Orellana's office and has been out.  -ISS. Accuchecks  -Tele monitoring    VTE Risk Mitigation (From admission, onward)           Ordered     heparin 25,000 units in dextrose 5% (100 units/ml) IV bolus from bag - ADDITIONAL PRN BOLUS - 60 units/kg (max bolus 4000 units)  As needed (PRN)        Question:  Heparin Infusion Adjustment (DO NOT MODIFY ANSWER)  Answer:  \\[email or web address]\epic\Images\Pharmacy\HeparinInfusions\heparin LOW INTENSITY nomogram for [facility] [id number]    01/14/24 0911     heparin 25,000 units in dextrose 5% (100 units/ml) IV bolus from bag - ADDITIONAL PRN BOLUS - 30 units/kg (max bolus 4000 units)  As needed (PRN)        Question:  Heparin Infusion Adjustment (DO NOT MODIFY ANSWER)  Answer:  \\[email or web address]\epic\Images\Pharmacy\HeparinInfusions\heparin LOW INTENSITY nomogram for [facility] [id number].pdf    01/14/24 0911     heparin 25,000 units in dextrose 5% (100 units/ml) IV bolus from bag INITIAL BOLUS (max bolus 4000 units)  Once        Question:  Heparin Infusion Adjustment (DO NOT MODIFY ANSWER)  Answer:  \\[email or web address]\epic\Images\Pharmacy\HeparinInfusions\heparin LOW INTENSITY nomogram for [facility] [id number]    01/14/24 0911      heparin 25,000 units in dextrose 5% 250 mL (100 units/mL) infusion LOW INTENSITY nomogram - [facility]  Continuous        Question:  Begin at (units/kg/hr)  Answer:  12    01/14/24 0911     IP VTE HIGH RISK PATIENT  Once         01/14/24 0026     Place sequential compression device  Until discontinued         01/14/24 0026                      Department Hospital Medicine  [facility]  Bryce Wick MD  Date of service: 01/14/2024

## 2024-01-14 NOTE — CONSULTS
[facility]   Cardiology Note    Consult Requested By: HERMELINDA  Reason for Consult: chest pain    SUBJECTIVE:     History of Present Illness: The pt is 64 y/o M pt of Dr. Orellana with pmh CAD s/p MI in [place] 2013 HTN HLP Type 2 DM Obesity AFib s/p ablation. The pt presented to ED with c/o chest pain/heartburn that was non-exertional, resolved with antacid given in ED. He was admitted to trend trop/stress test. EKG appears non-ischemic however, artifact noted. Repeated EKG no ST seg changes. Trop neg initially but trened up to 138 then 429. Heparin drip started.   Labs h/h 16/50 cr 1.1 CXR NAPD BP stable  Pt was on Eliquis for pAF history, has been out of med for about one week.     Examined in ED, pt states chest pain has resolved at this time. Denies SOB. VSS, heparin drip  infusing    Review of patient's allergies indicates:  No Known Allergies    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 01/13/2022    EYE EXAM  DR KATHY PARMAR    Coronary artery disease     1 coronary stent - Dr Orellana    Diabetes mellitus, type 2     Hypertension     Myocardial infarction 2013     Past Surgical History:   Procedure Laterality Date    ABLATION OF ARRHYTHMOGENIC FOCUS FOR ATRIAL FIBRILLATION N/A 7/7/2022    Procedure: ABLATION, ARRHYTHMOGENIC FOCUS, FOR ATRIAL FIBRILLATION;  Surgeon: Niko Pacheco MD;  Location: [facility] EP LAB;  Service: Cardiology;  Laterality: N/A;  AF, ARMANDO(Cx if SR), PVI, RFA, CARTO, GEN, GP, 3 PREP    ARTHROPLASTY OF SHOULDER Right 11/13/2023    Procedure: ARTHROPLASTY, SHOULDER, RIGHT;  Surgeon: Branden Hernandez MD;  Location: [facility] OR;  Service: Orthopedics;  Laterality: Right;  DJO    CORONARY STENT PLACEMENT  2013    TRANSESOPHAGEAL ECHOCARDIOGRAPHY N/A 7/7/2022    Procedure: ECHOCARDIOGRAM, TRANSESOPHAGEAL;  Surgeon: Conor Chappell MD;  Location: [facility] EP LAB;  Service: Cardiology;  Laterality: N/A;    TREATMENT OF CARDIAC ARRHYTHMIA  N/A 3/7/2022    Procedure: CARDIOVERSION;  Surgeon: Gomez Orellana MD;  Location: [facility] CATH/EP LAB;  Service: Cardiology;  Laterality: N/A;    TREATMENT OF CARDIAC ARRHYTHMIA  7/7/2022    Procedure: Cardioversion or Defibrillation;  Surgeon: Conor Chappell MD;  Location: [facility] EP LAB;  Service: Cardiology;;     Family History   Problem Relation Age of Onset    Heart attack Father     Cancer Father         prostate     Social History     Tobacco Use    Smoking status: Never    Smokeless tobacco: Never   Substance Use Topics    Alcohol use: Yes     Comment: occ    Drug use: Never       Review of Systems:  Review of Systems   Constitutional:  Negative for chills and fever.   Respiratory:  Negative for cough, hemoptysis, sputum production, shortness of breath and wheezing.    Cardiovascular:  Positive for chest pain.   Gastrointestinal:  Positive for heartburn. Negative for nausea and vomiting.       OBJECTIVE:     Vital Signs (Most Recent)  Temp: 98.2 °F (36.8 °C) (01/13/24 2144)  Pulse: 68 (01/14/24 0759)  Resp: 15 (01/14/24 0759)  BP: (!) 143/68 (01/14/24 0759)  SpO2: 95 % (01/14/24 0759)    Vital Signs Range (Last 24H):  Temp:  [98.2 °F (36.8 °C)]   Pulse:  [68-99]   Resp:  [15-25]   BP: (141-214)/()   SpO2:  [95 %-99 %]     I & O (Last 24H):  No intake or output data in the 24 hours ending 01/14/24 1220    Current Diet:     Current Diet Order   Procedures    Diet NPO Except for: Medication     Order Specific Question:   Except for     Answer:   Medication        Allergies:  Review of patient's allergies indicates:  No Known Allergies    Meds:  Scheduled Meds:   amiodarone  200 mg Oral Daily    aspirin  81 mg Oral Daily    atorvastatin  20 mg Oral Daily    diltiaZEM  240 mg Oral BID    furosemide  20 mg Oral BID    lisinopriL  20 mg Oral Daily     Continuous Infusions:   heparin (porcine) in D5W 12 Units/kg/hr (01/14/24 1152)     PRN Meds:acetaminophen, aluminum-magnesium hydroxide-simethicone, dextrose 50%,  dextrose 50%, dextrose 50%, dextrose 50%, glucagon (human recombinant), glucagon (human recombinant), glucose, glucose, glucose, glucose, heparin (PORCINE), heparin (PORCINE), insulin aspart U-100, magnesium oxide, magnesium oxide, naloxone, ondansetron, potassium bicarbonate, potassium bicarbonate, potassium bicarbonate, potassium, sodium phosphates, potassium, sodium phosphates, potassium, sodium phosphates, sodium chloride 0.9%    Oxygen/Ventilator Data (Last 24H):  (if applicable)            Hemodynamic Parameters (Last 24H):   (if applicable)        Laboratory and Radiology Data:  Recent Results (from the past 24 hour(s))   CBC auto differential    Collection Time: 01/13/24  9:51 PM   Result Value Ref Range    WBC 9.27 3.90 - 12.70 K/uL    RBC 5.90 4.60 - 6.20 M/uL    Hemoglobin 16.5 14.0 - 18.0 g/dL    Hematocrit 50.0 40.0 - 54.0 %    MCV 85 82 - 98 fL    MCH 28.0 27.0 - 31.0 pg    MCHC 33.0 32.0 - 36.0 g/dL    RDW 14.5 11.5 - 14.5 %    Platelets 209 150 - 450 K/uL    MPV 10.4 9.2 - 12.9 fL    Immature Granulocytes 0.3 0.0 - 0.5 %    Gran # (ANC) 5.2 1.8 - 7.7 K/uL    Immature Grans (Abs) 0.03 0.00 - 0.04 K/uL    Lymph # 3.1 1.0 - 4.8 K/uL    Mono # 0.7 0.3 - 1.0 K/uL    Eos # 0.1 0.0 - 0.5 K/uL    Baso # 0.06 0.00 - 0.20 K/uL    nRBC 0 0 /100 WBC    Gran % 56.5 38.0 - 73.0 %    Lymph % 33.4 18.0 - 48.0 %    Mono % 8.0 4.0 - 15.0 %    Eosinophil % 1.2 0.0 - 8.0 %    Basophil % 0.6 0.0 - 1.9 %    Differential Method Automated    Comprehensive metabolic panel    Collection Time: 01/13/24  9:51 PM   Result Value Ref Range    Sodium 137 136 - 145 mmol/L    Potassium 3.9 3.5 - 5.1 mmol/L    Chloride 102 95 - 110 mmol/L    CO2 22 (L) 23 - 29 mmol/L    Glucose 272 (H) 70 - 110 mg/dL    BUN 27 (H) 8 - 23 mg/dL    Creatinine 1.1 0.5 - 1.4 mg/dL    Calcium 9.5 8.7 - 10.5 mg/dL    Total Protein 8.0 6.0 - 8.4 g/dL    Albumin 4.7 3.5 - 5.2 g/dL    Total Bilirubin 0.5 0.1 - 1.0 mg/dL    Alkaline Phosphatase 75 55 - 135 U/L     AST 16 10 - 40 U/L    ALT 25 10 - 44 U/L    eGFR >60.0 >60 mL/min/1.73 m^2    Anion Gap 13 8 - 16 mmol/L   Brain natriuretic peptide    Collection Time: 01/13/24  9:51 PM   Result Value Ref Range    BNP 57 0 - 99 pg/mL   Magnesium    Collection Time: 01/13/24  9:51 PM   Result Value Ref Range    Magnesium 1.9 1.6 - 2.6 mg/dL   Troponin I High Sensitivity #1    Collection Time: 01/13/24  9:51 PM   Result Value Ref Range    Troponin I High Sensitivity 6.3 0.0 - 14.9 pg/mL   Protime-INR    Collection Time: 01/13/24  9:51 PM   Result Value Ref Range    Prothrombin Time 11.0 9.0 - 12.5 sec    INR 1.0 0.8 - 1.2   Troponin I High Sensitivity #2    Collection Time: 01/14/24  1:06 AM   Result Value Ref Range    Troponin I High Sensitivity 138.2 (HH) 0.0 - 14.9 pg/mL   Troponin I High Sensitivity    Collection Time: 01/14/24  6:49 AM   Result Value Ref Range    Troponin I High Sensitivity 429.4 (HH) 0.0 - 14.9 pg/mL   APTT    Collection Time: 01/14/24  9:24 AM   Result Value Ref Range    aPTT 28.5 21.0 - 32.0 sec   Protime-INR    Collection Time: 01/14/24  9:24 AM   Result Value Ref Range    Prothrombin Time 12.0 9.0 - 12.5 sec    INR 1.1 0.8 - 1.2     Imaging Results              X-Ray Chest AP Portable (Final result)  Result time 01/14/24 08:15:46      Final result by Geraldo Sharif MD (01/14/24 08:15:46)                   Narrative:    Chest single view    Clinical data:Chest pain    FINDINGS: AP view of the chest demonstrates no cardiac, pulmonary, or acute osseous abnormalities. Postarthroplasty changes right shoulder.    IMPRESSION:  1. No acute process.    Electronically signed by:  Geraldo Sharif MD  01/14/2024 08:15 AM [facility] Workstation: [id number]                                    12-lead EKG interpretation:  (if applicable)      Current Cardiac Rhythm:   (if applicable)    Physical Exam:   Physical Exam  Cardiovascular:      Rate and Rhythm: Normal rate.      Pulses: Normal pulses.   Pulmonary:       Effort: Pulmonary effort is normal.      Breath sounds: Normal breath sounds.   Abdominal:      General: Abdomen is flat.      Palpations: Abdomen is soft.   Musculoskeletal:         General: No swelling.   Skin:     General: Skin is warm and dry.   Neurological:      Mental Status: He is alert and oriented to person, place, and time.   Psychiatric:         Mood and Affect: Mood normal.         Behavior: Behavior normal.         ASSESSMENT/PLAN:   Assessment:   Chest pain  Elevated trop  pAF--h/o ablation, SR   DM-poorly controlled--HGA1C 10.8  Obesity   Echo 10/2023--EF 50-55 % Grade II DD PAP 35mmhg    Plan:   Heparin drip started in ED   Pt with no ST seg changes  No Chest pain at this time  D/W  , [facility] in am.   Thank your for your consult.

## 2024-01-14 NOTE — H&P
[facility] - Emergency Dept  Hospital Medicine  History & Physical    Patient Name: Donald Frey  MRN: [id number]  Patient Class: OP- Observation  Admission Date: 1/13/2024  Attending Physician: Robin Ellington MD   Primary Care Provider: Radha Pinzon FNP         Patient information was obtained from patient and ER records.     Subjective:     Principal Problem:Chest pain    Chief Complaint:   Chief Complaint   Patient presents with    Chest Pain     Chest pain x 15 min         HPI: 63 M with HTN, HLD, DM2, CAD status-post MI/PCI in 2013, who was first diagnosed with AF in 1/2022  and  s/p ablation . CHF  came with CP .  His chest pain is more like epigastric area and started this evening and got better with taking antacid med he still came to the hospital.  Currently he is chest pain-free and EKG did not show any ischemia and sinus rhythm with loss of PVC otherwise normal.  He ran out of the Eliquis secondary to insurance issues and currently he is taking aspirin and Plavix.  Normally he takes aspirin and Eliquis in the past.  Chest x-ray negative and cardiac enzymes are negative.  He did echocardiogram with Dr. Xavi fierro about 6 months ago and he was told normal and no recent stress test.    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 01/13/2022    EYE EXAM  DR KATHY PARMAR    Coronary artery disease     1 coronary stent - Dr Orellana    Diabetes mellitus, type 2     Hypertension     Myocardial infarction 2013       Past Surgical History:   Procedure Laterality Date    ABLATION OF ARRHYTHMOGENIC FOCUS FOR ATRIAL FIBRILLATION N/A 7/7/2022    Procedure: ABLATION, ARRHYTHMOGENIC FOCUS, FOR ATRIAL FIBRILLATION;  Surgeon: Niko Pacheco MD;  Location: [facility] EP LAB;  Service: Cardiology;  Laterality: N/A;  AF, ARMANDO(Cx if SR), PVI, RFA, CARTO, GEN, GP, 3 PREP    ARTHROPLASTY OF SHOULDER Right 11/13/2023    Procedure: ARTHROPLASTY,  SHOULDER, RIGHT;  Surgeon: Branden Hernandez MD;  Location: [facility] OR;  Service: Orthopedics;  Laterality: Right;  DJO    CORONARY STENT PLACEMENT  2013    TRANSESOPHAGEAL ECHOCARDIOGRAPHY N/A 7/7/2022    Procedure: ECHOCARDIOGRAM, TRANSESOPHAGEAL;  Surgeon: Conor Chappell MD;  Location: [facility] EP LAB;  Service: Cardiology;  Laterality: N/A;    TREATMENT OF CARDIAC ARRHYTHMIA N/A 3/7/2022    Procedure: CARDIOVERSION;  Surgeon: Gomez Orellana MD;  Location: [facility] CATH/EP LAB;  Service: Cardiology;  Laterality: N/A;    TREATMENT OF CARDIAC ARRHYTHMIA  7/7/2022    Procedure: Cardioversion or Defibrillation;  Surgeon: Conor Chappell MD;  Location: [facility] EP LAB;  Service: Cardiology;;       Review of patient's allergies indicates:  No Known Allergies    Current Facility-Administered Medications on File Prior to Encounter   Medication    electrolyte-S (ISOLYTE)    fentaNYL 50 mcg/mL injection 25 mcg    lactated ringers infusion    LIDOcaine (PF) 10 mg/ml (1%) injection 10 mg    oxyCODONE immediate release tablet 5 mg     Current Outpatient Medications on File Prior to Encounter   Medication Sig    amiodarone (PACERONE) 200 MG Tab Take 1 tablet (200 mg total) by mouth once daily. (Patient taking differently: Take 100 mg by mouth once daily.)    apixaban (ELIQUIS) 5 mg Tab Take 1 tablet (5 mg total) by mouth 2 (two) times daily.    diltiaZEM (CARDIZEM CD) 240 MG 24 hr capsule Take 1 capsule (240 mg total) by mouth 2 (two) times a day.    dulaglutide (TRULICITY) 0.75 mg/0.5 mL pen injector Inject 0.75 mg into the skin every 7 days.    empagliflozin (JARDIANCE) 25 mg tablet Take 1 tablet (25 mg total) by mouth once daily.    furosemide (LASIX) 20 MG tablet Take 1 tablet (20 mg total) by mouth 2 (two) times daily.    glipiZIDE (GLUCOTROL) 5 MG tablet Take 1 tablet (5 mg total) by mouth 2 (two) times daily with meals.    lisinopriL (PRINIVIL,ZESTRIL) 20 MG tablet Take 1 tablet (20 mg total) by mouth once daily.    metFORMIN  (GLUCOPHAGE) 1000 MG tablet Take 1 tablet (1,000 mg total) by mouth 2 (two) times daily with meals.    rosuvastatin (CRESTOR) 20 MG tablet Take 20 mg by mouth once daily.     Family History       Problem Relation (Age of Onset)    Cancer Father    Heart attack Father          Tobacco Use    Smoking status: Never    Smokeless tobacco: Never   Substance and Sexual Activity    Alcohol use: Yes     Comment: occ    Drug use: Never    Sexual activity: Not on file     Review of Systems   Constitutional:  Negative for activity change, appetite change and diaphoresis.   HENT:  Negative for congestion, facial swelling and sinus pressure.    Respiratory:  Positive for chest tightness. Negative for shortness of breath and wheezing.    Cardiovascular:  Negative for chest pain and palpitations.   Gastrointestinal:  Negative for abdominal distention and abdominal pain.   Genitourinary:  Negative for dysuria.   Skin:  Negative for color change and pallor.   Neurological:  Negative for dizziness, facial asymmetry, speech difficulty and headaches.   Psychiatric/Behavioral:  Negative for agitation and confusion.      Objective:     Vital Signs (Most Recent):  Temp: 98.2 °F (36.8 °C) (01/13/24 2144)  Pulse: 83 (01/14/24 0029)  Resp: 17 (01/14/24 0029)  BP: (!) 178/86 (01/14/24 0029)  SpO2: 96 % (01/14/24 0029) Vital Signs (24h Range):  Temp:  [98.2 °F (36.8 °C)] 98.2 °F (36.8 °C)  Pulse:  [81-99] 83  Resp:  [17-19] 17  SpO2:  [96 %-99 %] 96 %  BP: (174-214)/() 178/86     Weight: (!) 142.9 kg (315 lb)  Body mass index is 40.44 kg/m².     Physical Exam  Constitutional:       General: He is not in acute distress.     Appearance: He is well-developed.   HENT:      Head: Normocephalic.   Eyes:      Pupils: Pupils are equal, round, and reactive to light.   Cardiovascular:      Rate and Rhythm: Normal rate and regular rhythm.   Pulmonary:      Effort: Pulmonary effort is normal. No respiratory distress.   Abdominal:      General:  Abdomen is flat. There is no distension.      Tenderness: There is no abdominal tenderness.   Musculoskeletal:         General: Normal range of motion.      Cervical back: Neck supple.   Skin:     General: Skin is warm.      Findings: No rash.   Neurological:      Mental Status: He is alert and oriented to person, place, and time.              CRANIAL NERVES     CN III, IV, VI   Pupils are equal, round, and reactive to light.       Significant Labs: All pertinent labs within the past 24 hours have been reviewed.  BMP:   Recent Labs   Lab 01/13/24  2151   *      K 3.9      CO2 22*   BUN 27*   CREATININE 1.1   CALCIUM 9.5   MG 1.9     CBC:   Recent Labs   Lab 01/13/24  2151   WBC 9.27   HGB 16.5   HCT 50.0        CMP:   Recent Labs   Lab 01/13/24 2151      K 3.9      CO2 22*   *   BUN 27*   CREATININE 1.1   CALCIUM 9.5   PROT 8.0   ALBUMIN 4.7   BILITOT 0.5   ALKPHOS 75   AST 16   ALT 25   ANIONGAP 13       Significant Imaging: I have reviewed all pertinent imaging results/findings within the past 24 hours.  Assessment/Plan:     * Chest pain  Possible GI origin   Trend cardiac enz  Lexiscan stress test tomorrow   Cardiology Cx if needed   Resume home meds including eliquis   Asa]  Statin       Status post catheter ablation of atrial fibrillation  aware      Persistent atrial fibrillation  Currently sinus   Home meds.    Hyperlipidemia  aware      Hx of myocardial infarction  Aware. Home meds      Type 2 diabetes mellitus with hyperglycemia, without long-term current use of insulin  SSI      Essential hypertension  Chronic, uncontrolled. Latest blood pressure and vitals reviewed-     Temp:  [98.2 °F (36.8 °C)]   Pulse:  [81-99]   Resp:  [17-19]   BP: (174-214)/()   SpO2:  [96 %-99 %] .   Home meds for hypertension were reviewed and noted below.   Hypertension Medications               diltiaZEM (CARDIZEM CD) 240 MG 24 hr capsule Take 1 capsule (240 mg total) by mouth  2 (two) times a day.    furosemide (LASIX) 20 MG tablet Take 1 tablet (20 mg total) by mouth 2 (two) times daily.    lisinopriL (PRINIVIL,ZESTRIL) 20 MG tablet Take 1 tablet (20 mg total) by mouth once daily.            While in the hospital, will manage blood pressure as follows;     Will utilize p.r.n. blood pressure medication only if patient's blood pressure greater than 160/100 and he develops symptoms such as worsening chest pain or shortness of breath.      VTE Risk Mitigation (From admission, onward)           Ordered     apixaban tablet 5 mg  2 times daily         01/14/24 0026     IP VTE HIGH RISK PATIENT  Once         01/14/24 0026     Place sequential compression device  Until discontinued         01/14/24 0026                       On 01/14/2024, patient should be placed in hospital observation services under my care.             Robin Ellington MD  Department of Hospital Medicine  [facility] - Emergency Dept

## 2024-01-15 LAB
ALBUMIN SERPL BCP-MCNC: 3.9 G/DL (ref 3.5–5.2)
ALP SERPL-CCNC: 64 U/L (ref 55–135)
ALT SERPL W/O P-5'-P-CCNC: 23 U/L (ref 10–44)
ANION GAP SERPL CALC-SCNC: 9 MMOL/L (ref 8–16)
APTT PPP: 34.4 SEC (ref 21–32)
APTT PPP: 42.1 SEC (ref 21–32)
APTT PPP: 43.2 SEC (ref 21–32)
AST SERPL-CCNC: 17 U/L (ref 10–40)
BASOPHILS # BLD AUTO: 0.05 K/UL (ref 0–0.2)
BASOPHILS NFR BLD: 0.8 % (ref 0–1.9)
BILIRUB SERPL-MCNC: 0.5 MG/DL (ref 0.1–1)
BUN SERPL-MCNC: 18 MG/DL (ref 8–23)
CALCIUM SERPL-MCNC: 8.9 MG/DL (ref 8.7–10.5)
CATH EF ESTIMATED: 35 %
CHLORIDE SERPL-SCNC: 105 MMOL/L (ref 95–110)
CO2 SERPL-SCNC: 22 MMOL/L (ref 23–29)
CREAT SERPL-MCNC: 0.8 MG/DL (ref 0.5–1.4)
DIFFERENTIAL METHOD BLD: ABNORMAL
EOSINOPHIL # BLD AUTO: 0.1 K/UL (ref 0–0.5)
EOSINOPHIL NFR BLD: 1.3 % (ref 0–8)
ERYTHROCYTE [DISTWIDTH] IN BLOOD BY AUTOMATED COUNT: 14.6 % (ref 11.5–14.5)
EST. GFR  (NO RACE VARIABLE): >60 ML/MIN/1.73 M^2
GLUCOSE SERPL-MCNC: 245 MG/DL (ref 70–110)
GLUCOSE SERPL-MCNC: 270 MG/DL (ref 70–110)
GLUCOSE SERPL-MCNC: 277 MG/DL (ref 70–110)
GLUCOSE SERPL-MCNC: 288 MG/DL (ref 70–110)
GLUCOSE SERPL-MCNC: 293 MG/DL (ref 70–110)
HCT VFR BLD AUTO: 47.3 % (ref 40–54)
HGB BLD-MCNC: 15.5 G/DL (ref 14–18)
IMM GRANULOCYTES # BLD AUTO: 0.02 K/UL (ref 0–0.04)
IMM GRANULOCYTES NFR BLD AUTO: 0.3 % (ref 0–0.5)
LYMPHOCYTES # BLD AUTO: 1.9 K/UL (ref 1–4.8)
LYMPHOCYTES NFR BLD: 30.7 % (ref 18–48)
MCH RBC QN AUTO: 28.3 PG (ref 27–31)
MCHC RBC AUTO-ENTMCNC: 32.8 G/DL (ref 32–36)
MCV RBC AUTO: 87 FL (ref 82–98)
MONOCYTES # BLD AUTO: 0.6 K/UL (ref 0.3–1)
MONOCYTES NFR BLD: 10 % (ref 4–15)
NEUTROPHILS # BLD AUTO: 3.5 K/UL (ref 1.8–7.7)
NEUTROPHILS NFR BLD: 56.9 % (ref 38–73)
NRBC BLD-RTO: 0 /100 WBC
PLATELET # BLD AUTO: 161 K/UL (ref 150–450)
PMV BLD AUTO: 10.5 FL (ref 9.2–12.9)
POTASSIUM SERPL-SCNC: 4.1 MMOL/L (ref 3.5–5.1)
PROT SERPL-MCNC: 6.6 G/DL (ref 6–8.4)
RBC # BLD AUTO: 5.47 M/UL (ref 4.6–6.2)
SODIUM SERPL-SCNC: 136 MMOL/L (ref 136–145)
WBC # BLD AUTO: 6.18 K/UL (ref 3.9–12.7)

## 2024-01-15 PROCEDURE — 80053 COMPREHEN METABOLIC PANEL: CPT | Performed by: INTERNAL MEDICINE

## 2024-01-15 PROCEDURE — 36415 COLL VENOUS BLD VENIPUNCTURE: CPT | Performed by: INTERNAL MEDICINE

## 2024-01-15 PROCEDURE — 25000003 PHARM REV CODE 250: Performed by: INTERNAL MEDICINE

## 2024-01-15 PROCEDURE — 93458 L HRT ARTERY/VENTRICLE ANGIO: CPT | Performed by: INTERNAL MEDICINE

## 2024-01-15 PROCEDURE — 63600175 PHARM REV CODE 636 W HCPCS: Performed by: INTERNAL MEDICINE

## 2024-01-15 PROCEDURE — 21000000 HC CCU ICU ROOM CHARGE

## 2024-01-15 PROCEDURE — 93010 ELECTROCARDIOGRAM REPORT: CPT | Mod: ,,, | Performed by: GENERAL PRACTICE

## 2024-01-15 PROCEDURE — 85730 THROMBOPLASTIN TIME PARTIAL: CPT | Performed by: INTERNAL MEDICINE

## 2024-01-15 PROCEDURE — 85025 COMPLETE CBC W/AUTO DIFF WBC: CPT | Performed by: INTERNAL MEDICINE

## 2024-01-15 PROCEDURE — 85730 THROMBOPLASTIN TIME PARTIAL: CPT | Mod: 91 | Performed by: INTERNAL MEDICINE

## 2024-01-15 PROCEDURE — B2111ZZ FLUOROSCOPY OF MULTIPLE CORONARY ARTERIES USING LOW OSMOLAR CONTRAST: ICD-10-PCS | Performed by: INTERNAL MEDICINE

## 2024-01-15 PROCEDURE — 93005 ELECTROCARDIOGRAM TRACING: CPT | Performed by: GENERAL PRACTICE

## 2024-01-15 PROCEDURE — C1887 CATHETER, GUIDING: HCPCS | Performed by: INTERNAL MEDICINE

## 2024-01-15 PROCEDURE — 27201423 OPTIME MED/SURG SUP & DEVICES STERILE SUPPLY: Performed by: INTERNAL MEDICINE

## 2024-01-15 PROCEDURE — 25500020 PHARM REV CODE 255: Performed by: INTERNAL MEDICINE

## 2024-01-15 PROCEDURE — C1894 INTRO/SHEATH, NON-LASER: HCPCS | Performed by: INTERNAL MEDICINE

## 2024-01-15 PROCEDURE — C1769 GUIDE WIRE: HCPCS | Performed by: INTERNAL MEDICINE

## 2024-01-15 PROCEDURE — 4A023N7 MEASUREMENT OF CARDIAC SAMPLING AND PRESSURE, LEFT HEART, PERCUTANEOUS APPROACH: ICD-10-PCS | Performed by: INTERNAL MEDICINE

## 2024-01-15 PROCEDURE — B2151ZZ FLUOROSCOPY OF LEFT HEART USING LOW OSMOLAR CONTRAST: ICD-10-PCS | Performed by: INTERNAL MEDICINE

## 2024-01-15 PROCEDURE — 63600175 PHARM REV CODE 636 W HCPCS: Mod: JZ,JG | Performed by: INTERNAL MEDICINE

## 2024-01-15 RX ORDER — NITROGLYCERIN 20 MG/100ML
INJECTION INTRAVENOUS
Status: DISPENSED
Start: 2024-01-15 | End: 2024-01-16

## 2024-01-15 RX ORDER — HEPARIN SODIUM 10000 [USP'U]/ML
INJECTION, SOLUTION INTRAVENOUS; SUBCUTANEOUS
Status: DISCONTINUED | OUTPATIENT
Start: 2024-01-15 | End: 2024-01-15 | Stop reason: HOSPADM

## 2024-01-15 RX ORDER — MIDAZOLAM HYDROCHLORIDE 1 MG/ML
INJECTION INTRAMUSCULAR; INTRAVENOUS
Status: DISCONTINUED | OUTPATIENT
Start: 2024-01-15 | End: 2024-01-15 | Stop reason: HOSPADM

## 2024-01-15 RX ORDER — NITROGLYCERIN 20 MG/100ML
0-400 INJECTION INTRAVENOUS CONTINUOUS
Status: DISCONTINUED | OUTPATIENT
Start: 2024-01-15 | End: 2024-01-17

## 2024-01-15 RX ORDER — FENTANYL CITRATE 50 UG/ML
INJECTION, SOLUTION INTRAMUSCULAR; INTRAVENOUS
Status: DISCONTINUED | OUTPATIENT
Start: 2024-01-15 | End: 2024-01-15 | Stop reason: HOSPADM

## 2024-01-15 RX ORDER — LIDOCAINE HYDROCHLORIDE 10 MG/ML
INJECTION, SOLUTION EPIDURAL; INFILTRATION; INTRACAUDAL; PERINEURAL
Status: DISCONTINUED | OUTPATIENT
Start: 2024-01-15 | End: 2024-01-15 | Stop reason: HOSPADM

## 2024-01-15 RX ORDER — SODIUM CHLORIDE 9 MG/ML
INJECTION, SOLUTION INTRAVENOUS CONTINUOUS
Status: DISCONTINUED | OUTPATIENT
Start: 2024-01-15 | End: 2024-01-15

## 2024-01-15 RX ORDER — FUROSEMIDE 20 MG/1
20 TABLET ORAL DAILY
Status: DISCONTINUED | OUTPATIENT
Start: 2024-01-16 | End: 2024-01-17

## 2024-01-15 RX ORDER — MORPHINE SULFATE 2 MG/ML
2 INJECTION, SOLUTION INTRAMUSCULAR; INTRAVENOUS ONCE
Status: DISCONTINUED | OUTPATIENT
Start: 2024-01-15 | End: 2024-01-17

## 2024-01-15 RX ORDER — LORAZEPAM 2 MG/ML
1 INJECTION INTRAMUSCULAR ONCE
Status: COMPLETED | OUTPATIENT
Start: 2024-01-15 | End: 2024-01-15

## 2024-01-15 RX ADMIN — DILTIAZEM HYDROCHLORIDE 240 MG: 120 CAPSULE, COATED, EXTENDED RELEASE ORAL at 08:01

## 2024-01-15 RX ADMIN — AMIODARONE HYDROCHLORIDE 200 MG: 200 TABLET ORAL at 08:01

## 2024-01-15 RX ADMIN — ASPIRIN 81 MG 81 MG: 81 TABLET ORAL at 08:01

## 2024-01-15 RX ADMIN — SODIUM CHLORIDE: 0.9 INJECTION, SOLUTION INTRAVENOUS at 11:01

## 2024-01-15 RX ADMIN — LORAZEPAM 1 MG: 2 INJECTION INTRAMUSCULAR; INTRAVENOUS at 11:01

## 2024-01-15 RX ADMIN — NITROGLYCERIN 10 MCG/MIN: 20 INJECTION INTRAVENOUS at 01:01

## 2024-01-15 RX ADMIN — INSULIN ASPART 3 UNITS: 100 INJECTION, SOLUTION INTRAVENOUS; SUBCUTANEOUS at 05:01

## 2024-01-15 RX ADMIN — INSULIN ASPART 2 UNITS: 100 INJECTION, SOLUTION INTRAVENOUS; SUBCUTANEOUS at 08:01

## 2024-01-15 RX ADMIN — INSULIN ASPART 3 UNITS: 100 INJECTION, SOLUTION INTRAVENOUS; SUBCUTANEOUS at 11:01

## 2024-01-15 RX ADMIN — ATORVASTATIN CALCIUM 20 MG: 20 TABLET, FILM COATED ORAL at 08:01

## 2024-01-15 NOTE — CONSULTS
This is Dr. Coreas dictating with date of service being 15th January 2024.  This patient has severe coronary artery disease.  He underwent heart catheterization and coronary angiography showing multivessel coronary artery disease.  He was referred for surgical bypass.    He has a history of percutaneous coronary intervention apparently in 2013.  He has also had atrial fibrillation ablation and 2022.  This has been successful according to the patient and wife.    He has diabetes and hypertension.    Medicines are noted and are part of the epic record.    Family history is not pertinent at this time.    He has not a smoker.    On exam vital signs are stable.  Pupils are equal and round reactive to light.  Neck is supple.  Chest is equal breath sounds.  Heart is in a regular rate and rhythm.  Abdomen is benign.  Perfusion to the legs and feet seems to be satisfactory.    Recent studies were reviewed.  The patient has multivessel coronary artery disease.    Recommendation is for surgical bypass.  The procedure and risks were discussed.  The patient seems to be understanding and agreeable.  This will be done either tomorrow or Wednesday.

## 2024-01-15 NOTE — PROGRESS NOTES
[facility] Medicine  Progress Note    Patient name: Donald Frey  MRN: [id number]  Admit Date: 1/13/2024   LOS: 1 day     SUBJECTIVE:     Principal problem: Chest pain    Interval History:  s/p LHC with severe 3 vessel disease noted.  Having chest pain after LHC and thus plan to move to Tele A to start NTG gtt (cannot be done on Tele B). CT surgery consulted.  He is chest pain free at the time of my exam.  Asking appropriate questions about management/next steps.    Hospital Course:    63 M with HTN, HLD, DM2, CAD status-post MI/PCI in 2013, who was first diagnosed with AF in 1/2022  and  s/p ablation, CHF presented with chest pain/epigastric pain. EKG with a good deal of artifact but does not appear to have ST elevation. CP resolved with treatment and he was admitted for a cardiac workup. First troponin was normal. Stress test ordered.  2nd troponin resulted 138, 3rd 429.  Stress test cancelled, Xarelto held, Heparin gtt started for NSTEMI and Cardiology consulted.     Scheduled Meds:   amiodarone  200 mg Oral Daily    aspirin  81 mg Oral Daily    atorvastatin  20 mg Oral Daily    diltiaZEM  240 mg Oral BID    [START ON 1/16/2024] furosemide  20 mg Oral Daily    lisinopriL  20 mg Oral Daily    morphine  2 mg Intravenous Once    nitroGLYCERIN in 5 % dextrose         Continuous Infusions:   sodium chloride 0.9% 75 mL/hr at 01/15/24 1139    nitroGLYCERIN in 5 % dextrose 10 mcg/min (01/15/24 1339)     PRN Meds:acetaminophen, aluminum-magnesium hydroxide-simethicone, dextrose 50%, dextrose 50%, dextrose 50%, dextrose 50%, glucagon (human recombinant), glucagon (human recombinant), glucose, glucose, glucose, glucose, influenza 65up-adj, insulin aspart U-100, magnesium oxide, magnesium oxide, naloxone, nitroGLYCERIN in 5 % dextrose, ondansetron, potassium bicarbonate, potassium bicarbonate, potassium bicarbonate, potassium, sodium phosphates, potassium, sodium phosphates, potassium, sodium  phosphates, sodium chloride 0.9%    Review of patient's allergies indicates:  No Known Allergies    Review of Systems: As per interval history    OBJECTIVE:     Vital Signs (Most Recent)  Temp: 97.3 °F (36.3 °C) (01/15/24 1228)  Pulse: 72 (01/15/24 1228)  Resp: 17 (01/15/24 1228)  BP: (!) 152/87 (01/15/24 1345)  SpO2: 95 % (01/15/24 1228)    Vital Signs Range (Last 24H):  Temp:  [97.3 °F (36.3 °C)-98.2 °F (36.8 °C)]   Pulse:  [67-82]   Resp:  [17-18]   BP: (115-163)/(63-91)   SpO2:  [92 %-98 %]     I & O (Last 24H):  Intake/Output Summary (Last 24 hours) at 1/15/2024 1426  Last data filed at 1/15/2024 1040  Gross per 24 hour   Intake 115.95 ml   Output --   Net 115.95 ml       Physical Exam:    Vitals and nursing note reviewed.     Constitutional:       General: Not in acute distress.     Appearance: Well-developed.   HENT:      Head: Normocephalic and atraumatic.   Eyes:      Pupils: Pupils are equal, round, and reactive to light.   Cardiovascular:      Rate and Rhythm: Regular rhythm.   No LE edema  Pulmonary:      Effort: Pulmonary effort is normal.      Breath sounds: Normal breath sounds. No wheezing.   Abdominal:      General: There is no distension.      Palpations: Abdomen is soft.      Tenderness: There is no abdominal tenderness. There is no guarding or rebound.   Musculoskeletal:         General: Normal range of motion.      Cervical back: Normal range of motion.   Skin:     Findings: No rash.   Neurological:      Mental Status: Alert and oriented to person, place, and time.      Cranial Nerves: No cranial nerve deficit.      Sensory: No sensory deficit.     Laboratory:  I have reviewed all pertinent lab results within the past 24 hours.  CBC:   Recent Labs   Lab 01/15/24  0401   WBC 6.18   RBC 5.47   HGB 15.5   HCT 47.3      MCV 87   MCH 28.3   MCHC 32.8       CMP:   Recent Labs   Lab 01/15/24  0800   *   CALCIUM 8.9   ALBUMIN 3.9   PROT 6.6      K 4.1   CO2 22*      BUN 18  "  CREATININE 0.8   ALKPHOS 64   ALT 23   AST 17   BILITOT 0.5       Cardiac markers: No results for input(s): "CKMB", "CPKMB", "TROPONINT", "TROPONINI", "MYOGLOBIN" in the last 168 hours.    Diagnostic Results:      ASSESSMENT/PLAN:         Active Hospital Problems    Diagnosis  POA    *Chest pain [R07.9]  Unknown    Persistent atrial fibrillation [I48.19]  Yes    Status post catheter ablation of atrial fibrillation [Z98.890]  Not Applicable    Hyperlipidemia [E78.5]  Yes    Essential hypertension [I10]  Yes    Hx of myocardial infarction [I25.2]  Not Applicable    Type 2 diabetes mellitus with hyperglycemia, without long-term current use of insulin [E11.65]  Yes      Resolved Hospital Problems   No resolved problems to display.         Plan:     -Mr. Frey was admitted for chest pain evaluation. Ruled in for NSTEMI. Started on goal directed therapy and went for LHC 1/15 which revealed three vessel disease.   -Cardiology consulted and following  -NTG gtt ordered due to ongoing chest pain s/p LHC.  -Heparin gtt stopped.  Discussed with Cardiology and no need to replace it with an alternative at this time as the hope is that he will go for CABG tomorrow.  -CT surgery consulted for CABG consideration  -Xarelto held in the event he needs a procedure.  He has not received it here.  He gets samples from Dr. Orellana's office and has been out.  -ISS. Accuchecks  -Tele monitoring    VTE Risk Mitigation (From admission, onward)           Ordered     IP VTE HIGH RISK PATIENT  Once         01/14/24 0026     Place sequential compression device  Until discontinued         01/14/24 0026                      Department Hospital Medicine  [facility]  Bryce Wick MD  Date of service: 01/15/2024     "

## 2024-01-15 NOTE — PLAN OF CARE
[facility]  Initial Discharge Assessment       Primary Care Provider: Radha Pinzon FNP    Admission Diagnosis: Chest pain [R07.9]    Admission Date: 1/13/2024  Expected Discharge Date:   Assessment was completed at bedside with Pt and his wife ( Cassie Frey).  Pt was alert and oriented. Cm verified demographic, insurance and pharmacy. Pt denies any in home services, DME's, dialysis or coumadin clinics. Pt reports he is independent with all ADL's. Pt is a full code.  Pt states his wife is his POA.  Pt plans to discharge home. At this time Pt has no discharge needs.       Transition of Care Barriers: None    Payor: [employer] / Plan: MEDICARE SUPPLEMENT [facility] AARP / Product Type: Medicare Supplement /     Extended Emergency Contact Information  Primary Emergency Contact: Cassie Frey  Address: [address]           [place] [address] [facility]  Home Phone: [phone]  Mobile Phone: [phone]  Relation: Spouse  Preferred language: English   needed? No    Discharge Plan A: Home with family  Discharge Plan B: Home with family      [id number] - [place] - [address] AT [facility] [address]  [address] [address]  Phone: [phone] Fax: [phone]      Initial Assessment (most recent)       Adult Discharge Assessment - 01/15/24 1004          Discharge Assessment    Assessment Type Discharge Planning Assessment     Confirmed/corrected address, phone number and insurance Yes     Confirmed Demographics Correct on Facesheet     Source of Information patient     When was your last doctors appointment? --   pt report his last visit was a week ago    Reason For Admission Chest Pain     People in Home spouse     Facility Arrived From: home     Do you expect to return to your current living situation? Yes     Do you have help at home or someone to help you manage your care at home? Yes     Who are  your caregiver(s) and their phone number(s)? wife Cassie Casiano [phone]     Prior to hospitilization cognitive status: Alert/Oriented     Current cognitive status: Alert/Oriented     Walking or Climbing Stairs Difficulty no     Dressing/Bathing Difficulty no     Home Accessibility wheelchair accessible     Home Layout Able to live on 1st floor     Equipment Currently Used at Home none     Readmission within 30 days? No     Patient currently being followed by outpatient case management? No     Do you currently have service(s) that help you manage your care at home? No     Do you take prescription medications? Yes     Do you have prescription coverage? Yes     Coverage [facility]     Do you have any problems affording any of your prescribed medications? No     Is the patient taking medications as prescribed? yes     Who is going to help you get home at discharge? wife Cassie Casiano [phone]     How do you get to doctors appointments? car, drives self     Are you on dialysis? No     Do you take coumadin? No     Discharge Plan A Home with family     Discharge Plan B Home with family     DME Needed Upon Discharge  none     Discharge Plan discussed with: Patient;Spouse/sig other     Name(s) and Number(s) wife Cassie Casiano [phone]     Transition of Care Barriers None

## 2024-01-15 NOTE — PROGRESS NOTES
[facility]  Adult Nutrition   Progress Note (Initial Assessment)     SUMMARY     Recommendations  1) RD recommends to advance pts diet to a 2000 kcal diabetic/cardiac as soon as medically possible.   2) RD will give follow up diabetic diet education to pt due to an A1C of 10.8.   3) When diet is advanced,  to obtain and honor daily food selections.    Goals:   1) Pt to meet 75% or more of his EEN/EPN.   2) Pt to understand the diabetic diet and be able to lower her A1C to WNL's of standard reference range dior the next 6 months.   3) When diet is advanced,  to obtain and honor daily food selections.   4) Pt to lower his blood glucose levels to WNL's of standard reference range before discahrge.    Nutrition Goal Status: goal not met    Communication of RD Recs: reviewed with RN    Dietitian Rounds Brief  Assessed per MST of 4: Pt was OOR and had been moved to room 2527 and was not aware of it at the time. He is on a cardiac diet and will add diabetic diet restrictions to his diet order. Pt has been educated on a previous admission twice but his A1C is 10.8 so will see if he may need further counseling at follow up. Will follow up biweekly and prn.    Nutrition Diagnosis PES Statement: Overweight/Obesity related to knowledge deficit as evidenced by a BMI of 39.97.    Nutrition Education:  Educated pt on significance of A1c and how it is affected and goal ranges. Educated on carb choices and serving sizes of 1 choice. Pairing carbohydrates with a fat, fiber, or protein to prevent increase in blood glucose. Educated pt on MyPlate and what a plate should consist of. Educated pt on consistent carbohydrate intake throughout the day with appropriate insulin coverage. Recommended checking blood glucose daily. Educated pt on heart healthy diet. Education focused on including healthy fats- gave examples and lowering LDL levels by excluding saturated/trans fat in the diet. Went over types of foods  "that have saturated/trans fat. Encouraged cooking with olive oil instead of butter. Choosing whole grains over refined grains, choosing canned foods with no salt added and plan frozen veggies instead of veggies cooked in butter and cream sauces. Encouraged patient to choose leaner cuts of meat and decreasing high fat meats such as wright, sausage, hot dogs, etc. Educated pt on decreasing sodium in diet. To try not to cook with salt and use herbs and spices to make food more flavorful. To limit eating out-if patient chooses to eat out, informed patient to discuss with  to cook meats and veggies with no salt and olive oil instead of butter. Provided handouts on all of these topics for pt to use in the future. Also provided RD contact information for pt to call with future questions.      Anais Jeffers RD 01/21/2022 8:49 AM          Electronically signed by Anais Jeffers RD at 1/21/2022  8:49 AM      Diet order:   Current Diet Order: NPO      CHO needs:  MSJ x 1.25 - 500 (for gradual weight loss) /2 (50% of kcals) /4 (4 kcals/g CHO) = 292 g CHO/day     Evaluation of Received Nutrient/Fluid Intake  Energy Calories Required: not meeting needs  Protein Required: not meeting needs  Fluid Required: meeting needs  Tolerance: other (see comments)     % Intake of Estimated Energy Needs: 0%  % Meal Intake: NPO      Intake/Output Summary (Last 24 hours) at 1/15/2024 0907  Last data filed at 1/15/2024 0735  Gross per 24 hour   Intake 115.95 ml   Output --   Net 115.95 ml        Anthropometrics  Temp: 98 °F (36.7 °C)  Height Method: Stated  Height: 6' 2" (188 cm)  Height (inches): 74 in  Weight Method: Standard Scale  Weight: (!) 141.2 kg (311 lb 4.6 oz)  Weight (lb): (!) 311.29 lb  Ideal Body Weight (IBW), Male: 190 lb  % Ideal Body Weight, Male (lb): 163.84 %  BMI (Calculated): 40  BMI Grade: 35 - 39.9 - obesity - grade II       Estimated/Assessed Needs  Weight Used For Calorie Calculations: (!) 141.2 kg (311 lb 4.6 " oz)  Energy Calorie Requirements (kcal): 1169-4601 kcals/day (20-25 kcals/kg ABW)  Energy Need Method: Kcal/kg  Protein Requirements: 129-172 g/day (1.5-2.0 g/kg IBW)  Weight Used For Protein Calculations: 86 kg (189 lb 9.5 oz)     Estimated Fluid Requirement Method: RDA Method  RDA Method (mL): 2824       Reason for Assessment  Reason For Assessment: identified at risk by screening criteria, other (see comments) (MST of 4)  Diagnosis: other (see comments) (Chest pain)  Relevant Medical History: Myocardial infarction, Diabetes mellitus, type 2, Hypertension, Controlled 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, Coronary artery disease  Nutrition Discharge Planning: Pt is NPO at this time and is being assessed due to an MST of 4.    Nutrition/Diet History  Food Allergies: NKFA  Factors Affecting Nutritional Intake: NPO    Nutrition Risk Screen  Nutrition Risk Screen: no indicators present     MST Score: 4  Have you recently lost weight without trying?: Yes: 24-33 lbs  Weight loss score: 3  Have you been eating poorly because of a decreased appetite?: Yes  Appetite score: 1       Weight History:  Wt Readings from Last 5 Encounters:   01/14/24 (!) 141.2 kg (311 lb 4.6 oz)   12/19/23 (!) 139.7 kg (308 lb)   11/22/23 (!) 139.7 kg (308 lb)   11/14/23 (!) 142.4 kg (314 lb)   11/13/23 (!) 142.4 kg (314 lb)        Lab/Procedures/Meds: Pertinent Labs/Meds Reviewed    Medications:Pertinent Medications Reviewed  Scheduled Meds:   amiodarone  200 mg Oral Daily    aspirin  81 mg Oral Daily    atorvastatin  20 mg Oral Daily    diltiaZEM  240 mg Oral BID    furosemide  20 mg Oral BID    lisinopriL  20 mg Oral Daily     Continuous Infusions:   heparin (porcine) in D5W 16 Units/kg/hr (01/15/24 0101)     PRN Meds:.acetaminophen, aluminum-magnesium hydroxide-simethicone, dextrose 50%, dextrose 50%, dextrose 50%, dextrose 50%, glucagon (human recombinant), glucagon (human recombinant), glucose, glucose, glucose, glucose,  heparin (PORCINE), heparin (PORCINE), influenza 65up-adj, insulin aspart U-100, magnesium oxide, magnesium oxide, naloxone, ondansetron, potassium bicarbonate, potassium bicarbonate, potassium bicarbonate, potassium, sodium phosphates, potassium, sodium phosphates, potassium, sodium phosphates, sodium chloride 0.9%    Labs: Pertinent Labs Reviewed  Clinical Chemistry:  Recent Labs   Lab 01/13/24  2151 01/15/24  0800    136   K 3.9 4.1    105   CO2 22* 22*   * 270*   BUN 27* 18   CREATININE 1.1 0.8   CALCIUM 9.5 8.9   PROT 8.0 6.6   ALBUMIN 4.7 3.9   BILITOT 0.5 0.5   ALKPHOS 75 64   AST 16 17   ALT 25 23   ANIONGAP 13 9   MG 1.9  --      CBC:   Recent Labs   Lab 01/15/24  0401   WBC 6.18   RBC 5.47   HGB 15.5   HCT 47.3      MCV 87   MCH 28.3   MCHC 32.8       Cardiac Profile:  Recent Labs   Lab 01/13/24 2151   BNP 57       Monitor and Evaluation  Food and Nutrient Intake: food and beverage intake, energy intake  Food and Nutrient Adminstration: diet order  Knowledge/Beliefs/Attitudes: food and nutrition knowledge/skill, beliefs and attitudes  Physical Activity and Function: nutrition-related ADLs and IADLs, factors affecting access to physical activity  Anthropometric Measurements: weight, weight change, body mass index  Biochemical Data, Medical Tests and Procedures: lipid profile, inflammatory profile, glucose/endocrine profile, gastrointestinal profile, electrolyte and renal panel  Nutrition-Focused Physical Findings: overall appearance     Nutrition Risk  Level of Risk/Frequency of Follow-up: high     Nutrition Follow-Up         Pati Dominguez RD 01/15/2024 9:07 AM

## 2024-01-16 ENCOUNTER — ANESTHESIA EVENT (OUTPATIENT)
Dept: SURGERY | Facility: HOSPITAL | Age: 64
DRG: 234 | End: 2024-01-16
Payer: MEDICARE

## 2024-01-16 LAB
ABO + RH BLD: NORMAL
ALBUMIN SERPL BCP-MCNC: 4.2 G/DL (ref 3.5–5.2)
ALP SERPL-CCNC: 73 U/L (ref 55–135)
ALT SERPL W/O P-5'-P-CCNC: 34 U/L (ref 10–44)
ANION GAP SERPL CALC-SCNC: 9 MMOL/L (ref 8–16)
AST SERPL-CCNC: 19 U/L (ref 10–40)
BASOPHILS # BLD AUTO: 0.04 K/UL (ref 0–0.2)
BASOPHILS NFR BLD: 0.6 % (ref 0–1.9)
BILIRUB SERPL-MCNC: 0.6 MG/DL (ref 0.1–1)
BLD GP AB SCN CELLS X3 SERPL QL: NORMAL
BUN SERPL-MCNC: 19 MG/DL (ref 8–23)
CALCIUM SERPL-MCNC: 9.3 MG/DL (ref 8.7–10.5)
CHLORIDE SERPL-SCNC: 105 MMOL/L (ref 95–110)
CO2 SERPL-SCNC: 21 MMOL/L (ref 23–29)
CREAT SERPL-MCNC: 0.8 MG/DL (ref 0.5–1.4)
DIFFERENTIAL METHOD BLD: ABNORMAL
EOSINOPHIL # BLD AUTO: 0.1 K/UL (ref 0–0.5)
EOSINOPHIL NFR BLD: 1.7 % (ref 0–8)
ERYTHROCYTE [DISTWIDTH] IN BLOOD BY AUTOMATED COUNT: 14.6 % (ref 11.5–14.5)
EST. GFR  (NO RACE VARIABLE): >60 ML/MIN/1.73 M^2
GLUCOSE SERPL-MCNC: 236 MG/DL (ref 70–110)
GLUCOSE SERPL-MCNC: 261 MG/DL (ref 70–110)
GLUCOSE SERPL-MCNC: 281 MG/DL (ref 70–110)
GLUCOSE SERPL-MCNC: 311 MG/DL (ref 70–110)
HCT VFR BLD AUTO: 47.6 % (ref 40–54)
HGB BLD-MCNC: 15.5 G/DL (ref 14–18)
IMM GRANULOCYTES # BLD AUTO: 0.03 K/UL (ref 0–0.04)
IMM GRANULOCYTES NFR BLD AUTO: 0.4 % (ref 0–0.5)
LYMPHOCYTES # BLD AUTO: 2 K/UL (ref 1–4.8)
LYMPHOCYTES NFR BLD: 29.3 % (ref 18–48)
MAGNESIUM SERPL-MCNC: 1.9 MG/DL (ref 1.6–2.6)
MCH RBC QN AUTO: 27.6 PG (ref 27–31)
MCHC RBC AUTO-ENTMCNC: 32.6 G/DL (ref 32–36)
MCV RBC AUTO: 85 FL (ref 82–98)
MONOCYTES # BLD AUTO: 0.6 K/UL (ref 0.3–1)
MONOCYTES NFR BLD: 9.2 % (ref 4–15)
NEUTROPHILS # BLD AUTO: 4 K/UL (ref 1.8–7.7)
NEUTROPHILS NFR BLD: 58.8 % (ref 38–73)
NRBC BLD-RTO: 0 /100 WBC
PLATELET # BLD AUTO: 180 K/UL (ref 150–450)
PMV BLD AUTO: 10.3 FL (ref 9.2–12.9)
POTASSIUM SERPL-SCNC: 4.3 MMOL/L (ref 3.5–5.1)
PROT SERPL-MCNC: 7 G/DL (ref 6–8.4)
RBC # BLD AUTO: 5.61 M/UL (ref 4.6–6.2)
SODIUM SERPL-SCNC: 135 MMOL/L (ref 136–145)
SPECIMEN OUTDATE: NORMAL
WBC # BLD AUTO: 6.86 K/UL (ref 3.9–12.7)

## 2024-01-16 PROCEDURE — 36415 COLL VENOUS BLD VENIPUNCTURE: CPT | Performed by: THORACIC SURGERY (CARDIOTHORACIC VASCULAR SURGERY)

## 2024-01-16 PROCEDURE — 99900031 HC PATIENT EDUCATION (STAT)

## 2024-01-16 PROCEDURE — 94761 N-INVAS EAR/PLS OXIMETRY MLT: CPT

## 2024-01-16 PROCEDURE — 36415 COLL VENOUS BLD VENIPUNCTURE: CPT | Performed by: INTERNAL MEDICINE

## 2024-01-16 PROCEDURE — 25000003 PHARM REV CODE 250: Performed by: HOSPITALIST

## 2024-01-16 PROCEDURE — 85025 COMPLETE CBC W/AUTO DIFF WBC: CPT | Performed by: INTERNAL MEDICINE

## 2024-01-16 PROCEDURE — 83735 ASSAY OF MAGNESIUM: CPT | Performed by: INTERNAL MEDICINE

## 2024-01-16 PROCEDURE — 80053 COMPREHEN METABOLIC PANEL: CPT | Performed by: INTERNAL MEDICINE

## 2024-01-16 PROCEDURE — 25000003 PHARM REV CODE 250: Performed by: INTERNAL MEDICINE

## 2024-01-16 PROCEDURE — 86901 BLOOD TYPING SEROLOGIC RH(D): CPT | Performed by: THORACIC SURGERY (CARDIOTHORACIC VASCULAR SURGERY)

## 2024-01-16 PROCEDURE — 20000000 HC ICU ROOM

## 2024-01-16 PROCEDURE — 86920 COMPATIBILITY TEST SPIN: CPT | Performed by: THORACIC SURGERY (CARDIOTHORACIC VASCULAR SURGERY)

## 2024-01-16 RX ORDER — CALCIUM CHLORIDE, MAGNESIUM CHLORIDE, POTASSIUM CHLORIDE AND SODIUM CHLORIDE 17.6; 325.3; 119.3; 643 MG/100ML; MG/100ML; MG/100ML; MG/100ML
SOLUTION INTRA-ARTERIAL ONCE
Status: DISCONTINUED | OUTPATIENT
Start: 2024-01-17 | End: 2024-01-17

## 2024-01-16 RX ORDER — HYDROCODONE BITARTRATE AND ACETAMINOPHEN 500; 5 MG/1; MG/1
TABLET ORAL
Status: DISCONTINUED | OUTPATIENT
Start: 2024-01-16 | End: 2024-01-17

## 2024-01-16 RX ORDER — MUPIROCIN 20 MG/G
OINTMENT TOPICAL 2 TIMES DAILY
Status: DISCONTINUED | OUTPATIENT
Start: 2024-01-16 | End: 2024-01-21 | Stop reason: HOSPADM

## 2024-01-16 RX ADMIN — ASPIRIN 81 MG 81 MG: 81 TABLET ORAL at 08:01

## 2024-01-16 RX ADMIN — DILTIAZEM HYDROCHLORIDE 240 MG: 120 CAPSULE, COATED, EXTENDED RELEASE ORAL at 08:01

## 2024-01-16 RX ADMIN — ATORVASTATIN CALCIUM 20 MG: 20 TABLET, FILM COATED ORAL at 08:01

## 2024-01-16 RX ADMIN — LISINOPRIL 20 MG: 20 TABLET ORAL at 08:01

## 2024-01-16 RX ADMIN — INSULIN ASPART 2 UNITS: 100 INJECTION, SOLUTION INTRAVENOUS; SUBCUTANEOUS at 08:01

## 2024-01-16 RX ADMIN — FUROSEMIDE 20 MG: 20 TABLET ORAL at 08:01

## 2024-01-16 RX ADMIN — INSULIN ASPART 3 UNITS: 100 INJECTION, SOLUTION INTRAVENOUS; SUBCUTANEOUS at 12:01

## 2024-01-16 RX ADMIN — MUPIROCIN 1 G: 20 OINTMENT TOPICAL at 09:01

## 2024-01-16 RX ADMIN — AMIODARONE HYDROCHLORIDE 200 MG: 200 TABLET ORAL at 08:01

## 2024-01-16 NOTE — PROGRESS NOTES
[facility]    Critical Care Cardiology Progress Note    Subjective:  Patient is comfortable.  He is still on IV nitroglycerin he has not having any significant chest pain.  He is scheduled for surgery tomorrow    Objective:  Vital Signs (Most Recent)  Temp: 98.5 °F (36.9 °C) (01/16/24 0345)  Pulse: 68 (01/16/24 1030)  Resp: 20 (01/16/24 0600)  BP: 135/66 (01/16/24 1030)  SpO2: 95 % (01/16/24 1030)    Vital Signs Range (Last 24H):  Temp:  [97.8 °F (36.6 °C)-98.5 °F (36.9 °C)]   Pulse:  [61-80]   Resp:  [14-20]   BP: (103-170)/(53-96)   SpO2:  [93 %-98 %]     I & O (Last 24H):    Intake/Output Summary (Last 24 hours) at 1/16/2024 1343  Last data filed at 1/15/2024 1932  Gross per 24 hour   Intake 150 ml   Output --   Net 150 ml       Current Diet:     Current Diet Order   Procedures    Diet Cardiac [facility]; Diabetic Diet     Order Specific Question:   Indicate patient location for additional diet options:     Answer:   [facility]     Order Specific Question:   Additional Diet Options:     Answer:   Diabetic Diet    Diet NPO Except for: Medication     Order Specific Question:   Except for     Answer:   Medication        Allergies:  Review of patient's allergies indicates:  No Known Allergies    Meds:  Scheduled Meds:   amiodarone  200 mg Oral Daily    aspirin  81 mg Oral Daily    atorvastatin  20 mg Oral Daily    diltiaZEM  240 mg Oral BID    furosemide  20 mg Oral Daily    lisinopriL  20 mg Oral Daily    morphine  2 mg Intravenous Once     Continuous Infusions:   nitroGLYCERIN in 5 % dextrose 10 mcg/min (01/16/24 0247)     PRN Meds:0.9%  NaCl infusion (for blood administration), 0.9%  NaCl infusion (for blood administration), acetaminophen, aluminum-magnesium hydroxide-simethicone, dextrose 50%, dextrose 50%, dextrose 50%, dextrose 50%, glucagon (human recombinant), glucagon (human recombinant), glucose, glucose, glucose, glucose, influenza 65up-adj, insulin aspart U-100, magnesium oxide, magnesium oxide, naloxone,  ondansetron, potassium bicarbonate, potassium bicarbonate, potassium bicarbonate, potassium, sodium phosphates, potassium, sodium phosphates, potassium, sodium phosphates, sodium chloride 0.9%    Oxygen/Ventilator Data (Last 24H):  (if applicable)            Hemodynamic Parameters (Last 24H):   (if applicable)        Lines/Drains:  (if applicable)       Peripheral IV - Single Lumen 01/13/24 2151 20 G Anterior;Left;Proximal Upper Arm (Active)   Site Assessment Clean;Dry;Intact;No redness;No swelling 01/16/24 0730   Extremity Assessment Distal to IV No warmth;No swelling;No redness;No abnormal discoloration 01/16/24 0730   Line Status Infusing 01/16/24 0730   Dressing Status Dry;Clean;Intact 01/16/24 0730   Dressing Intervention Integrity maintained 01/16/24 0730   Reason Not Rotated Not due 01/14/24 1950   Number of days: 2            Peripheral IV - Single Lumen 01/14/24 1434 20 G Right Antecubital (Active)   Site Assessment Clean;Dry;Intact;No swelling;No redness 01/16/24 0730   Extremity Assessment Distal to IV No warmth;No swelling;No redness;No abnormal discoloration 01/16/24 0730   Line Status Saline locked 01/16/24 0730   Dressing Status Clean;Dry;Intact 01/16/24 0730   Dressing Intervention Integrity maintained 01/16/24 0730   Reason Not Rotated Not due 01/14/24 1950   Number of days: 1       Lab Results :  Recent Results (from the past 24 hour(s))   POCT glucose    Collection Time: 01/15/24  4:15 PM   Result Value Ref Range    POC Glucose 293 (H) 70 - 110   POCT glucose    Collection Time: 01/15/24  8:26 PM   Result Value Ref Range    POC Glucose 288 (H) 70 - 110   CBC auto differential    Collection Time: 01/16/24  3:55 AM   Result Value Ref Range    WBC 6.86 3.90 - 12.70 K/uL    RBC 5.61 4.60 - 6.20 M/uL    Hemoglobin 15.5 14.0 - 18.0 g/dL    Hematocrit 47.6 40.0 - 54.0 %    MCV 85 82 - 98 fL    MCH 27.6 27.0 - 31.0 pg    MCHC 32.6 32.0 - 36.0 g/dL    RDW 14.6 (H) 11.5 - 14.5 %    Platelets 180 150 - 450  K/uL    MPV 10.3 9.2 - 12.9 fL    Immature Granulocytes 0.4 0.0 - 0.5 %    Gran # (ANC) 4.0 1.8 - 7.7 K/uL    Immature Grans (Abs) 0.03 0.00 - 0.04 K/uL    Lymph # 2.0 1.0 - 4.8 K/uL    Mono # 0.6 0.3 - 1.0 K/uL    Eos # 0.1 0.0 - 0.5 K/uL    Baso # 0.04 0.00 - 0.20 K/uL    nRBC 0 0 /100 WBC    Gran % 58.8 38.0 - 73.0 %    Lymph % 29.3 18.0 - 48.0 %    Mono % 9.2 4.0 - 15.0 %    Eosinophil % 1.7 0.0 - 8.0 %    Basophil % 0.6 0.0 - 1.9 %    Differential Method Automated    Comprehensive Metabolic Panel    Collection Time: 01/16/24  3:55 AM   Result Value Ref Range    Sodium 135 (L) 136 - 145 mmol/L    Potassium 4.3 3.5 - 5.1 mmol/L    Chloride 105 95 - 110 mmol/L    CO2 21 (L) 23 - 29 mmol/L    Glucose 261 (H) 70 - 110 mg/dL    BUN 19 8 - 23 mg/dL    Creatinine 0.8 0.5 - 1.4 mg/dL    Calcium 9.3 8.7 - 10.5 mg/dL    Total Protein 7.0 6.0 - 8.4 g/dL    Albumin 4.2 3.5 - 5.2 g/dL    Total Bilirubin 0.6 0.1 - 1.0 mg/dL    Alkaline Phosphatase 73 55 - 135 U/L    AST 19 10 - 40 U/L    ALT 34 10 - 44 U/L    eGFR >60.0 >60 mL/min/1.73 m^2    Anion Gap 9 8 - 16 mmol/L   Magnesium    Collection Time: 01/16/24  3:55 AM   Result Value Ref Range    Magnesium 1.9 1.6 - 2.6 mg/dL   POCT glucose    Collection Time: 01/16/24  8:05 AM   Result Value Ref Range    POC Glucose 236 (H) 70 - 110   POCT glucose    Collection Time: 01/16/24 11:44 AM   Result Value Ref Range    POC Glucose 281 (H) 70 - 110   Prepare RBC 4 Units; Preoperative CABG patient    Collection Time: 01/16/24 12:15 PM   Result Value Ref Range    UNIT NUMBER [id number]     Product Code D9608K32     DISPENSE STATUS CROSSMATCHED     CODING SYSTEM LYJJ216     Unit Blood Type Code 7300     Unit Blood Type B POS     Unit Expiration [id number]     CROSSMATCH INTERPRETATION Compatible     UNIT NUMBER [id number]     Product Code R0622G21     DISPENSE STATUS CROSSMATCHED     CODING SYSTEM CZBD565     Unit Blood Type Code 7300     Unit Blood Type B POS     Unit Expiration  "[id number]     CROSSMATCH INTERPRETATION Compatible     UNIT NUMBER [id number]     Product Code M5473S47     DISPENSE STATUS CROSSMATCHED     CODING SYSTEM EWLE275     Unit Blood Type Code 7300     Unit Blood Type B POS     Unit Expiration [id number]     CROSSMATCH INTERPRETATION Compatible     UNIT NUMBER [id number]     Product Code Q3783I91     DISPENSE STATUS CROSSMATCHED     CODING SYSTEM YVYU150     Unit Blood Type Code 5100     Unit Blood Type O POS     Unit Expiration [id number]     CROSSMATCH INTERPRETATION Compatible    Type & Screen    Collection Time: 01/16/24 12:15 PM   Result Value Ref Range    Group & Rh B POS     Indirect Adalberto NEG     Specimen Outdate 01/19/2024 23:59        Diagnostic Results:  Imaging Results              X-Ray Chest AP Portable (Final result)  Result time 01/14/24 08:15:46      Final result by Geraldo Sharif MD (01/14/24 08:15:46)                   Narrative:    Chest single view    Clinical data:Chest pain    FINDINGS: AP view of the chest demonstrates no cardiac, pulmonary, or acute osseous abnormalities. Postarthroplasty changes right shoulder.    IMPRESSION:  1. No acute process.    Electronically signed by:  Geraldo Sharif MD  01/14/2024 08:15 AM CST Workstation: [id number]                                    12-lead EKG interpretation:   (if applicable)    Recent Cardiac Rhythm:  (if applicable)      Physical Exam:  Objective:  General Appearance:  Comfortable and in no acute distress.    Vital signs: (most recent): Blood pressure 135/66, pulse 68, temperature 98.5 °F (36.9 °C), resp. rate 20, height 6' 2" (1.88 m), weight (!) 141.6 kg (312 lb 1.6 oz), SpO2 95 %.    Lungs:  Normal effort and normal respiratory rate.    Heart: Normal rate.  Regular rhythm.  S1 normal and S2 normal.  Positive for murmur.    Abdomen: Abdomen is soft.  Bowel sounds are normal.     Extremities: There is no local swelling.        Current Consults:  IP CONSULT TO CARDIOLOGY  IP " CONSULT TO CARDIOTHORACIC SURGERY  IP CONSULT TO CARDIOTHORACIC SURGERY    Assessment/Plan:  Assessment:   Three-vessel coronary disease  LV dysfunction EF 30-35%  History of paroxysmal atrial fibrillation in sinus  Poorly-controlled diabetes mellitus    Plan:    For surgery in a.m.

## 2024-01-16 NOTE — ANESTHESIA PREPROCEDURE EVALUATION
01/16/2024  Donald Frey is a 63 y.o., male.      Results for orders placed or performed during the hospital encounter of 01/13/24   EKG 12-lead    Collection Time: 01/15/24 12:36 PM    Narrative    Test Reason : R07.9,    Vent. Rate : 075 BPM     Atrial Rate : 075 BPM     P-R Int : 218 ms          QRS Dur : 122 ms      QT Int : 496 ms       P-R-T Axes : 077 029 125 degrees     QTc Int : 553 ms    Sinus rhythm with 1st degree A-V block  Nonspecific intraventricular conduction delay  T wave abnormality, consider anterolateral ischemia  Abnormal ECG  When compared with ECG of 14-JAN-2024 12:42,  QRS duration has increased  Minimal criteria for Anteroseptal infarct are no longer Present  Nonspecific T wave abnormality, worse in Inferior leads  T wave inversion now evident in Anterior-lateral leads  QT has lengthened  Confirmed by Sonya KAUR, Roger RYAN ([id number]) on 1/15/2024 10:03:47 PM    Referred By: [email or web address]   SELF           Confirmed By:Roger Hassan MD        Imaging Results              X-Ray Chest AP Portable (Final result)  Result time 01/14/24 08:15:46      Final result by Geraldo Sharif MD (01/14/24 08:15:46)                   Narrative:    Chest single view    Clinical data:Chest pain    FINDINGS: AP view of the chest demonstrates no cardiac, pulmonary, or acute osseous abnormalities. Postarthroplasty changes right shoulder.    IMPRESSION:  1. No acute process.    Electronically signed by:  Geraldo Sharif MD  01/14/2024 08:15 AM CST Workstation: [id number]                                     Lab Results   Component Value Date    WBC 6.86 01/16/2024    HGB 15.5 01/16/2024    HCT 47.6 01/16/2024    MCV 85 01/16/2024     01/16/2024 01/16/24 12:15   Group & Rh B POS   INDIRECT JEF NEG   Specimen Outdate 01/19/2024 23:59             BMP  Lab Results   Component Value Date    NA  135 (L) 01/16/2024    K 4.3 01/16/2024     01/16/2024    CO2 21 (L) 01/16/2024    BUN 19 01/16/2024    CREATININE 0.8 01/16/2024    CALCIUM 9.3 01/16/2024    ANIONGAP 9 01/16/2024     (H) 01/16/2024     (H) 01/15/2024     (H) 01/13/2024       Results for orders placed during the hospital encounter of 01/20/22    Echo    Interpretation Summary  · Eccentric hypertrophy and mildly decreased systolic function.  · The estimated ejection fraction is 45%.  · Mild left atrial enlargement.  · Mild mitral regurgitation.  · Mild tricuspid regurgitation.  · Intermediate central venous pressure (8 mmHg).  · The estimated PA systolic pressure is 33 mmHg.  · Atrial fibrillation observed.    Cardiac catheterization        Summary         The ejection fraction was 30-40% by visual estimate.    The pre-procedure left ventricular end diastolic pressure was 10.    The estimated blood loss was none.    There is inferior basilar hypokinesia    Severe three-vessel disease, 99% mid stenosis of the LAD with a distal stenosis near the apex of 95%    95% stenosis of the 1st obtuse marginal branch    Total occlusion of the midportion of the right coronary artery with collateralization from the left coronary system as well as faintly from the right coronary system    Patient will need aorta coronary bypass     The procedure log was documented by Documenter: Tschume, Sarah, RN and verified by Samir Emmanuel MD.     Date: 1/15/2024  Time: 10:52 AM    US Carotid Bilateral     IMPRESSION:  1. Less than 50% stenosis of bilateral internal carotid arteries.  2. Mild atherosclerotic changes at the carotid bifurcation bilaterally.     Electronically signed by:  Richard Curtis MD  01/15/2024 04:23 PM [facility] [[email or web address]] Workstation: [id number]    Pre-op Assessment    I have reviewed the Patient Summary Reports.     I have reviewed the Nursing Notes. I have reviewed the NPO Status.   I have reviewed the Medications.     Review of  Systems  Anesthesia Hx:  No problems with previous Anesthesia             Denies Family Hx of Anesthesia complications.    Denies Personal Hx of Anesthesia complications.                    Social:  Alcohol Use, Non-Smoker       Hematology/Oncology:    Oncology Normal                Hematology Comments: Eliquis Therapy - last dose 18/14/2024                    EENT/Dental:  EENT/Dental Normal  Eyes:             Eye Disease:  Diabetic Retinopathy                   Cardiovascular:     Hypertension, poorly controlled  Past MI CAD   CABG/stent Dysrhythmias atrial fibrillation      hyperlipidemia   ECG has been reviewed. Patient s/p cardiac ablation 2022    NTG drip infusing     Patient denies angina following initiation of nitroglycerin drip                             Pulmonary:         Patient denies sleep apnea meets criteria               Renal/:  Renal/ Normal                 Hepatic/GI:        Trulicity Therapy prescribed but patient has not received first dose           Musculoskeletal:  Musculoskeletal Normal    Recent right shoulder surgery November 2023            Neurological:  Neurology Normal                                      Endocrine:  Diabetes, poorly controlled, type 2         Morbid Obesity / BMI > 40  Dermatological:  Skin Normal    Psych:  Psychiatric Normal                    Physical Exam  General: Well nourished, Cooperative, Alert and Oriented    Airway:  Mallampati: III / II  Mouth Opening: Normal  TM Distance: > 6 cm  Tongue: Normal  Neck ROM: Normal ROM    Dental:  Intact    Chest/Lungs:  Clear to auscultation, Normal Respiratory Rate    Heart:  Rate: Normal  Rhythm: Regular Rhythm        Anesthesia Plan  Type of Anesthesia, risks & benefits discussed:    Anesthesia Type: Gen ETT  Intra-op Monitoring Plan: Standard ASA Monitors, Art Line, Central Line, PA and CO  Post Op Pain Control Plan: multimodal analgesia and IV/PO Opioids PRN  Induction:  IV  Airway Plan: Direct and Video,  Post-Induction  Informed Consent: Informed consent signed with the Patient and all parties understand the risks and agree with anesthesia plan.  All questions answered. Patient consented to blood products? Yes  ASA Score: 4  Anesthesia Plan Notes:     Check Morning Labs     ADDISON Precautions     Ready For Surgery From Anesthesia Perspective.     .

## 2024-01-16 NOTE — PLAN OF CARE
Pt discussed in CM/Hospital Med rounds.  CABG planned for tomorrow per Patient Calendar report.  Will follow for discharge planning needs.        01/16/24 1210   Rounds   Attendance Provider;Nurse    Discharge Plan A Home with family   Why the patient remains in the hospital Requires continued medical care   Transition of Care Barriers None

## 2024-01-17 ENCOUNTER — ANESTHESIA (OUTPATIENT)
Dept: SURGERY | Facility: HOSPITAL | Age: 64
DRG: 234 | End: 2024-01-17
Payer: MEDICARE

## 2024-01-17 LAB
ALBUMIN SERPL BCP-MCNC: 3.3 G/DL (ref 3.5–5.2)
ALBUMIN SERPL BCP-MCNC: 3.8 G/DL (ref 3.5–5.2)
ALBUMIN SERPL BCP-MCNC: 3.9 G/DL (ref 3.5–5.2)
ALBUMIN SERPL BCP-MCNC: 4.1 G/DL (ref 3.5–5.2)
ALLENS TEST: ABNORMAL
ALP SERPL-CCNC: 49 U/L (ref 55–135)
ALP SERPL-CCNC: 49 U/L (ref 55–135)
ALP SERPL-CCNC: 51 U/L (ref 55–135)
ALP SERPL-CCNC: 65 U/L (ref 55–135)
ALT SERPL W/O P-5'-P-CCNC: 31 U/L (ref 10–44)
ALT SERPL W/O P-5'-P-CCNC: 35 U/L (ref 10–44)
ALT SERPL W/O P-5'-P-CCNC: 37 U/L (ref 10–44)
ALT SERPL W/O P-5'-P-CCNC: 38 U/L (ref 10–44)
ANION GAP SERPL CALC-SCNC: 11 MMOL/L (ref 8–16)
ANION GAP SERPL CALC-SCNC: 11 MMOL/L (ref 8–16)
ANION GAP SERPL CALC-SCNC: 5 MMOL/L (ref 8–16)
ANION GAP SERPL CALC-SCNC: 8 MMOL/L (ref 8–16)
ANION GAP SERPL CALC-SCNC: 9 MMOL/L (ref 8–16)
AST SERPL-CCNC: 18 U/L (ref 10–40)
AST SERPL-CCNC: 34 U/L (ref 10–40)
AST SERPL-CCNC: 41 U/L (ref 10–40)
AST SERPL-CCNC: 44 U/L (ref 10–40)
BASOPHILS # BLD AUTO: 0.04 K/UL (ref 0–0.2)
BASOPHILS NFR BLD: 0.6 % (ref 0–1.9)
BILIRUB SERPL-MCNC: 0.5 MG/DL (ref 0.1–1)
BILIRUB SERPL-MCNC: 0.6 MG/DL (ref 0.1–1)
BILIRUB SERPL-MCNC: 0.6 MG/DL (ref 0.1–1)
BILIRUB SERPL-MCNC: 0.7 MG/DL (ref 0.1–1)
BUN SERPL-MCNC: 14 MG/DL (ref 8–23)
BUN SERPL-MCNC: 16 MG/DL (ref 8–23)
BUN SERPL-MCNC: 18 MG/DL (ref 8–23)
CALCIUM SERPL-MCNC: 8.2 MG/DL (ref 8.7–10.5)
CALCIUM SERPL-MCNC: 8.3 MG/DL (ref 8.7–10.5)
CALCIUM SERPL-MCNC: 8.5 MG/DL (ref 8.7–10.5)
CALCIUM SERPL-MCNC: 9.1 MG/DL (ref 8.7–10.5)
CALCIUM SERPL-MCNC: 9.3 MG/DL (ref 8.7–10.5)
CHLORIDE SERPL-SCNC: 103 MMOL/L (ref 95–110)
CHLORIDE SERPL-SCNC: 104 MMOL/L (ref 95–110)
CHLORIDE SERPL-SCNC: 108 MMOL/L (ref 95–110)
CHLORIDE SERPL-SCNC: 110 MMOL/L (ref 95–110)
CHLORIDE SERPL-SCNC: 111 MMOL/L (ref 95–110)
CO2 SERPL-SCNC: 20 MMOL/L (ref 23–29)
CO2 SERPL-SCNC: 20 MMOL/L (ref 23–29)
CO2 SERPL-SCNC: 22 MMOL/L (ref 23–29)
CO2 SERPL-SCNC: 26 MMOL/L (ref 23–29)
CO2 SERPL-SCNC: 28 MMOL/L (ref 23–29)
CREAT SERPL-MCNC: 0.8 MG/DL (ref 0.5–1.4)
CREAT SERPL-MCNC: 0.9 MG/DL (ref 0.5–1.4)
CREAT SERPL-MCNC: 1.2 MG/DL (ref 0.5–1.4)
DELSYS: ABNORMAL
DIFFERENTIAL METHOD BLD: NORMAL
EOSINOPHIL # BLD AUTO: 0.1 K/UL (ref 0–0.5)
EOSINOPHIL NFR BLD: 1.3 % (ref 0–8)
ERYTHROCYTE [DISTWIDTH] IN BLOOD BY AUTOMATED COUNT: 14.3 % (ref 11.5–14.5)
ERYTHROCYTE [DISTWIDTH] IN BLOOD BY AUTOMATED COUNT: 14.4 % (ref 11.5–14.5)
ERYTHROCYTE [DISTWIDTH] IN BLOOD BY AUTOMATED COUNT: 14.4 % (ref 11.5–14.5)
ERYTHROCYTE [DISTWIDTH] IN BLOOD BY AUTOMATED COUNT: 14.5 % (ref 11.5–14.5)
ERYTHROCYTE [SEDIMENTATION RATE] IN BLOOD BY WESTERGREN METHOD: 15 MM/H
ERYTHROCYTE [SEDIMENTATION RATE] IN BLOOD BY WESTERGREN METHOD: 15 MM/H
ERYTHROCYTE [SEDIMENTATION RATE] IN BLOOD BY WESTERGREN METHOD: 16 MM/H
ERYTHROCYTE [SEDIMENTATION RATE] IN BLOOD BY WESTERGREN METHOD: 18 MM/H
EST. GFR  (NO RACE VARIABLE): >60 ML/MIN/1.73 M^2
FIO2: 100
FIO2: 45
FIO2: 55
FIO2: 70
FIO2: 70
FLOW: 2
FLOW: 4
GLUCOSE SERPL-MCNC: 182 MG/DL (ref 70–110)
GLUCOSE SERPL-MCNC: 203 MG/DL (ref 70–110)
GLUCOSE SERPL-MCNC: 203 MG/DL (ref 70–110)
GLUCOSE SERPL-MCNC: 213 MG/DL (ref 70–110)
GLUCOSE SERPL-MCNC: 232 MG/DL (ref 70–110)
GLUCOSE SERPL-MCNC: 239 MG/DL (ref 70–110)
GLUCOSE SERPL-MCNC: 244 MG/DL (ref 70–110)
GLUCOSE SERPL-MCNC: 245 MG/DL (ref 70–110)
GLUCOSE SERPL-MCNC: 255 MG/DL (ref 70–110)
GLUCOSE SERPL-MCNC: 258 MG/DL (ref 70–110)
GLUCOSE SERPL-MCNC: 265 MG/DL (ref 70–110)
GLUCOSE SERPL-MCNC: 267 MG/DL (ref 70–110)
GLUCOSE SERPL-MCNC: 271 MG/DL (ref 70–110)
GLUCOSE SERPL-MCNC: 287 MG/DL (ref 70–110)
GLUCOSE SERPL-MCNC: 307 MG/DL (ref 70–110)
GLUCOSE SERPL-MCNC: 308 MG/DL (ref 70–110)
GLUCOSE SERPL-MCNC: 309 MG/DL (ref 70–110)
GLUCOSE SERPL-MCNC: 324 MG/DL (ref 70–110)
GLUCOSE SERPL-MCNC: 331 MG/DL (ref 70–110)
GLUCOSE SERPL-MCNC: 335 MG/DL (ref 70–110)
GLUCOSE SERPL-MCNC: 340 MG/DL (ref 70–110)
GLUCOSE SERPL-MCNC: 341 MG/DL (ref 70–110)
GLUCOSE SERPL-MCNC: 343 MG/DL (ref 70–110)
HCO3 UR-SCNC: 18.1 MMOL/L (ref 24–28)
HCO3 UR-SCNC: 19.5 MMOL/L (ref 24–28)
HCO3 UR-SCNC: 20 MMOL/L (ref 24–28)
HCO3 UR-SCNC: 20.6 MMOL/L (ref 24–28)
HCO3 UR-SCNC: 21.1 MMOL/L (ref 24–28)
HCO3 UR-SCNC: 21.2 MMOL/L (ref 24–28)
HCO3 UR-SCNC: 21.4 MMOL/L (ref 24–28)
HCO3 UR-SCNC: 22 MMOL/L (ref 24–28)
HCO3 UR-SCNC: 22.9 MMOL/L (ref 24–28)
HCO3 UR-SCNC: 23.2 MMOL/L (ref 24–28)
HCO3 UR-SCNC: 23.7 MMOL/L (ref 24–28)
HCO3 UR-SCNC: 24.8 MMOL/L (ref 24–28)
HCT VFR BLD AUTO: 38.3 % (ref 40–54)
HCT VFR BLD AUTO: 41.1 % (ref 40–54)
HCT VFR BLD AUTO: 41.9 % (ref 40–54)
HCT VFR BLD AUTO: 46 % (ref 40–54)
HCT VFR BLD CALC: 33 %PCV (ref 36–54)
HCT VFR BLD CALC: 33 %PCV (ref 36–54)
HCT VFR BLD CALC: 34 %PCV (ref 36–54)
HCT VFR BLD CALC: 36 %PCV (ref 36–54)
HCT VFR BLD CALC: 40 %PCV (ref 36–54)
HCT VFR BLD CALC: 41 %PCV (ref 36–54)
HCT VFR BLD CALC: 41 %PCV (ref 36–54)
HCT VFR BLD CALC: 42 %PCV (ref 36–54)
HCT VFR BLD CALC: 43 %PCV (ref 36–54)
HCT VFR BLD CALC: 43 %PCV (ref 36–54)
HGB BLD-MCNC: 12.6 G/DL (ref 14–18)
HGB BLD-MCNC: 13.6 G/DL (ref 14–18)
HGB BLD-MCNC: 13.8 G/DL (ref 14–18)
HGB BLD-MCNC: 15 G/DL (ref 14–18)
IMM GRANULOCYTES # BLD AUTO: 0.02 K/UL (ref 0–0.04)
IMM GRANULOCYTES NFR BLD AUTO: 0.3 % (ref 0–0.5)
LYMPHOCYTES # BLD AUTO: 1.6 K/UL (ref 1–4.8)
LYMPHOCYTES NFR BLD: 25.7 % (ref 18–48)
MAGNESIUM SERPL-MCNC: 1.7 MG/DL (ref 1.6–2.6)
MAGNESIUM SERPL-MCNC: 1.9 MG/DL (ref 1.6–2.6)
MAGNESIUM SERPL-MCNC: 2 MG/DL (ref 1.6–2.6)
MAGNESIUM SERPL-MCNC: 2.2 MG/DL (ref 1.6–2.6)
MCH RBC QN AUTO: 27.7 PG (ref 27–31)
MCH RBC QN AUTO: 27.7 PG (ref 27–31)
MCH RBC QN AUTO: 27.8 PG (ref 27–31)
MCH RBC QN AUTO: 27.8 PG (ref 27–31)
MCHC RBC AUTO-ENTMCNC: 32.6 G/DL (ref 32–36)
MCHC RBC AUTO-ENTMCNC: 32.9 G/DL (ref 32–36)
MCHC RBC AUTO-ENTMCNC: 32.9 G/DL (ref 32–36)
MCHC RBC AUTO-ENTMCNC: 33.1 G/DL (ref 32–36)
MCV RBC AUTO: 84 FL (ref 82–98)
MCV RBC AUTO: 84 FL (ref 82–98)
MCV RBC AUTO: 85 FL (ref 82–98)
MCV RBC AUTO: 85 FL (ref 82–98)
MODE: ABNORMAL
MONOCYTES # BLD AUTO: 0.6 K/UL (ref 0.3–1)
MONOCYTES NFR BLD: 9.1 % (ref 4–15)
NEUTROPHILS # BLD AUTO: 3.9 K/UL (ref 1.8–7.7)
NEUTROPHILS NFR BLD: 63 % (ref 38–73)
NRBC BLD-RTO: 0 /100 WBC
PCO2 BLDA: 36.7 MMHG (ref 35–45)
PCO2 BLDA: 38.5 MMHG (ref 35–45)
PCO2 BLDA: 39.1 MMHG (ref 35–45)
PCO2 BLDA: 39.7 MMHG (ref 35–45)
PCO2 BLDA: 40.2 MMHG (ref 35–45)
PCO2 BLDA: 40.7 MMHG (ref 35–45)
PCO2 BLDA: 42.2 MMHG (ref 35–45)
PCO2 BLDA: 42.5 MMHG (ref 35–45)
PCO2 BLDA: 42.5 MMHG (ref 35–45)
PCO2 BLDA: 43.2 MMHG (ref 35–45)
PCO2 BLDA: 45.1 MMHG (ref 35–45)
PCO2 BLDA: 46.3 MMHG (ref 35–45)
PEEP: 5
PH SMN: 7.24 [PH] (ref 7.35–7.45)
PH SMN: 7.26 [PH] (ref 7.35–7.45)
PH SMN: 7.3 [PH] (ref 7.35–7.45)
PH SMN: 7.3 [PH] (ref 7.35–7.45)
PH SMN: 7.33 [PH] (ref 7.35–7.45)
PH SMN: 7.34 [PH] (ref 7.35–7.45)
PH SMN: 7.35 [PH] (ref 7.35–7.45)
PH SMN: 7.36 [PH] (ref 7.35–7.45)
PH SMN: 7.37 [PH] (ref 7.35–7.45)
PH SMN: 7.37 [PH] (ref 7.35–7.45)
PHOSPHATE SERPL-MCNC: 2.4 MG/DL (ref 2.7–4.5)
PHOSPHATE SERPL-MCNC: 2.9 MG/DL (ref 2.7–4.5)
PLATELET # BLD AUTO: 139 K/UL (ref 150–450)
PLATELET # BLD AUTO: 157 K/UL (ref 150–450)
PLATELET # BLD AUTO: 161 K/UL (ref 150–450)
PLATELET # BLD AUTO: 180 K/UL (ref 150–450)
PMV BLD AUTO: 10.2 FL (ref 9.2–12.9)
PMV BLD AUTO: 10.6 FL (ref 9.2–12.9)
PMV BLD AUTO: 10.9 FL (ref 9.2–12.9)
PMV BLD AUTO: 11 FL (ref 9.2–12.9)
PO2 BLDA: 100 MMHG (ref 80–100)
PO2 BLDA: 106 MMHG (ref 80–100)
PO2 BLDA: 112 MMHG (ref 80–100)
PO2 BLDA: 120 MMHG (ref 80–100)
PO2 BLDA: 296 MMHG (ref 80–100)
PO2 BLDA: 320 MMHG (ref 80–100)
PO2 BLDA: 352 MMHG (ref 80–100)
PO2 BLDA: 367 MMHG (ref 80–100)
PO2 BLDA: 434 MMHG (ref 80–100)
PO2 BLDA: 77 MMHG (ref 80–100)
PO2 BLDA: 79 MMHG (ref 80–100)
PO2 BLDA: 97 MMHG (ref 80–100)
POC ACTIVATED CLOTTING TIME K: 109 SEC (ref 74–137)
POC ACTIVATED CLOTTING TIME K: 136 SEC (ref 74–137)
POC ACTIVATED CLOTTING TIME K: 547 SEC (ref 74–137)
POC ACTIVATED CLOTTING TIME K: 617 SEC (ref 74–137)
POC ACTIVATED CLOTTING TIME K: 644 SEC (ref 74–137)
POC BE: -1 MMOL/L
POC BE: -2 MMOL/L
POC BE: -3 MMOL/L
POC BE: -5 MMOL/L
POC BE: -6 MMOL/L
POC BE: -6 MMOL/L
POC BE: -8 MMOL/L
POC BE: -9 MMOL/L
POC IONIZED CALCIUM: 1.12 MMOL/L (ref 1.06–1.42)
POC IONIZED CALCIUM: 1.14 MMOL/L (ref 1.06–1.42)
POC IONIZED CALCIUM: 1.18 MMOL/L (ref 1.06–1.42)
POC IONIZED CALCIUM: 1.18 MMOL/L (ref 1.06–1.42)
POC IONIZED CALCIUM: 1.19 MMOL/L (ref 1.06–1.42)
POC IONIZED CALCIUM: 1.21 MMOL/L (ref 1.06–1.42)
POC IONIZED CALCIUM: 1.21 MMOL/L (ref 1.06–1.42)
POC IONIZED CALCIUM: 1.22 MMOL/L (ref 1.06–1.42)
POC IONIZED CALCIUM: 1.22 MMOL/L (ref 1.06–1.42)
POC IONIZED CALCIUM: 1.24 MMOL/L (ref 1.06–1.42)
POC IONIZED CALCIUM: 1.25 MMOL/L (ref 1.06–1.42)
POC IONIZED CALCIUM: 1.26 MMOL/L (ref 1.06–1.42)
POC PCO2 TEMP: 35.2 MMHG
POC PCO2 TEMP: 37.7 MMHG
POC PCO2 TEMP: 42.7 MMHG
POC PH TEMP: 7.26
POC PH TEMP: 7.35
POC PH TEMP: 7.36
POC PO2 TEMP: 115 MMHG
POC PO2 TEMP: 95 MMHG
POC PO2 TEMP: 99 MMHG
POC SATURATED O2: 100 % (ref 95–100)
POC SATURATED O2: 94 % (ref 95–100)
POC SATURATED O2: 95 % (ref 95–100)
POC SATURATED O2: 97 % (ref 95–100)
POC SATURATED O2: 98 % (ref 95–100)
POC SATURATED O2: 98 % (ref 95–100)
POC TCO2: 19 MMOL/L (ref 23–27)
POC TCO2: 21 MMOL/L (ref 23–27)
POC TCO2: 21 MMOL/L (ref 23–27)
POC TCO2: 22 MMOL/L (ref 23–27)
POC TCO2: 23 MMOL/L (ref 23–27)
POC TCO2: 23 MMOL/L (ref 23–27)
POC TCO2: 24 MMOL/L (ref 23–27)
POC TCO2: 25 MMOL/L (ref 23–27)
POC TCO2: 25 MMOL/L (ref 23–27)
POC TCO2: 26 MMOL/L (ref 23–27)
POC TEMPERATURE: ABNORMAL
POTASSIUM BLD-SCNC: 3.5 MMOL/L (ref 3.5–5.1)
POTASSIUM BLD-SCNC: 3.6 MMOL/L (ref 3.5–5.1)
POTASSIUM BLD-SCNC: 3.7 MMOL/L (ref 3.5–5.1)
POTASSIUM BLD-SCNC: 3.7 MMOL/L (ref 3.5–5.1)
POTASSIUM BLD-SCNC: 3.8 MMOL/L (ref 3.5–5.1)
POTASSIUM BLD-SCNC: 3.9 MMOL/L (ref 3.5–5.1)
POTASSIUM BLD-SCNC: 4.2 MMOL/L (ref 3.5–5.1)
POTASSIUM BLD-SCNC: 4.4 MMOL/L (ref 3.5–5.1)
POTASSIUM SERPL-SCNC: 3.5 MMOL/L (ref 3.5–5.1)
POTASSIUM SERPL-SCNC: 3.5 MMOL/L (ref 3.5–5.1)
POTASSIUM SERPL-SCNC: 3.8 MMOL/L (ref 3.5–5.1)
POTASSIUM SERPL-SCNC: 3.8 MMOL/L (ref 3.5–5.1)
POTASSIUM SERPL-SCNC: 4.4 MMOL/L (ref 3.5–5.1)
POTASSIUM SERPL-SCNC: 5.1 MMOL/L (ref 3.5–5.1)
POTASSIUM SERPL-SCNC: 5.9 MMOL/L (ref 3.5–5.1)
PROT SERPL-MCNC: 5.1 G/DL (ref 6–8.4)
PROT SERPL-MCNC: 5.6 G/DL (ref 6–8.4)
PROT SERPL-MCNC: 5.9 G/DL (ref 6–8.4)
PROT SERPL-MCNC: 6.7 G/DL (ref 6–8.4)
PS: 10
RBC # BLD AUTO: 4.53 M/UL (ref 4.6–6.2)
RBC # BLD AUTO: 4.91 M/UL (ref 4.6–6.2)
RBC # BLD AUTO: 4.97 M/UL (ref 4.6–6.2)
RBC # BLD AUTO: 5.42 M/UL (ref 4.6–6.2)
SAMPLE: ABNORMAL
SAMPLE: NORMAL
SAMPLE: NORMAL
SITE: ABNORMAL
SODIUM BLD-SCNC: 138 MMOL/L (ref 136–145)
SODIUM BLD-SCNC: 139 MMOL/L (ref 136–145)
SODIUM BLD-SCNC: 140 MMOL/L (ref 136–145)
SODIUM BLD-SCNC: 142 MMOL/L (ref 136–145)
SODIUM BLD-SCNC: 143 MMOL/L (ref 136–145)
SODIUM BLD-SCNC: 143 MMOL/L (ref 136–145)
SODIUM BLD-SCNC: 144 MMOL/L (ref 136–145)
SODIUM BLD-SCNC: 145 MMOL/L (ref 136–145)
SODIUM SERPL-SCNC: 136 MMOL/L (ref 136–145)
SODIUM SERPL-SCNC: 138 MMOL/L (ref 136–145)
SODIUM SERPL-SCNC: 139 MMOL/L (ref 136–145)
SODIUM SERPL-SCNC: 141 MMOL/L (ref 136–145)
SODIUM SERPL-SCNC: 142 MMOL/L (ref 136–145)
SP02: 96
VT: 650
WBC # BLD AUTO: 13.14 K/UL (ref 3.9–12.7)
WBC # BLD AUTO: 13.89 K/UL (ref 3.9–12.7)
WBC # BLD AUTO: 15.17 K/UL (ref 3.9–12.7)
WBC # BLD AUTO: 6.23 K/UL (ref 3.9–12.7)

## 2024-01-17 PROCEDURE — D9220A PRA ANESTHESIA: Mod: CRNA,,, | Performed by: NURSE ANESTHETIST, CERTIFIED REGISTERED

## 2024-01-17 PROCEDURE — 85025 COMPLETE CBC W/AUTO DIFF WBC: CPT | Performed by: INTERNAL MEDICINE

## 2024-01-17 PROCEDURE — P9047 ALBUMIN (HUMAN), 25%, 50ML: HCPCS | Mod: JG

## 2024-01-17 PROCEDURE — 82803 BLOOD GASES ANY COMBINATION: CPT

## 2024-01-17 PROCEDURE — 84100 ASSAY OF PHOSPHORUS: CPT | Performed by: HOSPITALIST

## 2024-01-17 PROCEDURE — C9248 INJ, CLEVIDIPINE BUTYRATE: HCPCS | Performed by: HOSPITALIST

## 2024-01-17 PROCEDURE — 27100171 HC OXYGEN HIGH FLOW UP TO 24 HOURS

## 2024-01-17 PROCEDURE — 33508 ENDOSCOPIC VEIN HARVEST: CPT | Mod: 59,,, | Performed by: THORACIC SURGERY (CARDIOTHORACIC VASCULAR SURGERY)

## 2024-01-17 PROCEDURE — 83735 ASSAY OF MAGNESIUM: CPT | Mod: 91 | Performed by: THORACIC SURGERY (CARDIOTHORACIC VASCULAR SURGERY)

## 2024-01-17 PROCEDURE — C1768 GRAFT, VASCULAR: HCPCS | Performed by: THORACIC SURGERY (CARDIOTHORACIC VASCULAR SURGERY)

## 2024-01-17 PROCEDURE — D9220A PRA ANESTHESIA: Mod: ANES,,, | Performed by: ANESTHESIOLOGY

## 2024-01-17 PROCEDURE — 63600175 PHARM REV CODE 636 W HCPCS: Performed by: HOSPITALIST

## 2024-01-17 PROCEDURE — 25000003 PHARM REV CODE 250

## 2024-01-17 PROCEDURE — 36000712 HC OR TIME LEV V 1ST 15 MIN: Performed by: THORACIC SURGERY (CARDIOTHORACIC VASCULAR SURGERY)

## 2024-01-17 PROCEDURE — 25000003 PHARM REV CODE 250: Performed by: THORACIC SURGERY (CARDIOTHORACIC VASCULAR SURGERY)

## 2024-01-17 PROCEDURE — P9045 ALBUMIN (HUMAN), 5%, 250 ML: HCPCS | Mod: JZ,JG | Performed by: THORACIC SURGERY (CARDIOTHORACIC VASCULAR SURGERY)

## 2024-01-17 PROCEDURE — 85027 COMPLETE CBC AUTOMATED: CPT | Mod: 91 | Performed by: HOSPITALIST

## 2024-01-17 PROCEDURE — 80048 BASIC METABOLIC PNL TOTAL CA: CPT | Performed by: STUDENT IN AN ORGANIZED HEALTH CARE EDUCATION/TRAINING PROGRAM

## 2024-01-17 PROCEDURE — 82330 ASSAY OF CALCIUM: CPT

## 2024-01-17 PROCEDURE — S0017 INJECTION, AMINOCAPROIC ACID: HCPCS

## 2024-01-17 PROCEDURE — C1729 CATH, DRAINAGE: HCPCS | Performed by: THORACIC SURGERY (CARDIOTHORACIC VASCULAR SURGERY)

## 2024-01-17 PROCEDURE — 03BC4ZZ EXCISION OF LEFT RADIAL ARTERY, PERCUTANEOUS ENDOSCOPIC APPROACH: ICD-10-PCS | Performed by: THORACIC SURGERY (CARDIOTHORACIC VASCULAR SURGERY)

## 2024-01-17 PROCEDURE — 99900017 HC EXTUBATION W/PARAMETERS (STAT)

## 2024-01-17 PROCEDURE — 84295 ASSAY OF SERUM SODIUM: CPT

## 2024-01-17 PROCEDURE — 94761 N-INVAS EAR/PLS OXIMETRY MLT: CPT

## 2024-01-17 PROCEDURE — 37000009 HC ANESTHESIA EA ADD 15 MINS: Performed by: THORACIC SURGERY (CARDIOTHORACIC VASCULAR SURGERY)

## 2024-01-17 PROCEDURE — S0017 INJECTION, AMINOCAPROIC ACID: HCPCS | Performed by: NURSE ANESTHETIST, CERTIFIED REGISTERED

## 2024-01-17 PROCEDURE — 84132 ASSAY OF SERUM POTASSIUM: CPT

## 2024-01-17 PROCEDURE — 37799 UNLISTED PX VASCULAR SURGERY: CPT

## 2024-01-17 PROCEDURE — 63600175 PHARM REV CODE 636 W HCPCS: Mod: JZ,JG | Performed by: THORACIC SURGERY (CARDIOTHORACIC VASCULAR SURGERY)

## 2024-01-17 PROCEDURE — C1760 CLOSURE DEV, VASC: HCPCS | Performed by: THORACIC SURGERY (CARDIOTHORACIC VASCULAR SURGERY)

## 2024-01-17 PROCEDURE — 93010 ELECTROCARDIOGRAM REPORT: CPT | Mod: ,,, | Performed by: INTERNAL MEDICINE

## 2024-01-17 PROCEDURE — 36415 COLL VENOUS BLD VENIPUNCTURE: CPT | Performed by: STUDENT IN AN ORGANIZED HEALTH CARE EDUCATION/TRAINING PROGRAM

## 2024-01-17 PROCEDURE — 36000713 HC OR TIME LEV V EA ADD 15 MIN: Performed by: THORACIC SURGERY (CARDIOTHORACIC VASCULAR SURGERY)

## 2024-01-17 PROCEDURE — 25000003 PHARM REV CODE 250: Performed by: NURSE ANESTHETIST, CERTIFIED REGISTERED

## 2024-01-17 PROCEDURE — 93005 ELECTROCARDIOGRAM TRACING: CPT | Performed by: INTERNAL MEDICINE

## 2024-01-17 PROCEDURE — 36415 COLL VENOUS BLD VENIPUNCTURE: CPT | Performed by: THORACIC SURGERY (CARDIOTHORACIC VASCULAR SURGERY)

## 2024-01-17 PROCEDURE — 99900035 HC TECH TIME PER 15 MIN (STAT)

## 2024-01-17 PROCEDURE — 63600175 PHARM REV CODE 636 W HCPCS: Performed by: ANESTHESIOLOGY

## 2024-01-17 PROCEDURE — 27000513 HC SENSOR FLOTRAC

## 2024-01-17 PROCEDURE — 021109W BYPASS CORONARY ARTERY, TWO ARTERIES FROM AORTA WITH AUTOLOGOUS VENOUS TISSUE, OPEN APPROACH: ICD-10-PCS | Performed by: THORACIC SURGERY (CARDIOTHORACIC VASCULAR SURGERY)

## 2024-01-17 PROCEDURE — 85014 HEMATOCRIT: CPT

## 2024-01-17 PROCEDURE — 5A1221Z PERFORMANCE OF CARDIAC OUTPUT, CONTINUOUS: ICD-10-PCS | Performed by: THORACIC SURGERY (CARDIOTHORACIC VASCULAR SURGERY)

## 2024-01-17 PROCEDURE — 63600175 PHARM REV CODE 636 W HCPCS

## 2024-01-17 PROCEDURE — 37000008 HC ANESTHESIA 1ST 15 MINUTES: Performed by: THORACIC SURGERY (CARDIOTHORACIC VASCULAR SURGERY)

## 2024-01-17 PROCEDURE — C1751 CATH, INF, PER/CENT/MIDLINE: HCPCS

## 2024-01-17 PROCEDURE — 94002 VENT MGMT INPAT INIT DAY: CPT

## 2024-01-17 PROCEDURE — 63600175 PHARM REV CODE 636 W HCPCS: Performed by: NURSE ANESTHETIST, CERTIFIED REGISTERED

## 2024-01-17 PROCEDURE — 02100AW BYPASS CORONARY ARTERY, ONE ARTERY FROM AORTA WITH AUTOLOGOUS ARTERIAL TISSUE, OPEN APPROACH: ICD-10-PCS | Performed by: THORACIC SURGERY (CARDIOTHORACIC VASCULAR SURGERY)

## 2024-01-17 PROCEDURE — 85027 COMPLETE CBC AUTOMATED: CPT | Mod: 91 | Performed by: THORACIC SURGERY (CARDIOTHORACIC VASCULAR SURGERY)

## 2024-01-17 PROCEDURE — C1894 INTRO/SHEATH, NON-LASER: HCPCS | Performed by: THORACIC SURGERY (CARDIOTHORACIC VASCULAR SURGERY)

## 2024-01-17 PROCEDURE — 76937 US GUIDE VASCULAR ACCESS: CPT

## 2024-01-17 PROCEDURE — 20000000 HC ICU ROOM

## 2024-01-17 PROCEDURE — 33519 CABG ARTERY-VEIN THREE: CPT | Mod: ,,, | Performed by: THORACIC SURGERY (CARDIOTHORACIC VASCULAR SURGERY)

## 2024-01-17 PROCEDURE — 33533 CABG ARTERIAL SINGLE: CPT | Mod: ,,, | Performed by: THORACIC SURGERY (CARDIOTHORACIC VASCULAR SURGERY)

## 2024-01-17 PROCEDURE — 06BQ4ZZ EXCISION OF LEFT SAPHENOUS VEIN, PERCUTANEOUS ENDOSCOPIC APPROACH: ICD-10-PCS | Performed by: THORACIC SURGERY (CARDIOTHORACIC VASCULAR SURGERY)

## 2024-01-17 PROCEDURE — 02100Z9 BYPASS CORONARY ARTERY, ONE ARTERY FROM LEFT INTERNAL MAMMARY, OPEN APPROACH: ICD-10-PCS | Performed by: THORACIC SURGERY (CARDIOTHORACIC VASCULAR SURGERY)

## 2024-01-17 PROCEDURE — 25000003 PHARM REV CODE 250: Performed by: HOSPITALIST

## 2024-01-17 PROCEDURE — 80053 COMPREHEN METABOLIC PANEL: CPT | Mod: 91 | Performed by: THORACIC SURGERY (CARDIOTHORACIC VASCULAR SURGERY)

## 2024-01-17 PROCEDURE — 27201423 OPTIME MED/SURG SUP & DEVICES STERILE SUPPLY: Performed by: THORACIC SURGERY (CARDIOTHORACIC VASCULAR SURGERY)

## 2024-01-17 PROCEDURE — 80053 COMPREHEN METABOLIC PANEL: CPT | Mod: 91 | Performed by: HOSPITALIST

## 2024-01-17 PROCEDURE — 84100 ASSAY OF PHOSPHORUS: CPT | Mod: 91 | Performed by: THORACIC SURGERY (CARDIOTHORACIC VASCULAR SURGERY)

## 2024-01-17 RX ORDER — ROCURONIUM BROMIDE 10 MG/ML
INJECTION, SOLUTION INTRAVENOUS
Status: DISCONTINUED | OUTPATIENT
Start: 2024-01-17 | End: 2024-01-17

## 2024-01-17 RX ORDER — VANCOMYCIN HYDROCHLORIDE 500 MG/10ML
INJECTION, POWDER, LYOPHILIZED, FOR SOLUTION INTRAVENOUS
Status: DISCONTINUED | OUTPATIENT
Start: 2024-01-17 | End: 2024-01-17 | Stop reason: HOSPADM

## 2024-01-17 RX ORDER — MAGNESIUM SULFATE HEPTAHYDRATE 40 MG/ML
4 INJECTION, SOLUTION INTRAVENOUS
Status: DISCONTINUED | OUTPATIENT
Start: 2024-01-17 | End: 2024-01-21 | Stop reason: HOSPADM

## 2024-01-17 RX ORDER — CALCIUM CHLORIDE, MAGNESIUM CHLORIDE, POTASSIUM CHLORIDE AND SODIUM CHLORIDE 17.6; 325.3; 119.3; 643 MG/100ML; MG/100ML; MG/100ML; MG/100ML
SOLUTION INTRA-ARTERIAL ONCE
Status: DISCONTINUED | OUTPATIENT
Start: 2024-01-17 | End: 2024-01-17

## 2024-01-17 RX ORDER — CALCIUM GLUCONATE 20 MG/ML
2 INJECTION, SOLUTION INTRAVENOUS
Status: DISCONTINUED | OUTPATIENT
Start: 2024-01-17 | End: 2024-01-21 | Stop reason: HOSPADM

## 2024-01-17 RX ORDER — PHENYLEPHRINE HYDROCHLORIDE 10 MG/ML
INJECTION INTRAVENOUS CONTINUOUS PRN
Status: DISCONTINUED | OUTPATIENT
Start: 2024-01-17 | End: 2024-01-17

## 2024-01-17 RX ORDER — SODIUM BICARBONATE 1 MEQ/ML
50 SYRINGE (ML) INTRAVENOUS ONCE
Status: COMPLETED | OUTPATIENT
Start: 2024-01-17 | End: 2024-01-17

## 2024-01-17 RX ORDER — SODIUM CHLORIDE 9 MG/ML
INJECTION, SOLUTION INTRAVENOUS CONTINUOUS PRN
Status: DISCONTINUED | OUTPATIENT
Start: 2024-01-17 | End: 2024-01-17

## 2024-01-17 RX ORDER — ETOMIDATE 2 MG/ML
INJECTION INTRAVENOUS
Status: DISCONTINUED | OUTPATIENT
Start: 2024-01-17 | End: 2024-01-17

## 2024-01-17 RX ORDER — CALCIUM GLUCONATE 20 MG/ML
1 INJECTION, SOLUTION INTRAVENOUS
Status: DISCONTINUED | OUTPATIENT
Start: 2024-01-17 | End: 2024-01-21 | Stop reason: HOSPADM

## 2024-01-17 RX ORDER — PROTAMINE SULFATE 10 MG/ML
INJECTION, SOLUTION INTRAVENOUS
Status: DISCONTINUED | OUTPATIENT
Start: 2024-01-17 | End: 2024-01-17

## 2024-01-17 RX ORDER — MIDAZOLAM HYDROCHLORIDE 1 MG/ML
INJECTION INTRAMUSCULAR; INTRAVENOUS
Status: COMPLETED
Start: 2024-01-17 | End: 2024-01-17

## 2024-01-17 RX ORDER — LIDOCAINE HYDROCHLORIDE 20 MG/ML
INJECTION, SOLUTION EPIDURAL; INFILTRATION; INTRACAUDAL; PERINEURAL
Status: DISCONTINUED | OUTPATIENT
Start: 2024-01-17 | End: 2024-01-17

## 2024-01-17 RX ORDER — CEFAZOLIN SODIUM 2 G/50ML
2 SOLUTION INTRAVENOUS
Status: COMPLETED | OUTPATIENT
Start: 2024-01-17 | End: 2024-01-18

## 2024-01-17 RX ORDER — SODIUM CHLORIDE 450 MG/100ML
INJECTION, SOLUTION INTRAVENOUS CONTINUOUS
Status: DISCONTINUED | OUTPATIENT
Start: 2024-01-17 | End: 2024-01-19

## 2024-01-17 RX ORDER — FENTANYL CITRATE 50 UG/ML
INJECTION, SOLUTION INTRAMUSCULAR; INTRAVENOUS
Status: COMPLETED
Start: 2024-01-17 | End: 2024-01-17

## 2024-01-17 RX ORDER — POTASSIUM CHLORIDE 14.9 MG/ML
20 INJECTION INTRAVENOUS
Status: DISCONTINUED | OUTPATIENT
Start: 2024-01-17 | End: 2024-01-21 | Stop reason: HOSPADM

## 2024-01-17 RX ORDER — EPHEDRINE SULFATE 50 MG/ML
INJECTION, SOLUTION INTRAVENOUS
Status: DISCONTINUED | OUTPATIENT
Start: 2024-01-17 | End: 2024-01-17

## 2024-01-17 RX ORDER — MORPHINE SULFATE 2 MG/ML
2 INJECTION, SOLUTION INTRAMUSCULAR; INTRAVENOUS
Status: DISCONTINUED | OUTPATIENT
Start: 2024-01-17 | End: 2024-01-21 | Stop reason: HOSPADM

## 2024-01-17 RX ORDER — SODIUM BICARBONATE 1 MEQ/ML
100 SYRINGE (ML) INTRAVENOUS ONCE
Status: COMPLETED | OUTPATIENT
Start: 2024-01-17 | End: 2024-01-17

## 2024-01-17 RX ORDER — HEPARIN SODIUM 1000 [USP'U]/ML
INJECTION, SOLUTION INTRAVENOUS; SUBCUTANEOUS
Status: DISCONTINUED | OUTPATIENT
Start: 2024-01-17 | End: 2024-01-17

## 2024-01-17 RX ORDER — MIDAZOLAM HYDROCHLORIDE 1 MG/ML
1 INJECTION INTRAMUSCULAR; INTRAVENOUS
Status: ACTIVE | OUTPATIENT
Start: 2024-01-17 | End: 2024-01-18

## 2024-01-17 RX ORDER — LIDOCAINE HYDROCHLORIDE 20 MG/ML
5 INJECTION, SOLUTION EPIDURAL; INFILTRATION; INTRACAUDAL; PERINEURAL ONCE
Status: COMPLETED | OUTPATIENT
Start: 2024-01-17 | End: 2024-01-17

## 2024-01-17 RX ORDER — CEFAZOLIN SODIUM 1 G/3ML
INJECTION, POWDER, FOR SOLUTION INTRAMUSCULAR; INTRAVENOUS
Status: DISCONTINUED | OUTPATIENT
Start: 2024-01-17 | End: 2024-01-17

## 2024-01-17 RX ORDER — AMINOCAPROIC ACID 250 MG/ML
INJECTION, SOLUTION INTRAVENOUS
Status: DISCONTINUED | OUTPATIENT
Start: 2024-01-17 | End: 2024-01-17

## 2024-01-17 RX ORDER — MORPHINE SULFATE 4 MG/ML
4 INJECTION, SOLUTION INTRAMUSCULAR; INTRAVENOUS
Status: DISCONTINUED | OUTPATIENT
Start: 2024-01-17 | End: 2024-01-21 | Stop reason: HOSPADM

## 2024-01-17 RX ORDER — HYDROCODONE BITARTRATE AND ACETAMINOPHEN 5; 325 MG/1; MG/1
1 TABLET ORAL EVERY 4 HOURS PRN
Status: DISCONTINUED | OUTPATIENT
Start: 2024-01-17 | End: 2024-01-21 | Stop reason: HOSPADM

## 2024-01-17 RX ORDER — CALCIUM GLUCONATE 20 MG/ML
3 INJECTION, SOLUTION INTRAVENOUS
Status: DISCONTINUED | OUTPATIENT
Start: 2024-01-17 | End: 2024-01-21 | Stop reason: HOSPADM

## 2024-01-17 RX ORDER — FENTANYL CITRATE 50 UG/ML
INJECTION, SOLUTION INTRAMUSCULAR; INTRAVENOUS
Status: DISCONTINUED | OUTPATIENT
Start: 2024-01-17 | End: 2024-01-17

## 2024-01-17 RX ORDER — MAGNESIUM SULFATE HEPTAHYDRATE 40 MG/ML
2 INJECTION, SOLUTION INTRAVENOUS
Status: DISCONTINUED | OUTPATIENT
Start: 2024-01-17 | End: 2024-01-21 | Stop reason: HOSPADM

## 2024-01-17 RX ORDER — SODIUM CHLORIDE, SODIUM LACTATE, POTASSIUM CHLORIDE, CALCIUM CHLORIDE 600; 310; 30; 20 MG/100ML; MG/100ML; MG/100ML; MG/100ML
INJECTION, SOLUTION INTRAVENOUS CONTINUOUS PRN
Status: DISCONTINUED | OUTPATIENT
Start: 2024-01-17 | End: 2024-01-17

## 2024-01-17 RX ORDER — ONDANSETRON HYDROCHLORIDE 2 MG/ML
4 INJECTION, SOLUTION INTRAVENOUS EVERY 6 HOURS PRN
Status: DISCONTINUED | OUTPATIENT
Start: 2024-01-17 | End: 2024-01-21 | Stop reason: HOSPADM

## 2024-01-17 RX ORDER — POTASSIUM CHLORIDE 29.8 MG/ML
20 INJECTION INTRAVENOUS
Status: DISCONTINUED | OUTPATIENT
Start: 2024-01-17 | End: 2024-01-21 | Stop reason: HOSPADM

## 2024-01-17 RX ORDER — VECURONIUM BROMIDE FOR INJECTION 1 MG/ML
INJECTION, POWDER, LYOPHILIZED, FOR SOLUTION INTRAVENOUS
Status: DISCONTINUED | OUTPATIENT
Start: 2024-01-17 | End: 2024-01-17

## 2024-01-17 RX ORDER — ALBUMIN HUMAN 50 G/1000ML
25 SOLUTION INTRAVENOUS
Status: DISCONTINUED | OUTPATIENT
Start: 2024-01-17 | End: 2024-01-21 | Stop reason: HOSPADM

## 2024-01-17 RX ORDER — DOCUSATE SODIUM 100 MG/1
100 CAPSULE, LIQUID FILLED ORAL 2 TIMES DAILY
Status: DISCONTINUED | OUTPATIENT
Start: 2024-01-18 | End: 2024-01-21 | Stop reason: HOSPADM

## 2024-01-17 RX ORDER — SODIUM BICARBONATE 1 MEQ/ML
SYRINGE (ML) INTRAVENOUS
Status: COMPLETED
Start: 2024-01-17 | End: 2024-01-17

## 2024-01-17 RX ORDER — HEPARIN SODIUM 10000 [USP'U]/ML
INJECTION, SOLUTION INTRAVENOUS; SUBCUTANEOUS
Status: DISCONTINUED | OUTPATIENT
Start: 2024-01-17 | End: 2024-01-17 | Stop reason: HOSPADM

## 2024-01-17 RX ORDER — MIDAZOLAM HYDROCHLORIDE 1 MG/ML
INJECTION INTRAMUSCULAR; INTRAVENOUS
Status: DISCONTINUED | OUTPATIENT
Start: 2024-01-17 | End: 2024-01-17

## 2024-01-17 RX ADMIN — GENTAMICIN SULFATE 160 MG: 40 INJECTION, SOLUTION INTRAMUSCULAR; INTRAVENOUS at 07:01

## 2024-01-17 RX ADMIN — SODIUM PHOSPHATE, MONOBASIC, MONOHYDRATE AND SODIUM PHOSPHATE, DIBASIC, ANHYDROUS 15 MMOL: 142; 276 INJECTION, SOLUTION INTRAVENOUS at 07:01

## 2024-01-17 RX ADMIN — PHENYLEPHRINE HYDROCHLORIDE 0.01 MCG/KG/MIN: 10 INJECTION INTRAVENOUS at 10:01

## 2024-01-17 RX ADMIN — LIDOCAINE HYDROCHLORIDE 100 MG: 20 INJECTION, SOLUTION EPIDURAL; INFILTRATION; INTRACAUDAL; PERINEURAL at 06:01

## 2024-01-17 RX ADMIN — FENTANYL CITRATE 100 MCG: 50 INJECTION, SOLUTION INTRAMUSCULAR; INTRAVENOUS at 06:01

## 2024-01-17 RX ADMIN — SODIUM CHLORIDE: 0.45 INJECTION, SOLUTION INTRAVENOUS at 01:01

## 2024-01-17 RX ADMIN — FIBRINOGEN HUMAN, HUMAN THROMBIN: 90; 500 SOLUTION TOPICAL at 05:01

## 2024-01-17 RX ADMIN — FENTANYL CITRATE 100 MCG: 50 INJECTION, SOLUTION INTRAMUSCULAR; INTRAVENOUS at 11:01

## 2024-01-17 RX ADMIN — EPHEDRINE SULFATE 10 MG: 50 INJECTION INTRAVENOUS at 07:01

## 2024-01-17 RX ADMIN — LIDOCAINE HYDROCHLORIDE 50 MG: 20 INJECTION, SOLUTION EPIDURAL; INFILTRATION; INTRACAUDAL; PERINEURAL at 07:01

## 2024-01-17 RX ADMIN — PROTAMINE SULFATE 400 MG: 10 INJECTION, SOLUTION INTRAVENOUS at 10:01

## 2024-01-17 RX ADMIN — MORPHINE SULFATE 2 MG: 2 INJECTION, SOLUTION INTRAMUSCULAR; INTRAVENOUS at 05:01

## 2024-01-17 RX ADMIN — FENTANYL CITRATE 250 MCG: 50 INJECTION, SOLUTION INTRAMUSCULAR; INTRAVENOUS at 07:01

## 2024-01-17 RX ADMIN — FENTANYL CITRATE 250 MCG: 50 INJECTION, SOLUTION INTRAMUSCULAR; INTRAVENOUS at 08:01

## 2024-01-17 RX ADMIN — INSULIN HUMAN 3 UNITS/HR: 1 INJECTION, SOLUTION INTRAVENOUS at 11:01

## 2024-01-17 RX ADMIN — MUPIROCIN 1 G: 20 OINTMENT TOPICAL at 08:01

## 2024-01-17 RX ADMIN — ROCURONIUM BROMIDE 50 MG: 10 INJECTION, SOLUTION INTRAVENOUS at 07:01

## 2024-01-17 RX ADMIN — INSULIN HUMAN 10 UNITS: 100 INJECTION, SOLUTION PARENTERAL at 06:01

## 2024-01-17 RX ADMIN — SODIUM CHLORIDE: 0.9 INJECTION, SOLUTION INTRAVENOUS at 07:01

## 2024-01-17 RX ADMIN — ALBUMIN (HUMAN) 25 G: 12.5 INJECTION, SOLUTION INTRAVENOUS at 12:01

## 2024-01-17 RX ADMIN — POTASSIUM CHLORIDE 40 MEQ: 14.9 INJECTION, SOLUTION INTRAVENOUS at 12:01

## 2024-01-17 RX ADMIN — VECURONIUM BROMIDE 5 MG: 1 INJECTION, POWDER, LYOPHILIZED, FOR SOLUTION INTRAVENOUS at 09:01

## 2024-01-17 RX ADMIN — AMINOCAPROIC ACID 5 G: 250 INJECTION, SOLUTION INTRAVENOUS at 10:01

## 2024-01-17 RX ADMIN — SUGAMMADEX 283 MG: 100 INJECTION, SOLUTION INTRAVENOUS at 01:01

## 2024-01-17 RX ADMIN — MIDAZOLAM HYDROCHLORIDE 3 MG: 1 INJECTION, SOLUTION INTRAMUSCULAR; INTRAVENOUS at 08:01

## 2024-01-17 RX ADMIN — MIDAZOLAM HYDROCHLORIDE 3 MG: 1 INJECTION, SOLUTION INTRAMUSCULAR; INTRAVENOUS at 09:01

## 2024-01-17 RX ADMIN — MIDAZOLAM HYDROCHLORIDE 2 MG: 1 INJECTION, SOLUTION INTRAMUSCULAR; INTRAVENOUS at 07:01

## 2024-01-17 RX ADMIN — AMINOCAPROIC ACID 5 G: 250 INJECTION, SOLUTION INTRAVENOUS at 07:01

## 2024-01-17 RX ADMIN — CLEVIPIDINE 2 MG/HR: 0.5 EMULSION INTRAVENOUS at 09:01

## 2024-01-17 RX ADMIN — ATORVASTATIN CALCIUM 20 MG: 20 TABLET, FILM COATED ORAL at 08:01

## 2024-01-17 RX ADMIN — ETOMIDATE 20 MG: 2 INJECTION, SOLUTION INTRAVENOUS at 07:01

## 2024-01-17 RX ADMIN — MIDAZOLAM HYDROCHLORIDE 2 MG: 1 INJECTION, SOLUTION INTRAMUSCULAR; INTRAVENOUS at 06:01

## 2024-01-17 RX ADMIN — MORPHINE SULFATE 2 MG: 2 INJECTION, SOLUTION INTRAMUSCULAR; INTRAVENOUS at 10:01

## 2024-01-17 RX ADMIN — POTASSIUM CHLORIDE 20 MEQ: 14.9 INJECTION, SOLUTION INTRAVENOUS at 05:01

## 2024-01-17 RX ADMIN — MAGNESIUM SULFATE HEPTAHYDRATE 2 G: 40 INJECTION, SOLUTION INTRAVENOUS at 05:01

## 2024-01-17 RX ADMIN — SODIUM CHLORIDE, SODIUM LACTATE, POTASSIUM CHLORIDE, AND CALCIUM CHLORIDE: .6; .31; .03; .02 INJECTION, SOLUTION INTRAVENOUS at 07:01

## 2024-01-17 RX ADMIN — SODIUM BICARBONATE 100 MEQ: 84 INJECTION INTRAVENOUS at 01:01

## 2024-01-17 RX ADMIN — DILTIAZEM HYDROCHLORIDE 240 MG: 120 CAPSULE, COATED, EXTENDED RELEASE ORAL at 08:01

## 2024-01-17 RX ADMIN — MIDAZOLAM HYDROCHLORIDE 2 MG: 1 INJECTION, SOLUTION INTRAMUSCULAR; INTRAVENOUS at 11:01

## 2024-01-17 RX ADMIN — CEFAZOLIN 3 G: 330 INJECTION, POWDER, FOR SOLUTION INTRAMUSCULAR; INTRAVENOUS at 11:01

## 2024-01-17 RX ADMIN — HEPARIN SODIUM 45000 UNITS: 1000 INJECTION INTRAVENOUS; SUBCUTANEOUS at 08:01

## 2024-01-17 RX ADMIN — CEFAZOLIN 3 G: 330 INJECTION, POWDER, FOR SOLUTION INTRAMUSCULAR; INTRAVENOUS at 07:01

## 2024-01-17 RX ADMIN — SODIUM CHLORIDE 1 UNITS/HR: 9 INJECTION, SOLUTION INTRAVENOUS at 07:01

## 2024-01-17 RX ADMIN — HYDROCODONE BITARTRATE AND ACETAMINOPHEN 1 TABLET: 5; 325 TABLET ORAL at 08:01

## 2024-01-17 RX ADMIN — CEFAZOLIN SODIUM 2 G: 2 SOLUTION INTRAVENOUS at 09:01

## 2024-01-17 RX ADMIN — SODIUM BICARBONATE 50 MEQ: 84 INJECTION, SOLUTION INTRAVENOUS at 06:01

## 2024-01-17 NOTE — SUBJECTIVE & OBJECTIVE
Interval History:  Patient seen after CABG.  He is intubated and awakens easily.  Off pressors.  Denies pain.       Objective:     Vital Signs (Most Recent):  Temp: 97.1 °F (36.2 °C) (01/17/24 1130)  Pulse: 89 (01/17/24 1312)  Resp: 16 (01/17/24 1312)  BP: (!) 152/72 (01/17/24 0530)  SpO2: 98 % (01/17/24 1312) Vital Signs (24h Range):  Temp:  [97.1 °F (36.2 °C)-98.6 °F (37 °C)] 97.1 °F (36.2 °C)  Pulse:  [56-90] 89  Resp:  [0-29] 16  SpO2:  [94 %-100 %] 98 %  BP: (126-161)/(65-95) 152/72  Arterial Line BP: (102-125)/(51-55) 121/52     Weight: (!) 141.6 kg (312 lb 1.6 oz)  Body mass index is 40.07 kg/m².    Intake/Output Summary (Last 24 hours) at 1/17/2024 1328  Last data filed at 1/17/2024 1115  Gross per 24 hour   Intake 3923.45 ml   Output 1600 ml   Net 2323.45 ml         Physical Exam  GENERAL:  Ventilated, arousable and nods appropriately to questions  HEENT:  EOMI. Conjunctivae intact.  ET tube in place  LUNGS:  No respiratory distress. Clear to auscultation anterolateral fields  CARDIAC:  RRR  Chest: Chest tubes in place  ABDOMEN:  Soft,  Nontender and nondistended, no rebound or guarding, bowel sounds present   EXTREMITIES:  Hands and feet are warm.  Left leg wrapped.  No edema right leg  : Campa with clear urine  SKIN:  Sternotomy site with dressing intact          Significant Labs: All pertinent labs within the past 24 hours have been reviewed.  CBC:   Recent Labs   Lab 01/16/24  0355 01/17/24  0255 01/17/24  0732 01/17/24  1127 01/17/24  1137 01/17/24  1301 01/17/24  1308   WBC 6.86 6.23  --  15.17*  --   --   --    HGB 15.5 15.0  --  12.6*  --   --   --    HCT 47.6 46.0   < > 38.3* 36 40 43    180  --  161  --   --   --     < > = values in this interval not displayed.     CMP:   Recent Labs   Lab 01/16/24  0355 01/17/24  0255 01/17/24  0446 01/17/24  1127   * 136 138 139   K 4.3 5.9* 5.1 3.5  3.5    103 104 108   CO2 21* 28 26 20*   * 255* 239* 335*   BUN 19 18 16 16    CREATININE 0.8 1.2 0.9 0.8   CALCIUM 9.3 9.1 9.3 8.2*   PROT 7.0 6.7  --  5.1*   ALBUMIN 4.2 4.1  --  3.3*   BILITOT 0.6 0.6  --  0.6   ALKPHOS 73 65  --  51*   AST 19 18  --  34   ALT 34 35  --  31   ANIONGAP 9 5* 8 11       Significant Imaging: I have reviewed all pertinent imaging results/findings within the past 24 hours.

## 2024-01-17 NOTE — PROGRESS NOTES
[facility] Medicine  Progress Note    Patient Name: Donald Frey  MRN: [id number]  Patient Class: IP- Inpatient   Admission Date: 1/13/2024  Length of Stay: 3 days  Attending Physician: Adrianna Wilson MD  Primary Care Provider: Radha Pinzon FNP        Subjective:     Principal Problem:Chest pain        HPI:  63 M with HTN, HLD, DM2, CAD status-post MI/PCI in 2013, who was first diagnosed with AF in 1/2022  and  s/p ablation . CHF  came with CP .  His chest pain is more like epigastric area and started this evening and got better with taking antacid med he still came to the hospital.  Currently he is chest pain-free and EKG did not show any ischemia and sinus rhythm with loss of PVC otherwise normal.  He ran out of the Eliquis secondary to insurance issues and currently he is taking aspirin and Plavix.  Normally he takes aspirin and Eliquis in the past.  Chest x-ray negative and cardiac enzymes are negative.  He did echocardiogram with Dr. Xavi fierro about 6 months ago and he was told normal and no recent stress test.    Overview/Hospital Course:  Patient presented with chest pain. [facility] found to have multivessel disease.  Place on nitro drip for chest pain.  Getting CABG 1/17/24.    Interval History:  Patient seen after CABG.  He is intubated and awakens easily.  Off pressors.  Denies pain.       Objective:     Vital Signs (Most Recent):  Temp: 97.1 °F (36.2 °C) (01/17/24 1130)  Pulse: 89 (01/17/24 1312)  Resp: 16 (01/17/24 1312)  BP: (!) 152/72 (01/17/24 0530)  SpO2: 98 % (01/17/24 1312) Vital Signs (24h Range):  Temp:  [97.1 °F (36.2 °C)-98.6 °F (37 °C)] 97.1 °F (36.2 °C)  Pulse:  [56-90] 89  Resp:  [0-29] 16  SpO2:  [94 %-100 %] 98 %  BP: (126-161)/(65-95) 152/72  Arterial Line BP: (102-125)/(51-55) 121/52     Weight: (!) 141.6 kg (312 lb 1.6 oz)  Body mass index is 40.07 kg/m².    Intake/Output Summary (Last 24 hours) at 1/17/2024 1328  Last data filed at 1/17/2024 1115  Gross per  24 hour   Intake 3923.45 ml   Output 1600 ml   Net 2323.45 ml         Physical Exam  GENERAL:  Ventilated, arousable and nods appropriately to questions  HEENT:  EOMI. Conjunctivae intact.  ET tube in place  LUNGS:  No respiratory distress. Clear to auscultation anterolateral fields  CARDIAC:  RRR  Chest: Chest tubes in place  ABDOMEN:  Soft,  Nontender and nondistended, no rebound or guarding, bowel sounds present   EXTREMITIES:  Hands and feet are warm.  Left leg wrapped.  No edema right leg  : Campa with clear urine  SKIN:  Sternotomy site with dressing intact          Significant Labs: All pertinent labs within the past 24 hours have been reviewed.  CBC:   Recent Labs   Lab 01/16/24  0355 01/17/24  0255 01/17/24  0732 01/17/24  1127 01/17/24  1137 01/17/24  1301 01/17/24  1308   WBC 6.86 6.23  --  15.17*  --   --   --    HGB 15.5 15.0  --  12.6*  --   --   --    HCT 47.6 46.0   < > 38.3* 36 40 43    180  --  161  --   --   --     < > = values in this interval not displayed.     CMP:   Recent Labs   Lab 01/16/24  0355 01/17/24  0255 01/17/24  0446 01/17/24  1127   * 136 138 139   K 4.3 5.9* 5.1 3.5  3.5    103 104 108   CO2 21* 28 26 20*   * 255* 239* 335*   BUN 19 18 16 16   CREATININE 0.8 1.2 0.9 0.8   CALCIUM 9.3 9.1 9.3 8.2*   PROT 7.0 6.7  --  5.1*   ALBUMIN 4.2 4.1  --  3.3*   BILITOT 0.6 0.6  --  0.6   ALKPHOS 73 65  --  51*   AST 19 18  --  34   ALT 34 35  --  31   ANIONGAP 9 5* 8 11       Significant Imaging: I have reviewed all pertinent imaging results/findings within the past 24 hours.    Assessment/Plan:    * Chest pain, Multivessel disease  Postop from CABG today.  Intubated.  Off pressors.  Chest tubes in place, management per surgery.        Status post catheter ablation of atrial fibrillation  Monitor telemetry          Hyperlipidemia          Hx of myocardial infarction          Type 2 diabetes mellitus with hyperglycemia, without long-term current use of  insulin  SSI        Essential hypertension  monitor    VTE Risk Mitigation (From admission, onward)           Ordered     IP VTE LOW RISK PATIENT  Once         01/17/24 1127                    Discharge Planning   ROBINSON: 1/22/2024     Code Status: Full Code   Is the patient medically ready for discharge?:     Reason for patient still in hospital (select all that apply): Treatment  Discharge Plan A: Home with family              Adrianna Wilson MD  Department of Hospital Medicine   [facility]

## 2024-01-17 NOTE — ANESTHESIA PROCEDURE NOTES
Arterial    Diagnosis: Coronary artery disease    Patient location during procedure: ICU  Timeout: 1/17/2024 6:40 AM  Procedure end time: 1/17/2024 6:50 AM    Staffing  Authorizing Provider: Mark Shahid MD  Performing Provider: Mark Shahid MD    Staffing  Performed by: Mark Shahid MD  Authorized by: Mark Shahid MD    Anesthesiologist was present at the time of the procedure.    Preanesthetic Checklist  Completed: patient identified, IV checked, site marked, risks and benefits discussed, surgical consent, monitors and equipment checked, pre-op evaluation, timeout performed and anesthesia consent givenArterial  Skin Prep: chlorhexidine gluconate  Local Infiltration: lidocaine  Orientation: right  Location: radial    Catheter Size: 20 G  Catheter placement by Ultrasound guidance. Heme positive aspiration all ports.   Vessel Caliber: medium, patent, compressibility normal  Vascular Doppler:  not done  Needle advanced into vessel with real time Ultrasound guidance.  Sterile sheath used.Insertion Attempts: 1  Assessment  Dressing: sutured in place and taped, tegaderm and steri-strips  Patient: Tolerated well  Additional Notes  No apparent complications.

## 2024-01-17 NOTE — OP NOTE
This is Dr. Coreas dictating with date of service being 17th January 2024.    Preoperative diagnosis:  Severe atherosclerotic coronary artery disease with unstable angina.    Postoperative diagnosis:  Same.    Operation:  Coronary artery bypass grafting x4 using left internal mammary artery to left anterior descending coronary artery and separate segments of saphenous vein from the aorta to the posterior descending coronary artery and from the aorta to the posterolateral branch of the circumflex coronary artery and left radial artery from the aorta to the obtuse marginal coronary artery using a combination of antegrade and retrograde cardioplegia, mild systemic hypothermia, endoscopic left radial artery harvest, endoscopic left saphenous vein harvest, and insertion of right femoral arterial line with percutaneous Seldinger technique.    Operation in detail:  The patient was prepped and draped in usual sterile fashion.  The femoral arterial line was placed with percutaneous Seldinger technique.  The femoral artery was punctured and a J-wire advanced.  A sheath and dilator were placed over the wire into the vessel.  The dilator and wire were removed leaving the sheath.  The sheath was secured to skin with interrupted silk suture as it was heparinized.    The left saphenous vein was harvested with the [employer] endoscopic harvest system.  The side branches were initially controlled with electrocautery.  The vein was excised and retrogradely flushed and found to be satisfactory.  The vein branches were reinforced with hemoclips and silk ties.  The leg was closed in a single layer of the subcutaneous tissue with running 2 0 Monocryl stitch and the skin was closed with running 3-0 subcuticular Monocryl stitch.    The left radial artery was harvested through a short longitudinal incision over the pedicle just proximal to the wrist.  A tunnel was created through which the pedicle was dissected up to the antecubital fossa.  A  counter incision was made at this point through which the pedicle was clamped and transected.  The proximal vessel was clipped and oversewn.  The pedicle was pulled through the tunnel down to the incision at the wrist.  The pedicle here was clamped and transected.  The artery was flushed with a nitroglycerin and Cardene solution.  The distal artery was oversewn with 5 0 Prolene suture and clipped.    The arm was closed in a single layer of the subcutaneous tissue with running 2 0 Monocryl stitch and the skin was closed with running 3-0 subcuticular Monocryl stitch.    The chest was opened through median sternotomy and the left internal mammary artery was harvested with the cautery and hemoclips.  After heparinization the mammary artery was divided distally and found have satisfactory flow.    The pericardium was incised and retraction sutures placed.  The patient was cannulated using the aorta and right atrial appendage in two-stage cannula for venous drainage.  Antegrade and retrograde cardioplegia lines were placed in the aorta and right atrial free wall and into the coronary sinus respectively.  The patient was placed on cardiopulmonary bypass and cooled to systemic moderate hypothermia.  The cross-clamp was applied and heart arrested initially with antegrade cardioplegia and then intermittently with retrograde cardioplegia.  Once the heart was satisfactorily arrested the bypasses were then done.  Saphenous vein was sewn in an end-to-side fashion to the posterior descending coronary artery with running 7 0 Prolene suture.  The graft was measured to length and cut and sewn proximally to the aorta in an end-to-side fashion with running 6 0 Prolene suture.    The vein was then next sewn in end-to-side fashion to the posterolateral branch of the circumflex coronary artery with running 7 0 Prolene suture.  The graft was measured to length and cut and sewn proximally to the aorta in an end-to-side fashion with running  6 0 Prolene suture.    The left radial artery was sewn in an end-to-side fashion to the obtuse marginal coronary artery with running 7 0 Prolene suture.  The graft was measured to length and cut and then sewn to the knott of the vein graft that proceeded to the circumflex system.  This was done in end-to-side fashion with running 7 0 Prolene suture.    The left internal mammary artery was sewn to the left anterior descending coronary artery in an end-to-side fashion with running 7 0 Prolene suture.  Flow was checked and seemed to be satisfactory.  The pedicle was fixed to the epicardium with interrupted 6 0 Prolene suture.  A small amount of Tisseel coagulant was placed over the distal anastomoses.    The patient was rewarming during this time and the cross-clamp released.  The patient was weaned from cardiopulmonary bypass once satisfactory hemodynamics were maintained and rewarming complete.    Chest tubes were inserted into the left hemithorax and 2 into the anterior mediastinum through separate stab wounds and secured to skin with 0 silk suture.  Two pacing wires were placed on the right ventricle and brought out through separate stab wounds and secured to skin with 2-0 silk suture.    Once the patient was doing well decannulation was performed and the sites secured with their respective pursestrings and oversewn with Prolene suture.    Closure of the sternum was done with interrupted stainless steel wire.  The presternal fascia and subcutaneous tissues were closed with running 0 and 2-0 Monocryl stitch respectively.  The skin was closed with running 3 0 subcuticular Monocryl stitch.    Overall patient tolerated the procedure well and there appeared to be no major obvious intraoperative complications.  Estimated blood loss was 500 cc.  The patient was brought to the ICU in satisfactory condition.

## 2024-01-17 NOTE — CARE UPDATE
01/17/24 1136   PRE-TX-O2   Device (Oxygen Therapy) ventilator   $ Is the patient on High Flow Oxygen? Yes   Oxygen Concentration (%) 100   SpO2 99 %   Pulse Oximetry Type Continuous   $ Pulse Oximetry - Multiple Charge Pulse Oximetry - Multiple   Pulse 82   Resp 16        Airway - Non-Surgical 01/17/24 0716   Placement Date/Time: 01/17/24 (c) 0716   Method of Intubation: Video Laryngoscopy  Mask Ventilation: Easy  Intubated: Postinduction  Blade: Cruz #3  Airway Device Size: 7.5  Placement Verified By: Capnometry  Complicating Factors: None  Findings Po...   Secured at 25 cm   Measured At Lips   Secured Location Center   Secured by Commercial tube weber   Vent Select   Does the patient have an artificial airway? Yes   $ Ventilator Initial 1   Charged w/in last 24h YES   Preset Conventional Ventilator Settings   Vent Type    Ventilation Type VC   Vent Mode SIMV   Set Rate 15 BPM   Vt Set 650 mL   PEEP/CPAP 5 cmH20   Pressure Support 10 cmH20   Peak Flow 60 L/min   Peak End Inspiratory Pressure 15 cmH20   Insp Rise Time  70 %   I-Trigger Type  V-TRIG   Trigger Sensitivity Flow/I-Trigger 3 L/min   Patient Ventilator Parameters   Resp Rate Total 22 br/min   Peak Airway Pressure 19 cmH20   Mean Airway Pressure 8.9 cmH20   Plateau Pressure 0 cmH20   Exhaled Vt 575 mL   Total Ve 13.8 L/m   Spont Ve 6.36 L   I:E Ratio Measured 1:2.60   Auto PEEP 0 cmH20   Inspired Tidal Volume (VTI) 0 mL   Conventional Ventilator Alarms   Resp Rate High Alarm 50 br/min   Press High Alarm 60 cmH2O   Apnea Rate 18   Apnea Volume (mL) 0 mL   Apnea Oxygen Concentration  100   Apnea Flow Rate (L/min) 60   T Apnea 20 sec(s)   Ready to Wean/Extubation Screen   FIO2<=50 (chart decimal) (!) 1   MV<16L (chart vol.) 13.8   PEEP <=8 (chart #) 5   Ready to Wean Parameters   F/VT Ratio<105 (RSBI) (!) 27.83   Vital Capacity   Vital Capacity (mL) 0

## 2024-01-17 NOTE — HOSPITAL COURSE
Patient presented with chest pain. [facility] found to have multivessel disease.  Place on nitro drip for chest pain.  Getting CABG 1/17/24. PaBP control. Patient was on Claviprex and insulin drip post op for glycemic control and BP control. Patient was started on Coreg and Lisinopril. Lasix continued on IV form 40 mg daily, amiodarone was continued. BP is now controlled. Patient was bridged with Levemire and dose increased to 25 mg BID with Novolog 8 units TID and sliding scale to achieve glycemic control. On discharge his metformin and Glipizide was resumed and Insulin doses decreased to 20 units BID and 5 units of Novolog. Patient does have glucometer at home and advised to check glucose prior to each insulin dose. Routine post op care continued, mediastinal and chest tubes removed. Sternotomy incision healing. Patient cleared to discharge from CTS stand point.

## 2024-01-17 NOTE — CARE UPDATE
01/16/24 2200   Education   $ Education DME Oxygen;15 min        01/16/24 2200   Education   $ Education DME Oxygen;15 min

## 2024-01-17 NOTE — NURSING
Nurses Note -- 4 Eyes      1/17/2024   2:37 AM      Skin assessed during: Transfer      [x] No Altered Skin Integrity Present    []Prevention Measures Documented      [] Yes- Altered Skin Integrity Present or Discovered   [] LDA Added if Not in Epic (Describe Wound)   [] New Altered Skin Integrity was Present on Admit and Documented in LDA   [] Wound Image Taken    Wound Care Consulted? No    Attending Nurse:  Ori Vergara RN/Staff Member:  KUSHAL Duggan

## 2024-01-17 NOTE — NURSING
Patient transferred via bed to ICU for CABG in the AM. Nitro gtt still running. Pt belongings and medicine brought with pt. Report called to KUSHAL Rehman.

## 2024-01-17 NOTE — TRANSFER OF CARE
"Anesthesia Transfer of Care Note    Patient: Donald Frey    Procedure(s) Performed: Procedure(s) (LRB):  CORONARY ARTERY BYPASS GRAFT (CABG) (N/A)  SURGICAL PROCUREMENT,ARTERY,RADIAL,FOR CABG, ENDOSCOPIC (Left)  SURGICAL PROCUREMENT, VEIN, ENDOSCOPIC (Left)    Patient location: ICU    Anesthesia Type: general    Transport from OR: Transported from OR intubated on 100% O2 by AMBU with adequate controlled ventilation. Continuous ECG monitoring in transport. Continuous SpO2 monitoring in transport. Continuos invasive BP monitoring in transport. Continuous CVP monitoring in transport    Post pain: adequate analgesia    Post assessment: no apparent anesthetic complications and tolerated procedure well    Post vital signs: stable    Level of consciousness: sedated    Nausea/Vomiting: no nausea/vomiting    Complications: none    Transfer of care protocol was followed      Last vitals: Visit Vitals  BP (!) 152/72   Pulse 67   Temp 36.9 °C (98.4 °F)   Resp 20   Ht 6' 2" (1.88 m)   Wt (!) 141.6 kg (312 lb 1.6 oz)   SpO2 97%   BMI 40.07 kg/m²     "

## 2024-01-17 NOTE — NURSING
Notified Dr. Long of pt potassium of 5.9. New orders to draw STAT BMP to confirm before shifting potassium. Will continue to monitor.

## 2024-01-17 NOTE — ANESTHESIA PROCEDURE NOTES
Intubation    Date/Time: 1/17/2024 7:16 AM    Performed by: Raul Anderson CRNA  Authorized by: Mark Shahid MD    Intubation:     Induction:  Intravenous    Intubated:  Postinduction    Mask Ventilation:  Easy mask    Attempts:  1    Attempted By:  CRNA    Method of Intubation:  Video laryngoscopy    Blade:  Cruz 3    Laryngeal View Grade: Grade I - full view of cords      Difficult Airway Encountered?: No      Complications:  None    Airway Device:  Oral endotracheal tube    Airway Device Size:  7.5    Style/Cuff Inflation:  Cuffed    Inflation Amount (mL):  8    Tube secured:  24    Secured at:  The lips    Placement Verified By:  Capnometry    Complicating Factors:  None    Findings Post-Intubation:  BS equal bilateral and atraumatic/condition of teeth unchanged

## 2024-01-17 NOTE — PROGRESS NOTES
[facility] Medicine  Progress Note    Patient Name: Donald Frey  MRN: [id number]  Patient Class: IP- Inpatient   Admission Date: 1/13/2024  Length of Stay: 3 days  Attending Physician: Rhona Encarnacion MD  Primary Care Provider: Radha Pinzon FNP        Subjective:     Principal Problem:Chest pain        HPI:  63 M with HTN, HLD, DM2, CAD status-post MI/PCI in 2013, who was first diagnosed with AF in 1/2022  and  s/p ablation . CHF  came with CP .  His chest pain is more like epigastric area and started this evening and got better with taking antacid med he still came to the hospital.  Currently he is chest pain-free and EKG did not show any ischemia and sinus rhythm with loss of PVC otherwise normal.  He ran out of the Eliquis secondary to insurance issues and currently he is taking aspirin and Plavix.  Normally he takes aspirin and Eliquis in the past.  Chest x-ray negative and cardiac enzymes are negative.  He did echocardiogram with Dr. Xavi fierro about 6 months ago and he was told normal and no recent stress test.    Overview/Hospital Course:  Patient with C found to have multivessel disease. Plan for C 1/17/24.    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 EP LAB;  Service: Cardiology;  Laterality: N/A;  AF, ARMANDO(Cx if SR), PVI, RFA, CARTO, GEN, GP, 3 PREP    ANGIOGRAM, CORONARY, WITH LEFT HEART CATHETERIZATION N/A 1/15/2024    Procedure: Angiogram, Coronary, with Left Heart Cath;  Surgeon:  Samir Emmanuel MD;  Location: [facility] CATH/EP LAB;  Service: Cardiology;  Laterality: N/A;    ARTHROPLASTY OF SHOULDER Right 11/13/2023    Procedure: ARTHROPLASTY, SHOULDER, RIGHT;  Surgeon: Branden Hernandez MD;  Location: [facility] OR;  Service: Orthopedics;  Laterality: Right;  DJO    CORONARY STENT PLACEMENT  2013    TRANSESOPHAGEAL ECHOCARDIOGRAPHY N/A 7/7/2022    Procedure: ECHOCARDIOGRAM, TRANSESOPHAGEAL;  Surgeon: Conor Chappell MD;  Location: [facility] EP LAB;  Service: Cardiology;  Laterality: N/A;    TREATMENT OF CARDIAC ARRHYTHMIA N/A 3/7/2022    Procedure: CARDIOVERSION;  Surgeon: Gomez Orellana MD;  Location: [facility] CATH/EP LAB;  Service: Cardiology;  Laterality: N/A;    TREATMENT OF CARDIAC ARRHYTHMIA  7/7/2022    Procedure: Cardioversion or Defibrillation;  Surgeon: Conor Chappell MD;  Location: [facility] EP LAB;  Service: Cardiology;;       Review of patient's allergies indicates:  No Known Allergies    Current Facility-Administered Medications on File Prior to Encounter   Medication    electrolyte-S (ISOLYTE)    fentaNYL 50 mcg/mL injection 25 mcg    lactated ringers infusion    LIDOcaine (PF) 10 mg/ml (1%) injection 10 mg    oxyCODONE immediate release tablet 5 mg     Current Outpatient Medications on File Prior to Encounter   Medication Sig    amiodarone (PACERONE) 200 MG Tab Take 1 tablet (200 mg total) by mouth once daily. (Patient taking differently: Take 100 mg by mouth once daily.)    apixaban (ELIQUIS) 5 mg Tab Take 1 tablet (5 mg total) by mouth 2 (two) times daily.    diltiaZEM (CARDIZEM CD) 240 MG 24 hr capsule Take 1 capsule (240 mg total) by mouth 2 (two) times a day.    furosemide (LASIX) 20 MG tablet Take 1 tablet (20 mg total) by mouth 2 (two) times daily.    glipiZIDE (GLUCOTROL) 5 MG tablet Take 1 tablet (5 mg total) by mouth 2 (two) times daily with meals.    lisinopriL (PRINIVIL,ZESTRIL) 20 MG tablet Take 1 tablet (20 mg total) by mouth once daily.    metFORMIN (GLUCOPHAGE) 1000 MG tablet Take 1  tablet (1,000 mg total) by mouth 2 (two) times daily with meals.    rosuvastatin (CRESTOR) 20 MG tablet Take 20 mg by mouth once daily.    dulaglutide (TRULICITY) 0.75 mg/0.5 mL pen injector Inject 0.75 mg into the skin every 7 days. (Patient taking differently: Inject 0.75 mg into the skin every 7 days. NEW Rx - NOT YET STARTED)     Family History       Problem Relation (Age of Onset)    Cancer Father    Heart attack Father          Tobacco Use    Smoking status: Never    Smokeless tobacco: Never   Substance and Sexual Activity    Alcohol use: Yes     Comment: occ    Drug use: Never    Sexual activity: Not on file     Review of Systems   Constitutional:  Negative for activity change, appetite change and diaphoresis.   HENT:  Negative for congestion, facial swelling and sinus pressure.    Respiratory:  Positive for chest tightness. Negative for shortness of breath and wheezing.    Cardiovascular:  Negative for chest pain and palpitations.   Gastrointestinal:  Negative for abdominal distention and abdominal pain.   Genitourinary:  Negative for dysuria.   Skin:  Negative for color change and pallor.   Neurological:  Negative for dizziness, facial asymmetry, speech difficulty and headaches.   Psychiatric/Behavioral:  Negative for agitation and confusion.      Objective:     Vital Signs (Most Recent):  Temp: 98.4 °F (36.9 °C) (01/17/24 0101)  Pulse: (!) 56 (01/17/24 0130)  Resp: 16 (01/17/24 0130)  BP: 126/65 (01/17/24 0130)  SpO2: (!) 94 % (01/17/24 0130) Vital Signs (24h Range):  Temp:  [97.9 °F (36.6 °C)-98.6 °F (37 °C)] 98.4 °F (36.9 °C)  Pulse:  [56-77] 56  Resp:  [12-23] 16  SpO2:  [93 %-99 %] 94 %  BP: (126-160)/(65-95) 126/65     Weight: (!) 141.6 kg (312 lb 1.6 oz)  Body mass index is 40.07 kg/m².     Physical Exam  Constitutional:       General: He is not in acute distress.     Appearance: He is well-developed.   HENT:      Head: Normocephalic.   Eyes:      Pupils: Pupils are equal, round, and reactive to  light.   Cardiovascular:      Rate and Rhythm: Normal rate and regular rhythm.   Pulmonary:      Effort: Pulmonary effort is normal. No respiratory distress.   Abdominal:      General: Abdomen is flat. There is no distension.      Tenderness: There is no abdominal tenderness.   Musculoskeletal:         General: Normal range of motion.      Cervical back: Neck supple.   Skin:     General: Skin is warm.      Findings: No rash.   Neurological:      Mental Status: He is alert and oriented to person, place, and time.              CRANIAL NERVES     CN III, IV, VI   Pupils are equal, round, and reactive to light.       Significant Labs: All pertinent labs within the past 24 hours have been reviewed.  BMP:   Recent Labs   Lab 01/16/24  0355   *   *   K 4.3      CO2 21*   BUN 19   CREATININE 0.8   CALCIUM 9.3   MG 1.9       CBC:   Recent Labs   Lab 01/15/24  0401 01/16/24  0355   WBC 6.18 6.86   HGB 15.5 15.5   HCT 47.3 47.6    180       CMP:   Recent Labs   Lab 01/15/24  0800 01/16/24  0355    135*   K 4.1 4.3    105   CO2 22* 21*   * 261*   BUN 18 19   CREATININE 0.8 0.8   CALCIUM 8.9 9.3   PROT 6.6 7.0   ALBUMIN 3.9 4.2   BILITOT 0.5 0.6   ALKPHOS 64 73   AST 17 19   ALT 23 34   ANIONGAP 9 9         Significant Imaging: I have reviewed all pertinent imaging results/findings within the past 24 hours.    Assessment/Plan:      * Chest pain  Possible GI origin   Trend cardiac enz  Lexiscan stress test tomorrow   Cardiology Cx if needed   Resume home meds including eliquis   Asa]  Statin       Status post catheter ablation of atrial fibrillation  aware      Persistent atrial fibrillation  Currently sinus   Home meds.    Hyperlipidemia  aware      Hx of myocardial infarction  Aware. Home meds      Type 2 diabetes mellitus with hyperglycemia, without long-term current use of insulin  SSI      Essential hypertension  Chronic, uncontrolled. Latest blood pressure and vitals reviewed-      Temp:  [98.2 °F (36.8 °C)]   Pulse:  [81-99]   Resp:  [17-19]   BP: (174-214)/()   SpO2:  [96 %-99 %] .   Home meds for hypertension were reviewed and noted below.   Hypertension Medications               diltiaZEM (CARDIZEM CD) 240 MG 24 hr capsule Take 1 capsule (240 mg total) by mouth 2 (two) times a day.    furosemide (LASIX) 20 MG tablet Take 1 tablet (20 mg total) by mouth 2 (two) times daily.    lisinopriL (PRINIVIL,ZESTRIL) 20 MG tablet Take 1 tablet (20 mg total) by mouth once daily.            While in the hospital, will manage blood pressure as follows;     Will utilize p.r.n. blood pressure medication only if patient's blood pressure greater than 160/100 and he develops symptoms such as worsening chest pain or shortness of breath.      VTE Risk Mitigation (From admission, onward)           Ordered     lactated ringers 1,000 mL with heparin (porcine) 6,000 Units irrigation  Once         01/16/24 2001     lactated ringers 1,000 mL with heparin (porcine) 6,000 Units irrigation  Once         01/16/24 2001     IP VTE HIGH RISK PATIENT  Once         01/14/24 0026     Place sequential compression device  Until discontinued         01/14/24 0026                    Discharge Planning   ROBINSON: 1/22/2024     Code Status: Full Code   Is the patient medically ready for discharge?:     Reason for patient still in hospital (select all that apply): Patient trending condition and Treatment  Discharge Plan A: Home with family            Critical care time spent on the evaluation and treatment of severe organ dysfunction, review of pertinent labs and imaging studies, discussions with consulting providers and discussions with patient/family: 30 minutes.      Rhona Encarnacion MD  Department of Hospital Medicine   [facility]

## 2024-01-17 NOTE — SUBJECTIVE & OBJECTIVE
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 01/13/2022    EYE EXAM  DR KATHY PARMAR    Coronary artery disease     1 coronary stent - Dr Orellana    Diabetes mellitus, type 2     Hypertension     Myocardial infarction 2013       Past Surgical History:   Procedure Laterality Date    ABLATION OF ARRHYTHMOGENIC FOCUS FOR ATRIAL FIBRILLATION N/A 7/7/2022    Procedure: ABLATION, ARRHYTHMOGENIC FOCUS, FOR ATRIAL FIBRILLATION;  Surgeon: Niko Pacheco MD;  Location: [facility] EP LAB;  Service: Cardiology;  Laterality: N/A;  AF, ARMANDO(Cx if SR), PVI, RFA, CARTO, GEN, GP, 3 PREP    ANGIOGRAM, CORONARY, WITH LEFT HEART CATHETERIZATION N/A 1/15/2024    Procedure: Angiogram, Coronary, with Left Heart Cath;  Surgeon: Samir Emmanuel MD;  Location: [facility] CATH/EP LAB;  Service: Cardiology;  Laterality: N/A;    ARTHROPLASTY OF SHOULDER Right 11/13/2023    Procedure: ARTHROPLASTY, SHOULDER, RIGHT;  Surgeon: Branden Hernandez MD;  Location: [facility] OR;  Service: Orthopedics;  Laterality: Right;  DJO    CORONARY STENT PLACEMENT  2013    TRANSESOPHAGEAL ECHOCARDIOGRAPHY N/A 7/7/2022    Procedure: ECHOCARDIOGRAM, TRANSESOPHAGEAL;  Surgeon: Conor Chappell MD;  Location: [facility] EP LAB;  Service: Cardiology;  Laterality: N/A;    TREATMENT OF CARDIAC ARRHYTHMIA N/A 3/7/2022    Procedure: CARDIOVERSION;  Surgeon: Gomez Orellana MD;  Location: [facility] CATH/EP LAB;  Service: Cardiology;  Laterality: N/A;    TREATMENT OF CARDIAC ARRHYTHMIA  7/7/2022    Procedure: Cardioversion or Defibrillation;  Surgeon: Conor Chappell MD;  Location: [facility] EP LAB;  Service: Cardiology;;       Review of patient's allergies indicates:  No Known Allergies    Current Facility-Administered Medications on File Prior to Encounter   Medication    electrolyte-S (ISOLYTE)    fentaNYL 50 mcg/mL injection 25 mcg    lactated ringers infusion    LIDOcaine (PF) 10 mg/ml (1%) injection 10 mg    oxyCODONE immediate release  tablet 5 mg     Current Outpatient Medications on File Prior to Encounter   Medication Sig    amiodarone (PACERONE) 200 MG Tab Take 1 tablet (200 mg total) by mouth once daily. (Patient taking differently: Take 100 mg by mouth once daily.)    apixaban (ELIQUIS) 5 mg Tab Take 1 tablet (5 mg total) by mouth 2 (two) times daily.    diltiaZEM (CARDIZEM CD) 240 MG 24 hr capsule Take 1 capsule (240 mg total) by mouth 2 (two) times a day.    furosemide (LASIX) 20 MG tablet Take 1 tablet (20 mg total) by mouth 2 (two) times daily.    glipiZIDE (GLUCOTROL) 5 MG tablet Take 1 tablet (5 mg total) by mouth 2 (two) times daily with meals.    lisinopriL (PRINIVIL,ZESTRIL) 20 MG tablet Take 1 tablet (20 mg total) by mouth once daily.    metFORMIN (GLUCOPHAGE) 1000 MG tablet Take 1 tablet (1,000 mg total) by mouth 2 (two) times daily with meals.    rosuvastatin (CRESTOR) 20 MG tablet Take 20 mg by mouth once daily.    dulaglutide (TRULICITY) 0.75 mg/0.5 mL pen injector Inject 0.75 mg into the skin every 7 days. (Patient taking differently: Inject 0.75 mg into the skin every 7 days. NEW Rx - NOT YET STARTED)     Family History       Problem Relation (Age of Onset)    Cancer Father    Heart attack Father          Tobacco Use    Smoking status: Never    Smokeless tobacco: Never   Substance and Sexual Activity    Alcohol use: Yes     Comment: occ    Drug use: Never    Sexual activity: Not on file     Review of Systems   Constitutional:  Negative for activity change, appetite change and diaphoresis.   HENT:  Negative for congestion, facial swelling and sinus pressure.    Respiratory:  Positive for chest tightness. Negative for shortness of breath and wheezing.    Cardiovascular:  Negative for chest pain and palpitations.   Gastrointestinal:  Negative for abdominal distention and abdominal pain.   Genitourinary:  Negative for dysuria.   Skin:  Negative for color change and pallor.   Neurological:  Negative for dizziness, facial  asymmetry, speech difficulty and headaches.   Psychiatric/Behavioral:  Negative for agitation and confusion.      Objective:     Vital Signs (Most Recent):  Temp: 98.4 °F (36.9 °C) (01/17/24 0101)  Pulse: (!) 56 (01/17/24 0130)  Resp: 16 (01/17/24 0130)  BP: 126/65 (01/17/24 0130)  SpO2: (!) 94 % (01/17/24 0130) Vital Signs (24h Range):  Temp:  [97.9 °F (36.6 °C)-98.6 °F (37 °C)] 98.4 °F (36.9 °C)  Pulse:  [56-77] 56  Resp:  [12-23] 16  SpO2:  [93 %-99 %] 94 %  BP: (126-160)/(65-95) 126/65     Weight: (!) 141.6 kg (312 lb 1.6 oz)  Body mass index is 40.07 kg/m².     Physical Exam  Constitutional:       General: He is not in acute distress.     Appearance: He is well-developed.   HENT:      Head: Normocephalic.   Eyes:      Pupils: Pupils are equal, round, and reactive to light.   Cardiovascular:      Rate and Rhythm: Normal rate and regular rhythm.   Pulmonary:      Effort: Pulmonary effort is normal. No respiratory distress.   Abdominal:      General: Abdomen is flat. There is no distension.      Tenderness: There is no abdominal tenderness.   Musculoskeletal:         General: Normal range of motion.      Cervical back: Neck supple.   Skin:     General: Skin is warm.      Findings: No rash.   Neurological:      Mental Status: He is alert and oriented to person, place, and time.              CRANIAL NERVES     CN III, IV, VI   Pupils are equal, round, and reactive to light.       Significant Labs: All pertinent labs within the past 24 hours have been reviewed.  BMP:   Recent Labs   Lab 01/16/24  0355   *   *   K 4.3      CO2 21*   BUN 19   CREATININE 0.8   CALCIUM 9.3   MG 1.9       CBC:   Recent Labs   Lab 01/15/24  0401 01/16/24  0355   WBC 6.18 6.86   HGB 15.5 15.5   HCT 47.3 47.6    180       CMP:   Recent Labs   Lab 01/15/24  0800 01/16/24  0355    135*   K 4.1 4.3    105   CO2 22* 21*   * 261*   BUN 18 19   CREATININE 0.8 0.8   CALCIUM 8.9 9.3   PROT 6.6 7.0    ALBUMIN 3.9 4.2   BILITOT 0.5 0.6   ALKPHOS 64 73   AST 17 19   ALT 23 34   ANIONGAP 9 9         Significant Imaging: I have reviewed all pertinent imaging results/findings within the past 24 hours.

## 2024-01-18 LAB
ALBUMIN SERPL BCP-MCNC: 4.1 G/DL (ref 3.5–5.2)
ALP SERPL-CCNC: 48 U/L (ref 55–135)
ALT SERPL W/O P-5'-P-CCNC: 36 U/L (ref 10–44)
ANION GAP SERPL CALC-SCNC: 8 MMOL/L (ref 8–16)
AST SERPL-CCNC: 35 U/L (ref 10–40)
BILIRUB SERPL-MCNC: 0.5 MG/DL (ref 0.1–1)
BUN SERPL-MCNC: 15 MG/DL (ref 8–23)
CALCIUM SERPL-MCNC: 8.5 MG/DL (ref 8.7–10.5)
CHLORIDE SERPL-SCNC: 111 MMOL/L (ref 95–110)
CO2 SERPL-SCNC: 24 MMOL/L (ref 23–29)
CREAT SERPL-MCNC: 0.8 MG/DL (ref 0.5–1.4)
ERYTHROCYTE [DISTWIDTH] IN BLOOD BY AUTOMATED COUNT: 14.6 % (ref 11.5–14.5)
EST. GFR  (NO RACE VARIABLE): >60 ML/MIN/1.73 M^2
GLUCOSE SERPL-MCNC: 101 MG/DL (ref 70–110)
GLUCOSE SERPL-MCNC: 110 MG/DL (ref 70–110)
GLUCOSE SERPL-MCNC: 118 MG/DL (ref 70–110)
GLUCOSE SERPL-MCNC: 127 MG/DL (ref 70–110)
GLUCOSE SERPL-MCNC: 139 MG/DL (ref 70–110)
GLUCOSE SERPL-MCNC: 145 MG/DL (ref 70–110)
GLUCOSE SERPL-MCNC: 163 MG/DL (ref 70–110)
GLUCOSE SERPL-MCNC: 372 MG/DL (ref 70–110)
GLUCOSE SERPL-MCNC: 405 MG/DL (ref 70–110)
GLUCOSE SERPL-MCNC: 97 MG/DL (ref 70–110)
HCT VFR BLD AUTO: 42.7 % (ref 40–54)
HGB BLD-MCNC: 14.3 G/DL (ref 14–18)
MAGNESIUM SERPL-MCNC: 2.1 MG/DL (ref 1.6–2.6)
MCH RBC QN AUTO: 28.3 PG (ref 27–31)
MCHC RBC AUTO-ENTMCNC: 33.5 G/DL (ref 32–36)
MCV RBC AUTO: 84 FL (ref 82–98)
PHOSPHATE SERPL-MCNC: 2.6 MG/DL (ref 2.7–4.5)
PLATELET # BLD AUTO: 172 K/UL (ref 150–450)
PMV BLD AUTO: 10.9 FL (ref 9.2–12.9)
POTASSIUM SERPL-SCNC: 4.2 MMOL/L (ref 3.5–5.1)
PROT SERPL-MCNC: 6.2 G/DL (ref 6–8.4)
RBC # BLD AUTO: 5.06 M/UL (ref 4.6–6.2)
SODIUM SERPL-SCNC: 143 MMOL/L (ref 136–145)
WBC # BLD AUTO: 16.49 K/UL (ref 3.9–12.7)

## 2024-01-18 PROCEDURE — 94761 N-INVAS EAR/PLS OXIMETRY MLT: CPT

## 2024-01-18 PROCEDURE — 25000003 PHARM REV CODE 250: Performed by: INTERNAL MEDICINE

## 2024-01-18 PROCEDURE — C9248 INJ, CLEVIDIPINE BUTYRATE: HCPCS | Performed by: HOSPITALIST

## 2024-01-18 PROCEDURE — 63600175 PHARM REV CODE 636 W HCPCS: Performed by: THORACIC SURGERY (CARDIOTHORACIC VASCULAR SURGERY)

## 2024-01-18 PROCEDURE — 85027 COMPLETE CBC AUTOMATED: CPT | Performed by: THORACIC SURGERY (CARDIOTHORACIC VASCULAR SURGERY)

## 2024-01-18 PROCEDURE — 93005 ELECTROCARDIOGRAM TRACING: CPT | Performed by: INTERNAL MEDICINE

## 2024-01-18 PROCEDURE — 63600175 PHARM REV CODE 636 W HCPCS: Performed by: HOSPITALIST

## 2024-01-18 PROCEDURE — 84100 ASSAY OF PHOSPHORUS: CPT | Performed by: THORACIC SURGERY (CARDIOTHORACIC VASCULAR SURGERY)

## 2024-01-18 PROCEDURE — 99024 POSTOP FOLLOW-UP VISIT: CPT | Mod: ,,, | Performed by: PHYSICIAN ASSISTANT

## 2024-01-18 PROCEDURE — 99900035 HC TECH TIME PER 15 MIN (STAT)

## 2024-01-18 PROCEDURE — 27000221 HC OXYGEN, UP TO 24 HOURS

## 2024-01-18 PROCEDURE — 94799 UNLISTED PULMONARY SVC/PX: CPT | Mod: XB

## 2024-01-18 PROCEDURE — 80053 COMPREHEN METABOLIC PANEL: CPT | Performed by: THORACIC SURGERY (CARDIOTHORACIC VASCULAR SURGERY)

## 2024-01-18 PROCEDURE — 25000003 PHARM REV CODE 250: Performed by: PHYSICIAN ASSISTANT

## 2024-01-18 PROCEDURE — 83735 ASSAY OF MAGNESIUM: CPT | Performed by: THORACIC SURGERY (CARDIOTHORACIC VASCULAR SURGERY)

## 2024-01-18 PROCEDURE — 20000000 HC ICU ROOM

## 2024-01-18 PROCEDURE — 25000003 PHARM REV CODE 250: Performed by: THORACIC SURGERY (CARDIOTHORACIC VASCULAR SURGERY)

## 2024-01-18 PROCEDURE — 63600175 PHARM REV CODE 636 W HCPCS: Performed by: INTERNAL MEDICINE

## 2024-01-18 PROCEDURE — 25000003 PHARM REV CODE 250: Performed by: HOSPITALIST

## 2024-01-18 PROCEDURE — 93010 ELECTROCARDIOGRAM REPORT: CPT | Mod: ,,, | Performed by: INTERNAL MEDICINE

## 2024-01-18 RX ORDER — PANTOPRAZOLE SODIUM 40 MG/1
40 TABLET, DELAYED RELEASE ORAL DAILY
Status: DISCONTINUED | OUTPATIENT
Start: 2024-01-19 | End: 2024-01-21 | Stop reason: HOSPADM

## 2024-01-18 RX ORDER — CARVEDILOL 3.12 MG/1
3.12 TABLET ORAL 2 TIMES DAILY
Status: DISCONTINUED | OUTPATIENT
Start: 2024-01-18 | End: 2024-01-21 | Stop reason: HOSPADM

## 2024-01-18 RX ORDER — GLUCAGON 1 MG
1 KIT INJECTION
Status: DISCONTINUED | OUTPATIENT
Start: 2024-01-18 | End: 2024-01-21 | Stop reason: HOSPADM

## 2024-01-18 RX ORDER — ATORVASTATIN CALCIUM 40 MG/1
40 TABLET, FILM COATED ORAL DAILY
Status: DISCONTINUED | OUTPATIENT
Start: 2024-01-18 | End: 2024-01-21 | Stop reason: HOSPADM

## 2024-01-18 RX ORDER — INSULIN ASPART 100 [IU]/ML
0-5 INJECTION, SOLUTION INTRAVENOUS; SUBCUTANEOUS
Status: DISCONTINUED | OUTPATIENT
Start: 2024-01-18 | End: 2024-01-19

## 2024-01-18 RX ORDER — IBUPROFEN 200 MG
24 TABLET ORAL
Status: DISCONTINUED | OUTPATIENT
Start: 2024-01-18 | End: 2024-01-21 | Stop reason: HOSPADM

## 2024-01-18 RX ORDER — IBUPROFEN 200 MG
16 TABLET ORAL
Status: DISCONTINUED | OUTPATIENT
Start: 2024-01-18 | End: 2024-01-21 | Stop reason: HOSPADM

## 2024-01-18 RX ORDER — LISINOPRIL 20 MG/1
20 TABLET ORAL DAILY
Status: DISCONTINUED | OUTPATIENT
Start: 2024-01-18 | End: 2024-01-21 | Stop reason: HOSPADM

## 2024-01-18 RX ADMIN — ATORVASTATIN CALCIUM 40 MG: 40 TABLET, FILM COATED ORAL at 09:01

## 2024-01-18 RX ADMIN — DILTIAZEM HYDROCHLORIDE 240 MG: 120 CAPSULE, COATED, EXTENDED RELEASE ORAL at 09:01

## 2024-01-18 RX ADMIN — HYDROCODONE BITARTRATE AND ACETAMINOPHEN 1 TABLET: 5; 325 TABLET ORAL at 01:01

## 2024-01-18 RX ADMIN — DOCUSATE SODIUM 100 MG: 100 CAPSULE, LIQUID FILLED ORAL at 09:01

## 2024-01-18 RX ADMIN — INSULIN ASPART 3 UNITS: 100 INJECTION, SOLUTION INTRAVENOUS; SUBCUTANEOUS at 10:01

## 2024-01-18 RX ADMIN — CEFAZOLIN SODIUM 2 G: 2 SOLUTION INTRAVENOUS at 05:01

## 2024-01-18 RX ADMIN — MUPIROCIN 1 G: 20 OINTMENT TOPICAL at 09:01

## 2024-01-18 RX ADMIN — LISINOPRIL 20 MG: 20 TABLET ORAL at 09:01

## 2024-01-18 RX ADMIN — CLEVIPIDINE 6 MG/HR: 0.5 EMULSION INTRAVENOUS at 03:01

## 2024-01-18 RX ADMIN — HYDROCODONE BITARTRATE AND ACETAMINOPHEN 1 TABLET: 5; 325 TABLET ORAL at 09:01

## 2024-01-18 RX ADMIN — INSULIN ASPART 5 UNITS: 100 INJECTION, SOLUTION INTRAVENOUS; SUBCUTANEOUS at 02:01

## 2024-01-18 RX ADMIN — AMIODARONE HYDROCHLORIDE 200 MG: 200 TABLET ORAL at 09:01

## 2024-01-18 RX ADMIN — HYDROCODONE BITARTRATE AND ACETAMINOPHEN 1 TABLET: 5; 325 TABLET ORAL at 07:01

## 2024-01-18 RX ADMIN — CARVEDILOL 3.12 MG: 3.12 TABLET, FILM COATED ORAL at 09:01

## 2024-01-18 RX ADMIN — SODIUM PHOSPHATE, MONOBASIC, MONOHYDRATE AND SODIUM PHOSPHATE, DIBASIC, ANHYDROUS 15 MMOL: 142; 276 INJECTION, SOLUTION INTRAVENOUS at 06:01

## 2024-01-18 RX ADMIN — ASPIRIN 81 MG 81 MG: 81 TABLET ORAL at 09:01

## 2024-01-18 RX ADMIN — CLEVIPIDINE 6 MG/HR: 0.5 EMULSION INTRAVENOUS at 07:01

## 2024-01-18 RX ADMIN — HYDROCODONE BITARTRATE AND ACETAMINOPHEN 1 TABLET: 5; 325 TABLET ORAL at 02:01

## 2024-01-18 RX ADMIN — MORPHINE SULFATE 2 MG: 2 INJECTION, SOLUTION INTRAMUSCULAR; INTRAVENOUS at 12:01

## 2024-01-18 RX ADMIN — INSULIN DETEMIR 20 UNITS: 100 INJECTION, SOLUTION SUBCUTANEOUS at 02:01

## 2024-01-18 RX ADMIN — CEFAZOLIN SODIUM 2 G: 2 SOLUTION INTRAVENOUS at 01:01

## 2024-01-18 RX ADMIN — MORPHINE SULFATE 2 MG: 2 INJECTION, SOLUTION INTRAMUSCULAR; INTRAVENOUS at 05:01

## 2024-01-18 NOTE — PROGRESS NOTES
[facility] Medicine  Progress Note    Patient name: Donald Frey  MRN: [id number]  Admit Date: 1/13/2024   LOS: 4 days     SUBJECTIVE:     Principal problem: Chest pain    HPI:  63 M with HTN, HLD, DM2, CAD status-post MI/PCI in 2013, who was first diagnosed with AF in 1/2022  and  s/p ablation . CHF  came with CP .  His chest pain is more like epigastric area and started this evening and got better with taking antacid med he still came to the hospital.  Currently he is chest pain-free and EKG did not show any ischemia and sinus rhythm with loss of PVC otherwise normal.  He ran out of the Eliquis secondary to insurance issues and currently he is taking aspirin and Plavix.  Normally he takes aspirin and Eliquis in the past.  Chest x-ray negative and cardiac enzymes are negative.  He did echocardiogram with Dr. Xavi fierro about 6 months ago and he was told normal and no recent stress test.    Overview/Hospital Course:  Patient presented with chest pain. C found to have multivessel disease.  Place on nitro drip for chest pain.  underwent CABG 1/17/24.    Interval History:  Patient is post op D1 today, he is sitting up on the chair, on NC oxygen, there is sternotomy incision with multiple small incisions in extremities. Tubes and drains intact. Denies any new pain except for the surgical sites and no SOB.      Scheduled Meds:   amiodarone  200 mg Oral Daily    aspirin  81 mg Oral Daily    atorvastatin  20 mg Oral Daily    ceFAZolin (Ancef) IV (PEDS and ADULTS)  2 g Intravenous Q8H    diltiaZEM  240 mg Oral BID    docusate sodium  100 mg Oral BID    lisinopriL  20 mg Oral Daily    mupirocin   Nasal BID     Continuous Infusions:   sodium chloride 0.45% 50 mL/hr at 01/17/24 1300    clevidipine 6 mg/hr (01/18/24 0707)    insulin regular 1 units/mL infusion orderable (CTS POST-OP) 8.5 Units/hr (01/18/24 0016)     PRN Meds:acetaminophen, albumin human 5%, aluminum-magnesium hydroxide-simethicone,  calcium gluconate IVPB, calcium gluconate IVPB, calcium gluconate IVPB, clevidipine, dextrose 50%, dextrose 50%, HYDROcodone-acetaminophen, magnesium sulfate IVPB, magnesium sulfate IVPB, morphine, morphine, ondansetron, ondansetron, potassium chloride in water, potassium chloride in water, potassium chloride in water, sodium phosphate 15 mmol in dextrose 5 % (D5W) 250 mL IVPB, sodium phosphate 20.01 mmol in dextrose 5 % (D5W) 250 mL IVPB, sodium phosphate 30 mmol in dextrose 5 % (D5W) 250 mL IVPB    Review of patient's allergies indicates:  No Known Allergies    Review of Systems  As per subjective    OBJECTIVE:     Vital Signs (Most Recent)  Temp: 98 °F (36.7 °C) (01/18/24 0500)  Pulse: 98 (01/18/24 0750)  Resp: (!) 23 (01/18/24 0927)  BP: 125/65 (01/18/24 0630)  SpO2: (!) 91 % (01/18/24 0750)    Vital Signs Range (Last 24H):  Temp:  [96.4 °F (35.8 °C)-98 °F (36.7 °C)]   Pulse:  []   Resp:  [0-23]   BP: (114-145)/(64-83)   SpO2:  [91 %-99 %]   Arterial Line BP: ()/(24-63)     I & O (Last 24H):  Intake/Output Summary (Last 24 hours) at 1/18/2024 1147  Last data filed at 1/18/2024 0601  Gross per 24 hour   Intake 1262.67 ml   Output 2330 ml   Net -1067.33 ml       Physical Exam:  General: Patient resting comfortably in no acute distress. Appears as stated age. Calm  Eyes: EOM intact. No conjunctivae injection. No scleral icterus.  ENT: Hearing grossly intact. No discharge from ears. No nasal discharge.   CVS: RRR. No LE edema BL.  Lungs: CTA BL, no wheezing or crackles. Good breath sounds. No accessory muscle use. No acute respiratory distress  Neuro: Alert. GCS 15. Cranial nerves grossly intact. Moves all extremities equally. Follows commands. Responds appropriately   Skin: sternotomy surgical incision and multiple vein harvesting incisions.   Abdomen: soft and non tender with mediastinal and chest drains     Laboratory:  All pertinent labs within the past 24 hours have been reviewed.  CBC:   Recent  Labs   Lab 01/17/24  1547 01/17/24  1642 01/17/24 2003 01/17/24 2038 01/18/24  0207   WBC 13.89*  --  13.14*  --  16.49*   HGB 13.6*  --  13.8*  --  14.3   HCT 41.1   < > 41.9 42 42.7     --  139*  --  172    < > = values in this interval not displayed.     CMP:   Recent Labs   Lab 01/17/24  1547 01/17/24 2003 01/18/24  0207    141 143   K 3.8  3.8 4.4 4.2   * 110 111*   CO2 20* 22* 24   * 271* 139*   BUN 16 14 15   CREATININE 0.8 0.8 0.8   CALCIUM 8.3* 8.5* 8.5*   PROT 5.6* 5.9* 6.2   ALBUMIN 3.8 3.9 4.1   BILITOT 0.7 0.5 0.5   ALKPHOS 49* 49* 48*   AST 44* 41* 35   ALT 37 38 36   ANIONGAP 11 9 8       Microbiology Results (last 7 days)       ** No results found for the last 168 hours. **             Diagnostic Results:      ASSESSMENT/PLAN:     NSTEMI with multivessel CAD   S/p CABG 1/17  - post op care per CTS   - Cont aspirin, Lipitor, Lisinopril, added Coreg     Afib with s/p ablation   - Cont Cardizem, amiodarone  - Coreg added   - resume Eliquis when OK with CTS     HLD   Type II DM: not on home insulin   HTN  - resume home medications   - insulin drip changed to levemir today.   - sliding scale      VTE Risk Mitigation (From admission, onward)           Ordered     IP VTE LOW RISK PATIENT  Once         01/17/24 1127                          Department Hospital Medicine  [facility]  Krystal Henao MD  Date of service: 01/18/2024

## 2024-01-18 NOTE — ANESTHESIA POSTPROCEDURE EVALUATION
Anesthesia Post Evaluation    Patient: Donald Frey    Procedure(s) Performed: Procedure(s) (LRB):  CORONARY ARTERY BYPASS GRAFT (CABG) (N/A)  SURGICAL PROCUREMENT,ARTERY,RADIAL,FOR CABG, ENDOSCOPIC (Left)  SURGICAL PROCUREMENT, VEIN, ENDOSCOPIC (Left)    Final Anesthesia Type: general      Patient location during evaluation: ICU  Patient participation: Yes- Able to Participate  Level of consciousness: awake and alert  Post-procedure vital signs: reviewed and stable  Pain management: adequate  Airway patency: patent    PONV status at discharge: No PONV  Anesthetic complications: no      Cardiovascular status: blood pressure returned to baseline and stable  Respiratory status: unassisted and nasal cannula  Hydration status: euvolemic  Follow-up not needed.  Comments: Patient status post coronary artery bypass surgery. The patient is extubated.  He is awake and alert, and he reports good pain control.  He has no nausea or vomiting.  His airway patent, and he has no breathing problems. He denies any surgical recall. His neurological exam is at baseline. He is hemodynamically stable with no vasopressors. He has adequate urine output. There were no complications to general anesthesia for coronary artery bypass surgery.                Vitals Value Taken Time   /73 01/18/24 0731   Temp 36.7 °C (98 °F) 01/18/24 0500   Pulse 96 01/18/24 0751   Resp 20 01/18/24 0751   SpO2 92 % 01/18/24 0751   Vitals shown include unvalidated device data.      No case tracking events are documented in the log.      Pain/Codie Score: Pain Rating Prior to Med Admin: 5 (1/18/2024  5:44 AM)  Pain Rating Post Med Admin: 3 (1/18/2024  6:14 AM)

## 2024-01-18 NOTE — NURSING
"          Dx: Chest pain    Shift Events: CABG x 4 using left internal mammary artery to left anterior descending coronary artery and separate segments of saphenous vein from the aorta to the posterior descending coronary artery and from the aorta to the posterolateral branch of the circumflex coronary artery and left radial artery from the aorta to the obtuse marginal coronary artery. Arrived to ICU at 1130, fluid resuscitation and electrolyte replacement was done. No ectopy noted on tele. Patient was reversed with 283 mg of Sugammadex and extubated at 1650.     Neuro: AAO x4, Follows Commands, and Moves All Extremities    Vital Signs: /74   Pulse 92   Temp 97 °F (36.1 °C) (Oral)   Resp 18   Ht 6' 2" (1.88 m)   Wt (!) 141.6 kg (312 lb 1.6 oz)   SpO2 97%   BMI 40.07 kg/m²     Respiratory: Nasal Cannula 3 L NC    Diet: Clear Liquid    Gtts: Insulin and MIVF    Urine Output: Urinary Catheter 1550 cc/shift    Drains: Chest Tube, total output 360 cc /  shift    Skin: Dressings CDI        "

## 2024-01-18 NOTE — PROGRESS NOTES
CVT SURGERY PROGRESS NOTE  Donald Frey  63 y.o.  1960    Patient's Chief Complaint:  Chest pain    HPI:  This patient has severe coronary artery disease.  He underwent heart catheterization and coronary angiography showing multivessel coronary artery disease.  He was referred for surgical bypass.    He has a history of percutaneous coronary intervention apparently in 2013.  He has also had atrial fibrillation ablation and 2022.  This has been successful according to the patient and wife.    He has diabetes and hypertension.    Medicines are noted and are part of the epic record.    Family history is not pertinent at this time.    He has not a smoker.    On exam vital signs are stable.  Pupils are equal and round reactive to light.  Neck is supple.  Chest is equal breath sounds.  Heart is in a regular rate and rhythm.  Abdomen is benign.  Perfusion to the legs and feet seems to be satisfactory.    Recent studies were reviewed.  The patient has multivessel coronary artery disease.    Recommendation is for surgical bypass.  The procedure and risks were discussed.  The patient seems to be understanding and agreeable.      Last 24 hour interval:  -S/p CABG x 4 (LIMA to LAD, SVG to PDA, SVG to PLB, L radial to OM) by Dr. Coreas on 1/17/24  -Extubated overnight.   -Hemodynamically stable - Cleviprex weaned off this afternoon.  -MS tube output last 24 hours - 750 cc, to suction, no air leak  -Pleural Tube output last 24 hours - 170 cc, to suction, no air leak  -H/H stable  -Good UOP, renal function stable  -Pt seen this AM, sitting up in chair in NAD on NC. He reports expected post op pain that improves with pain medication. No flatus or BM yet. Poor appetite.     Subjective:  Symptoms:  Stable.    Diet:  Poor intake.  No nausea or vomiting.    Activity level: Returning to normal.    Pain:  He complains of pain that is moderate.  He reports pain is improving.  Pain is partially controlled.                          "                                          Objective:  General Appearance:  In no acute distress.    Vital signs: (most recent): Blood pressure 125/65, pulse 98, temperature 98 °F (36.7 °C), temperature source Axillary, resp. rate (!) 23, height 6' 2" (1.88 m), weight (!) 141.6 kg (312 lb 1.6 oz), SpO2 (!) 91 %.    Output: Producing urine.    HEENT: Normal HEENT exam.    Lungs:  Normal effort.  No rales, wheezes or rhonchi.    Heart: Normal rate.  Regular rhythm.  No murmur, gallop or friction rub.   Chest: (Sternal incision with surgical dressing. Chest tubes in place, draining appropriately. To suction, no air leak.   )  Abdomen: Abdomen is soft and non-distended.  Bowel sounds are normal.   There is no abdominal tenderness.     Extremities: There is no local swelling.  (EVH and radial artery harvest sites with surgical dressing. )  Pulses: Distal pulses are intact.    Neurological: Patient is alert and oriented to person, place and time.    Skin:  Warm and dry.      Recent Vitals:  Vitals:    01/18/24 0600 01/18/24 0630 01/18/24 0750 01/18/24 0927   BP: 129/67 125/65     Pulse: 92 91 98    Resp: (!) 21 19 16 (!) 23   Temp:       TempSrc:       SpO2: (!) 93% (!) 91% (!) 91%    Weight:       Height:           INPATIENT MEDS   sodium chloride 0.45% 50 mL/hr at 01/17/24 1300    clevidipine 6 mg/hr (01/18/24 0707)    insulin regular 1 units/mL infusion orderable (CTS POST-OP) 8.5 Units/hr (01/18/24 0016)      amiodarone  200 mg Oral Daily    aspirin  81 mg Oral Daily    atorvastatin  40 mg Oral Daily    carvediloL  3.125 mg Oral BID    ceFAZolin (Ancef) IV (PEDS and ADULTS)  2 g Intravenous Q8H    diltiaZEM  240 mg Oral BID    docusate sodium  100 mg Oral BID    insulin detemir U-100  20 Units Subcutaneous QHS    lisinopriL  20 mg Oral Daily    mupirocin   Nasal BID     acetaminophen, albumin human 5%, aluminum-magnesium hydroxide-simethicone, calcium gluconate IVPB, calcium gluconate IVPB, calcium gluconate IVPB, " "clevidipine, dextrose 50%, dextrose 50%, dextrose 50%, dextrose 50%, glucagon (human recombinant), glucose, glucose, HYDROcodone-acetaminophen, insulin aspart U-100, magnesium sulfate IVPB, magnesium sulfate IVPB, morphine, morphine, ondansetron, ondansetron, potassium chloride in water, potassium chloride in water, potassium chloride in water, sodium phosphate 15 mmol in dextrose 5 % (D5W) 250 mL IVPB, sodium phosphate 20.01 mmol in dextrose 5 % (D5W) 250 mL IVPB, sodium phosphate 30 mmol in dextrose 5 % (D5W) 250 mL IVPB  HEMODYNAMICS  CO:  [9.1 L/min-12.2 L/min] 12 L/min  CI:  [3.5 L/min/m2-4.7 L/min/m2] 4.6 L/min/m2   Recent O2 Therapy/Vent Settings: 3L NC    I/O last 24 hrs:  Intake/Output - Last 3 Shifts         01/16 0700 01/17 0659 01/17 0700 01/18 0659 01/18 0700 01/19 0659    P.O. 600      I.V. (mL/kg) 173.5 (1.2) 2762.7 (19.5)     Blood  1250     IV Piggyback  400     Total Intake(mL/kg) 773.5 (5.5) 4412.7 (31.2)     Urine (mL/kg/hr) 1200 (0.4) 2190 (0.6)     Stool 0      Chest Tube  920     Total Output 1200 3110     Net -426.6 +1302.7            Stool Occurrence 1 x            Recent Cardiac Rhythm: SR    Recent Pain Assessment: 3    CBC  Recent Labs   Lab 01/17/24  1547 01/17/24 2003 01/18/24  0207   WBC 13.89* 13.14* 16.49*   RBC 4.91 4.97 5.06   HGB 13.6* 13.8* 14.3    139* 172   MCV 84 84 84   MCH 27.7 27.8 28.3   MCHC 33.1 32.9 33.5     BMP  Recent Labs   Lab 01/17/24  1547 01/17/24 2003 01/18/24  0207   CO2 20* 22* 24   BUN 16 14 15   CREATININE 0.8 0.8 0.8   CALCIUM 8.3* 8.5* 8.5*     CARDIAC ENZYMES  No results for input(s): "TROPONINI", "CPK", "CKMB" in the last 168 hours.  BNP  Recent Labs   Lab 01/13/24  2151   BNP 57     PT/INR  INR   Date Value Ref Range Status   01/14/2024 1.1 0.8 - 1.2 Final     Comment:     Coumadin Therapy:  2.0 - 3.0 for INR for all indicators except mechanical heart valves  and antiphospholipid syndromes which should use 2.5 - 3.5.     01/13/2024 1.0 0.8 - " 1.2 Final     Comment:     Coumadin Therapy:  2.0 - 3.0 for INR for all indicators except mechanical heart valves  and antiphospholipid syndromes which should use 2.5 - 3.5.     11/06/2023 1.1 0.8 - 1.2 Final     Comment:     Coumadin Therapy:  2.0 - 3.0 for INR for all indicators except mechanical heart valves  and antiphospholipid syndromes which should use 2.5 - 3.5.         DIAGNOSTIC RESULTS:  X-Ray Chest AP Portable  Reason: Post-op    FINDINGS:    Portable chest with comparison chest x-ray January 17, 2024 show unchanged enlarged cardiac mediastinal silhouette. Interval removal of nasogastric tube and endotracheal tube. Mediastinal chest drains in place. Right internal jugular central venous catheter in place. No pneumothorax.  Lungs are clear. Pulmonary vasculature is normal. No acute osseous abnormality.    IMPRESSION:    Postsurgical changes status post CABG.    Electronically signed by:  Art Austin DO  01/18/2024 08:33 AM CST Workstation: [id number]      ECG Results              EKG 12-lead (Final result)  Result time 01/15/24 22:03:39      Final result by Interface, Lab In [facility] (01/15/24 22:03:39)                   Narrative:    Test Reason : I25.10,    Vent. Rate : 076 BPM     Atrial Rate : 076 BPM     P-R Int : 224 ms          QRS Dur : 104 ms      QT Int : 388 ms       P-R-T Axes : 062 109 037 degrees     QTc Int : 436 ms    Sinus rhythm with 1st degree A-V block  Rightward axis  Low voltage QRS  Cannot rule out Anteroseptal infarct (cited on or before 20-JAN-2022)  Abnormal ECG  When compared with ECG of 13-JAN-2024 21:43,  Significant changes have occurred  Confirmed by Roger Hassan MD ([id number]) on 1/15/2024 10:03:32 PM    Referred By: [email or web address]   SELF           Confirmed By:Roger Hassan MD                                     EKG 12-lead (Final result)  Result time 01/15/24 22:03:23      Final result by Interface, Lab In [facility] (01/15/24 22:03:23)                   Narrative:    Test  Reason : R07.9,    Vent. Rate : 100 BPM     Atrial Rate : 100 BPM     P-R Int : 208 ms          QRS Dur : 116 ms      QT Int : 348 ms       P-R-T Axes : 050 023 050 degrees     QTc Int : 448 ms    Sinus rhythm with occasional Premature ventricular complexes  Anterior infarct (cited on or before 20-JAN-2022)  Abnormal ECG  When compared with ECG of 18-JUL-2023 02:24,  Premature ventricular complexes are now Present  Borderline criteria for Inferior infarct are no longer Present  ST elevation now present in Anterior leads  Nonspecific T wave abnormality has replaced inverted T waves in Inferior  leads  Nonspecific T wave abnormality no longer evident in Anterior leads  Confirmed by Sonya KAUR, Roger RYAN ([id number]) on 1/15/2024 10:03:13 PM    Referred By: [email or web address]   SELF           Confirmed By:Roger Hassan MD                                    CURRENT CONSULTS:  IP CONSULT TO CARDIOLOGY  IP CONSULT TO CARDIOTHORACIC SURGERY  IP CONSULT TO REGISTERED DIETITIAN/NUTRITIONIST    ASSESSMENT/PLAN:    Multivessel CAD  S/p CABG x 4 (LIMA to LAD, SVG to PDA, SVG to PLB, L radial to OM) by Dr. Coreas on 1/17/24  -Hemodynamically stable  -Tolerating NC - wean as tolerated  -Mediastinal tube and pleural tube to remain until output decreases  -Pacer wires to remain  -Surgical incisions with surgical dressing  -Continue ASA, BB, statin  -Remains in SR  -Encourage IS  -Increase activity  -Hiram hose  -Bowel regimen - on docusate    Acute post operative blood loss anemia  Thrombocytopenia  -Expected post op  -Received 1250 cc Cell Saver post op  -No indication for transfusion at this time  -Monitor    Leukocytosis  -Afebrile  -Likely reactive  -Monitor    Uncontrolled IDDM  -BS well controlled on current regimen  -Continue Levemir and SSI  -Goal < 180  -Last A1C 10.9%    HTN  -Cleviprex off since 1204  -Well controlled on current regimen    HLD  -Continue statin    Hx of a-fib/flutter  -s/p RFA in July 2022  -Currently in SR  -On  amiodarone  -Resume Eliquis when chest tubes removed    Hypomagnesemia  Hypophosphatemia  -Replaced per protocol  -Keep Mag > 2 and K >4      Case and plan of care discussed with MD      GI Prophylaxis:  PPI:proton pump inhibitor per orders  DVT Prophylaxis:  Anticoagulants   Medication Route Frequency       Bebe Garcia PA-C  Cardiovascular Thoracic Surgery  1/18/2024  2:06 PM

## 2024-01-18 NOTE — CONSULTS
"Formerly [facility], [facility]  Adult Nutrition   Progress Note (Follow-Up)    SUMMARY     Recommendations  Recommendation/Intervention:   1. Recommend Unjury BID for additional protein intake.   2. Verbal DM education provided at bedside along with handouts.   3. RD following.  Goals: Maintain po intake > 75% at meals and nutrition supplement by f/u.  Nutrition Goal Status: progressing towards goal  Communication of RD Recs: reviewed with RN    Nutrition Diagnosis PES Statement: Limited adherence to nutrition- related recommendations related to unwilling to apply information as evidenced by reported failure to follow DM diet and A1C of 10.8.    Dietitian Rounds Brief  RD follow up. Pt seen sitting in chair. He is s/p CABG. States he appetite is fair and ate about 75% at breakfast. Encourage protein intake at meals for healing. Also provided handout and verbal education re: A1C, blood glucose, and carb couting. Pt states he is aware that his A1C is elevated and has had education before. He has not been following his diet like he should and only checks his blood sugars in the morning. Also provided information to out patient dietitian for DM education post discharge.     Diet order:   Current Diet Order: 1800 DM       Evaluation of Received Nutrient/Fluid Intake  Energy Calories Required: meeting needs  Protein Required: meeting needs  Fluid Required: meeting needs      % Intake of Estimated Energy Needs: 50 - 75 %  % Meal Intake: 75 - 100 %      Intake/Output Summary (Last 24 hours) at 1/18/2024 1341  Last data filed at 1/18/2024 0601  Gross per 24 hour   Intake 1262.67 ml   Output 2025 ml   Net -762.33 ml        Anthropometrics  Temp: 98 °F (36.7 °C)  Height Method: Stated  Height: 6' 2" (188 cm)  Height (inches): 74 in  Weight Method: Bed Scale  Weight: (!) 141.6 kg (312 lb 1.6 oz)  Weight (lb): (!) 312.1 lb  Ideal Body Weight (IBW), Male: 190 lb  % Ideal Body Weight, Male (lb): 163.84 %  BMI (Calculated): 40.1  BMI " Grade: greater than 40 - morbid obesity       Estimated/Assessed Needs  Weight Used For Calorie Calculations: (!) 141.2 kg (311 lb 4.6 oz)  Energy Calorie Requirements (kcal): 4879-6250 kcals/day (20-25 kcals/kg ABW)  Energy Need Method: Kcal/kg  Protein Requirements: 129-172 g/day (1.5-2.0 g/kg IBW)  Weight Used For Protein Calculations: 86 kg (189 lb 9.5 oz)     Estimated Fluid Requirement Method: RDA Method  RDA Method (mL): 2824  CHO Requirement: 220-270 gm (40-50% of 2200 kcal)    Reason for Assessment  Reason For Assessment: RD follow-up  Diagnosis: other (see comments) (Chest pain)  Relevant Medical History: Myocardial infarction, Diabetes mellitus, type 2, Hypertension, Controlled 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, Coronary artery disease  Interdisciplinary Rounds: did not attend  Nutrition Discharge Planning: DM Diet    Nutrition/Diet History  Patient Reported Diet/Restrictions/Preferences: diabetic diet, carbohydrate counting  Food Allergies: NKFA  Factors Affecting Nutritional Intake: decreased appetite    Nutrition Risk Screen  Nutrition Risk Screen: no indicators present     MST Score: 4  Have you recently lost weight without trying?: Yes: 24-33 lbs  Weight loss score: 3  Have you been eating poorly because of a decreased appetite?: Yes  Appetite score: 1       Weight History:  Wt Readings from Last 10 Encounters:   01/16/24 (!) 141.6 kg (312 lb 1.6 oz)   12/19/23 (!) 139.7 kg (308 lb)   11/22/23 (!) 139.7 kg (308 lb)   11/14/23 (!) 142.4 kg (314 lb)   11/13/23 (!) 142.4 kg (314 lb)   11/06/23 (!) 142.4 kg (314 lb)   08/29/23 (!) 142.4 kg (314 lb)   08/21/23 (!) 142.8 kg (314 lb 12.8 oz)   07/18/23 (!) 142.9 kg (315 lb)   05/17/23 (!) 141.7 kg (312 lb 6.4 oz)        Lab/Procedures/Meds: Pertinent Labs/Meds Reviewed    Medications:Pertinent Medications Reviewed  Scheduled Meds:   amiodarone  200 mg Oral Daily    aspirin  81 mg Oral Daily    atorvastatin  40 mg Oral Daily     carvediloL  3.125 mg Oral BID    ceFAZolin (Ancef) IV (PEDS and ADULTS)  2 g Intravenous Q8H    diltiaZEM  240 mg Oral BID    docusate sodium  100 mg Oral BID    insulin detemir U-100  20 Units Subcutaneous QHS    lisinopriL  20 mg Oral Daily    mupirocin   Nasal BID     Continuous Infusions:   sodium chloride 0.45% 50 mL/hr at 01/17/24 1300    clevidipine 6 mg/hr (01/18/24 0707)    insulin regular 1 units/mL infusion orderable (CTS POST-OP) 8.5 Units/hr (01/18/24 0016)     PRN Meds:.acetaminophen, albumin human 5%, aluminum-magnesium hydroxide-simethicone, calcium gluconate IVPB, calcium gluconate IVPB, calcium gluconate IVPB, clevidipine, dextrose 50%, dextrose 50%, dextrose 50%, dextrose 50%, glucagon (human recombinant), glucose, glucose, HYDROcodone-acetaminophen, insulin aspart U-100, magnesium sulfate IVPB, magnesium sulfate IVPB, morphine, morphine, ondansetron, ondansetron, potassium chloride in water, potassium chloride in water, potassium chloride in water, sodium phosphate 15 mmol in dextrose 5 % (D5W) 250 mL IVPB, sodium phosphate 20.01 mmol in dextrose 5 % (D5W) 250 mL IVPB, sodium phosphate 30 mmol in dextrose 5 % (D5W) 250 mL IVPB    Labs: Pertinent Labs Reviewed  Clinical Chemistry:  Recent Labs   Lab 01/17/24  1127 01/17/24  1547 01/17/24 2003 01/18/24  0207    142 141 143   K 3.5  3.5 3.8  3.8 4.4 4.2    111* 110 111*   CO2 20* 20* 22* 24   * 324* 271* 139*   BUN 16 16 14 15   CREATININE 0.8 0.8 0.8 0.8   CALCIUM 8.2* 8.3* 8.5* 8.5*   PROT 5.1* 5.6* 5.9* 6.2   ALBUMIN 3.3* 3.8 3.9 4.1   BILITOT 0.6 0.7 0.5 0.5   ALKPHOS 51* 49* 49* 48*   AST 34 44* 41* 35   ALT 31 37 38 36   ANIONGAP 11 11 9 8   MG 1.9 1.7 2.2 2.1   PHOS 2.9 2.4*  --  2.6*     CBC:   Recent Labs   Lab 01/18/24  0207   WBC 16.49*   RBC 5.06   HGB 14.3   HCT 42.7      MCV 84   MCH 28.3   MCHC 33.5     Cardiac Profile:  Recent Labs   Lab 01/13/24  2151   BNP 57         Monitor and Evaluation  Food and  Nutrient Intake: energy intake, food and beverage intake  Food and Nutrient Adminstration: diet order  Knowledge/Beliefs/Attitudes: food and nutrition knowledge/skill  Physical Activity and Function: nutrition-related ADLs and IADLs  Anthropometric Measurements: weight  Biochemical Data, Medical Tests and Procedures: electrolyte and renal panel, inflammatory profile, gastrointestinal profile, lipid profile, glucose/endocrine profile  Nutrition-Focused Physical Findings: overall appearance     Nutrition Risk  Level of Risk/Frequency of Follow-up:  (weekly)     Nutrition Follow-Up  RD Follow-up?: Yes      Judy Browning RD 01/18/2024 1:41 PM

## 2024-01-18 NOTE — PLAN OF CARE
Problem: Bleeding (Cardiovascular Surgery)  Goal: Bleeding (Cardiovascular Surgery)  Outcome: Ongoing, Progressing  Intervention: Monitor and Manage Bleeding  Flowsheets (Taken 1/17/2024 1850)  Bleeding Management: dressing monitored     Problem: Cerebral Tissue Perfusion (Cardiovascular Surgery)  Goal: Optimal Cerebral Tissue Perfusion (Cardiovascular Surgery)  Outcome: Ongoing, Progressing  Intervention: Protect and Optimize Cerebral Perfusion  Flowsheets (Taken 1/17/2024 1850)  Fever Reduction/Comfort Measures: lightweight clothing  Sensory Stimulation Regulation: visual stimulation provided  Cerebral Perfusion Promotion:   blood pressure monitored   normothermia promoted   sedation level decreased  Glycemic Management:   blood glucose monitored   insulin infusion adjusted  Head of Bed (HOB) Positioning: HOB at 45 degrees     Problem: Fluid and Electrolyte Imbalance (Cardiovascular Surgery)  Goal: Fluid and Electrolyte Balance (Cardiovascular Surgery)  Outcome: Ongoing, Progressing  Intervention: Monitor and Manage Fluid and Electrolyte Balance  Flowsheets (Taken 1/17/2024 1850)  Fluid/Electrolyte Management: intravenous fluid replacement initiated     Problem: Glycemic Control Impaired (Cardiovascular Surgery)  Goal: Blood Glucose Level Within Targeted Range (Cardiovascular Surgery)  Outcome: Ongoing, Progressing  Intervention: Optimize Glycemic Control  Flowsheets (Taken 1/17/2024 1850)  Glycemic Management:   blood glucose monitored   insulin infusion adjusted     Problem: Ongoing Anesthesia Effects (Cardiovascular Surgery)  Goal: Anesthesia/Sedation Recovery  Outcome: Ongoing, Progressing  Intervention: Optimize Anesthesia Recovery  Flowsheets (Taken 1/17/2024 1850)  Safety Promotion/Fall Prevention:   bed alarm set   Fall Risk reviewed with patient/family   high risk medications identified   lighting adjusted   medications reviewed   pulse ox   room near unit station   side rails raised x 2   supervised  activity   instructed to call staff for mobility  Reorientation Measures:   clock in view   familiar social contact encouraged   reorientation provided  Outcome Anesthesia/Sedation Recovery: progressing toward baseline

## 2024-01-18 NOTE — PROGRESS NOTES
[facility]    Critical Care Cardiology Progress Note    Subjective:  No acute events overnight. Now s/p CABG (LIMA_->LAD, SVG->PDA, SVG->PLB, Radial graft to OM). He is extubated. Chest tubes remain in place. He reports post operative soreness. No further chest pain. Denies any palpitations, syncope, presyncope, orthopnea, or PND.     Objective:  Vital Signs (Most Recent)  Temp: 98 °F (36.7 °C) (01/18/24 0500)  Pulse: 98 (01/18/24 0750)  Resp: (!) 22 (01/18/24 1411)  BP: 125/65 (01/18/24 0630)  SpO2: (!) 91 % (01/18/24 0750)    Vital Signs Range (Last 24H):  Temp:  [97 °F (36.1 °C)-98 °F (36.7 °C)]   Pulse:  []   Resp:  [10-23]   BP: (116-145)/(65-83)   SpO2:  [91 %-99 %]   Arterial Line BP: (119-153)/(47-63)     I & O (Last 24H):    Intake/Output Summary (Last 24 hours) at 1/18/2024 1524  Last data filed at 1/18/2024 0601  Gross per 24 hour   Intake 1262.67 ml   Output 1620 ml   Net -357.33 ml         Current Diet:     Current Diet Order   Procedures    Diet diabetic [facility]; 1800 Calorie     Order Specific Question:   Indicate patient location for additional diet options:     Answer:   [facility]     Order Specific Question:   Total calories:     Answer:   1800 Calorie        Allergies:  Review of patient's allergies indicates:  No Known Allergies    Meds:  Scheduled Meds:   amiodarone  200 mg Oral Daily    aspirin  81 mg Oral Daily    atorvastatin  40 mg Oral Daily    carvediloL  3.125 mg Oral BID    diltiaZEM  240 mg Oral BID    docusate sodium  100 mg Oral BID    insulin detemir U-100  20 Units Subcutaneous QHS    lisinopriL  20 mg Oral Daily    mupirocin   Nasal BID    [START ON 1/19/2024] pantoprazole  40 mg Oral Daily     Continuous Infusions:   sodium chloride 0.45% 50 mL/hr at 01/17/24 1300    clevidipine 2 mg/hr (01/18/24 1100)    insulin regular 1 units/mL infusion orderable (CTS POST-OP) 8.5 Units/hr (01/18/24 0016)     PRN Meds:acetaminophen, albumin human 5%, aluminum-magnesium  hydroxide-simethicone, calcium gluconate IVPB, calcium gluconate IVPB, calcium gluconate IVPB, clevidipine, dextrose 50%, dextrose 50%, dextrose 50%, dextrose 50%, glucagon (human recombinant), glucose, glucose, HYDROcodone-acetaminophen, insulin aspart U-100, magnesium sulfate IVPB, magnesium sulfate IVPB, morphine, morphine, ondansetron, ondansetron, potassium chloride in water, potassium chloride in water, potassium chloride in water, sodium phosphate 15 mmol in dextrose 5 % (D5W) 250 mL IVPB, sodium phosphate 20.01 mmol in dextrose 5 % (D5W) 250 mL IVPB, sodium phosphate 30 mmol in dextrose 5 % (D5W) 250 mL IVPB    Oxygen/Ventilator Data (Last 24H):  (if applicable)   Vent Mode: Spont  Oxygen Concentration (%):  [45] 45  Resp Rate Total:  [11 br/min-24 br/min] 24 br/min  PEEP/CPAP:  [5 cmH20] 5 cmH20  Pressure Support:  [10 cmH20] 10 cmH20  Mean Airway Pressure:  [9.2 cmH20-9.3 cmH20] 9.3 cmH20        Hemodynamic Parameters (Last 24H):   (if applicable)   CO:  [9.1 L/min-12.2 L/min] 12 L/min  CI:  [3.5 L/min/m2-4.7 L/min/m2] 4.6 L/min/m2    Lines/Drains:  (if applicable)       Peripheral IV - Single Lumen 01/13/24 2151 20 G Anterior;Left;Proximal Upper Arm (Active)   Site Assessment Clean;Dry;Intact;No redness;No swelling 01/16/24 0730   Extremity Assessment Distal to IV No warmth;No swelling;No redness;No abnormal discoloration 01/16/24 0730   Line Status Infusing 01/16/24 0730   Dressing Status Dry;Clean;Intact 01/16/24 0730   Dressing Intervention Integrity maintained 01/16/24 0730   Reason Not Rotated Not due 01/14/24 1950   Number of days: 2            Peripheral IV - Single Lumen 01/14/24 1434 20 G Right Antecubital (Active)   Site Assessment Clean;Dry;Intact;No swelling;No redness 01/16/24 0730   Extremity Assessment Distal to IV No warmth;No swelling;No redness;No abnormal discoloration 01/16/24 0730   Line Status Saline locked 01/16/24 0730   Dressing Status Clean;Dry;Intact 01/16/24 0730   Dressing  Intervention Integrity maintained 01/16/24 0730   Reason Not Rotated Not due 01/14/24 1950   Number of days: 1       Lab Results :  Recent Results (from the past 24 hour(s))   Magnesium    Collection Time: 01/17/24  3:47 PM   Result Value Ref Range    Magnesium 1.7 1.6 - 2.6 mg/dL   CBC Without Differential    Collection Time: 01/17/24  3:47 PM   Result Value Ref Range    WBC 13.89 (H) 3.90 - 12.70 K/uL    RBC 4.91 4.60 - 6.20 M/uL    Hemoglobin 13.6 (L) 14.0 - 18.0 g/dL    Hematocrit 41.1 40.0 - 54.0 %    MCV 84 82 - 98 fL    MCH 27.7 27.0 - 31.0 pg    MCHC 33.1 32.0 - 36.0 g/dL    RDW 14.4 11.5 - 14.5 %    Platelets 157 150 - 450 K/uL    MPV 10.9 9.2 - 12.9 fL   Comprehensive metabolic panel    Collection Time: 01/17/24  3:47 PM   Result Value Ref Range    Sodium 142 136 - 145 mmol/L    Potassium 3.8 3.5 - 5.1 mmol/L    Chloride 111 (H) 95 - 110 mmol/L    CO2 20 (L) 23 - 29 mmol/L    Glucose 324 (H) 70 - 110 mg/dL    BUN 16 8 - 23 mg/dL    Creatinine 0.8 0.5 - 1.4 mg/dL    Calcium 8.3 (L) 8.7 - 10.5 mg/dL    Total Protein 5.6 (L) 6.0 - 8.4 g/dL    Albumin 3.8 3.5 - 5.2 g/dL    Total Bilirubin 0.7 0.1 - 1.0 mg/dL    Alkaline Phosphatase 49 (L) 55 - 135 U/L    AST 44 (H) 10 - 40 U/L    ALT 37 10 - 44 U/L    eGFR >60.0 >60 mL/min/1.73 m^2    Anion Gap 11 8 - 16 mmol/L   Potassium    Collection Time: 01/17/24  3:47 PM   Result Value Ref Range    Potassium 3.8 3.5 - 5.1 mmol/L   Phosphorus    Collection Time: 01/17/24  3:47 PM   Result Value Ref Range    Phosphorus 2.4 (L) 2.7 - 4.5 mg/dL   ISTAT PROCEDURE    Collection Time: 01/17/24  4:42 PM   Result Value Ref Range    POC PH 7.302 (L) 7.35 - 7.45    POC PCO2 43.2 35 - 45 mmHg    POC PO2 97 80 - 100 mmHg    POC HCO3 21.4 (L) 24 - 28 mmol/L    POC BE -5 (L) -2 to 2 mmol/L    POC SATURATED O2 97 95 - 100 %    POC Glucose 309 (H) 70 - 110 mg/dL    POC Sodium 145 136 - 145 mmol/L    POC Potassium 3.8 3.5 - 5.1 mmol/L    POC TCO2 23 23 - 27 mmol/L    POC Ionized Calcium  1.22 1.06 - 1.42 mmol/L    POC Hematocrit 43 36 - 54 %PCV    Sample ARTERIAL     Site [facility]     Allens Test N/A     DelSys Adult Vent     Mode PSV     PEEP 5     PS 10     FiO2 45    ISTAT PROCEDURE    Collection Time: 01/17/24  5:38 PM   Result Value Ref Range    POC PH 7.296 (L) 7.35 - 7.45    POC PCO2 42.2 35 - 45 mmHg    POC PO2 79 (L) 80 - 100 mmHg    POC HCO3 20.6 (L) 24 - 28 mmol/L    POC BE -6 (L) -2 to 2 mmol/L    POC SATURATED O2 94 95 - 100 %    POC Glucose 308 (H) 70 - 110 mg/dL    POC Sodium 144 136 - 145 mmol/L    POC Potassium 4.2 3.5 - 5.1 mmol/L    POC TCO2 22 (L) 23 - 27 mmol/L    POC Ionized Calcium 1.24 1.06 - 1.42 mmol/L    POC Hematocrit 42 36 - 54 %PCV    Sample ARTERIAL     Site [facility]     Allens Test N/A     DelSys Nasal Can     Mode SPONT     Flow 2    POCT glucose    Collection Time: 01/17/24  7:07 PM   Result Value Ref Range    POC Glucose 267 (H) 70 - 110   Magnesium    Collection Time: 01/17/24  8:03 PM   Result Value Ref Range    Magnesium 2.2 1.6 - 2.6 mg/dL   CBC Without Differential    Collection Time: 01/17/24  8:03 PM   Result Value Ref Range    WBC 13.14 (H) 3.90 - 12.70 K/uL    RBC 4.97 4.60 - 6.20 M/uL    Hemoglobin 13.8 (L) 14.0 - 18.0 g/dL    Hematocrit 41.9 40.0 - 54.0 %    MCV 84 82 - 98 fL    MCH 27.8 27.0 - 31.0 pg    MCHC 32.9 32.0 - 36.0 g/dL    RDW 14.3 11.5 - 14.5 %    Platelets 139 (L) 150 - 450 K/uL    MPV 10.6 9.2 - 12.9 fL   Comprehensive metabolic panel    Collection Time: 01/17/24  8:03 PM   Result Value Ref Range    Sodium 141 136 - 145 mmol/L    Potassium 4.4 3.5 - 5.1 mmol/L    Chloride 110 95 - 110 mmol/L    CO2 22 (L) 23 - 29 mmol/L    Glucose 271 (H) 70 - 110 mg/dL    BUN 14 8 - 23 mg/dL    Creatinine 0.8 0.5 - 1.4 mg/dL    Calcium 8.5 (L) 8.7 - 10.5 mg/dL    Total Protein 5.9 (L) 6.0 - 8.4 g/dL    Albumin 3.9 3.5 - 5.2 g/dL    Total Bilirubin 0.5 0.1 - 1.0 mg/dL    Alkaline Phosphatase 49 (L) 55 - 135 U/L    AST 41 (H) 10 - 40 U/L    ALT 38 10 - 44  U/L    eGFR >60.0 >60 mL/min/1.73 m^2    Anion Gap 9 8 - 16 mmol/L   POCT glucose    Collection Time: 01/17/24  8:09 PM   Result Value Ref Range    POC Glucose 258 (H) 70 - 110   ISTAT PROCEDURE    Collection Time: 01/17/24  8:38 PM   Result Value Ref Range    POC PH 7.366 7.35 - 7.45    POC PCO2 38.5 35 - 45 mmHg    POC PO2 77 (L) 80 - 100 mmHg    POC HCO3 22.0 (L) 24 - 28 mmol/L    POC BE -3 (L) -2 to 2 mmol/L    POC SATURATED O2 95 95 - 100 %    POC Glucose 265 (H) 70 - 110 mg/dL    POC Sodium 144 136 - 145 mmol/L    POC Potassium 4.4 3.5 - 5.1 mmol/L    POC TCO2 23 23 - 27 mmol/L    POC Ionized Calcium 1.22 1.06 - 1.42 mmol/L    POC Hematocrit 42 36 - 54 %PCV    Sample ARTERIAL     Site [facility]     Allens Test N/A     DelSys Nasal Can     Mode SPONT     Flow 4     Sp02 96    POCT glucose    Collection Time: 01/17/24  9:05 PM   Result Value Ref Range    POC Glucose 245 (H) 70 - 110   POCT glucose    Collection Time: 01/17/24 10:10 PM   Result Value Ref Range    POC Glucose 232 (H) 70 - 110   POCT glucose    Collection Time: 01/17/24 11:06 PM   Result Value Ref Range    POC Glucose 203 (H) 70 - 110   POCT glucose    Collection Time: 01/18/24 12:16 AM   Result Value Ref Range    POC Glucose 163 (H) 70 - 110   POCT glucose    Collection Time: 01/18/24  1:04 AM   Result Value Ref Range    POC Glucose 145 (H) 70 - 110   POCT glucose    Collection Time: 01/18/24  2:06 AM   Result Value Ref Range    POC Glucose 127 (H) 70 - 110   Magnesium    Collection Time: 01/18/24  2:07 AM   Result Value Ref Range    Magnesium 2.1 1.6 - 2.6 mg/dL   CBC Without Differential    Collection Time: 01/18/24  2:07 AM   Result Value Ref Range    WBC 16.49 (H) 3.90 - 12.70 K/uL    RBC 5.06 4.60 - 6.20 M/uL    Hemoglobin 14.3 14.0 - 18.0 g/dL    Hematocrit 42.7 40.0 - 54.0 %    MCV 84 82 - 98 fL    MCH 28.3 27.0 - 31.0 pg    MCHC 33.5 32.0 - 36.0 g/dL    RDW 14.6 (H) 11.5 - 14.5 %    Platelets 172 150 - 450 K/uL    MPV 10.9 9.2 - 12.9 fL    Comprehensive Metabolic Panel    Collection Time: 01/18/24  2:07 AM   Result Value Ref Range    Sodium 143 136 - 145 mmol/L    Potassium 4.2 3.5 - 5.1 mmol/L    Chloride 111 (H) 95 - 110 mmol/L    CO2 24 23 - 29 mmol/L    Glucose 139 (H) 70 - 110 mg/dL    BUN 15 8 - 23 mg/dL    Creatinine 0.8 0.5 - 1.4 mg/dL    Calcium 8.5 (L) 8.7 - 10.5 mg/dL    Total Protein 6.2 6.0 - 8.4 g/dL    Albumin 4.1 3.5 - 5.2 g/dL    Total Bilirubin 0.5 0.1 - 1.0 mg/dL    Alkaline Phosphatase 48 (L) 55 - 135 U/L    AST 35 10 - 40 U/L    ALT 36 10 - 44 U/L    eGFR >60.0 >60 mL/min/1.73 m^2    Anion Gap 8 8 - 16 mmol/L   Phosphorus    Collection Time: 01/18/24  2:07 AM   Result Value Ref Range    Phosphorus 2.6 (L) 2.7 - 4.5 mg/dL   POCT glucose    Collection Time: 01/18/24  3:05 AM   Result Value Ref Range    POC Glucose 118 (H) 70 - 110   POCT glucose    Collection Time: 01/18/24  4:07 AM   Result Value Ref Range    POC Glucose 110 70 - 110   POCT glucose    Collection Time: 01/18/24  5:09 AM   Result Value Ref Range    POC Glucose 101 70 - 110   POCT glucose    Collection Time: 01/18/24  6:02 AM   Result Value Ref Range    POC Glucose 97 70 - 110   POCT glucose    Collection Time: 01/18/24  2:14 PM   Result Value Ref Range    POC Glucose 372 (H) 70 - 110       Diagnostic Results:  Imaging Results              X-Ray Chest AP Portable (Final result)  Result time 01/14/24 08:15:46      Final result by Geraldo Sharif MD (01/14/24 08:15:46)                   Narrative:    Chest single view    Clinical data:Chest pain    FINDINGS: AP view of the chest demonstrates no cardiac, pulmonary, or acute osseous abnormalities. Postarthroplasty changes right shoulder.    IMPRESSION:  1. No acute process.    Electronically signed by:  Geraldo Sharif MD  01/14/2024 08:15 AM [facility] Workstation: [id number]                                    12-lead EKG interpretation:   (if applicable)    Recent Cardiac Rhythm:  (if applicable)      Physical  "Exam:  Objective:  General Appearance:  Comfortable and in no acute distress.    Vital signs: (most recent): Blood pressure 125/65, pulse 98, temperature 98 °F (36.7 °C), temperature source Axillary, resp. rate (!) 22, height 6' 2" (1.88 m), weight (!) 141.6 kg (312 lb 1.6 oz), SpO2 (!) 91 %.    Lungs:  Normal effort and normal respiratory rate.  (Mild rhonchi. Chest tubes in place. )  Heart: Normal rate.  Regular rhythm.  S1 normal and S2 normal.  Positive for murmur.    Chest: (Dressing c/d/I. )  Abdomen: Abdomen is soft.  Bowel sounds are normal.     Extremities: There is no local swelling.    Neurological: Patient is alert and oriented to person, place and time.  Normal strength.        Current Consults:  IP CONSULT TO CARDIOLOGY  IP CONSULT TO CARDIOTHORACIC SURGERY  IP CONSULT TO REGISTERED DIETITIAN/NUTRITIONIST    Assessment/Plan:  Assessment:   Three-vessel coronary disease  S/p CABG (LIMA-  >LAD, SVG->PDA, SVG->PLB, Radial graft to OM)  LV dysfunction EF 30-35%  History of paroxysmal atrial fibrillation in sinus  Poorly-controlled diabetes mellitus    Plan:   - Blood pressure adequately controlled at this point.  - If blood pressure remains stable, will plan to start Losartan 25 mg po tomorrow. will plan to transition to Entresto should his EF remain diminished following surgery. Continue Coreg 3.125 mg PO BID. Plan to uptitrate Coreg in place of Diltiazem, given his known moderately reduced EF. Once stable on Losartan, will need to consider starting Aldactone 25 mg po daily with iCMP and DM.   - increase Lipitor to 40 mg po daily.  - continued control of DM per primary team.  - continue Amiodarone 200 mg po daily and Aspirin 81 mg po daily.  - unable to restart NOAC with chest tubes in place.  - Defer DVT ppx to primary team, given he still has chest tubes in place.   - monitor on telemetry.   - maintain K>4 and Mg>2.  - will follow along with you.    Nikko Hare MD  "

## 2024-01-19 LAB
ALBUMIN SERPL BCP-MCNC: 3.6 G/DL (ref 3.5–5.2)
ALP SERPL-CCNC: 50 U/L (ref 55–135)
ALT SERPL W/O P-5'-P-CCNC: 20 U/L (ref 10–44)
ANION GAP SERPL CALC-SCNC: 8 MMOL/L (ref 8–16)
AST SERPL-CCNC: 15 U/L (ref 10–40)
BILIRUB SERPL-MCNC: 0.6 MG/DL (ref 0.1–1)
BLD PROD TYP BPU: NORMAL
BLOOD UNIT EXPIRATION DATE: NORMAL
BLOOD UNIT TYPE CODE: 5100
BLOOD UNIT TYPE CODE: 7300
BLOOD UNIT TYPE: NORMAL
BUN SERPL-MCNC: 29 MG/DL (ref 8–23)
CALCIUM SERPL-MCNC: 8.3 MG/DL (ref 8.7–10.5)
CHLORIDE SERPL-SCNC: 101 MMOL/L (ref 95–110)
CO2 SERPL-SCNC: 24 MMOL/L (ref 23–29)
CODING SYSTEM: NORMAL
CREAT SERPL-MCNC: 1 MG/DL (ref 0.5–1.4)
CROSSMATCH INTERPRETATION: NORMAL
DISPENSE STATUS: NORMAL
ERYTHROCYTE [DISTWIDTH] IN BLOOD BY AUTOMATED COUNT: 15.1 % (ref 11.5–14.5)
EST. GFR  (NO RACE VARIABLE): >60 ML/MIN/1.73 M^2
GLUCOSE SERPL-MCNC: 232 MG/DL (ref 70–110)
GLUCOSE SERPL-MCNC: 310 MG/DL (ref 70–110)
GLUCOSE SERPL-MCNC: 313 MG/DL (ref 70–110)
GLUCOSE SERPL-MCNC: 389 MG/DL (ref 70–110)
GLUCOSE SERPL-MCNC: 397 MG/DL (ref 70–110)
HCT VFR BLD AUTO: 35.6 % (ref 40–54)
HGB BLD-MCNC: 11.7 G/DL (ref 14–18)
MAGNESIUM SERPL-MCNC: 2.2 MG/DL (ref 1.6–2.6)
MCH RBC QN AUTO: 27.8 PG (ref 27–31)
MCHC RBC AUTO-ENTMCNC: 32.9 G/DL (ref 32–36)
MCV RBC AUTO: 85 FL (ref 82–98)
NUM UNITS TRANS PACKED RBC: NORMAL
PLATELET # BLD AUTO: 153 K/UL (ref 150–450)
PMV BLD AUTO: 11.4 FL (ref 9.2–12.9)
POTASSIUM SERPL-SCNC: 4.9 MMOL/L (ref 3.5–5.1)
PROT SERPL-MCNC: 5.8 G/DL (ref 6–8.4)
RBC # BLD AUTO: 4.21 M/UL (ref 4.6–6.2)
SODIUM SERPL-SCNC: 133 MMOL/L (ref 136–145)
WBC # BLD AUTO: 17.08 K/UL (ref 3.9–12.7)

## 2024-01-19 PROCEDURE — 25000003 PHARM REV CODE 250: Performed by: THORACIC SURGERY (CARDIOTHORACIC VASCULAR SURGERY)

## 2024-01-19 PROCEDURE — 83735 ASSAY OF MAGNESIUM: CPT | Performed by: THORACIC SURGERY (CARDIOTHORACIC VASCULAR SURGERY)

## 2024-01-19 PROCEDURE — 85027 COMPLETE CBC AUTOMATED: CPT | Performed by: THORACIC SURGERY (CARDIOTHORACIC VASCULAR SURGERY)

## 2024-01-19 PROCEDURE — 94761 N-INVAS EAR/PLS OXIMETRY MLT: CPT

## 2024-01-19 PROCEDURE — 63600175 PHARM REV CODE 636 W HCPCS: Performed by: INTERNAL MEDICINE

## 2024-01-19 PROCEDURE — 80053 COMPREHEN METABOLIC PANEL: CPT | Performed by: THORACIC SURGERY (CARDIOTHORACIC VASCULAR SURGERY)

## 2024-01-19 PROCEDURE — 25000003 PHARM REV CODE 250: Performed by: INTERNAL MEDICINE

## 2024-01-19 PROCEDURE — 99024 POSTOP FOLLOW-UP VISIT: CPT | Mod: ,,, | Performed by: PHYSICIAN ASSISTANT

## 2024-01-19 PROCEDURE — 99900035 HC TECH TIME PER 15 MIN (STAT)

## 2024-01-19 PROCEDURE — 20000000 HC ICU ROOM

## 2024-01-19 PROCEDURE — 25000003 PHARM REV CODE 250: Performed by: PHYSICIAN ASSISTANT

## 2024-01-19 PROCEDURE — 93010 ELECTROCARDIOGRAM REPORT: CPT | Mod: ,,, | Performed by: INTERNAL MEDICINE

## 2024-01-19 PROCEDURE — 93005 ELECTROCARDIOGRAM TRACING: CPT | Performed by: INTERNAL MEDICINE

## 2024-01-19 PROCEDURE — 63600175 PHARM REV CODE 636 W HCPCS: Performed by: THORACIC SURGERY (CARDIOTHORACIC VASCULAR SURGERY)

## 2024-01-19 PROCEDURE — 99900031 HC PATIENT EDUCATION (STAT)

## 2024-01-19 RX ORDER — INSULIN ASPART 100 [IU]/ML
0-15 INJECTION, SOLUTION INTRAVENOUS; SUBCUTANEOUS
Status: DISCONTINUED | OUTPATIENT
Start: 2024-01-19 | End: 2024-01-21 | Stop reason: HOSPADM

## 2024-01-19 RX ORDER — INSULIN ASPART 100 [IU]/ML
5 INJECTION, SOLUTION INTRAVENOUS; SUBCUTANEOUS
Status: DISCONTINUED | OUTPATIENT
Start: 2024-01-19 | End: 2024-01-20

## 2024-01-19 RX ORDER — ENOXAPARIN SODIUM 100 MG/ML
40 INJECTION SUBCUTANEOUS EVERY 12 HOURS
Status: DISCONTINUED | OUTPATIENT
Start: 2024-01-19 | End: 2024-01-21 | Stop reason: HOSPADM

## 2024-01-19 RX ORDER — ENOXAPARIN SODIUM 100 MG/ML
40 INJECTION SUBCUTANEOUS EVERY 24 HOURS
Status: DISCONTINUED | OUTPATIENT
Start: 2024-01-19 | End: 2024-01-19

## 2024-01-19 RX ADMIN — DILTIAZEM HYDROCHLORIDE 240 MG: 120 CAPSULE, COATED, EXTENDED RELEASE ORAL at 08:01

## 2024-01-19 RX ADMIN — MUPIROCIN 1 G: 20 OINTMENT TOPICAL at 10:01

## 2024-01-19 RX ADMIN — HYDROCODONE BITARTRATE AND ACETAMINOPHEN 1 TABLET: 5; 325 TABLET ORAL at 08:01

## 2024-01-19 RX ADMIN — DOCUSATE SODIUM 100 MG: 100 CAPSULE, LIQUID FILLED ORAL at 10:01

## 2024-01-19 RX ADMIN — PANTOPRAZOLE SODIUM 40 MG: 40 TABLET, DELAYED RELEASE ORAL at 06:01

## 2024-01-19 RX ADMIN — LISINOPRIL 20 MG: 20 TABLET ORAL at 10:01

## 2024-01-19 RX ADMIN — ENOXAPARIN SODIUM 40 MG: 40 INJECTION SUBCUTANEOUS at 08:01

## 2024-01-19 RX ADMIN — INSULIN ASPART 5 UNITS: 100 INJECTION, SOLUTION INTRAVENOUS; SUBCUTANEOUS at 07:01

## 2024-01-19 RX ADMIN — HYDROCODONE BITARTRATE AND ACETAMINOPHEN 1 TABLET: 5; 325 TABLET ORAL at 02:01

## 2024-01-19 RX ADMIN — INSULIN ASPART 5 UNITS: 100 INJECTION, SOLUTION INTRAVENOUS; SUBCUTANEOUS at 04:01

## 2024-01-19 RX ADMIN — HYDROCODONE BITARTRATE AND ACETAMINOPHEN 1 TABLET: 5; 325 TABLET ORAL at 10:01

## 2024-01-19 RX ADMIN — MORPHINE SULFATE 2 MG: 2 INJECTION, SOLUTION INTRAMUSCULAR; INTRAVENOUS at 12:01

## 2024-01-19 RX ADMIN — INSULIN ASPART 5 UNITS: 100 INJECTION, SOLUTION INTRAVENOUS; SUBCUTANEOUS at 11:01

## 2024-01-19 RX ADMIN — ATORVASTATIN CALCIUM 40 MG: 40 TABLET, FILM COATED ORAL at 08:01

## 2024-01-19 RX ADMIN — INSULIN ASPART 15 UNITS: 100 INJECTION, SOLUTION INTRAVENOUS; SUBCUTANEOUS at 10:01

## 2024-01-19 RX ADMIN — CARVEDILOL 3.12 MG: 3.12 TABLET, FILM COATED ORAL at 10:01

## 2024-01-19 RX ADMIN — DILTIAZEM HYDROCHLORIDE 240 MG: 120 CAPSULE, COATED, EXTENDED RELEASE ORAL at 09:01

## 2024-01-19 RX ADMIN — MUPIROCIN 1 G: 20 OINTMENT TOPICAL at 08:01

## 2024-01-19 RX ADMIN — CARVEDILOL 3.12 MG: 3.12 TABLET, FILM COATED ORAL at 08:01

## 2024-01-19 RX ADMIN — INSULIN ASPART 8 UNITS: 100 INJECTION, SOLUTION INTRAVENOUS; SUBCUTANEOUS at 09:01

## 2024-01-19 RX ADMIN — AMIODARONE HYDROCHLORIDE 200 MG: 200 TABLET ORAL at 10:01

## 2024-01-19 RX ADMIN — DOCUSATE SODIUM 100 MG: 100 CAPSULE, LIQUID FILLED ORAL at 08:01

## 2024-01-19 RX ADMIN — ASPIRIN 81 MG 81 MG: 81 TABLET ORAL at 10:01

## 2024-01-19 NOTE — PROGRESS NOTES
CVT SURGERY PROGRESS NOTE  Donald Frey  63 y.o.  1960    Patient's Chief Complaint:  Chest pain    HPI:  This patient has severe coronary artery disease.  He underwent heart catheterization and coronary angiography showing multivessel coronary artery disease.  He was referred for surgical bypass.    He has a history of percutaneous coronary intervention apparently in 2013.  He has also had atrial fibrillation ablation and 2022.  This has been successful according to the patient and wife.    He has diabetes and hypertension.    Medicines are noted and are part of the epic record.    Family history is not pertinent at this time.    He has not a smoker.    On exam vital signs are stable.  Pupils are equal and round reactive to light.  Neck is supple.  Chest is equal breath sounds.  Heart is in a regular rate and rhythm.  Abdomen is benign.  Perfusion to the legs and feet seems to be satisfactory.    Recent studies were reviewed.  The patient has multivessel coronary artery disease.    Recommendation is for surgical bypass.  The procedure and risks were discussed.  The patient seems to be understanding and agreeable.      Hospital Course: Patient underwent CABG x 4 (LIMA to LAD, SVG to PDA, SVG to PLB, L radial to OM) by Dr. Coreas on 1/17/24. He was extubated overnight. He required Cleviprex post op, but this was weaned off on POD #1.     Last 24 hour interval:  -No events overnight.   -BP well controlled  -MS tube output last 24 hours - 420 cc, to suction, no air leak  -Pleural Tube output last 24 hours - 65 cc, to suction, no air leak  -Drop in H/H.   -Leukocytosis noted, afebrile  -Good UOP, renal function stable  -Pt seen this afternoon, sitting up in chair in NAD on RA. He has ambulated around the unit today. He reports expected post op pain that improves with pain medication. + BM today.     Subjective:  Symptoms:  Stable.    Diet:  Poor intake.  No nausea or vomiting.    Activity level: Returning to  "normal.    Pain:  He complains of pain that is moderate.  He reports pain is improving.  Pain is partially controlled.                                                                   Objective:  General Appearance:  In no acute distress.    Vital signs: (most recent): Blood pressure (!) 105/58, pulse 78, temperature 98.2 °F (36.8 °C), temperature source Oral, resp. rate 18, height 6' 2" (1.88 m), weight (!) 141.6 kg (312 lb 1.6 oz), SpO2 (!) 92 %.    Output: Producing urine.    HEENT: Normal HEENT exam.    Lungs:  Normal effort.  No rales, wheezes or rhonchi.    Heart: Normal rate.  Regular rhythm.  No murmur, gallop or friction rub.   Chest: (Sternal incision with surgical dressing. Chest tubes in place, draining appropriately. To suction, no air leak.   )  Abdomen: Abdomen is soft and non-distended.  Bowel sounds are normal.   There is no abdominal tenderness.     Extremities: There is no local swelling.  (EVH and radial artery harvest sites with surgical dressing. )  Pulses: Distal pulses are intact.    Neurological: Patient is alert and oriented to person, place and time.    Skin:  Warm and dry.      Recent Vitals:  Vitals:    01/19/24 1300 01/19/24 1400 01/19/24 1403 01/19/24 1500   BP: 110/61 112/62 112/62 (!) 105/58   Pulse: 82 79 80 78   Resp:   18 18   Temp:    98.2 °F (36.8 °C)   TempSrc:    Oral   SpO2:       Weight:       Height:           INPATIENT MEDS   clevidipine Stopped (01/18/24 1101)      amiodarone  200 mg Oral Daily    aspirin  81 mg Oral Daily    atorvastatin  40 mg Oral Daily    carvediloL  3.125 mg Oral BID    diltiaZEM  240 mg Oral BID    docusate sodium  100 mg Oral BID    insulin aspart U-100  5 Units Subcutaneous TIDWM    insulin detemir U-100  15 Units Subcutaneous BID    lisinopriL  20 mg Oral Daily    mupirocin   Nasal BID    pantoprazole  40 mg Oral Daily     acetaminophen, albumin human 5%, aluminum-magnesium hydroxide-simethicone, calcium gluconate IVPB, calcium gluconate IVPB, " "calcium gluconate IVPB, clevidipine, dextrose 50%, dextrose 50%, dextrose 50%, dextrose 50%, glucagon (human recombinant), glucose, glucose, HYDROcodone-acetaminophen, insulin aspart U-100, magnesium sulfate IVPB, magnesium sulfate IVPB, morphine, morphine, ondansetron, ondansetron, potassium chloride in water, potassium chloride in water, potassium chloride in water, sodium phosphate 15 mmol in dextrose 5 % (D5W) 250 mL IVPB, sodium phosphate 20.01 mmol in dextrose 5 % (D5W) 250 mL IVPB, sodium phosphate 30 mmol in dextrose 5 % (D5W) 250 mL IVPB  HEMODYNAMICS      Recent O2 Therapy/Vent Settings: RA    I/O last 24 hrs:  Intake/Output - Last 3 Shifts         01/17 0700 01/18 0659 01/18 0700 01/19 0659 01/19 0700 01/20 0659    P.O.  1800 360    I.V. (mL/kg) 3427.5 (24.2) 773.9 (5.5)     Blood 1250      IV Piggyback 699.9 349.9     Total Intake(mL/kg) 5377.4 (38) 2923.8 (20.6) 360 (2.5)    Urine (mL/kg/hr) 2190 (0.6) 1700 (0.5) 600 (0.5)    Stool   0    Chest Tube 920 485     Total Output 3110 2185 600    Net +2267.4 +738.8 -240           Stool Occurrence   1 x          Recent Cardiac Rhythm: SR    Recent Pain Assessment: 4    CBC  Recent Labs   Lab 01/17/24 2003 01/18/24 0207 01/19/24  0359   WBC 13.14* 16.49* 17.08*   RBC 4.97 5.06 4.21*   HGB 13.8* 14.3 11.7*   * 172 153   MCV 84 84 85   MCH 27.8 28.3 27.8   MCHC 32.9 33.5 32.9       BMP  Recent Labs   Lab 01/17/24 2003 01/18/24 0207 01/19/24  0359   CO2 22* 24 24   BUN 14 15 29*   CREATININE 0.8 0.8 1.0   CALCIUM 8.5* 8.5* 8.3*       CARDIAC ENZYMES  No results for input(s): "TROPONINI", "CPK", "CKMB" in the last 168 hours.  BNP  Recent Labs   Lab 01/13/24  2151   BNP 57       PT/INR  INR   Date Value Ref Range Status   01/14/2024 1.1 0.8 - 1.2 Final     Comment:     Coumadin Therapy:  2.0 - 3.0 for INR for all indicators except mechanical heart valves  and antiphospholipid syndromes which should use 2.5 - 3.5.     01/13/2024 1.0 0.8 - 1.2 Final     " Comment:     Coumadin Therapy:  2.0 - 3.0 for INR for all indicators except mechanical heart valves  and antiphospholipid syndromes which should use 2.5 - 3.5.     11/06/2023 1.1 0.8 - 1.2 Final     Comment:     Coumadin Therapy:  2.0 - 3.0 for INR for all indicators except mechanical heart valves  and antiphospholipid syndromes which should use 2.5 - 3.5.         DIAGNOSTIC RESULTS:  X-Ray Chest AP Portable  CLINICAL HISTORY:  63 years (1960) Male Post-op CABG    TECHNIQUE:  XR CHEST 1 VIEW. 1 images obtained.    COMPARISON:  January 18, 2024    FINDINGS:    Right-sided IJ catheter has tip projecting at the entry point of the superior vena cava in optimal position. Heart is enlarged exacerbated by the decreased inspiratory from. Minimal central vascular congestion changes are established that appear equal in distribution. Operative changes of recent coronary artery bypass surgery with 2 mediastinal drains and parallel orientation and left-sided thoracostomy tube overlying the left hemidiaphragm. There is no evidence of pneumothorax. No significant tracheal shift.    IMPRESSION: Stable appearance of the chest when compared to previous.    Electronically signed by:  Franky Chris MD  01/19/2024 08:05 AM [facility] Workstation: [id number]      ECG Results              EKG 12-lead (Final result)  Result time 01/15/24 22:03:39      Final result by Interface, Lab In [facility] (01/15/24 22:03:39)                   Narrative:    Test Reason : I25.10,    Vent. Rate : 076 BPM     Atrial Rate : 076 BPM     P-R Int : 224 ms          QRS Dur : 104 ms      QT Int : 388 ms       P-R-T Axes : 062 109 037 degrees     QTc Int : 436 ms    Sinus rhythm with 1st degree A-V block  Rightward axis  Low voltage QRS  Cannot rule out Anteroseptal infarct (cited on or before 20-JAN-2022)  Abnormal ECG  When compared with ECG of 13-JAN-2024 21:43,  Significant changes have occurred  Confirmed by Roger Hassan MD ([id number]) on 1/15/2024 10:03:32  PM    Referred By: LIBORIO   SELF           Confirmed By:Roger Hassan MD                                     EKG 12-lead (Final result)  Result time 01/15/24 22:03:23      Final result by Interface, Lab In [facility] (01/15/24 22:03:23)                   Narrative:    Test Reason : R07.9,    Vent. Rate : 100 BPM     Atrial Rate : 100 BPM     P-R Int : 208 ms          QRS Dur : 116 ms      QT Int : 348 ms       P-R-T Axes : 050 023 050 degrees     QTc Int : 448 ms    Sinus rhythm with occasional Premature ventricular complexes  Anterior infarct (cited on or before 20-JAN-2022)  Abnormal ECG  When compared with ECG of 18-JUL-2023 02:24,  Premature ventricular complexes are now Present  Borderline criteria for Inferior infarct are no longer Present  ST elevation now present in Anterior leads  Nonspecific T wave abnormality has replaced inverted T waves in Inferior  leads  Nonspecific T wave abnormality no longer evident in Anterior leads  Confirmed by Sonya KAUR, Roger RYAN ([id number]) on 1/15/2024 10:03:13 PM    Referred By: LIBORIO   SELF           Confirmed By:Roger Hassan MD                                    CURRENT CONSULTS:  IP CONSULT TO CARDIOLOGY  IP CONSULT TO CARDIOTHORACIC SURGERY  IP CONSULT TO REGISTERED DIETITIAN/NUTRITIONIST    ASSESSMENT/PLAN:    Multivessel CAD  S/p CABG x 4 (LIMA to LAD, SVG to PDA, SVG to PLB, L radial to OM) by Dr. Coreas on 1/17/24  -Hemodynamically stable  -Tolerating RA  -Mediastinal tube to remain until output decreases  -Pleural tube removed 1/18/24  -Pacer wires removed 1/18/24  -Surgical incisions with surgical dressing  -Continue ASA, BB, statin  -Remains in SR  -Encourage IS  -Increase activity  -Hiram hose  -Bowel regimen - on docusate. BM 1/19/24.    Acute post operative blood loss anemia  Thrombocytopenia  -Expected post op  -Received 1250 cc Cell Saver post op  -No indication for transfusion at this time  -Monitor    Leukocytosis  -Afebrile  -Likely  reactive  -Monitor    Uncontrolled IDDM  -BS not well controlled. Levemir adjusted and prandial insulin added today.   -Continue SSI  -Goal < 180  -Last A1C 10.9%    HTN  -Cleviprex off since 1/18/24  -Well controlled on current regimen    HLD  -Continue statin    Hx of a-fib/flutter  -s/p RFA in July 2022  -Currently in SR  -On amiodarone  -Resume Eliquis at discharge.     Hypomagnesemia  Hypophosphatemia  -Replaced per protocol  -Keep Mag > 2 and K >4      Case and plan of care discussed with MD.      GI Prophylaxis:  PPI:proton pump inhibitor per orders  DVT Prophylaxis:  Anticoagulants   Medication Route Frequency    enoxaparin injection 40 mg Subcutaneous Q12H (prophylaxis, 0900/2100)       Bebe Garcia PA-C  Cardiovascular Thoracic Surgery  1/19/2024  2:06 PM

## 2024-01-19 NOTE — PLAN OF CARE
01/19/24 0738   Patient Assessment/Suction   Level of Consciousness (AVPU) alert   Respiratory Effort Normal;Unlabored   Expansion/Accessory Muscles/Retractions no retractions;no use of accessory muscles   PRE-TX-O2   Device (Oxygen Therapy) room air   SpO2 (!) 92 %   Pulse Oximetry Type Continuous   $ Pulse Oximetry - Multiple Charge Pulse Oximetry - Multiple   Pulse 108   Resp (!) 25   Education   $ Education 15 min

## 2024-01-19 NOTE — PROGRESS NOTES
[facility]    Critical Care Cardiology Progress Note    Subjective:  Patient looks good he is sitting up in the chair.  He has not had any shortness of breath or chest pain he states that he is coughing up some phlegm.  He is remarkably doing quite well ,glucose is need to be under better control    Objective:  Vital Signs (Most Recent)  Temp: 98.1 °F (36.7 °C) (01/19/24 0301)  Pulse: 97 (01/19/24 0800)  Resp: (!) 25 (01/19/24 0738)  BP: (!) 112/57 (01/19/24 0800)  SpO2: (!) 92 % (01/19/24 0800)    Vital Signs Range (Last 24H):  Temp:  [98.1 °F (36.7 °C)-98.4 °F (36.9 °C)]   Pulse:  []   Resp:  [15-60]   BP: ()/(55-98)   SpO2:  [88 %-98 %]   Arterial Line BP: (106-176)/()     I & O (Last 24H):    Intake/Output Summary (Last 24 hours) at 1/19/2024 0848  Last data filed at 1/19/2024 0501  Gross per 24 hour   Intake 2923.77 ml   Output 2185 ml   Net 738.77 ml       Current Diet:     Current Diet Order   Procedures    Diet diabetic [facility]; 1800 Calorie     Order Specific Question:   Indicate patient location for additional diet options:     Answer:   [facility]     Order Specific Question:   Total calories:     Answer:   1800 Calorie        Allergies:  Review of patient's allergies indicates:  No Known Allergies    Meds:  Scheduled Meds:   amiodarone  200 mg Oral Daily    aspirin  81 mg Oral Daily    atorvastatin  40 mg Oral Daily    carvediloL  3.125 mg Oral BID    diltiaZEM  240 mg Oral BID    docusate sodium  100 mg Oral BID    insulin aspart U-100  5 Units Subcutaneous TIDWM    insulin detemir U-100  15 Units Subcutaneous BID    lisinopriL  20 mg Oral Daily    mupirocin   Nasal BID    pantoprazole  40 mg Oral Daily     Continuous Infusions:   clevidipine Stopped (01/18/24 1101)     PRN Meds:acetaminophen, albumin human 5%, aluminum-magnesium hydroxide-simethicone, calcium gluconate IVPB, calcium gluconate IVPB, calcium gluconate IVPB, clevidipine, dextrose 50%, dextrose 50%, dextrose 50%,  dextrose 50%, glucagon (human recombinant), glucose, glucose, HYDROcodone-acetaminophen, insulin aspart U-100, magnesium sulfate IVPB, magnesium sulfate IVPB, morphine, morphine, ondansetron, ondansetron, potassium chloride in water, potassium chloride in water, potassium chloride in water, sodium phosphate 15 mmol in dextrose 5 % (D5W) 250 mL IVPB, sodium phosphate 20.01 mmol in dextrose 5 % (D5W) 250 mL IVPB, sodium phosphate 30 mmol in dextrose 5 % (D5W) 250 mL IVPB    Oxygen/Ventilator Data (Last 24H):  (if applicable)            Hemodynamic Parameters (Last 24H):   (if applicable)        Lines/Drains:  (if applicable)  Percutaneous Central Line Insertion/Assessment - Triple Lumen  01/17/24 0809 Internal Jugular Right (Active)   Verification by X-ray No 01/18/24 1901   Site Assessment No drainage;No redness;No swelling 01/19/24 0701   Line Securement Device Secured with sutureless device 01/19/24 0701   Dressing Type Transparent (Tegaderm) 01/19/24 0701   Dressing Status Clean;Dry;Intact 01/19/24 0701   Dressing Intervention Integrity maintained 01/19/24 0501   Distal Patency/Care normal saline locked 01/19/24 0501   Medial 1 Patency/Care normal saline lock 01/19/24 0501   Proximal 1 Patency/Care normal saline locked 01/19/24 0501   Number of days: 2            Peripheral IV - Single Lumen 01/14/24 1434 20 G Right Antecubital (Active)   Site Assessment Clean;Dry;Intact 01/19/24 0701   Extremity Assessment Distal to IV No redness;No swelling;No warmth 01/19/24 0701   Line Status Saline locked 01/19/24 0701   Dressing Status Clean;Dry;Intact 01/19/24 0701   Dressing Intervention Integrity maintained 01/19/24 0501   Reason Not Rotated Not due 01/19/24 0501   Number of days: 4            Chest Tube 01/17/24 0943 Tube - 3 Left Pleural 16 Fr. (Active)   Chest Tube WDL WDL 01/19/24 0701   Function -20 cm H2O 01/19/24 0701   Air Leak/Fluctuation air leak not present 01/19/24 0701   Safety all tubing connections taped  01/19/24 0701   Securement tubing secured to body distal to insertion site w/ tape 01/19/24 0701   Patency Intervention Tip/tilt 01/19/24 0701   Drainage Description Serosanguineous 01/19/24 0701   Dressing Appearance clean and dry 01/18/24 1501   Left Subcutaneous Emphysema none present 01/19/24 0501   Right Subcutaneous Emphysema none present 01/19/24 0501   Site Assessment Clean;Dry;Intact;No swelling;No redness 01/19/24 0501   Surrounding Skin Dry;Intact 01/19/24 0501   Output (mL) 20 mL 01/19/24 0501   Number of days: 1            Y Chest Tube 1 and 2 01/17/24 0942 Anterior Mediastinal 16 Fr. Anterior Mediastinal 16 Fr. (Active)   Function -20 cm H2O 01/19/24 0701   Air Leak/Fluctuation air leak not present 01/19/24 0501   Safety all tubing connections taped 01/19/24 0701   Securement tubing secured to body distal to insertion site w/ tape 01/19/24 0701   Left Subcutaneous Emphysema none present 01/19/24 0701   Right Subcutaneous Emphysema none present 01/19/24 0701   Patency Intervention Tip/tilt 01/19/24 0701   Drainage Description 1 Serosanguineous 01/19/24 0701   Tube 1 Dressing Appearance clean and dry 01/19/24 0501   Site Assessment 1 Clean;Dry;Intact 01/19/24 0701   Surrounding Skin 1 Dry;Intact 01/19/24 0501   Drainage Description 2 Sanguineous 01/19/24 0501   Tube 2 Dressing Appearance clean and dry 01/19/24 0501   Site Assessment 2 Clean;Dry;Intact 01/19/24 0501   Surrounding Skin Dry;Intact 01/19/24 0501   Output (mL) 110 mL 01/19/24 0501   Number of days: 1       Lab Results :  Recent Results (from the past 24 hour(s))   POCT glucose    Collection Time: 01/18/24  2:14 PM   Result Value Ref Range    POC Glucose 372 (H) 70 - 110   POCT glucose    Collection Time: 01/18/24 10:23 PM   Result Value Ref Range    POC Glucose 405 (H) 70 - 110   Magnesium    Collection Time: 01/19/24  3:59 AM   Result Value Ref Range    Magnesium 2.2 1.6 - 2.6 mg/dL   CBC Without Differential    Collection Time: 01/19/24   "3:59 AM   Result Value Ref Range    WBC 17.08 (H) 3.90 - 12.70 K/uL    RBC 4.21 (L) 4.60 - 6.20 M/uL    Hemoglobin 11.7 (L) 14.0 - 18.0 g/dL    Hematocrit 35.6 (L) 40.0 - 54.0 %    MCV 85 82 - 98 fL    MCH 27.8 27.0 - 31.0 pg    MCHC 32.9 32.0 - 36.0 g/dL    RDW 15.1 (H) 11.5 - 14.5 %    Platelets 153 150 - 450 K/uL    MPV 11.4 9.2 - 12.9 fL   Comprehensive Metabolic Panel    Collection Time: 01/19/24  3:59 AM   Result Value Ref Range    Sodium 133 (L) 136 - 145 mmol/L    Potassium 4.9 3.5 - 5.1 mmol/L    Chloride 101 95 - 110 mmol/L    CO2 24 23 - 29 mmol/L    Glucose 389 (H) 70 - 110 mg/dL    BUN 29 (H) 8 - 23 mg/dL    Creatinine 1.0 0.5 - 1.4 mg/dL    Calcium 8.3 (L) 8.7 - 10.5 mg/dL    Total Protein 5.8 (L) 6.0 - 8.4 g/dL    Albumin 3.6 3.5 - 5.2 g/dL    Total Bilirubin 0.6 0.1 - 1.0 mg/dL    Alkaline Phosphatase 50 (L) 55 - 135 U/L    AST 15 10 - 40 U/L    ALT 20 10 - 44 U/L    eGFR >60.0 >60 mL/min/1.73 m^2    Anion Gap 8 8 - 16 mmol/L       Diagnostic Results:  Imaging Results              X-Ray Chest AP Portable (Final result)  Result time 01/14/24 08:15:46      Final result by Geraldo Sharif MD (01/14/24 08:15:46)                   Narrative:    Chest single view    Clinical data:Chest pain    FINDINGS: AP view of the chest demonstrates no cardiac, pulmonary, or acute osseous abnormalities. Postarthroplasty changes right shoulder.    IMPRESSION:  1. No acute process.    Electronically signed by:  Geraldo Sharif MD  01/14/2024 08:15 AM [facility] Workstation: [id number]                                    12-lead EKG interpretation:   (if applicable)    Recent Cardiac Rhythm:  (if applicable)      Physical Exam:  Objective:  General Appearance:  In no acute distress.    Vital signs: (most recent): Blood pressure (!) 112/57, pulse 97, temperature 98.1 °F (36.7 °C), resp. rate (!) 25, height 6' 2" (1.88 m), weight (!) 141.6 kg (312 lb 1.6 oz), SpO2 (!) 92 %.    Lungs:  Normal respiratory rate.  There are " decreased breath sounds.    Heart: Normal rate.  Regular rhythm.  S1 normal and S2 normal.  Positive for murmur.  No gallop.   Abdomen: Abdomen is soft.  Bowel sounds are normal.   There is no abdominal tenderness.     Extremities: There is no local swelling.      Chest x-ray no significant interval change  Current Consults:  IP CONSULT TO CARDIOLOGY  IP CONSULT TO CARDIOTHORACIC SURGERY  IP CONSULT TO REGISTERED DIETITIAN/NUTRITIONIST    Assessment/Plan:  Assessment:   hree-vessel coronary disease  S/p CABG (LIMA-  >LAD, SVG->PDA, SVG->PLB, Radial graft to OM)  LV dysfunction EF 30-35%  History of paroxysmal atrial fibrillation in sinus  Poorly-controlled diabetes mellitus     Plan:    Will work on his glucoses with a higher sliding scale.  Increase activity.  Hopefully we can take the chest tubes out soon

## 2024-01-19 NOTE — PROGRESS NOTES
Pharmacist Renal Dose Adjustment Note    Donald Frey is a 63 y.o. male being treated with the medication Enoxaparin.    Patient Data:    Vital Signs (Most Recent):  Temp: 98.2 °F (36.8 °C) (01/19/24 1500)  Pulse: 78 (01/19/24 1500)  Resp: 18 (01/19/24 1500)  BP: (!) 105/58 (01/19/24 1500)  SpO2: (!) 92 % (01/19/24 0800) Vital Signs (72h Range):  Temp:  [96.4 °F (35.8 °C)-98.6 °F (37 °C)]   Pulse:  []   Resp:  [0-60]   BP: ()/(55-98)   SpO2:  [88 %-100 %]   Arterial Line BP: ()/()      Recent Labs   Lab 01/17/24 2003 01/18/24  0207 01/19/24  0359   CREATININE 0.8 0.8 1.0     Serum creatinine: 1 mg/dL 01/19/24 0359  Estimated creatinine clearance: 110.4 mL/min    Enoxaparin 40 mg subq every 24 hours will be changed to Enoxaparin 40 mg subq every 12 hours due to BMI> 40.    Pharmacist's Name: Vickie Nguyen  Pharmacist's Extension: [phone]

## 2024-01-19 NOTE — PLAN OF CARE
Pt is POD 2 CABG, CM following for DC planning when pt is medically clear to DC.      01/19/24 1003   Discharge Reassessment   Assessment Type Discharge Planning Reassessment   Did the patient's condition or plan change since previous assessment? No   Discharge Plan discussed with: Patient   Communicated ROBINSON with patient/caregiver Yes   Discharge Plan A Home with family   Discharge Plan B Home Health   DME Needed Upon Discharge  none   Transition of Care Barriers None   Why the patient remains in the hospital Requires continued medical care

## 2024-01-19 NOTE — PROGRESS NOTES
[facility] Medicine  Progress Note    Patient name: Donald Frey  MRN: [id number]  Admit Date: 1/13/2024   LOS: 5 days     SUBJECTIVE:     Principal problem: Chest pain    HPI:  63 M with HTN, HLD, DM2, CAD status-post MI/PCI in 2013, who was first diagnosed with AF in 1/2022  and  s/p ablation . CHF  came with CP .  His chest pain is more like epigastric area and started this evening and got better with taking antacid med he still came to the hospital.  Currently he is chest pain-free and EKG did not show any ischemia and sinus rhythm with loss of PVC otherwise normal.  He ran out of the Eliquis secondary to insurance issues and currently he is taking aspirin and Plavix.  Normally he takes aspirin and Eliquis in the past.  Chest x-ray negative and cardiac enzymes are negative.  He did echocardiogram with Dr. Xavi fierro about 6 months ago and he was told normal and no recent stress test.    Overview/Hospital Course:  Patient presented with chest pain. C found to have multivessel disease.  Place on nitro drip for chest pain.  underwent CABG 1/17/24.    Interval History:  Patient does not complains of any SOB. Soreness of the chest post operatively. Afebrile. Hyperglycemic.      Scheduled Meds:   amiodarone  200 mg Oral Daily    aspirin  81 mg Oral Daily    atorvastatin  40 mg Oral Daily    carvediloL  3.125 mg Oral BID    diltiaZEM  240 mg Oral BID    docusate sodium  100 mg Oral BID    insulin aspart U-100  5 Units Subcutaneous TIDWM    insulin detemir U-100  15 Units Subcutaneous BID    lisinopriL  20 mg Oral Daily    mupirocin   Nasal BID    pantoprazole  40 mg Oral Daily     Continuous Infusions:   clevidipine Stopped (01/18/24 1101)     PRN Meds:acetaminophen, albumin human 5%, aluminum-magnesium hydroxide-simethicone, calcium gluconate IVPB, calcium gluconate IVPB, calcium gluconate IVPB, clevidipine, dextrose 50%, dextrose 50%, dextrose 50%, dextrose 50%, glucagon (human  recombinant), glucose, glucose, HYDROcodone-acetaminophen, insulin aspart U-100, magnesium sulfate IVPB, magnesium sulfate IVPB, morphine, morphine, ondansetron, ondansetron, potassium chloride in water, potassium chloride in water, potassium chloride in water, sodium phosphate 15 mmol in dextrose 5 % (D5W) 250 mL IVPB, sodium phosphate 20.01 mmol in dextrose 5 % (D5W) 250 mL IVPB, sodium phosphate 30 mmol in dextrose 5 % (D5W) 250 mL IVPB    Review of patient's allergies indicates:  No Known Allergies    Review of Systems  As per subjective    OBJECTIVE:     Vital Signs (Most Recent)  Temp: 98.6 °F (37 °C) (01/19/24 1100)  Pulse: 89 (01/19/24 1100)  Resp: (!) 24 (01/19/24 1100)  BP: 131/64 (01/19/24 1100)  SpO2: (!) 92 % (01/19/24 0800)    Vital Signs Range (Last 24H):  Temp:  [98.1 °F (36.7 °C)-98.6 °F (37 °C)]   Pulse:  []   Resp:  [15-60]   BP: ()/(55-98)   SpO2:  [88 %-98 %]   Arterial Line BP: (106-176)/()     I & O (Last 24H):  Intake/Output Summary (Last 24 hours) at 1/19/2024 1145  Last data filed at 1/19/2024 0847  Gross per 24 hour   Intake 3283.77 ml   Output 2785 ml   Net 498.77 ml         Physical Exam:  General: Patient resting comfortably in no acute distress. Appears as stated age. Calm  Eyes: EOM intact. No conjunctivae injection. No scleral icterus.  ENT: Hearing grossly intact. No discharge from ears. No nasal discharge.   CVS: RRR. No LE edema BL.  Lungs: CTA BL, no wheezing or crackles. Good breath sounds. No accessory muscle use. No acute respiratory distress  Neuro: Alert. GCS 15. Cranial nerves grossly intact. Moves all extremities equally. Follows commands. Responds appropriately   Skin: sternotomy surgical incision and multiple vein harvesting incisions.   Abdomen: soft and non tender with mediastinal and chest drains     Laboratory:  All pertinent labs within the past 24 hours have been reviewed.  CBC:   Recent Labs   Lab 01/17/24 2003 01/17/24  2038 01/18/24  0207  01/19/24  0359   WBC 13.14*  --  16.49* 17.08*   HGB 13.8*  --  14.3 11.7*   HCT 41.9 42 42.7 35.6*   *  --  172 153       CMP:   Recent Labs   Lab 01/17/24 2003 01/18/24  0207 01/19/24  0359    143 133*   K 4.4 4.2 4.9    111* 101   CO2 22* 24 24   * 139* 389*   BUN 14 15 29*   CREATININE 0.8 0.8 1.0   CALCIUM 8.5* 8.5* 8.3*   PROT 5.9* 6.2 5.8*   ALBUMIN 3.9 4.1 3.6   BILITOT 0.5 0.5 0.6   ALKPHOS 49* 48* 50*   AST 41* 35 15   ALT 38 36 20   ANIONGAP 9 8 8         Microbiology Results (last 7 days)       ** No results found for the last 168 hours. **             Diagnostic Results:      ASSESSMENT/PLAN:     NSTEMI with multivessel CAD   S/p CABG 1/17  - post op care per CTS, chest and mediastinal tubes off per CTS   - Cont aspirin, Lipitor, Lisinopril, Coreg     Afib with s/p ablation   - Cont Cardizem, amiodarone  - Coreg added   - resume Eliquis when OK with CTS     HLD   Type II DM: not on home insulin   HTN  - resume home medications   - Levemir increased to 15 units BID and added 5 units TID Novolog. Sliding scale increased. Monitor glucose.   - sliding scale      VTE Risk Mitigation (From admission, onward)           Ordered     IP VTE LOW RISK PATIENT  Once         01/17/24 1127                          Department Hospital Medicine  [facility]  Krystal Henao MD  Date of service: 01/19/2024

## 2024-01-20 PROBLEM — Z95.1 S/P CABG (CORONARY ARTERY BYPASS GRAFT): Status: ACTIVE | Noted: 2024-01-20

## 2024-01-20 LAB
ALBUMIN SERPL BCP-MCNC: 3.5 G/DL (ref 3.5–5.2)
ALP SERPL-CCNC: 50 U/L (ref 55–135)
ALT SERPL W/O P-5'-P-CCNC: 17 U/L (ref 10–44)
ANION GAP SERPL CALC-SCNC: 6 MMOL/L (ref 8–16)
AST SERPL-CCNC: 15 U/L (ref 10–40)
BILIRUB SERPL-MCNC: 0.5 MG/DL (ref 0.1–1)
BUN SERPL-MCNC: 33 MG/DL (ref 8–23)
CALCIUM SERPL-MCNC: 8.1 MG/DL (ref 8.7–10.5)
CHLORIDE SERPL-SCNC: 102 MMOL/L (ref 95–110)
CO2 SERPL-SCNC: 26 MMOL/L (ref 23–29)
CREAT SERPL-MCNC: 0.9 MG/DL (ref 0.5–1.4)
ERYTHROCYTE [DISTWIDTH] IN BLOOD BY AUTOMATED COUNT: 15.1 % (ref 11.5–14.5)
EST. GFR  (NO RACE VARIABLE): >60 ML/MIN/1.73 M^2
GLUCOSE SERPL-MCNC: 137 MG/DL (ref 70–110)
GLUCOSE SERPL-MCNC: 210 MG/DL (ref 70–110)
GLUCOSE SERPL-MCNC: 235 MG/DL (ref 70–110)
GLUCOSE SERPL-MCNC: 270 MG/DL (ref 70–110)
GLUCOSE SERPL-MCNC: 275 MG/DL (ref 70–110)
HCT VFR BLD AUTO: 32.8 % (ref 40–54)
HGB BLD-MCNC: 10.7 G/DL (ref 14–18)
MAGNESIUM SERPL-MCNC: 2.4 MG/DL (ref 1.6–2.6)
MCH RBC QN AUTO: 27.8 PG (ref 27–31)
MCHC RBC AUTO-ENTMCNC: 32.6 G/DL (ref 32–36)
MCV RBC AUTO: 85 FL (ref 82–98)
PLATELET # BLD AUTO: 161 K/UL (ref 150–450)
PMV BLD AUTO: 10.9 FL (ref 9.2–12.9)
POTASSIUM SERPL-SCNC: 4.5 MMOL/L (ref 3.5–5.1)
PROT SERPL-MCNC: 5.8 G/DL (ref 6–8.4)
RBC # BLD AUTO: 3.85 M/UL (ref 4.6–6.2)
SODIUM SERPL-SCNC: 134 MMOL/L (ref 136–145)
WBC # BLD AUTO: 13.01 K/UL (ref 3.9–12.7)

## 2024-01-20 PROCEDURE — 25000003 PHARM REV CODE 250: Performed by: THORACIC SURGERY (CARDIOTHORACIC VASCULAR SURGERY)

## 2024-01-20 PROCEDURE — 83735 ASSAY OF MAGNESIUM: CPT | Performed by: THORACIC SURGERY (CARDIOTHORACIC VASCULAR SURGERY)

## 2024-01-20 PROCEDURE — 80053 COMPREHEN METABOLIC PANEL: CPT | Performed by: THORACIC SURGERY (CARDIOTHORACIC VASCULAR SURGERY)

## 2024-01-20 PROCEDURE — 25000003 PHARM REV CODE 250: Performed by: INTERNAL MEDICINE

## 2024-01-20 PROCEDURE — 63600175 PHARM REV CODE 636 W HCPCS: Performed by: INTERNAL MEDICINE

## 2024-01-20 PROCEDURE — 94761 N-INVAS EAR/PLS OXIMETRY MLT: CPT

## 2024-01-20 PROCEDURE — 21000000 HC CCU ICU ROOM CHARGE

## 2024-01-20 PROCEDURE — 63600175 PHARM REV CODE 636 W HCPCS: Performed by: THORACIC SURGERY (CARDIOTHORACIC VASCULAR SURGERY)

## 2024-01-20 PROCEDURE — 99900031 HC PATIENT EDUCATION (STAT)

## 2024-01-20 PROCEDURE — 25000003 PHARM REV CODE 250: Performed by: PHYSICIAN ASSISTANT

## 2024-01-20 PROCEDURE — 99024 POSTOP FOLLOW-UP VISIT: CPT | Mod: ,,, | Performed by: THORACIC SURGERY (CARDIOTHORACIC VASCULAR SURGERY)

## 2024-01-20 PROCEDURE — 93005 ELECTROCARDIOGRAM TRACING: CPT | Performed by: INTERNAL MEDICINE

## 2024-01-20 PROCEDURE — 93010 ELECTROCARDIOGRAM REPORT: CPT | Mod: ,,, | Performed by: INTERNAL MEDICINE

## 2024-01-20 PROCEDURE — 85027 COMPLETE CBC AUTOMATED: CPT | Performed by: THORACIC SURGERY (CARDIOTHORACIC VASCULAR SURGERY)

## 2024-01-20 RX ORDER — INSULIN ASPART 100 [IU]/ML
8 INJECTION, SOLUTION INTRAVENOUS; SUBCUTANEOUS
Status: DISCONTINUED | OUTPATIENT
Start: 2024-01-20 | End: 2024-01-21 | Stop reason: HOSPADM

## 2024-01-20 RX ORDER — FUROSEMIDE 10 MG/ML
40 INJECTION INTRAMUSCULAR; INTRAVENOUS DAILY
Status: DISCONTINUED | OUTPATIENT
Start: 2024-01-20 | End: 2024-01-21 | Stop reason: HOSPADM

## 2024-01-20 RX ADMIN — INSULIN ASPART 8 UNITS: 100 INJECTION, SOLUTION INTRAVENOUS; SUBCUTANEOUS at 05:01

## 2024-01-20 RX ADMIN — ENOXAPARIN SODIUM 40 MG: 40 INJECTION SUBCUTANEOUS at 08:01

## 2024-01-20 RX ADMIN — ASPIRIN 81 MG 81 MG: 81 TABLET ORAL at 08:01

## 2024-01-20 RX ADMIN — INSULIN ASPART 8 UNITS: 100 INJECTION, SOLUTION INTRAVENOUS; SUBCUTANEOUS at 08:01

## 2024-01-20 RX ADMIN — INSULIN ASPART 6 UNITS: 100 INJECTION, SOLUTION INTRAVENOUS; SUBCUTANEOUS at 11:01

## 2024-01-20 RX ADMIN — DILTIAZEM HYDROCHLORIDE 240 MG: 120 CAPSULE, COATED, EXTENDED RELEASE ORAL at 08:01

## 2024-01-20 RX ADMIN — AMIODARONE HYDROCHLORIDE 200 MG: 200 TABLET ORAL at 08:01

## 2024-01-20 RX ADMIN — DOCUSATE SODIUM 100 MG: 100 CAPSULE, LIQUID FILLED ORAL at 08:01

## 2024-01-20 RX ADMIN — INSULIN ASPART 9 UNITS: 100 INJECTION, SOLUTION INTRAVENOUS; SUBCUTANEOUS at 06:01

## 2024-01-20 RX ADMIN — INSULIN ASPART 8 UNITS: 100 INJECTION, SOLUTION INTRAVENOUS; SUBCUTANEOUS at 11:01

## 2024-01-20 RX ADMIN — CARVEDILOL 3.12 MG: 3.12 TABLET, FILM COATED ORAL at 08:01

## 2024-01-20 RX ADMIN — INSULIN ASPART 6 UNITS: 100 INJECTION, SOLUTION INTRAVENOUS; SUBCUTANEOUS at 05:01

## 2024-01-20 RX ADMIN — FUROSEMIDE 40 MG: 10 INJECTION, SOLUTION INTRAMUSCULAR; INTRAVENOUS at 05:01

## 2024-01-20 RX ADMIN — MUPIROCIN 1 G: 20 OINTMENT TOPICAL at 08:01

## 2024-01-20 RX ADMIN — ATORVASTATIN CALCIUM 40 MG: 40 TABLET, FILM COATED ORAL at 08:01

## 2024-01-20 RX ADMIN — LISINOPRIL 20 MG: 20 TABLET ORAL at 08:01

## 2024-01-20 RX ADMIN — HYDROCODONE BITARTRATE AND ACETAMINOPHEN 1 TABLET: 5; 325 TABLET ORAL at 10:01

## 2024-01-20 RX ADMIN — PANTOPRAZOLE SODIUM 40 MG: 40 TABLET, DELAYED RELEASE ORAL at 06:01

## 2024-01-20 NOTE — PLAN OF CARE
Problem: Infection  Goal: Absence of Infection Signs and Symptoms  Outcome: Ongoing, Progressing     Problem: Bleeding (Cardiovascular Surgery)  Goal: Bleeding (Cardiovascular Surgery)  Outcome: Ongoing, Progressing     Problem: Cerebral Tissue Perfusion (Cardiovascular Surgery)  Goal: Optimal Cerebral Tissue Perfusion (Cardiovascular Surgery)  Outcome: Ongoing, Progressing     Problem: Glycemic Control Impaired (Cardiovascular Surgery)  Goal: Blood Glucose Level Within Targeted Range (Cardiovascular Surgery)  Outcome: Ongoing, Progressing     Problem: Pain (Cardiovascular Surgery)  Goal: Acceptable Pain Control  Outcome: Ongoing, Progressing     Problem: Postoperative Nausea and Vomiting (Cardiovascular Surgery)  Goal: Nausea and Vomiting Relief (Cardiovascular Surgery)  Outcome: Met     Problem: Postoperative Urinary Retention (Cardiovascular Surgery)  Goal: Effective Urinary Elimination (Cardiovascular Surgery)  Outcome: Met     Problem: Skin Injury Risk Increased  Goal: Skin Health and Integrity  Outcome: Ongoing, Progressing   Pt has been up in chair since before shift chair. Up to toilet using cardiac walker. Ambulated 400 feet in hallway needing to stop a couple of times to lean against the wall to catch his breath. Pain pill as needed. Chest tubes removed per surgeon. Pt tolerated w/o complaint.

## 2024-01-20 NOTE — PROGRESS NOTES
[facility]    Critical Care Cardiology Progress Note    Subjective:  Patient continues to progress remarkably well. Up in bedside chair. No CP, palpitations, syncope, presyncope, orthopnea. He is having a minimal amount of LE edema. VSS. Chest tubes remain in place.     Objective:  Vital Signs (Most Recent)  Temp: 98 °F (36.7 °C) (01/20/24 1130)  Pulse: 73 (01/20/24 1330)  Resp: (!) 27 (01/20/24 1330)  BP: (!) 113/56 (01/20/24 1300)  SpO2: (!) 93 % (01/20/24 1330)    Vital Signs Range (Last 24H):  Temp:  [98 °F (36.7 °C)-98.7 °F (37.1 °C)]   Pulse:  [67-95]   Resp:  [16-31]   BP: (105-143)/(56-80)   SpO2:  [91 %-98 %]     I & O (Last 24H):    Intake/Output Summary (Last 24 hours) at 1/20/2024 1437  Last data filed at 1/20/2024 1250  Gross per 24 hour   Intake 2200 ml   Output 1840 ml   Net 360 ml         Current Diet:     Current Diet Order   Procedures    Diet diabetic [facility]; 1800 Calorie     Order Specific Question:   Indicate patient location for additional diet options:     Answer:   [facility]     Order Specific Question:   Total calories:     Answer:   1800 Calorie        Allergies:  Review of patient's allergies indicates:  No Known Allergies    Meds:  Scheduled Meds:   amiodarone  200 mg Oral Daily    aspirin  81 mg Oral Daily    atorvastatin  40 mg Oral Daily    carvediloL  3.125 mg Oral BID    diltiaZEM  240 mg Oral BID    docusate sodium  100 mg Oral BID    enoxparin  40 mg Subcutaneous Q12H (prophylaxis, 0900/2100)    insulin aspart U-100  8 Units Subcutaneous TIDWM    insulin detemir U-100  25 Units Subcutaneous BID    lisinopriL  20 mg Oral Daily    mupirocin   Nasal BID    pantoprazole  40 mg Oral Daily     Continuous Infusions:   clevidipine Stopped (01/18/24 1101)     PRN Meds:acetaminophen, albumin human 5%, aluminum-magnesium hydroxide-simethicone, calcium gluconate IVPB, calcium gluconate IVPB, calcium gluconate IVPB, clevidipine, dextrose 50%, dextrose 50%, dextrose 50%, dextrose 50%,  glucagon (human recombinant), glucose, glucose, HYDROcodone-acetaminophen, insulin aspart U-100, magnesium sulfate IVPB, magnesium sulfate IVPB, morphine, morphine, ondansetron, ondansetron, potassium chloride in water, potassium chloride in water, potassium chloride in water, sodium phosphate 15 mmol in dextrose 5 % (D5W) 250 mL IVPB, sodium phosphate 20.01 mmol in dextrose 5 % (D5W) 250 mL IVPB, sodium phosphate 30 mmol in dextrose 5 % (D5W) 250 mL IVPB    Oxygen/Ventilator Data (Last 24H):  (if applicable)            Hemodynamic Parameters (Last 24H):   (if applicable)        Lines/Drains:  (if applicable)  Percutaneous Central Line Insertion/Assessment - Triple Lumen  01/17/24 0809 Internal Jugular Right (Active)   Verification by X-ray No 01/18/24 1901   Site Assessment No drainage;No redness;No swelling 01/19/24 0701   Line Securement Device Secured with sutureless device 01/19/24 0701   Dressing Type Transparent (Tegaderm) 01/19/24 0701   Dressing Status Clean;Dry;Intact 01/19/24 0701   Dressing Intervention Integrity maintained 01/19/24 0501   Distal Patency/Care normal saline locked 01/19/24 0501   Medial 1 Patency/Care normal saline lock 01/19/24 0501   Proximal 1 Patency/Care normal saline locked 01/19/24 0501   Number of days: 2            Peripheral IV - Single Lumen 01/14/24 1434 20 G Right Antecubital (Active)   Site Assessment Clean;Dry;Intact 01/19/24 0701   Extremity Assessment Distal to IV No redness;No swelling;No warmth 01/19/24 0701   Line Status Saline locked 01/19/24 0701   Dressing Status Clean;Dry;Intact 01/19/24 0701   Dressing Intervention Integrity maintained 01/19/24 0501   Reason Not Rotated Not due 01/19/24 0501   Number of days: 4            Chest Tube 01/17/24 0943 Tube - 3 Left Pleural 16 Fr. (Active)   Chest Tube WDL WDL 01/19/24 0701   Function -20 cm H2O 01/19/24 0701   Air Leak/Fluctuation air leak not present 01/19/24 0701   Safety all tubing connections taped 01/19/24 0701    Securement tubing secured to body distal to insertion site w/ tape 01/19/24 0701   Patency Intervention Tip/tilt 01/19/24 0701   Drainage Description Serosanguineous 01/19/24 0701   Dressing Appearance clean and dry 01/18/24 1501   Left Subcutaneous Emphysema none present 01/19/24 0501   Right Subcutaneous Emphysema none present 01/19/24 0501   Site Assessment Clean;Dry;Intact;No swelling;No redness 01/19/24 0501   Surrounding Skin Dry;Intact 01/19/24 0501   Output (mL) 20 mL 01/19/24 0501   Number of days: 1            Y Chest Tube 1 and 2 01/17/24 0942 Anterior Mediastinal 16 Fr. Anterior Mediastinal 16 Fr. (Active)   Function -20 cm H2O 01/19/24 0701   Air Leak/Fluctuation air leak not present 01/19/24 0501   Safety all tubing connections taped 01/19/24 0701   Securement tubing secured to body distal to insertion site w/ tape 01/19/24 0701   Left Subcutaneous Emphysema none present 01/19/24 0701   Right Subcutaneous Emphysema none present 01/19/24 0701   Patency Intervention Tip/tilt 01/19/24 0701   Drainage Description 1 Serosanguineous 01/19/24 0701   Tube 1 Dressing Appearance clean and dry 01/19/24 0501   Site Assessment 1 Clean;Dry;Intact 01/19/24 0701   Surrounding Skin 1 Dry;Intact 01/19/24 0501   Drainage Description 2 Sanguineous 01/19/24 0501   Tube 2 Dressing Appearance clean and dry 01/19/24 0501   Site Assessment 2 Clean;Dry;Intact 01/19/24 0501   Surrounding Skin Dry;Intact 01/19/24 0501   Output (mL) 110 mL 01/19/24 0501   Number of days: 1       Lab Results :  Recent Results (from the past 24 hour(s))   POCT glucose    Collection Time: 01/19/24  4:33 PM   Result Value Ref Range    POC Glucose 232 (H) 70 - 110   POCT glucose    Collection Time: 01/19/24  9:58 PM   Result Value Ref Range    POC Glucose 310 (H) 70 - 110   Magnesium    Collection Time: 01/20/24  4:22 AM   Result Value Ref Range    Magnesium 2.4 1.6 - 2.6 mg/dL   CBC Without Differential    Collection Time: 01/20/24  4:22 AM   Result  Value Ref Range    WBC 13.01 (H) 3.90 - 12.70 K/uL    RBC 3.85 (L) 4.60 - 6.20 M/uL    Hemoglobin 10.7 (L) 14.0 - 18.0 g/dL    Hematocrit 32.8 (L) 40.0 - 54.0 %    MCV 85 82 - 98 fL    MCH 27.8 27.0 - 31.0 pg    MCHC 32.6 32.0 - 36.0 g/dL    RDW 15.1 (H) 11.5 - 14.5 %    Platelets 161 150 - 450 K/uL    MPV 10.9 9.2 - 12.9 fL   Comprehensive Metabolic Panel    Collection Time: 01/20/24  4:22 AM   Result Value Ref Range    Sodium 134 (L) 136 - 145 mmol/L    Potassium 4.5 3.5 - 5.1 mmol/L    Chloride 102 95 - 110 mmol/L    CO2 26 23 - 29 mmol/L    Glucose 275 (H) 70 - 110 mg/dL    BUN 33 (H) 8 - 23 mg/dL    Creatinine 0.9 0.5 - 1.4 mg/dL    Calcium 8.1 (L) 8.7 - 10.5 mg/dL    Total Protein 5.8 (L) 6.0 - 8.4 g/dL    Albumin 3.5 3.5 - 5.2 g/dL    Total Bilirubin 0.5 0.1 - 1.0 mg/dL    Alkaline Phosphatase 50 (L) 55 - 135 U/L    AST 15 10 - 40 U/L    ALT 17 10 - 44 U/L    eGFR >60.0 >60 mL/min/1.73 m^2    Anion Gap 6 (L) 8 - 16 mmol/L   POCT glucose    Collection Time: 01/20/24  6:03 AM   Result Value Ref Range    POC Glucose 270 (H) 70 - 110   POCT glucose    Collection Time: 01/20/24 11:29 AM   Result Value Ref Range    POC Glucose 235 (H) 70 - 110       Diagnostic Results:  Imaging Results              X-Ray Chest AP Portable (Final result)  Result time 01/14/24 08:15:46      Final result by Geraldo Sharif MD (01/14/24 08:15:46)                   Narrative:    Chest single view    Clinical data:Chest pain    FINDINGS: AP view of the chest demonstrates no cardiac, pulmonary, or acute osseous abnormalities. Postarthroplasty changes right shoulder.    IMPRESSION:  1. No acute process.    Electronically signed by:  Geraldo Sharif MD  01/14/2024 08:15 AM CST Workstation: [id number]                                    12-lead EKG interpretation:   (if applicable)    Recent Cardiac Rhythm:  (if applicable)      Physical Exam:  Objective:  General Appearance:  In no acute distress.    Vital signs: (most recent): Blood  "pressure (!) 113/56, pulse 73, temperature 98 °F (36.7 °C), temperature source Oral, resp. rate (!) 27, height 6' 2" (1.88 m), weight (!) 141.6 kg (312 lb 1.6 oz), SpO2 (!) 93 %.    Lungs:  Normal respiratory rate.  There are rhonchi.    Heart: Normal rate.  Regular rhythm.  S1 normal and S2 normal.  Positive for murmur.  No gallop.   Chest: (Dressing c/d/i. Chest tube present. )  Abdomen: Abdomen is soft.  Bowel sounds are normal.   There is no abdominal tenderness.     Extremities: (Trace edema with compression hose in place.)    Chest x-ray no significant interval change  Current Consults:  IP CONSULT TO CARDIOLOGY  IP CONSULT TO CARDIOTHORACIC SURGERY  IP CONSULT TO REGISTERED DIETITIAN/NUTRITIONIST    Assessment/Plan:  Assessment:   hree-vessel coronary disease  S/p CABG (LIMA-  >LAD, SVG->PDA, SVG->PLB, Radial graft to OM)  LV dysfunction EF 30-35%  History of paroxysmal atrial fibrillation in sinus  Poorly-controlled diabetes mellitus     Plan:    He continues to do very well. Continue with activity.  - BP controlled. Continue current antihypertensives. Continue Coreg. HR controlled. Continue Lisinopril.   - continue Diltiazem for now.   - continue Lipitor 40 mg po daily.  - continue Aspirin 81 mg po daily  - Monitor on telemetry.  - Continue amiodarone.   - not in acute heart failure.  - continue control of blood glucose.   - hopefully chest tubes out soon. Once chest tubes out, and okay with CV surgery, he will need to be restarted on NOAC.  - Maintain K>4 and Mg>2.  - will follow.    Nikko Hare MD    "

## 2024-01-20 NOTE — PROGRESS NOTES
POD #3 CABG    He is sitting in a chair.  He looks terrific!    Afebrile, normal sinus rhythm-76, systolic blood pressure 130, on room air, mediastinal chest tubes 150 mL, pleural chest tube 20 mL, poor glucose control  Lungs clear  Sternotomy is dressed and dried  Abdomen is soft nontender with active bowel sounds.  (+) BM this morning  No LE edema or pain    Lab work and CXR reviewed-minor LLL atelectasis    Increase Lantus to 5 units  CT removal today  Discharge planning

## 2024-01-20 NOTE — PLAN OF CARE
01/20/24 0708   Patient Assessment/Suction   Level of Consciousness (AVPU) alert   Respiratory Effort Normal;Unlabored   Expansion/Accessory Muscles/Retractions no retractions;no use of accessory muscles   PRE-TX-O2   Device (Oxygen Therapy) room air   SpO2 (!) 94 %   Pulse Oximetry Type Continuous   $ Pulse Oximetry - Multiple Charge Pulse Oximetry - Multiple   Pulse 80   Resp (!) 24   Education   $ Education 15 min

## 2024-01-20 NOTE — PROGRESS NOTES
[facility] Medicine  Progress Note    Patient name: Donald Frey  MRN: [id number]  Admit Date: 1/13/2024   LOS: 6 days     SUBJECTIVE:     Principal problem: Chest pain    HPI:  63 M with HTN, HLD, DM2, CAD status-post MI/PCI in 2013, who was first diagnosed with AF in 1/2022  and  s/p ablation . CHF  came with CP .  His chest pain is more like epigastric area and started this evening and got better with taking antacid med he still came to the hospital.  Currently he is chest pain-free and EKG did not show any ischemia and sinus rhythm with loss of PVC otherwise normal.  He ran out of the Eliquis secondary to insurance issues and currently he is taking aspirin and Plavix.  Normally he takes aspirin and Eliquis in the past.  Chest x-ray negative and cardiac enzymes are negative.  He did echocardiogram with Dr. Xavi fierro about 6 months ago and he was told normal and no recent stress test.    Overview/Hospital Course:  Patient presented with chest pain. C found to have multivessel disease.  Place on nitro drip for chest pain.  underwent CABG 1/17/24.    Interval History:  patient reports chest pain is improving. Chest tube ws removed yesterday but mediastinal drain is still intact. No other complains.     Scheduled Meds:   amiodarone  200 mg Oral Daily    aspirin  81 mg Oral Daily    atorvastatin  40 mg Oral Daily    carvediloL  3.125 mg Oral BID    diltiaZEM  240 mg Oral BID    docusate sodium  100 mg Oral BID    enoxparin  40 mg Subcutaneous Q12H (prophylaxis, 0900/2100)    insulin aspart U-100  8 Units Subcutaneous TIDWM    insulin detemir U-100  25 Units Subcutaneous BID    lisinopriL  20 mg Oral Daily    mupirocin   Nasal BID    pantoprazole  40 mg Oral Daily     Continuous Infusions:   clevidipine Stopped (01/18/24 1101)     PRN Meds:acetaminophen, albumin human 5%, aluminum-magnesium hydroxide-simethicone, calcium gluconate IVPB, calcium gluconate IVPB, calcium gluconate IVPB,  clevidipine, dextrose 50%, dextrose 50%, dextrose 50%, dextrose 50%, glucagon (human recombinant), glucose, glucose, HYDROcodone-acetaminophen, insulin aspart U-100, magnesium sulfate IVPB, magnesium sulfate IVPB, morphine, morphine, ondansetron, ondansetron, potassium chloride in water, potassium chloride in water, potassium chloride in water, sodium phosphate 15 mmol in dextrose 5 % (D5W) 250 mL IVPB, sodium phosphate 20.01 mmol in dextrose 5 % (D5W) 250 mL IVPB, sodium phosphate 30 mmol in dextrose 5 % (D5W) 250 mL IVPB    Review of patient's allergies indicates:  No Known Allergies    Review of Systems  As per subjective    OBJECTIVE:     Vital Signs (Most Recent)  Temp: 98.4 °F (36.9 °C) (01/20/24 0730)  Pulse: 71 (01/20/24 1100)  Resp: 16 (01/20/24 1100)  BP: (!) 113/58 (01/20/24 1100)  SpO2: 95 % (01/20/24 1100)    Vital Signs Range (Last 24H):  Temp:  [98.2 °F (36.8 °C)-98.7 °F (37.1 °C)]   Pulse:  [67-96]   Resp:  [16-31]   BP: (105-142)/(56-67)   SpO2:  [91 %-96 %]     I & O (Last 24H):  Intake/Output Summary (Last 24 hours) at 1/20/2024 1116  Last data filed at 1/20/2024 0822  Gross per 24 hour   Intake 1960 ml   Output 1615 ml   Net 345 ml         Physical Exam:  General: Patient resting comfortably in no acute distress. Appears as stated age. Calm  Eyes: EOM intact. No conjunctivae injection. No scleral icterus.  ENT: Hearing grossly intact. No discharge from ears. No nasal discharge.   CVS: RRR. No LE edema BL.  Lungs: CTA BL, no wheezing or crackles. Good breath sounds. No accessory muscle use. No acute respiratory distress  Neuro: Alert. GCS 15. Cranial nerves grossly intact. Moves all extremities equally. Follows commands. Responds appropriately   Skin: sternotomy surgical incision and multiple vein harvesting incisions.   Abdomen: soft and non tender with mediastinal and chest drains     Laboratory:  All pertinent labs within the past 24 hours have been reviewed.  CBC:   Recent Labs   Lab  01/19/24  0359 01/20/24  0422   WBC 17.08* 13.01*   HGB 11.7* 10.7*   HCT 35.6* 32.8*    161       CMP:   Recent Labs   Lab 01/19/24  0359 01/20/24  0422   * 134*   K 4.9 4.5    102   CO2 24 26   * 275*   BUN 29* 33*   CREATININE 1.0 0.9   CALCIUM 8.3* 8.1*   PROT 5.8* 5.8*   ALBUMIN 3.6 3.5   BILITOT 0.6 0.5   ALKPHOS 50* 50*   AST 15 15   ALT 20 17   ANIONGAP 8 6*         Microbiology Results (last 7 days)       ** No results found for the last 168 hours. **             Diagnostic Results:      ASSESSMENT/PLAN:     NSTEMI with multivessel CAD   S/p CABG 1/17  - post op care per CTS, chest and mediastinal tubes off per CTS   - Cont aspirin, Lipitor, Lisinopril, Coreg     Afib with s/p ablation   - Cont Cardizem, amiodarone  - Coreg added   - resume Eliquis when OK with CTS     HLD   Type II DM: not on home insulin   HTN  - resume home medications   - Levemir increased to 25  units BID and 8 units TID Novolog. Sliding scale increased. Monitor glucose.   - sliding scale      VTE Risk Mitigation (From admission, onward)           Ordered     enoxaparin injection 40 mg  Every 12 hours         01/19/24 1607     IP VTE LOW RISK PATIENT  Once         01/17/24 1127                          Department Hospital Medicine  [facility]  Krystal Henao MD  Date of service: 01/20/2024

## 2024-01-21 VITALS
RESPIRATION RATE: 22 BRPM | OXYGEN SATURATION: 97 % | DIASTOLIC BLOOD PRESSURE: 75 MMHG | BODY MASS INDEX: 40.06 KG/M2 | TEMPERATURE: 98 F | WEIGHT: 312.13 LBS | HEART RATE: 86 BPM | HEIGHT: 74 IN | SYSTOLIC BLOOD PRESSURE: 138 MMHG

## 2024-01-21 LAB
ALBUMIN SERPL BCP-MCNC: 3.3 G/DL (ref 3.5–5.2)
ALP SERPL-CCNC: 49 U/L (ref 55–135)
ALT SERPL W/O P-5'-P-CCNC: 19 U/L (ref 10–44)
ANION GAP SERPL CALC-SCNC: 6 MMOL/L (ref 8–16)
AST SERPL-CCNC: 14 U/L (ref 10–40)
BILIRUB SERPL-MCNC: 0.6 MG/DL (ref 0.1–1)
BUN SERPL-MCNC: 25 MG/DL (ref 8–23)
CALCIUM SERPL-MCNC: 7.9 MG/DL (ref 8.7–10.5)
CHLORIDE SERPL-SCNC: 104 MMOL/L (ref 95–110)
CO2 SERPL-SCNC: 28 MMOL/L (ref 23–29)
CREAT SERPL-MCNC: 0.9 MG/DL (ref 0.5–1.4)
ERYTHROCYTE [DISTWIDTH] IN BLOOD BY AUTOMATED COUNT: 14.9 % (ref 11.5–14.5)
EST. GFR  (NO RACE VARIABLE): >60 ML/MIN/1.73 M^2
GLUCOSE SERPL-MCNC: 135 MG/DL (ref 70–110)
HCT VFR BLD AUTO: 33.4 % (ref 40–54)
HGB BLD-MCNC: 10.8 G/DL (ref 14–18)
MAGNESIUM SERPL-MCNC: 2.1 MG/DL (ref 1.6–2.6)
MCH RBC QN AUTO: 27.7 PG (ref 27–31)
MCHC RBC AUTO-ENTMCNC: 32.3 G/DL (ref 32–36)
MCV RBC AUTO: 86 FL (ref 82–98)
PHOSPHATE SERPL-MCNC: 2.4 MG/DL (ref 2.7–4.5)
PLATELET # BLD AUTO: 187 K/UL (ref 150–450)
PMV BLD AUTO: 10.6 FL (ref 9.2–12.9)
POTASSIUM SERPL-SCNC: 4 MMOL/L (ref 3.5–5.1)
PROT SERPL-MCNC: 5.5 G/DL (ref 6–8.4)
RBC # BLD AUTO: 3.9 M/UL (ref 4.6–6.2)
SODIUM SERPL-SCNC: 138 MMOL/L (ref 136–145)
WBC # BLD AUTO: 8.76 K/UL (ref 3.9–12.7)

## 2024-01-21 PROCEDURE — 25000003 PHARM REV CODE 250: Performed by: THORACIC SURGERY (CARDIOTHORACIC VASCULAR SURGERY)

## 2024-01-21 PROCEDURE — 25000003 PHARM REV CODE 250: Performed by: PHYSICIAN ASSISTANT

## 2024-01-21 PROCEDURE — 84100 ASSAY OF PHOSPHORUS: CPT | Performed by: INTERNAL MEDICINE

## 2024-01-21 PROCEDURE — 99024 POSTOP FOLLOW-UP VISIT: CPT | Mod: ,,, | Performed by: THORACIC SURGERY (CARDIOTHORACIC VASCULAR SURGERY)

## 2024-01-21 PROCEDURE — 25000003 PHARM REV CODE 250: Performed by: INTERNAL MEDICINE

## 2024-01-21 PROCEDURE — 85027 COMPLETE CBC AUTOMATED: CPT | Performed by: INTERNAL MEDICINE

## 2024-01-21 PROCEDURE — 63600175 PHARM REV CODE 636 W HCPCS: Performed by: THORACIC SURGERY (CARDIOTHORACIC VASCULAR SURGERY)

## 2024-01-21 PROCEDURE — 90694 VACC AIIV4 NO PRSRV 0.5ML IM: CPT | Performed by: INTERNAL MEDICINE

## 2024-01-21 PROCEDURE — 80053 COMPREHEN METABOLIC PANEL: CPT | Performed by: INTERNAL MEDICINE

## 2024-01-21 PROCEDURE — 25000003 PHARM REV CODE 250: Performed by: HOSPITALIST

## 2024-01-21 PROCEDURE — 63600175 PHARM REV CODE 636 W HCPCS: Performed by: INTERNAL MEDICINE

## 2024-01-21 PROCEDURE — 94761 N-INVAS EAR/PLS OXIMETRY MLT: CPT

## 2024-01-21 PROCEDURE — 83735 ASSAY OF MAGNESIUM: CPT | Performed by: INTERNAL MEDICINE

## 2024-01-21 PROCEDURE — 90471 IMMUNIZATION ADMIN: CPT | Performed by: INTERNAL MEDICINE

## 2024-01-21 PROCEDURE — G0008 ADMIN INFLUENZA VIRUS VAC: HCPCS | Performed by: INTERNAL MEDICINE

## 2024-01-21 PROCEDURE — 3E02340 INTRODUCTION OF INFLUENZA VACCINE INTO MUSCLE, PERCUTANEOUS APPROACH: ICD-10-PCS | Performed by: INTERNAL MEDICINE

## 2024-01-21 RX ORDER — PANTOPRAZOLE SODIUM 40 MG/1
40 TABLET, DELAYED RELEASE ORAL DAILY
Qty: 30 TABLET | Refills: 0 | Status: SHIPPED | OUTPATIENT
Start: 2024-01-22 | End: 2024-04-09

## 2024-01-21 RX ORDER — INSULIN ASPART 100 [IU]/ML
5 INJECTION, SOLUTION INTRAVENOUS; SUBCUTANEOUS 3 TIMES DAILY
Qty: 4.5 ML | Refills: 0 | Status: SHIPPED | OUTPATIENT
Start: 2024-01-21 | End: 2024-04-09

## 2024-01-21 RX ORDER — CARVEDILOL 3.12 MG/1
3.12 TABLET ORAL 2 TIMES DAILY
Qty: 60 TABLET | Refills: 0 | Status: SHIPPED | OUTPATIENT
Start: 2024-01-21 | End: 2024-05-21

## 2024-01-21 RX ORDER — NAPROXEN SODIUM 220 MG/1
81 TABLET, FILM COATED ORAL DAILY
Qty: 30 TABLET | Refills: 0 | Status: SHIPPED | OUTPATIENT
Start: 2024-01-22 | End: 2024-05-21

## 2024-01-21 RX ORDER — PEN NEEDLE, DIABETIC 30 GX3/16"
1 NEEDLE, DISPOSABLE MISCELLANEOUS 3 TIMES DAILY
Qty: 100 EACH | Refills: 0 | Status: SHIPPED | OUTPATIENT
Start: 2024-01-21

## 2024-01-21 RX ORDER — HYDROCODONE BITARTRATE AND ACETAMINOPHEN 5; 325 MG/1; MG/1
1 TABLET ORAL EVERY 4 HOURS PRN
Qty: 10 TABLET | Refills: 0 | Status: SHIPPED | OUTPATIENT
Start: 2024-01-21 | End: 2024-02-07 | Stop reason: SDUPTHER

## 2024-01-21 RX ADMIN — LISINOPRIL 20 MG: 20 TABLET ORAL at 08:01

## 2024-01-21 RX ADMIN — INSULIN ASPART 8 UNITS: 100 INJECTION, SOLUTION INTRAVENOUS; SUBCUTANEOUS at 08:01

## 2024-01-21 RX ADMIN — MUPIROCIN 1 G: 20 OINTMENT TOPICAL at 08:01

## 2024-01-21 RX ADMIN — FUROSEMIDE 40 MG: 10 INJECTION, SOLUTION INTRAMUSCULAR; INTRAVENOUS at 08:01

## 2024-01-21 RX ADMIN — AMIODARONE HYDROCHLORIDE 200 MG: 200 TABLET ORAL at 08:01

## 2024-01-21 RX ADMIN — INFLUENZA A VIRUS A/VICTORIA/4897/2022 IVR-238 (H1N1) ANTIGEN (FORMALDEHYDE INACTIVATED), INFLUENZA A VIRUS A/DARWIN/6/2021 IVR-227 (H3N2) ANTIGEN (FORMALDEHYDE INACTIVATED), INFLUENZA B VIRUS B/AUSTRIA/1359417/2021 BVR-26 ANTIGEN (FORMALDEHYDE INACTIVATED), INFLUENZA B VIRUS B/PHUKET/3073/2013 BVR-1B ANTIGEN (FORMALDEHYDE INACTIVATED) 0.5 ML: 15; 15; 15; 15 INJECTION, SUSPENSION INTRAMUSCULAR at 10:01

## 2024-01-21 RX ADMIN — CARVEDILOL 3.12 MG: 3.12 TABLET, FILM COATED ORAL at 08:01

## 2024-01-21 RX ADMIN — DOCUSATE SODIUM 100 MG: 100 CAPSULE, LIQUID FILLED ORAL at 08:01

## 2024-01-21 RX ADMIN — ENOXAPARIN SODIUM 40 MG: 40 INJECTION SUBCUTANEOUS at 08:01

## 2024-01-21 RX ADMIN — HYDROCODONE BITARTRATE AND ACETAMINOPHEN 1 TABLET: 5; 325 TABLET ORAL at 08:01

## 2024-01-21 RX ADMIN — PANTOPRAZOLE SODIUM 40 MG: 40 TABLET, DELAYED RELEASE ORAL at 06:01

## 2024-01-21 RX ADMIN — SODIUM PHOSPHATE, MONOBASIC, MONOHYDRATE AND SODIUM PHOSPHATE, DIBASIC, ANHYDROUS 15 MMOL: 142; 276 INJECTION, SOLUTION INTRAVENOUS at 08:01

## 2024-01-21 RX ADMIN — DILTIAZEM HYDROCHLORIDE 240 MG: 120 CAPSULE, COATED, EXTENDED RELEASE ORAL at 08:01

## 2024-01-21 RX ADMIN — ASPIRIN 81 MG 81 MG: 81 TABLET ORAL at 08:01

## 2024-01-21 NOTE — DISCHARGE INSTRUCTIONS
Keep dressing on right neck until Tuesday evening.     Make appointment and follow up with cardiologist regarding blood thinner medication for a-fib

## 2024-01-21 NOTE — DISCHARGE SUMMARY
[facility]  Discharge Summary      Patient Name: Donald Frey  MRN: [id number]  DEIRDRE: [id number]  Patient Class: IP- Inpatient  Admission Date: 1/13/2024  Hospital Length of Stay: 7 days  Discharge Date and Time:  01/21/2024 10:21 AM  Attending Physician: Krystal Henao MD   Discharging Provider: Krystal Henao MD  Primary Care Provider: Radha Pinzon FNP    Primary Care Team: Networked reference to record PCT     HPI:   63 M with HTN, HLD, DM2, CAD status-post MI/PCI in 2013, who was first diagnosed with AF in 1/2022  and  s/p ablation . CHF  came with CP .  His chest pain is more like epigastric area and started this evening and got better with taking antacid med he still came to the hospital.  Currently he is chest pain-free and EKG did not show any ischemia and sinus rhythm with loss of PVC otherwise normal.  He ran out of the Eliquis secondary to insurance issues and currently he is taking aspirin and Plavix.  Normally he takes aspirin and Eliquis in the past.  Chest x-ray negative and cardiac enzymes are negative.  He did echocardiogram with Dr. Xavi fierro about 6 months ago and he was told normal and no recent stress test.    Procedure(s) (LRB):  CORONARY ARTERY BYPASS GRAFT (CABG) (N/A)  SURGICAL PROCUREMENT,ARTERY,RADIAL,FOR CABG, ENDOSCOPIC (Left)  SURGICAL PROCUREMENT, VEIN, ENDOSCOPIC (Left)      Hospital Course:   Patient presented with chest pain. [facility] found to have multivessel disease.  Place on nitro drip for chest pain.  Getting CABG 1/17/24. PaBP control. Patient was on Claviprex and insulin drip post op for glycemic control and BP control. Patient was started on Coreg and Lisinopril. Lasix continued on IV form 40 mg daily, amiodarone was continued. BP is now controlled. Patient was bridged with Levemire and dose increased to 25 mg BID with Novolog 8 units TID and sliding scale to achieve glycemic control. On discharge his metformin and  Glipizide was resumed and Insulin doses decreased to 20 units BID and 5 units of Novolog. Patient does have glucometer at home and advised to check glucose prior to each insulin dose. Routine post op care continued, mediastinal and chest tubes removed. Sternotomy incision healing. Patient cleared to discharge from CTS stand point.      Goals of Care Treatment Preferences:  Code Status: Full Code    Physical Exam:  General: Patient resting comfortably in no acute distress. Appears as stated age. Calm  Eyes: EOM intact. No conjunctivae injection. No scleral icterus.  ENT: Hearing grossly intact. No discharge from ears. No nasal discharge.   CVS: RRR. No LE edema BL.  Lungs: CTA BL, no wheezing or crackles. Good breath sounds. No accessory muscle use. No acute respiratory distress  Neuro: Alert. GCS 15. Cranial nerves grossly intact. Moves all extremities equally. Follows commands. Responds appropriately   Skin: sternotomy surgical incision and multiple vein harvesting incisions healing well.  Abdomen: soft and non tender     Consults:   Consults (From admission, onward)          Status Ordering Provider     Inpatient consult to Registered Dietitian/Nutritionist  Once        Provider:  (Not yet assigned)    Completed DARIUS CHRISTY     Inpatient consult to Cardiothoracic Surgery  Once        Provider:  Darius Christy MD    Completed LANIE VÁSQUEZ     Inpatient consult to Cardiology  Once        Provider:  Gomez Orellana MD    Completed AMOS SCHWARTZ            Assessment & Plan     NSTEMI with multivessel CAD   S/p CABG 1/17  - Cont aspirin, Lipitor, Lisinopril, Coreg      Afib with s/p ablation   - Cont Cardizem, amiodarone  - Coreg added   - per patient he does not take Eliquis due to insurance reasons and has been taking aspirin and Plavix.      HLD   Type II DM: not on home insulin   HTN  - resume home medications   - Levemir increased to 20  units BID and 5 units TID Novolog. Resume oral  hypoglycemics     Service: Hospital Medicine    Final Active Diagnoses:    Diagnosis Date Noted POA    PRINCIPAL PROBLEM:  Chest pain [R07.9] 01/14/2024 Unknown    S/P CABG (coronary artery bypass graft) [Z95.1] 01/20/2024 Not Applicable    Persistent atrial fibrillation [I48.19] 10/24/2022 Yes    Status post catheter ablation of atrial fibrillation [Z98.890] 10/24/2022 Not Applicable    Hyperlipidemia [E78.5] 10/21/2020 Yes    Essential hypertension [I10] 10/28/2019 Yes    Hx of myocardial infarction [I25.2] 10/28/2019 Not Applicable    Type 2 diabetes mellitus with hyperglycemia, without long-term current use of insulin [E11.65] 10/28/2019 Yes      Problems Resolved During this Admission:       Discharged Condition: good    Disposition: Home or Self Care    Follow Up:   Follow-up Information       Darius Coreas MD Follow up in 2 week(s).    Specialties: Cardiothoracic Surgery, Vascular Surgery, Cardiovascular Disease, Cardiology  Contact information:  [address]  [address]  [place] [address]               Radha Pinzon FNP Follow up in 1 week(s).    Specialty: Family Medicine  Contact information:  [address]  [address]  [place] [address]  [phone]                           Patient Instructions:   No discharge procedures on file.    Significant Diagnostic Studies: Labs: CMP   Recent Labs   Lab 01/20/24  0422 01/21/24  0410   * 138   K 4.5 4.0    104   CO2 26 28   * 135*   BUN 33* 25*   CREATININE 0.9 0.9   CALCIUM 8.1* 7.9*   PROT 5.8* 5.5*   ALBUMIN 3.5 3.3*   BILITOT 0.5 0.6   ALKPHOS 50* 49*   AST 15 14   ALT 17 19   ANIONGAP 6* 6*    and CBC   Recent Labs   Lab 01/20/24  0422 01/21/24  0410   WBC 13.01* 8.76   HGB 10.7* 10.8*   HCT 32.8* 33.4*    187       Pending Diagnostic Studies:       Procedure Component Value Units Date/Time    EKG 12-lead [[id number]] Collected: 01/20/24 0808    Order Status: Sent Lab Status: In process Updated: 01/20/24 0881     Narrative:      Test Reason : SY.Mariana    Vent. Rate : 086 BPM     Atrial Rate : 086 BPM     P-R Int : 206 ms          QRS Dur : 096 ms      QT Int : 338 ms       P-R-T Axes : 053 037 093 degrees     QTc Int : 404 ms    Normal sinus rhythm  Low voltage QRS  Cannot rule out Anteroseptal infarct (cited on or before 20-JAN-2022)  Abnormal ECG  When compared with ECG of 19-JAN-2024 06:29,  Serial changes of evolving Anteroseptal infarct Present    Referred By: [email or web address]   SELF           Confirmed By:     EKG 12-lead [[id number]] Collected: 01/19/24 0629    Order Status: Sent Lab Status: In process Updated: 01/19/24 0644    Narrative:      Test Reason : SY.Mariana    Vent. Rate : 093 BPM     Atrial Rate : 093 BPM     P-R Int : 194 ms          QRS Dur : 114 ms      QT Int : 388 ms       P-R-T Axes : 027 012 015 degrees     QTc Int : 482 ms    Normal sinus rhythm  Low voltage QRS  Possible Anterolateral infarct (cited on or before 20-JAN-2022)  Abnormal ECG  When compared with ECG of 18-JAN-2024 07:18,  QRS duration has increased  Questionable change in initial forces of Lateral leads    Referred By: LIBORIO   SELF           Confirmed By:     EKG 12-lead [[id number]] Collected: 01/18/24 0718    Order Status: Sent Lab Status: In process Updated: 01/18/24 0807    Narrative:      Test Reason : SY.Mariana    Vent. Rate : 108 BPM     Atrial Rate : 108 BPM     P-R Int : 212 ms          QRS Dur : 082 ms      QT Int : 326 ms       P-R-T Axes : 055 -21 047 degrees     QTc Int : 436 ms    Sinus tachycardia with 1st degree A-V block  Low voltage QRS  Inferior infarct (cited on or before 18-JUL-2023)  Cannot rule out Anteroseptal infarct (cited on or before 20-JAN-2022)  Abnormal ECG  When compared with ECG of 17-JAN-2024 11:29,  QRS duration has decreased  Questionable change in initial forces of Anterior leads  Nonspecific T wave abnormality has replaced inverted T waves in Inferior  leads  T wave inversion no longer evident in Anterior  "leads    Referred By: LIBORIO   SELF           Confirmed By:            Medications:  Reconciled Home Medications:      Medication List        START taking these medications      aspirin 81 MG Chew  Take 1 tablet (81 mg total) by mouth once daily.  Start taking on: January 22, 2024     carvediloL 3.125 MG tablet  Commonly known as: COREG  Take 1 tablet (3.125 mg total) by mouth 2 (two) times daily.     HYDROcodone-acetaminophen 5-325 mg per tablet  Commonly known as: NORCO  Take 1 tablet by mouth every 4 (four) hours as needed for Pain.     insulin aspart U-100 100 unit/mL (3 mL) Inpn pen  Commonly known as: NovoLOG  Inject 5 Units into the skin 3 (three) times daily.     insulin detemir U-100 (Levemir) 100 unit/mL (3 mL) Inpn pen  Inject 25 Units into the skin 2 (two) times daily. for 20 days     pantoprazole 40 MG tablet  Commonly known as: PROTONIX  Take 1 tablet (40 mg total) by mouth once daily.  Start taking on: January 22, 2024     pen needle, diabetic 32 gauge x 5/32" Ndle  1 each by Misc.(Non-Drug; Combo Route) route 3 (three) times daily.            CHANGE how you take these medications      amiodarone 200 MG Tab  Commonly known as: PACERONE  Take 1 tablet (200 mg total) by mouth once daily.  What changed: how much to take     TRULICITY 0.75 mg/0.5 mL pen injector  Generic drug: dulaglutide  Inject 0.75 mg into the skin every 7 days.  What changed: additional instructions            CONTINUE taking these medications      apixaban 5 mg Tab  Commonly known as: ELIQUIS  Take 1 tablet (5 mg total) by mouth 2 (two) times daily.     diltiaZEM 240 MG 24 hr capsule  Commonly known as: CARDIZEM CD  Take 1 capsule (240 mg total) by mouth 2 (two) times a day.     furosemide 20 MG tablet  Commonly known as: LASIX  Take 1 tablet (20 mg total) by mouth 2 (two) times daily.     glipiZIDE 5 MG tablet  Commonly known as: GLUCOTROL  Take 1 tablet (5 mg total) by mouth 2 (two) times daily with meals.     lisinopriL 20 MG " tablet  Commonly known as: PRINIVIL,ZESTRIL  Take 1 tablet (20 mg total) by mouth once daily.     metFORMIN 1000 MG tablet  Commonly known as: GLUCOPHAGE  Take 1 tablet (1,000 mg total) by mouth 2 (two) times daily with meals.     rosuvastatin 20 MG tablet  Commonly known as: CRESTOR  Take 20 mg by mouth once daily.              Indwelling Lines/Drains at time of discharge:   Lines/Drains/Airways       None                   Time spent on the discharge of patient: 40 minutes        Krystal Henao MD  Department of Hospital Medicine  [facility]

## 2024-01-21 NOTE — PLAN OF CARE
Pt to be discharged home. All in patient needs satisfied.      Problem: Adult Inpatient Plan of Care  Goal: Plan of Care Review  1/21/2024 1010 by Holdsworth, Tiffany, RN  Outcome: Met  1/21/2024 1010 by Holdsworth, Tiffany, RN  Outcome: Adequate for Care Transition  Goal: Patient-Specific Goal (Individualized)  1/21/2024 1010 by Holdsworth, Tiffany, RN  Outcome: Met  1/21/2024 1010 by Holdsworth, Tiffany, RN  Outcome: Adequate for Care Transition  Goal: Absence of Hospital-Acquired Illness or Injury  1/21/2024 1010 by Holdsworth, Tiffany, RN  Outcome: Met  1/21/2024 1010 by Holdsworth, Tiffany, RN  Outcome: Adequate for Care Transition  Goal: Optimal Comfort and Wellbeing  1/21/2024 1010 by Holdsworth, Tiffany, RN  Outcome: Met  1/21/2024 1010 by Holdsworth, Tiffany, RN  Outcome: Adequate for Care Transition  Goal: Readiness for Transition of Care  1/21/2024 1010 by Holdsworth, Tiffany, RN  Outcome: Met  1/21/2024 1010 by Holdsworth, Tiffany, RN  Outcome: Adequate for Care Transition

## 2024-01-21 NOTE — PROGRESS NOTES
POD #4 CABG    Better glucose control    BM this morning    Good diuresis with IV Lasix yesterday    He looks and feels well    Afebrile, NSR, sBP 120s, on room air    Lungs clearing  Sternotomy is dressed  Abdomen benign  LUE ecchymosis without hematoma  LE edema slightly improved, EVH sites dressed    Lab work reviewed    Medical regimen reviewed    OK for discharge  Recommend Lasix 40 mg daily for 30 days at discharge

## 2024-01-21 NOTE — PLAN OF CARE
01/21/24 1110   Final Note   Assessment Type Final Discharge Note   Anticipated Discharge Disposition Home-Health   Post-Acute Status   Post-Acute Authorization Home Health   Discharge Delays None known at this time     Patient cleared for discharge from case management standpoint.    Home Health will resume 1/22/2024.     Chart and discharge orders reviewed.  Patient discharged home with no further case management needs.

## 2024-01-23 ENCOUNTER — PATIENT OUTREACH (OUTPATIENT)
Dept: ADMINISTRATIVE | Facility: CLINIC | Age: 64
End: 2024-01-23
Payer: MEDICARE

## 2024-01-23 NOTE — PROGRESS NOTES
C3 nurse attempted to contact Donald Frey  for a TCC post hospital discharge follow up call. The patient is unable to conduct the call @ this time. The patient requested a callback.    The patient does not have a scheduled HOSFU appointment within 5-7 days post hospital discharge date 01/21/2024. Message sent to Physician staff to assist with HOSFU appointment scheduling.

## 2024-01-24 NOTE — PROGRESS NOTES
C3 nurse spoke with Donald Frey  for a TCC post hospital discharge follow up call. The patient has a scheduled [facility] appointment with Radha Pinzon FNP  on 1/29/2024 @ 8:30AM.

## 2024-01-26 PROCEDURE — G0180 MD CERTIFICATION HHA PATIENT: HCPCS | Mod: ,,, | Performed by: THORACIC SURGERY (CARDIOTHORACIC VASCULAR SURGERY)

## 2024-01-30 ENCOUNTER — TELEPHONE (OUTPATIENT)
Dept: VASCULAR SURGERY | Facility: CLINIC | Age: 64
End: 2024-01-30
Payer: MEDICARE

## 2024-01-30 NOTE — TELEPHONE ENCOUNTER
Spoke to Brenda. Called to inform distal end of the chest incision is showing signs of redness, somewhat bothersome to pt, denies drainage or heat to the area. Confirmed it is BARBARA, no creams, lotions or ointments to site, and pt is cleaning daily. Recomended repeat visit toward end of week to reassess. Gave direct number to call back with changes. Verblaized understanding.      ----- Message from Wes Solis LPN sent at 1/30/2024  2:58 PM CST -----  Contact: Brenda from [facility]    ----- Message -----  From: Dannielle Christianson  Sent: 1/30/2024   2:54 PM CST  To: [facility]oss [facility] Staff    Type:  Needs Medical Advice    Who Called: Paige with [facility]   Symptoms (please be specific): pt is having redness and irritation by the lower end of his chest incision and nurse needed to speak to someone about it.    Would the patient rather a call back or a response via [facility]? call  Best Call Back Number: [phone]  Additional Information: please advise and thank you.

## 2024-02-01 ENCOUNTER — TELEPHONE (OUTPATIENT)
Dept: PHARMACY | Facility: HOSPITAL | Age: 64
End: 2024-02-01

## 2024-02-01 LAB — HEMOCCULT STL QL IA: NEGATIVE

## 2024-02-06 DIAGNOSIS — E11.65 TYPE 2 DIABETES MELLITUS WITH HYPERGLYCEMIA, WITHOUT LONG-TERM CURRENT USE OF INSULIN: ICD-10-CM

## 2024-02-07 ENCOUNTER — OFFICE VISIT (OUTPATIENT)
Dept: VASCULAR SURGERY | Facility: CLINIC | Age: 64
End: 2024-02-07
Payer: MEDICARE

## 2024-02-07 VITALS — SYSTOLIC BLOOD PRESSURE: 182 MMHG | DIASTOLIC BLOOD PRESSURE: 88 MMHG | HEART RATE: 83 BPM

## 2024-02-07 DIAGNOSIS — Z95.1 S/P CABG (CORONARY ARTERY BYPASS GRAFT): Primary | ICD-10-CM

## 2024-02-07 PROCEDURE — 99213 OFFICE O/P EST LOW 20 MIN: CPT | Mod: PBBFAC,PN | Performed by: THORACIC SURGERY (CARDIOTHORACIC VASCULAR SURGERY)

## 2024-02-07 PROCEDURE — 99024 POSTOP FOLLOW-UP VISIT: CPT | Mod: POP,,, | Performed by: THORACIC SURGERY (CARDIOTHORACIC VASCULAR SURGERY)

## 2024-02-07 PROCEDURE — 99999 PR PBB SHADOW E&M-EST. PATIENT-LVL III: CPT | Mod: PBBFAC,,, | Performed by: THORACIC SURGERY (CARDIOTHORACIC VASCULAR SURGERY)

## 2024-02-07 RX ORDER — METFORMIN HYDROCHLORIDE 1000 MG/1
1000 TABLET ORAL 2 TIMES DAILY WITH MEALS
Qty: 180 TABLET | Refills: 1 | Status: SHIPPED | OUTPATIENT
Start: 2024-02-07 | End: 2024-05-13 | Stop reason: SDUPTHER

## 2024-02-07 RX ORDER — HYDROCODONE BITARTRATE AND ACETAMINOPHEN 5; 325 MG/1; MG/1
1 TABLET ORAL EVERY 4 HOURS PRN
Qty: 10 TABLET | Refills: 0 | Status: SHIPPED | OUTPATIENT
Start: 2024-02-07 | End: 2024-04-09

## 2024-02-07 NOTE — PROGRESS NOTES
This patient is status post coronary artery bypass grafting.  He is doing well without major complaints.    Medicines are noted and are part of the epic record.  His problem list was reviewed.    On exam vital signs are stable.  Surgical wounds are intact.  Overall he has had a good result with coronary artery bypass grafting.    Recommendation is for routine medical follow-up.  He can see me on an as-needed basis.  I re-filled his prescription for [place].

## 2024-02-20 ENCOUNTER — OFFICE VISIT (OUTPATIENT)
Dept: ORTHOPEDICS | Facility: CLINIC | Age: 64
End: 2024-02-20
Payer: MEDICARE

## 2024-02-20 VITALS — HEIGHT: 74 IN | BODY MASS INDEX: 40.04 KG/M2 | WEIGHT: 312 LBS

## 2024-02-20 DIAGNOSIS — Z96.611 HISTORY OF TOTAL SHOULDER REPLACEMENT, RIGHT: Primary | ICD-10-CM

## 2024-02-20 PROCEDURE — 99213 OFFICE O/P EST LOW 20 MIN: CPT | Mod: S$GLB,,, | Performed by: ORTHOPAEDIC SURGERY

## 2024-02-20 NOTE — PROGRESS NOTES
[facility]    Subjective:     Chief Complaint:   Chief Complaint   Patient presents with    Right Shoulder - Pain     Patient is here for a f/up on Right TSA 11.13.23, states his pain is better than his last vist       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 01/13/2022    EYE EXAM  DR KATHY PARMAR    Coronary artery disease     1 coronary stent - Dr Orellana    Diabetes mellitus, type 2     Hypertension     Myocardial infarction 2013       Past Surgical History:   Procedure Laterality Date    ABLATION OF ARRHYTHMOGENIC FOCUS FOR ATRIAL FIBRILLATION N/A 7/7/2022    Procedure: ABLATION, ARRHYTHMOGENIC FOCUS, FOR ATRIAL FIBRILLATION;  Surgeon: Niko Pacheco MD;  [facility]: [facility] EP LAB;  Service: Cardiology;  Laterality: N/A;  AF, ARMANDO(Cx if SR), PVI, RFA, CARTO, GEN, GP, 3 PREP    ANGIOGRAM, CORONARY, WITH LEFT HEART CATHETERIZATION N/A 1/15/2024    Procedure: Angiogram, Coronary, with Left Heart Cath;  Surgeon: Samir Emmanuel MD;  Location: [facility] CATH/EP LAB;  Service: Cardiology;  Laterality: N/A;    ARTHROPLASTY OF SHOULDER Right 11/13/2023    Procedure: ARTHROPLASTY, SHOULDER, RIGHT;  Surgeon: Branden Hernandez MD;  Location: [facility];  Service: Orthopedics;  Laterality: Right;  DJO    CORONARY ARTERY BYPASS GRAFT (CABG) N/A 1/17/2024    Procedure: CORONARY ARTERY BYPASS GRAFT (CABG);  Surgeon: Darius Coreas MD;  Location: [facility];  Service: Cardiothoracic;  Laterality: N/A;    CORONARY STENT PLACEMENT  2013    ENDOSCOPIC HARVEST OF VEIN Left 1/17/2024    Procedure: SURGICAL PROCUREMENT, VEIN, ENDOSCOPIC;  Surgeon: Darius Coreas MD;  Location: [facility];  Service: Cardiothoracic;  Laterality: Left;    SURGICAL PROCUREMENT, ARTERY, RADIAL, FOR CABG Left 1/17/2024    Procedure: SURGICAL PROCUREMENT,ARTERY,RADIAL,FOR CABG, ENDOSCOPIC;  Surgeon: Darius Coreas MD;  Location: [facility];  Service: Cardiothoracic;  Laterality:  "Left;    TRANSESOPHAGEAL ECHOCARDIOGRAPHY N/A 7/7/2022    Procedure: ECHOCARDIOGRAM, TRANSESOPHAGEAL;  Surgeon: Conor Chappell MD;  Location: [facility] EP LAB;  Service: Cardiology;  Laterality: N/A;    TREATMENT OF CARDIAC ARRHYTHMIA N/A 3/7/2022    Procedure: CARDIOVERSION;  Surgeon: Gomez Orellana MD;  Location: [facility] CATH/EP LAB;  Service: Cardiology;  Laterality: N/A;    TREATMENT OF CARDIAC ARRHYTHMIA  7/7/2022    Procedure: Cardioversion or Defibrillation;  Surgeon: Conor Chappell MD;  Location: [facility] EP LAB;  Service: Cardiology;;       Current Outpatient Medications   Medication Sig    amiodarone (PACERONE) 200 MG Tab Take 1 tablet (200 mg total) by mouth once daily. (Patient taking differently: Take 100 mg by mouth once daily.)    aspirin 81 MG Chew Take 1 tablet (81 mg total) by mouth once daily.    carvediloL (COREG) 3.125 MG tablet Take 1 tablet (3.125 mg total) by mouth 2 (two) times daily.    diltiaZEM (CARDIZEM CD) 240 MG 24 hr capsule Take 1 capsule (240 mg total) by mouth 2 (two) times a day.    dulaglutide (TRULICITY) 0.75 mg/0.5 mL pen injector Inject 0.75 mg into the skin every 7 days.    furosemide (LASIX) 20 MG tablet Take 1 tablet (20 mg total) by mouth 2 (two) times daily.    glipiZIDE (GLUCOTROL) 5 MG tablet Take 1 tablet (5 mg total) by mouth 2 (two) times daily with meals.    insulin aspart U-100 (NOVOLOG) 100 unit/mL (3 mL) InPn pen Inject 5 Units into the skin 3 (three) times daily.    insulin detemir U-100, Levemir, 100 unit/mL (3 mL) SubQ InPn pen Inject 25 Units into the skin 2 (two) times daily. for 20 days    lisinopriL (PRINIVIL,ZESTRIL) 20 MG tablet Take 1 tablet (20 mg total) by mouth once daily.    metFORMIN (GLUCOPHAGE) 1000 MG tablet TAKE 1 TABLET(1000 MG) BY MOUTH TWICE DAILY WITH MEALS    pantoprazole (PROTONIX) 40 MG tablet Take 1 tablet (40 mg total) by mouth once daily.    pen needle, diabetic 32 gauge x 5/32" Ndle 1 each by Misc.(Non-Drug; Combo Route) route 3 (three) " times daily.    rosuvastatin (CRESTOR) 20 MG tablet Take 20 mg by mouth once daily.    apixaban (ELIQUIS) 5 mg Tab Take 1 tablet (5 mg total) by mouth 2 (two) times daily. (Patient not taking: Reported on 1/24/2024)    HYDROcodone-acetaminophen (NORCO) 5-325 mg per tablet Take 1 tablet by mouth every 4 (four) hours as needed for Pain. (Patient not taking: Reported on 2/20/2024)     No current facility-administered medications for this visit.     Facility-Administered Medications Ordered in Other Visits   Medication    electrolyte-S (ISOLYTE)    fentaNYL 50 mcg/mL injection 25 mcg    lactated ringers infusion    LIDOcaine (PF) 10 mg/ml (1%) injection 10 mg    oxyCODONE immediate release tablet 5 mg       Review of patient's allergies indicates:  No Known Allergies    Family History   Problem Relation Age of Onset    Heart attack Father     Cancer Father         prostate       Social History     Socioeconomic History    Marital status:    Tobacco Use    Smoking status: Never    Smokeless tobacco: Never   Substance and Sexual Activity    Alcohol use: Yes     Comment: occ    Drug use: Never     Social Determinants of Health     Financial Resource Strain: Patient Declined (1/9/2024)    Overall Financial Resource Strain (CARDIA)     Difficulty of Paying Living Expenses: Patient declined   Food Insecurity: No Food Insecurity (1/9/2024)    Hunger Vital Sign     Worried About Running Out of Food in the Last Year: Never true     Ran Out of Food in the Last Year: Never true   Transportation Needs: No Transportation Needs (1/9/2024)    PRAPARE - Transportation     Lack of Transportation (Medical): No     Lack of Transportation (Non-Medical): No   Physical Activity: Insufficiently Active (1/9/2024)    Exercise Vital Sign     Days of Exercise per Week: 3 days     Minutes of Exercise per Session: 30 min   Stress: No Stress Concern Present (1/9/2024)    [employer] - Occupational Stress  Questionnaire     Feeling of Stress : Not at all   Social Connections: Unknown (1/9/2024)    Social Connection and Isolation Panel [NHANES]     Frequency of Communication with Friends and Family: Patient declined     Frequency of Social Gatherings with Friends and Family: Patient declined     Active Member of Clubs or Organizations: No     Attends Club or Organization Meetings: Never     Marital Status:    Housing Stability: Low Risk  (1/9/2024)    Housing Stability Vital Sign     Unable to Pay for Housing in the Last Year: No     Number of Places Lived in the Last Year: 1     Unstable Housing in the Last Year: No       History of present illness: 63-year-old male, returns to clinic today status post right shoulder arthroplasty November 13, 2023. Doing well.  Pain is improved.  Range of motion is increasing.       Review of Systems:    Constitution: Negative for chills, fever, and sweats.  Negative for unexplained weight loss.    HENT:  Negative for headaches and blurry vision.    Cardiovascular:Negative for chest pain or irregular heart beat. Negative for hypertension.    Respiratory:  Negative for cough and shortness of breath.    Gastrointestinal: Negative for abdominal pain, heartburn, melena, nausea, and vomitting.    Genitourinary:  Negative bladder incontinence and dysuria.    Musculoskeletal:  See HPI for details.     Neurological: Negative for numbness.    Psychiatric/Behavioral: Negative for depression.  The patient is not nervous/anxious.      Endocrine: Negative for polyuria    Hematologic/Lymphatic: Negative for bleeding problem.  Does not bruise/bleed easily.    Skin: Negative for poor would healing and rash    Objective:      Physical Examination:    Vital Signs:  There were no vitals filed for this visit.    Body mass index is 40.06 kg/m².    This a well-developed, well nourished patient in no acute distress.  They are alert and oriented and cooperative to examination.        Examination of  "the right shoulder, skin is dry and intact, no erythema or ecchymosis, no signs symptoms of infection.  No evidence of wound failure dehiscence.  Range of motion, patient can actively forward flex to about 120 130°, I can externally rotate him to about 50°.  Internally rotate him to the right hip.    Pertinent New Results:    XRAY Report / Interpretation:   AP lateral views of the right shoulder taken today in the office demonstrate a right total shoulder are plasty with no evidence of hardware failure or loosening.  No evidence of fracture dislocation.    Assessment/Plan:      Stable status post right shoulder arthroplasty now approximately 3 months ago.  Unfortunately he had some interruption in his physical therapy secondary to cardiovascular surgery and coronary artery bypass grafting.  He still has some pain in his sternum with range motion.  He is waiting cardiac clearance to start physical therapy.  We encouraged him to do what he can within the limits of his comfort.  If cleared he can restart physical therapy.  We will check him back in 3 months.    Torito Rodriguez, Physician Assistant, served in the capacity as a "scribe" for this patient encounter.  A "face-to-face" encounter occurred with Dr. Branden Hernandez on this date.  The treatment plan and medical decision-making is outlined above. Patient was seen and examined with a chaperone.       This note was created using Dragon voice recognition software that occasionally misinterpreted phrases or words.          "

## 2024-02-26 ENCOUNTER — EXTERNAL HOME HEALTH (OUTPATIENT)
Dept: HOME HEALTH SERVICES | Facility: HOSPITAL | Age: 64
End: 2024-02-26
Payer: MEDICARE

## 2024-04-04 ENCOUNTER — LAB VISIT (OUTPATIENT)
Dept: LAB | Facility: HOSPITAL | Age: 64
End: 2024-04-04
Attending: NURSE PRACTITIONER
Payer: MEDICARE

## 2024-04-04 DIAGNOSIS — E11.65 TYPE 2 DIABETES MELLITUS WITH HYPERGLYCEMIA, WITHOUT LONG-TERM CURRENT USE OF INSULIN: ICD-10-CM

## 2024-04-04 LAB
ALBUMIN SERPL BCP-MCNC: 3.9 G/DL (ref 3.5–5.2)
ALP SERPL-CCNC: 71 U/L (ref 55–135)
ALT SERPL W/O P-5'-P-CCNC: 17 U/L (ref 10–44)
ANION GAP SERPL CALC-SCNC: 11 MMOL/L (ref 8–16)
AST SERPL-CCNC: 18 U/L (ref 10–40)
BILIRUB SERPL-MCNC: 0.5 MG/DL (ref 0.1–1)
BUN SERPL-MCNC: 24 MG/DL (ref 8–23)
CALCIUM SERPL-MCNC: 10.3 MG/DL (ref 8.7–10.5)
CHLORIDE SERPL-SCNC: 105 MMOL/L (ref 95–110)
CO2 SERPL-SCNC: 23 MMOL/L (ref 23–29)
CREAT SERPL-MCNC: 1.2 MG/DL (ref 0.5–1.4)
EST. GFR  (NO RACE VARIABLE): >60 ML/MIN/1.73 M^2
ESTIMATED AVG GLUCOSE: 203 MG/DL (ref 68–131)
GLUCOSE SERPL-MCNC: 201 MG/DL (ref 70–110)
HBA1C MFR BLD: 8.7 % (ref 4–5.6)
POTASSIUM SERPL-SCNC: 4.8 MMOL/L (ref 3.5–5.1)
PROT SERPL-MCNC: 7.3 G/DL (ref 6–8.4)
SODIUM SERPL-SCNC: 139 MMOL/L (ref 136–145)

## 2024-04-04 PROCEDURE — 80053 COMPREHEN METABOLIC PANEL: CPT | Performed by: NURSE PRACTITIONER

## 2024-04-04 PROCEDURE — 36415 COLL VENOUS BLD VENIPUNCTURE: CPT | Performed by: NURSE PRACTITIONER

## 2024-04-04 PROCEDURE — 83036 HEMOGLOBIN GLYCOSYLATED A1C: CPT | Performed by: NURSE PRACTITIONER

## 2024-04-09 ENCOUNTER — OFFICE VISIT (OUTPATIENT)
Dept: FAMILY MEDICINE | Facility: CLINIC | Age: 64
End: 2024-04-09
Payer: MEDICARE

## 2024-04-09 VITALS
WEIGHT: 315 LBS | SYSTOLIC BLOOD PRESSURE: 132 MMHG | HEART RATE: 75 BPM | OXYGEN SATURATION: 97 % | TEMPERATURE: 99 F | RESPIRATION RATE: 18 BRPM | BODY MASS INDEX: 40.43 KG/M2 | HEIGHT: 74 IN | DIASTOLIC BLOOD PRESSURE: 78 MMHG

## 2024-04-09 DIAGNOSIS — E78.5 HYPERLIPIDEMIA, UNSPECIFIED HYPERLIPIDEMIA TYPE: ICD-10-CM

## 2024-04-09 DIAGNOSIS — E11.65 TYPE 2 DIABETES MELLITUS WITH HYPERGLYCEMIA, WITHOUT LONG-TERM CURRENT USE OF INSULIN: Primary | ICD-10-CM

## 2024-04-09 DIAGNOSIS — Z95.1 S/P CABG (CORONARY ARTERY BYPASS GRAFT): ICD-10-CM

## 2024-04-09 DIAGNOSIS — E66.01 CLASS 3 SEVERE OBESITY WITH SERIOUS COMORBIDITY AND BODY MASS INDEX (BMI) OF 40.0 TO 44.9 IN ADULT, UNSPECIFIED OBESITY TYPE: ICD-10-CM

## 2024-04-09 DIAGNOSIS — I10 ESSENTIAL HYPERTENSION: ICD-10-CM

## 2024-04-09 PROBLEM — E66.813 CLASS 3 SEVERE OBESITY WITH SERIOUS COMORBIDITY AND BODY MASS INDEX (BMI) OF 40.0 TO 44.9 IN ADULT, UNSPECIFIED OBESITY TYPE: Status: ACTIVE | Noted: 2024-04-09

## 2024-04-09 PROCEDURE — 99999 PR PBB SHADOW E&M-EST. PATIENT-LVL V: CPT | Mod: PBBFAC,,, | Performed by: NURSE PRACTITIONER

## 2024-04-09 PROCEDURE — 99214 OFFICE O/P EST MOD 30 MIN: CPT | Mod: S$PBB,,, | Performed by: NURSE PRACTITIONER

## 2024-04-09 PROCEDURE — 99215 OFFICE O/P EST HI 40 MIN: CPT | Mod: PBBFAC,PN | Performed by: NURSE PRACTITIONER

## 2024-04-09 RX ORDER — DULAGLUTIDE 1.5 MG/.5ML
1.5 INJECTION, SOLUTION SUBCUTANEOUS
Qty: 12 PEN | Refills: 1 | Status: SHIPPED | OUTPATIENT
Start: 2024-04-09

## 2024-04-09 NOTE — PROGRESS NOTES
SUBJECTIVE:      Patient ID: Donald Frey is a 63 y.o. male.    Chief Complaint: Diabetes and Hypertension    Donald is here for follow-up on diabetes and recent lab results.  Recent lab work reviewed today with patient- A1c improved to 8.7 since adding Trulicity! Taking all meds as prescribed-denies any side effects from his current medication regimen. Since last visit, he had a CABG with Dr. Coreas -has been doing well. Was taking Insulin while in hospital and upon d/c, but not taking any longer. Has been eating better but can improve still. BP is well-controlled at home and second reading normal today. Denies any complaints at this time.     Diabetes  He presents for his follow-up diabetic visit. He has type 2 diabetes mellitus. No MedicAlert identification noted. The initial diagnosis of diabetes was made 9 years ago. His disease course has been improving. Pertinent negatives for hypoglycemia include no confusion, dizziness, headaches, mood changes, nervousness/anxiousness, pallor, seizures, speech difficulty or tremors. Pertinent negatives for diabetes include no chest pain, no fatigue, no foot paresthesias, no foot ulcerations, no polydipsia, no polyphagia, no polyuria, no visual change, no weakness and no weight loss. There are no hypoglycemic complications. Pertinent negatives for hypoglycemia complications include no blackouts, no nocturnal hypoglycemia, no required assistance and no required glucagon injection. Symptoms are improving. Diabetic complications include heart disease, impotence and peripheral neuropathy. Pertinent negatives for diabetic complications include no autonomic neuropathy, CVA or nephropathy. Risk factors for coronary artery disease include diabetes mellitus, dyslipidemia, male sex, hypertension and sedentary lifestyle. Current diabetic treatment includes diet and oral agent (triple therapy). He is compliant with treatment most of the time. His weight is increasing steadily. He  is following a generally healthy and low salt diet. When asked about meal planning, he reported none. He has not had a previous visit with a dietitian. He participates in exercise intermittently. Home blood sugar record trend: NOT CHECKING. His breakfast blood glucose range is generally >200 mg/dl. An ACE inhibitor/angiotensin II receptor blocker is being taken. He does not see a podiatrist.Eye exam is current.       Family History   Problem Relation Age of Onset    Heart attack Father     Cancer Father         prostate      Social History     Socioeconomic History    Marital status:    Tobacco Use    Smoking status: Never    Smokeless tobacco: Never   Substance and Sexual Activity    Alcohol use: Yes     Comment: occ    Drug use: Never     Social Determinants of Health     Financial Resource Strain: Patient Declined (1/9/2024)    Overall Financial Resource Strain (CARDIA)     Difficulty of Paying Living Expenses: Patient declined   Food Insecurity: No Food Insecurity (1/9/2024)    Hunger Vital Sign     Worried About Running Out of Food in the Last Year: Never true     Ran Out of Food in the Last Year: Never true   Transportation Needs: No Transportation Needs (1/9/2024)    PRAPARE - Transportation     Lack of Transportation (Medical): No     Lack of Transportation (Non-Medical): No   Physical Activity: Insufficiently Active (1/9/2024)    Exercise Vital Sign     Days of Exercise per Week: 3 days     Minutes of Exercise per Session: 30 min   Stress: No Stress Concern Present (1/9/2024)    [employer] [place] [employer] - Occupational Stress Questionnaire     Feeling of Stress : Not at all   Social Connections: Unknown (1/9/2024)    Social Connection and Isolation Panel [NHANES]     Frequency of Communication with Friends and Family: Patient declined     Frequency of Social Gatherings with Friends and Family: Patient declined     Active Member of Clubs or Organizations: No     Attends Club or  "Organization Meetings: Never     Marital Status:    Housing Stability: Low Risk  (1/9/2024)    Housing Stability Vital Sign     Unable to Pay for Housing in the Last Year: No     Number of Places Lived in the Last Year: 1     Unstable Housing in the Last Year: No     Current Outpatient Medications   Medication Sig Dispense Refill    amiodarone (PACERONE) 200 MG Tab Take 1 tablet (200 mg total) by mouth once daily. (Patient taking differently: Take 100 mg by mouth once daily.) 30 tablet 11    apixaban (ELIQUIS) 5 mg Tab Take 1 tablet (5 mg total) by mouth 2 (two) times daily. 60 tablet 11    aspirin 81 MG Chew Take 1 tablet (81 mg total) by mouth once daily. 30 tablet 0    carvediloL (COREG) 3.125 MG tablet Take 1 tablet (3.125 mg total) by mouth 2 (two) times daily. 60 tablet 0    diltiaZEM (CARDIZEM CD) 240 MG 24 hr capsule Take 1 capsule (240 mg total) by mouth 2 (two) times a day. 60 capsule 11    furosemide (LASIX) 20 MG tablet Take 1 tablet (20 mg total) by mouth 2 (two) times daily. 60 tablet 11    glipiZIDE (GLUCOTROL) 5 MG tablet Take 1 tablet (5 mg total) by mouth 2 (two) times daily with meals. 180 tablet 1    lisinopriL (PRINIVIL,ZESTRIL) 20 MG tablet Take 1 tablet (20 mg total) by mouth once daily. 90 tablet 3    metFORMIN (GLUCOPHAGE) 1000 MG tablet TAKE 1 TABLET(1000 MG) BY MOUTH TWICE DAILY WITH MEALS 180 tablet 1    rosuvastatin (CRESTOR) 20 MG tablet Take 20 mg by mouth once daily.      dulaglutide (TRULICITY) 1.5 mg/0.5 mL pen injector Inject 1.5 mg into the skin every 7 days. 12 pen 1    pen needle, diabetic 32 gauge x 5/32" Ndle 1 each by Misc.(Non-Drug; Combo Route) route 3 (three) times daily. 100 each 0     No current facility-administered medications for this visit.     Facility-Administered Medications Ordered in Other Visits   Medication Dose Route Frequency Provider Last Rate Last Admin    electrolyte-S (ISOLYTE)   Intravenous Continuous Diogo Kent MD   Stopped at 11/13/23 " 1444    fentaNYL 50 mcg/mL injection 25 mcg  25 mcg Intravenous Q5 Min PRN Diogo Kent MD        lactated ringers infusion   Intravenous Continuous Diogo Kent MD        LIDOcaine (PF) 10 mg/ml (1%) injection 10 mg  1 mL Intradermal Once Diogo Kent MD        oxyCODONE immediate release tablet 5 mg  5 mg Oral Once PRN Diogo Kent MD         Review of patient's allergies indicates:  No Known Allergies   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 01/13/2022    EYE EXAM  DR KATHY PARMAR    Coronary artery disease     1 coronary stent - Dr Orellana    Diabetes mellitus, type 2     Hypertension     Myocardial infarction 2013     Past Surgical History:   Procedure Laterality Date    ABLATION OF ARRHYTHMOGENIC FOCUS FOR ATRIAL FIBRILLATION N/A 7/7/2022    Procedure: ABLATION, ARRHYTHMOGENIC FOCUS, FOR ATRIAL FIBRILLATION;  Surgeon: Niko Pacheco MD;  Location: [facility] EP LAB;  Service: Cardiology;  Laterality: N/A;  AF, ARMANDO(Cx if SR), PVI, RFA, CARTO, GEN, GP, 3 PREP    ANGIOGRAM, CORONARY, WITH LEFT HEART CATHETERIZATION N/A 1/15/2024    Procedure: Angiogram, Coronary, with Left Heart Cath;  Surgeon: Samir Emmanuel MD;  Location: [facility] CATH/EP LAB;  Service: Cardiology;  Laterality: N/A;    ARTHROPLASTY OF SHOULDER Right 11/13/2023    Procedure: ARTHROPLASTY, SHOULDER, RIGHT;  Surgeon: Branden Hernandez MD;  Location: [facility];  Service: Orthopedics;  Laterality: Right;  DJO    CORONARY ARTERY BYPASS GRAFT (CABG) N/A 1/17/2024    Procedure: CORONARY ARTERY BYPASS GRAFT (CABG);  Surgeon: Darius Coreas MD;  Location: [facility];  Service: Cardiothoracic;  Laterality: N/A;    CORONARY STENT PLACEMENT  2013    ENDOSCOPIC HARVEST OF VEIN Left 1/17/2024    Procedure: SURGICAL PROCUREMENT, VEIN, ENDOSCOPIC;  Surgeon: Darius Coreas MD;  Location: [facility];  Service: Cardiothoracic;  Laterality: Left;    SURGICAL PROCUREMENT,  ARTERY, RADIAL, FOR CABG Left 1/17/2024    Procedure: SURGICAL PROCUREMENT,ARTERY,RADIAL,FOR CABG, ENDOSCOPIC;  Surgeon: Darius Coreas MD;  Location: [facility] OR;  Service: Cardiothoracic;  Laterality: Left;    TRANSESOPHAGEAL ECHOCARDIOGRAPHY N/A 7/7/2022    Procedure: ECHOCARDIOGRAM, TRANSESOPHAGEAL;  Surgeon: Conor Chappell MD;  Location: [facility] EP LAB;  Service: Cardiology;  Laterality: N/A;    TREATMENT OF CARDIAC ARRHYTHMIA N/A 3/7/2022    Procedure: CARDIOVERSION;  Surgeon: Gomez Orellana MD;  Location: [facility] CATH/EP LAB;  Service: Cardiology;  Laterality: N/A;    TREATMENT OF CARDIAC ARRHYTHMIA  7/7/2022    Procedure: Cardioversion or Defibrillation;  Surgeon: Conor Chappell MD;  Location: [facility] EP LAB;  Service: Cardiology;;       Review of Systems   Constitutional:  Negative for activity change, chills, fatigue, fever, unexpected weight change and weight loss.   HENT:  Negative for hearing loss, rhinorrhea and trouble swallowing.    Eyes:  Negative for discharge and visual disturbance.   Respiratory:  Negative for chest tightness, shortness of breath and wheezing.    Cardiovascular:  Negative for chest pain and palpitations.   Gastrointestinal:  Negative for abdominal pain, blood in stool, constipation, diarrhea, nausea and vomiting.   Endocrine: Negative for polydipsia, polyphagia and polyuria.   Genitourinary:  Positive for impotence. Negative for difficulty urinating, dysuria, frequency, hematuria and urgency.   Musculoskeletal:  Negative for arthralgias and joint swelling.   Skin:  Negative for color change, pallor and wound.   Neurological:  Negative for dizziness, tremors, seizures, speech difficulty, weakness and headaches.   Hematological:  Negative for adenopathy. Bruises/bleeds easily.   Psychiatric/Behavioral:  Negative for confusion and dysphoric mood. The patient is not nervous/anxious.       OBJECTIVE:      Vitals:    04/09/24 1420 04/09/24 1443   BP: (!) 144/74 132/78   BP Location: Left  "arm Left arm   Patient Position: Sitting Sitting   BP Method: Large (Manual) Large (Manual)   Pulse: 75    Resp: 18    Temp: 98.7 °F (37.1 °C)    TempSrc: Oral    SpO2: 97%    Weight: (!) 146.7 kg (323 lb 6.4 oz)    Height: 6' 2" (1.88 m)      Physical Exam  Vitals and nursing note reviewed.   Constitutional:       General: He is not in acute distress.     Appearance: Normal appearance. He is well-developed. He is obese. He is not ill-appearing.      Comments: Morbid obesity    HENT:      Head: Normocephalic.      Nose: Nose normal.      Mouth/Throat:      Mouth: Mucous membranes are moist.   Eyes:      General: No scleral icterus.     Pupils: Pupils are equal, round, and reactive to light.   Neck:      Thyroid: No thyroid mass or thyromegaly.   Cardiovascular:      Rate and Rhythm: Normal rate and regular rhythm.      Heart sounds: Normal heart sounds. No murmur heard.  Pulmonary:      Effort: Pulmonary effort is normal. No respiratory distress.      Breath sounds: Normal breath sounds. No wheezing, rhonchi or rales.   Musculoskeletal:         General: Normal range of motion.      Cervical back: Normal range of motion and neck supple.      Right lower leg: No edema.      Left lower leg: No edema.   Lymphadenopathy:      Cervical: No cervical adenopathy.   Skin:     General: Skin is warm and dry.      Coloration: Skin is not jaundiced or pale.   Neurological:      Mental Status: He is alert and oriented to person, place, and time.   Psychiatric:         Mood and Affect: Mood normal.         Behavior: Behavior normal.         Thought Content: Thought content normal.         Judgment: Judgment normal.        Assessment:       1. Type 2 diabetes mellitus with hyperglycemia, without long-term current use of insulin    2. Essential hypertension    3. Hyperlipidemia, unspecified hyperlipidemia type    4. S/P CABG (coronary artery bypass graft)    5. Class 3 severe obesity with serious comorbidity and body mass index (BMI) " of 40.0 to 44.9 in adult, unspecified obesity type        Plan:       Type 2 diabetes mellitus with hyperglycemia, without long-term current use of insulin  -     dulaglutide (TRULICITY) 1.5 mg/0.5 mL pen injector; Inject 1.5 mg into the skin every 7 days.  Dispense: 12 pen ; Refill: 1  -     Comprehensive Metabolic Panel; Future; Expected date: 04/09/2024  -     Lipid Panel; Future; Expected date: 04/09/2024  -     Hemoglobin A1C; Future; Expected date: 04/09/2024  -     Ambulatory referral/consult to Optometry; Future; Expected date: 06/14/2024  -improving; cont metformin and glipizide; Trulicity increased to 1.5 mg weekly; continue efforts with diet and exercise; recheck labs in 3 mo     Essential hypertension  -     Comprehensive Metabolic Panel; Future; Expected date: 04/09/2024  -     Lipid Panel; Future; Expected date: 04/09/2024  -stable/controlled, cont meds    Hyperlipidemia, unspecified hyperlipidemia type  -     Comprehensive Metabolic Panel; Future; Expected date: 04/09/2024  -     Lipid Panel; Future; Expected date: 04/09/2024  -cont statin daily; check labs in 3 mo     S/P CABG (coronary artery bypass graft)   -cont care with cardiology and surgery     Class 3 severe obesity with serious comorbidity and body mass index (BMI) of 40.0 to 44.9 in adult, unspecified obesity type        Follow up in about 3 months (around 7/9/2024) for diabetes, HTN.      4/10/2024 HONORIO Coello, FNP    This note was created using [email or web address] voice recognition software that occasionally misinterprets phrases or words.

## 2024-05-13 DIAGNOSIS — E11.65 TYPE 2 DIABETES MELLITUS WITH HYPERGLYCEMIA, WITHOUT LONG-TERM CURRENT USE OF INSULIN: ICD-10-CM

## 2024-05-14 RX ORDER — METFORMIN HYDROCHLORIDE 1000 MG/1
1000 TABLET ORAL 2 TIMES DAILY WITH MEALS
Qty: 180 TABLET | Refills: 1 | Status: SHIPPED | OUTPATIENT
Start: 2024-05-14

## 2024-05-14 RX ORDER — GLIPIZIDE 5 MG/1
5 TABLET ORAL 2 TIMES DAILY WITH MEALS
Qty: 180 TABLET | Refills: 1 | Status: SHIPPED | OUTPATIENT
Start: 2024-05-14

## 2024-05-21 ENCOUNTER — OFFICE VISIT (OUTPATIENT)
Dept: ORTHOPEDICS | Facility: CLINIC | Age: 64
End: 2024-05-21
Payer: MEDICARE

## 2024-05-21 VITALS
HEIGHT: 74 IN | WEIGHT: 315 LBS | SYSTOLIC BLOOD PRESSURE: 140 MMHG | DIASTOLIC BLOOD PRESSURE: 90 MMHG | BODY MASS INDEX: 40.43 KG/M2

## 2024-05-21 DIAGNOSIS — Z96.611 HISTORY OF TOTAL SHOULDER REPLACEMENT, RIGHT: Primary | ICD-10-CM

## 2024-05-21 PROCEDURE — 99212 OFFICE O/P EST SF 10 MIN: CPT | Mod: S$GLB,,, | Performed by: ORTHOPAEDIC SURGERY

## 2024-05-21 RX ORDER — ROSUVASTATIN CALCIUM 40 MG/1
40 TABLET, COATED ORAL
COMMUNITY
Start: 2024-05-14

## 2024-05-21 NOTE — PROGRESS NOTES
[facility]    Subjective:     Chief Complaint:   Chief Complaint   Patient presents with    Right Shoulder - Pain     Patient is here for a f/u on right TSA 11.13.23. States pain has improved since last visit, currently no pain        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 01/13/2022    EYE EXAM  DR KATHY PARMAR    Coronary artery disease     1 coronary stent - Dr Orellana    Diabetes mellitus, type 2     Hypertension     Myocardial infarction 2013       Past Surgical History:   Procedure Laterality Date    ABLATION OF ARRHYTHMOGENIC FOCUS FOR ATRIAL FIBRILLATION N/A 7/7/2022    Procedure: ABLATION, ARRHYTHMOGENIC FOCUS, FOR ATRIAL FIBRILLATION;  Surgeon: Niko Pacheco MD;  Location: [facility] EP LAB;  Service: Cardiology;  Laterality: N/A;  AF, ARMANDO(Cx if SR), PVI, RFA, CARTO, GEN, GP, 3 PREP    ANGIOGRAM, CORONARY, WITH LEFT HEART CATHETERIZATION N/A 1/15/2024    Procedure: Angiogram, Coronary, with Left Heart Cath;  Surgeon: Samir Emmanuel MD;  Location: [facility] CATH/EP LAB;  Service: Cardiology;  Laterality: N/A;    ARTHROPLASTY OF SHOULDER Right 11/13/2023    Procedure: ARTHROPLASTY, SHOULDER, RIGHT;  Surgeon: Branden Hernandez MD;  Location: [facility];  Service: Orthopedics;  Laterality: Right;  DJO    CORONARY ARTERY BYPASS GRAFT (CABG) N/A 1/17/2024    Procedure: CORONARY ARTERY BYPASS GRAFT (CABG);  Surgeon: Darius Coreas MD;  Location: [facility];  Service: Cardiothoracic;  Laterality: N/A;    CORONARY STENT PLACEMENT  2013    ENDOSCOPIC HARVEST OF VEIN Left 1/17/2024    Procedure: SURGICAL PROCUREMENT, VEIN, ENDOSCOPIC;  Surgeon: Darius Coreas MD;  Location: [facility];  Service: Cardiothoracic;  Laterality: Left;    SURGICAL PROCUREMENT, ARTERY, RADIAL, FOR CABG Left 1/17/2024    Procedure: SURGICAL PROCUREMENT,ARTERY,RADIAL,FOR CABG, ENDOSCOPIC;  Surgeon: Darius Coreas MD;  Location: [facility];  Service:  "Cardiothoracic;  Laterality: Left;    TRANSESOPHAGEAL ECHOCARDIOGRAPHY N/A 7/7/2022    Procedure: ECHOCARDIOGRAM, TRANSESOPHAGEAL;  Surgeon: Conor Chappell MD;  Location: [facility] EP LAB;  Service: Cardiology;  Laterality: N/A;    TREATMENT OF CARDIAC ARRHYTHMIA N/A 3/7/2022    Procedure: CARDIOVERSION;  Surgeon: Gomez Orellana MD;  Location: [facility] CATH/EP LAB;  Service: Cardiology;  Laterality: N/A;    TREATMENT OF CARDIAC ARRHYTHMIA  7/7/2022    Procedure: Cardioversion or Defibrillation;  Surgeon: Conor Chappell MD;  Location: [facility] EP LAB;  Service: Cardiology;;       Current Outpatient Medications   Medication Sig    amiodarone (PACERONE) 200 MG Tab Take 1 tablet (200 mg total) by mouth once daily. (Patient taking differently: Take 100 mg by mouth once daily.)    apixaban (ELIQUIS) 5 mg Tab Take 1 tablet (5 mg total) by mouth 2 (two) times daily.    aspirin 81 MG Chew Take 1 tablet (81 mg total) by mouth once daily.    carvediloL (COREG) 3.125 MG tablet Take 1 tablet (3.125 mg total) by mouth 2 (two) times daily.    diltiaZEM (CARDIZEM CD) 240 MG 24 hr capsule Take 1 capsule (240 mg total) by mouth 2 (two) times a day.    dulaglutide (TRULICITY) 1.5 mg/0.5 mL pen injector Inject 1.5 mg into the skin every 7 days.    furosemide (LASIX) 20 MG tablet Take 1 tablet (20 mg total) by mouth 2 (two) times daily.    glipiZIDE (GLUCOTROL) 5 MG tablet Take 1 tablet (5 mg total) by mouth 2 (two) times daily with meals.    lisinopriL (PRINIVIL,ZESTRIL) 20 MG tablet Take 1 tablet (20 mg total) by mouth once daily.    metFORMIN (GLUCOPHAGE) 1000 MG tablet Take 1 tablet (1,000 mg total) by mouth 2 (two) times daily with meals.    pen needle, diabetic 32 gauge x 5/32" Ndle 1 each by Misc.(Non-Drug; Combo Route) route 3 (three) times daily.    rosuvastatin (CRESTOR) 40 MG Tab Take 40 mg by mouth.     No current facility-administered medications for this visit.     Facility-Administered Medications Ordered in Other Visits "   Medication    electrolyte-S (ISOLYTE)    fentaNYL 50 mcg/mL injection 25 mcg    lactated ringers infusion    LIDOcaine (PF) 10 mg/ml (1%) injection 10 mg    oxyCODONE immediate release tablet 5 mg       Review of patient's allergies indicates:  No Known Allergies    Family History   Problem Relation Name Age of Onset    Heart attack Father      Cancer Father          prostate       Social History     Socioeconomic History    Marital status:    Tobacco Use    Smoking status: Never    Smokeless tobacco: Never   Substance and Sexual Activity    Alcohol use: Yes     Comment: occ    Drug use: Never     Social Determinants of Health     Financial Resource Strain: Patient Declined (1/9/2024)    Overall Financial Resource Strain (CARDIA)     Difficulty of Paying Living Expenses: Patient declined   Food Insecurity: No Food Insecurity (1/9/2024)    Hunger Vital Sign     Worried About Running Out of Food in the Last Year: Never true     Ran Out of Food in the Last Year: Never true   Transportation Needs: No Transportation Needs (1/9/2024)    PRAPARE - Transportation     Lack of Transportation (Medical): No     Lack of Transportation (Non-Medical): No   Physical Activity: Insufficiently Active (1/9/2024)    Exercise Vital Sign     Days of Exercise per Week: 3 days     Minutes of Exercise per Session: 30 min   Stress: No Stress Concern Present (1/9/2024)    [employer] [place] [employer] - Occupational Stress Questionnaire     Feeling of Stress : Not at all   Housing Stability: Low Risk  (1/9/2024)    Housing Stability Vital Sign     Unable to Pay for Housing in the Last Year: No     Number of Places Lived in the Last Year: 1     Unstable Housing in the Last Year: No       History of present illness:  Donald comes in today for follow-up for his right primary total shoulder arthroplasty.  He is 6 months postop.  He is doing very well.  He has resumed his regular activities of daily living.  He is finished with  physical therapy.  He has no pain.  He is very pleased with his postoperative result.    Review of Systems:    Constitution: Negative for chills, fever, and sweats.  Negative for unexplained weight loss.    HENT:  Negative for headaches and blurry vision.    Cardiovascular:Negative for chest pain or irregular heart beat. Negative for hypertension.    Respiratory:  Negative for cough and shortness of breath.    Gastrointestinal: Negative for abdominal pain, heartburn, melena, nausea, and vomitting.    Genitourinary:  Negative bladder incontinence and dysuria.    Musculoskeletal:  See HPI for details.     Neurological: Negative for numbness.    Psychiatric/Behavioral: Negative for depression.  The patient is not nervous/anxious.      Endocrine: Negative for polyuria    Hematologic/Lymphatic: Negative for bleeding problem.  Does not bruise/bleed easily.    Skin: Negative for poor would healing and rash    Objective:      Physical Examination:    Vital Signs:    Vitals:    05/21/24 1007   BP: (!) 140/90       Body mass index is 41.52 kg/m².    This a well-developed, well nourished patient in no acute distress.  They are alert and oriented and cooperative to examination.        Right shoulder exam:  Skin to right shoulder is clean dry and intact.  No erythema or ecchymosis.  Right anterior shoulder incision well healed without wound dehiscence or drainage.  No signs or symptoms of infection.  He is neurovascularly intact throughout the right upper extremity.  He can fully forward flex to 180°.  Externally rotates about 60 °.    Pertinent New Results:    XRAY Report / Interpretation:   Two views taken of the right shoulder today: AP and Y-view.  They reveal no acute fractures or dislocations.  He has an anatomically placed right total shoulder arthroplasty without evidence of hardware failure or subsidence.    Assessment/Plan:      1. Status post right total shoulder arthroplasty.      Patient doing very well  "postoperatively.  He can resume/continue his regular activities of daily living without restriction.  He can follow up with us on an as-needed basis for any issues or concerns that arise.    Roger Britton, Physician Assistant, served in the capacity as a "scribe" for this patient encounter.  A "face-to-face" encounter occurred with Dr. Branden Hernandez on this date.  The treatment plan and medical decision-making is outlined above. Patient was seen and examined with a chaperone.       This note was created using Dragon voice recognition software that occasionally misinterpreted phrases or words.          "

## 2024-07-02 ENCOUNTER — LAB VISIT (OUTPATIENT)
Dept: LAB | Facility: HOSPITAL | Age: 64
End: 2024-07-02
Attending: NURSE PRACTITIONER
Payer: MEDICARE

## 2024-07-02 DIAGNOSIS — E11.65 TYPE 2 DIABETES MELLITUS WITH HYPERGLYCEMIA, WITHOUT LONG-TERM CURRENT USE OF INSULIN: ICD-10-CM

## 2024-07-02 DIAGNOSIS — I10 ESSENTIAL HYPERTENSION: ICD-10-CM

## 2024-07-02 DIAGNOSIS — E78.5 HYPERLIPIDEMIA, UNSPECIFIED HYPERLIPIDEMIA TYPE: ICD-10-CM

## 2024-07-02 LAB
ALBUMIN SERPL BCP-MCNC: 4 G/DL (ref 3.5–5.2)
ALP SERPL-CCNC: 50 U/L (ref 55–135)
ALT SERPL W/O P-5'-P-CCNC: 18 U/L (ref 10–44)
ANION GAP SERPL CALC-SCNC: 11 MMOL/L (ref 8–16)
AST SERPL-CCNC: 18 U/L (ref 10–40)
BILIRUB SERPL-MCNC: 0.5 MG/DL (ref 0.1–1)
BUN SERPL-MCNC: 18 MG/DL (ref 8–23)
CALCIUM SERPL-MCNC: 9.5 MG/DL (ref 8.7–10.5)
CHLORIDE SERPL-SCNC: 105 MMOL/L (ref 95–110)
CHOLEST SERPL-MCNC: 95 MG/DL (ref 120–199)
CHOLEST/HDLC SERPL: 3.8 {RATIO} (ref 2–5)
CO2 SERPL-SCNC: 24 MMOL/L (ref 23–29)
CREAT SERPL-MCNC: 1.1 MG/DL (ref 0.5–1.4)
EST. GFR  (NO RACE VARIABLE): >60 ML/MIN/1.73 M^2
ESTIMATED AVG GLUCOSE: 226 MG/DL (ref 68–131)
GLUCOSE SERPL-MCNC: 212 MG/DL (ref 70–110)
HBA1C MFR BLD: 9.5 % (ref 4–5.6)
HDLC SERPL-MCNC: 25 MG/DL (ref 40–75)
HDLC SERPL: 26.3 % (ref 20–50)
LDLC SERPL CALC-MCNC: 41.8 MG/DL (ref 63–159)
NONHDLC SERPL-MCNC: 70 MG/DL
POTASSIUM SERPL-SCNC: 4.9 MMOL/L (ref 3.5–5.1)
PROT SERPL-MCNC: 7 G/DL (ref 6–8.4)
SODIUM SERPL-SCNC: 140 MMOL/L (ref 136–145)
TRIGL SERPL-MCNC: 141 MG/DL (ref 30–150)

## 2024-07-02 PROCEDURE — 80061 LIPID PANEL: CPT | Performed by: NURSE PRACTITIONER

## 2024-07-02 PROCEDURE — 80053 COMPREHEN METABOLIC PANEL: CPT | Performed by: NURSE PRACTITIONER

## 2024-07-02 PROCEDURE — 36415 COLL VENOUS BLD VENIPUNCTURE: CPT | Performed by: NURSE PRACTITIONER

## 2024-07-02 PROCEDURE — 83036 HEMOGLOBIN GLYCOSYLATED A1C: CPT | Performed by: NURSE PRACTITIONER

## 2024-07-09 ENCOUNTER — OFFICE VISIT (OUTPATIENT)
Dept: FAMILY MEDICINE | Facility: CLINIC | Age: 64
End: 2024-07-09
Payer: MEDICARE

## 2024-07-09 VITALS — DIASTOLIC BLOOD PRESSURE: 86 MMHG | SYSTOLIC BLOOD PRESSURE: 129 MMHG

## 2024-07-09 DIAGNOSIS — E11.65 TYPE 2 DIABETES MELLITUS WITH HYPERGLYCEMIA, WITHOUT LONG-TERM CURRENT USE OF INSULIN: Primary | ICD-10-CM

## 2024-07-09 DIAGNOSIS — E78.5 HYPERLIPIDEMIA, UNSPECIFIED HYPERLIPIDEMIA TYPE: ICD-10-CM

## 2024-07-09 DIAGNOSIS — Z12.5 SCREENING FOR PROSTATE CANCER: ICD-10-CM

## 2024-07-09 DIAGNOSIS — I10 ESSENTIAL HYPERTENSION: ICD-10-CM

## 2024-07-09 PROCEDURE — 99214 OFFICE O/P EST MOD 30 MIN: CPT | Mod: 95,,, | Performed by: NURSE PRACTITIONER

## 2024-07-09 NOTE — PROGRESS NOTES
Subjective:        The chief complaint leading to consultation is: f/u DM, HLD   The patient location is:  Home  Visit type: Virtual visit with synchronous audio/video or audio only  This was a video visit in lieu of in-person visit due to the coronavirus emergency. Patient acknowledged and consented to the video visit encounter.     Donald is here for follow-up on diabetes, HLD and recent lab results.  Recent lab work reviewed today with patient- A1c up to 9.5. Stopped taking Trulicity due to abd pain after dose increase. This has stopped since discontinuing the Trulicity.  Taking all other medications as prescribed- denies any side effects.  Lipids are well controlled on current statin therapy.  BP is well-controlled at home.  Has upcoming eye exam scheduled.  Denies any complaints at this time.     Diabetes  He presents for his follow-up diabetic visit. He has type 2 diabetes mellitus. No MedicAlert identification noted. The initial diagnosis of diabetes was made 9 years ago. His disease course has been worsening. Pertinent negatives for hypoglycemia include no confusion, dizziness, headaches, mood changes, nervousness/anxiousness, pallor, seizures, speech difficulty or tremors. Pertinent negatives for diabetes include no chest pain, no fatigue, no foot paresthesias, no foot ulcerations, no polydipsia, no polyphagia, no polyuria, no visual change, no weakness and no weight loss. There are no hypoglycemic complications. Pertinent negatives for hypoglycemia complications include no blackouts, no nocturnal hypoglycemia, no required assistance and no required glucagon injection. Symptoms are worsening. Diabetic complications include heart disease, impotence and peripheral neuropathy. Pertinent negatives for diabetic complications include no autonomic neuropathy or nephropathy. Risk factors for coronary artery disease include diabetes mellitus, dyslipidemia, male sex, hypertension and sedentary lifestyle. Current  diabetic treatment includes diet and oral agent (triple therapy). He is compliant with treatment most of the time. His weight is increasing steadily. He is following a generally healthy and low salt diet. When asked about meal planning, he reported none. He has not had a previous visit with a dietitian. He participates in exercise intermittently. Home blood sugar record trend: NOT CHECKING. His breakfast blood glucose range is generally >200 mg/dl. An ACE inhibitor/angiotensin II receptor blocker is being taken. He does not see a podiatrist.Eye exam is current.   Hyperlipidemia  This is a chronic problem. The problem is controlled. Recent lipid tests were reviewed and are normal. Exacerbating diseases include diabetes and obesity. He has no history of chronic renal disease, hypothyroidism or liver disease. Factors aggravating his hyperlipidemia include beta blockers. Pertinent negatives include no chest pain, leg pain, myalgias or shortness of breath. Current antihyperlipidemic treatment includes statins and diet change. The current treatment provides significant improvement of lipids. Compliance problems include adherence to diet and adherence to exercise.  Risk factors for coronary artery disease include diabetes mellitus, dyslipidemia, hypertension, male sex, obesity and a sedentary lifestyle.       Past Surgical History:   Procedure Laterality Date    ABLATION OF ARRHYTHMOGENIC FOCUS FOR ATRIAL FIBRILLATION N/A 7/7/2022    Procedure: ABLATION, ARRHYTHMOGENIC FOCUS, FOR ATRIAL FIBRILLATION;  Surgeon: Niko Pacheco MD;  Location: [facility] EP LAB;  Service: Cardiology;  Laterality: N/A;  AF, ARMANDO(Cx if SR), PVI, RFA, CARTO, GEN, GP, 3 PREP    ANGIOGRAM, CORONARY, WITH LEFT HEART CATHETERIZATION N/A 1/15/2024    Procedure: Angiogram, Coronary, with Left Heart Cath;  Surgeon: Samir Emmanuel MD;  Location: [facility] CATH/EP LAB;  Service: Cardiology;  Laterality: N/A;    ARTHROPLASTY OF SHOULDER Right 11/13/2023     Procedure: ARTHROPLASTY, SHOULDER, RIGHT;  Surgeon: Branden Hernandez MD;  Location: [facility];  Service: Orthopedics;  Laterality: Right;  DJO    CORONARY ARTERY BYPASS GRAFT (CABG) N/A 1/17/2024    Procedure: CORONARY ARTERY BYPASS GRAFT (CABG);  Surgeon: Darius Coreas MD;  Location: [facility];  Service: Cardiothoracic;  Laterality: N/A;    CORONARY STENT PLACEMENT  2013    ENDOSCOPIC HARVEST OF VEIN Left 1/17/2024    Procedure: SURGICAL PROCUREMENT, VEIN, ENDOSCOPIC;  Surgeon: Darius Coreas MD;  Location: [facility];  Service: Cardiothoracic;  Laterality: Left;    SURGICAL PROCUREMENT, ARTERY, RADIAL, FOR CABG Left 1/17/2024    Procedure: SURGICAL PROCUREMENT,ARTERY,RADIAL,FOR CABG, ENDOSCOPIC;  Surgeon: Darius Coreas MD;  Location: [facility];  Service: Cardiothoracic;  Laterality: Left;    TRANSESOPHAGEAL ECHOCARDIOGRAPHY N/A 7/7/2022    Procedure: ECHOCARDIOGRAM, TRANSESOPHAGEAL;  Surgeon: Conor Chappell MD;  Location: [facility] EP LAB;  Service: Cardiology;  Laterality: N/A;    TREATMENT OF CARDIAC ARRHYTHMIA N/A 3/7/2022    Procedure: CARDIOVERSION;  Surgeon: Gomez Orellana MD;  Location: [facility] CATH/EP LAB;  Service: Cardiology;  Laterality: N/A;    TREATMENT OF CARDIAC ARRHYTHMIA  7/7/2022    Procedure: Cardioversion or Defibrillation;  Surgeon: Conor Chappell MD;  Location: [facility] EP LAB;  Service: Cardiology;;     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 01/13/2022    EYE EXAM  DR KATHY PARMAR    Coronary artery disease     1 coronary stent - Dr Orellana    Diabetes mellitus, type 2     Hypertension     Myocardial infarction 2013     Family History   Problem Relation Name Age of Onset    Heart attack Father      Cancer Father          prostate        Social History:   Marital Status:   Alcohol History:  reports current alcohol use.  Tobacco History:  reports that he has never smoked. He has never used smokeless tobacco.  Drug  History:  reports no history of drug use.    Review of patient's allergies indicates:  No Known Allergies    Current Outpatient Medications   Medication Sig Dispense Refill    amiodarone (PACERONE) 200 MG Tab Take 1 tablet (200 mg total) by mouth once daily. (Patient taking differently: Take 100 mg by mouth once daily.) 30 tablet 11    aspirin 81 MG Chew Take 1 tablet (81 mg total) by mouth once daily. 30 tablet 0    carvediloL (COREG) 3.125 MG tablet Take 1 tablet (3.125 mg total) by mouth 2 (two) times daily. 60 tablet 0    diltiaZEM (CARDIZEM CD) 240 MG 24 hr capsule Take 1 capsule (240 mg total) by mouth 2 (two) times a day. 60 capsule 11    furosemide (LASIX) 20 MG tablet Take 1 tablet (20 mg total) by mouth 2 (two) times daily. 60 tablet 11    glipiZIDE (GLUCOTROL) 5 MG tablet Take 1 tablet (5 mg total) by mouth 2 (two) times daily with meals. 180 tablet 1    lisinopriL (PRINIVIL,ZESTRIL) 20 MG tablet Take 1 tablet (20 mg total) by mouth once daily. 90 tablet 3    metFORMIN (GLUCOPHAGE) 1000 MG tablet Take 1 tablet (1,000 mg total) by mouth 2 (two) times daily with meals. 180 tablet 1    rosuvastatin (CRESTOR) 40 MG Tab Take 40 mg by mouth.      empagliflozin (JARDIANCE) 10 mg tablet Take 1 tablet (10 mg total) by mouth once daily. 90 tablet 1     No current facility-administered medications for this visit.     Facility-Administered Medications Ordered in Other Visits   Medication Dose Route Frequency Provider Last Rate Last Admin    electrolyte-S (ISOLYTE)   Intravenous Continuous Diogo Kent MD   Stopped at 11/13/23 1444    fentaNYL 50 mcg/mL injection 25 mcg  25 mcg Intravenous Q5 Min PRN Diogo Kent MD        lactated ringers infusion   Intravenous Continuous Diogo Kent MD        LIDOcaine (PF) 10 mg/ml (1%) injection 10 mg  1 mL Intradermal Once Diogo Kent MD        oxyCODONE immediate release tablet 5 mg  5 mg Oral Once PRN Diogo Kent MD           Review of Systems    Constitutional:  Negative for activity change, chills, fatigue, fever, unexpected weight change and weight loss.   HENT:  Negative for hearing loss, rhinorrhea and trouble swallowing.    Eyes:  Negative for pain, discharge and visual disturbance.   Respiratory:  Negative for chest tightness, shortness of breath and wheezing.    Cardiovascular:  Negative for chest pain, palpitations and leg swelling.   Gastrointestinal:  Negative for abdominal pain, blood in stool, constipation, diarrhea and vomiting.   Endocrine: Negative for polydipsia, polyphagia and polyuria.   Genitourinary:  Positive for impotence. Negative for difficulty urinating, dysuria, frequency, hematuria and urgency.   Musculoskeletal:  Negative for arthralgias, joint swelling, myalgias and neck pain.   Skin:  Negative for pallor.   Neurological:  Negative for dizziness, tremors, seizures, speech difficulty, weakness and headaches.   Psychiatric/Behavioral:  Negative for confusion and dysphoric mood. The patient is not nervous/anxious.          Objective:        Physical Exam:   Physical Exam  Constitutional:       General: He is not in acute distress.     Appearance: Normal appearance. He is obese. He is not ill-appearing.   Pulmonary:      Effort: Pulmonary effort is normal.   Musculoskeletal:      Cervical back: Normal range of motion.   Skin:     Coloration: Skin is not jaundiced or pale.   Neurological:      Mental Status: He is alert and oriented to person, place, and time.   Psychiatric:         Mood and Affect: Mood normal.         Behavior: Behavior normal.              Assessment:       1. Type 2 diabetes mellitus with hyperglycemia, without long-term current use of insulin    2. Essential hypertension    3. Hyperlipidemia, unspecified hyperlipidemia type    4. Screening for prostate cancer      Plan:   Type 2 diabetes mellitus with hyperglycemia, without long-term current use of insulin  -     Comprehensive Metabolic Panel; Future; Expected  date: 07/09/2024  -     Urinalysis; Future; Expected date: 07/09/2024  -     Microalbumin/Creatinine Ratio, Urine; Future; Expected date: 07/09/2024  -     Hemoglobin A1C; Future; Expected date: 07/09/2024  -     empagliflozin (JARDIANCE) 10 mg tablet; Take 1 tablet (10 mg total) by mouth once daily.  Dispense: 90 tablet; Refill: 1  -A1c has worsened since stopping Trulicity, continuing glipizide and metformin; patient would like to go back to Jardiance therapy as he has taken previously and had no side effects.  Cost will be about the same as Trulicity; will need to recheck last in 3 months; notify me for any problems or concerns with the medication or cost  -Risks of DM discussed. Types of treatment discussed. Encouraged exercise and diet. A1C Goal of < 7.0 reviewed. Stay away from sweets and high carb foods such as pasta, rice, bread, etc.  Will adjust/continue medication to achieve optimal glucose and cholesterol control.  Discussed need for annual eye exam, seasonal  flu vaccine seasonally, and routine inspection of feet.  Bring glucose diary to each visit.     Essential hypertension  -     Comprehensive Metabolic Panel; Future; Expected date: 07/09/2024  -     Urinalysis; Future; Expected date: 07/09/2024  -stable/controlled, continue medication    Hyperlipidemia, unspecified hyperlipidemia type   -lipids control, continue statin daily    Screening for prostate cancer  -     PSA, Screening; Future; Expected date: 10/09/2024      Follow up in about 3 months (around 10/9/2024) for diabetes, HTN.    Total time spent with patient: 24 minutes     Each patient to whom he or she provides medical services by telemedicine is:  (1) informed of the relationship between the physician and patient and the respective role of any other health care provider with respect to management of the patient; and (2) notified that he or she may decline to receive medical services by telemedicine and may withdraw from such care at any  time.

## 2024-07-26 ENCOUNTER — PATIENT MESSAGE (OUTPATIENT)
Dept: ADMINISTRATIVE | Facility: HOSPITAL | Age: 64
End: 2024-07-26
Payer: MEDICARE

## 2024-07-30 ENCOUNTER — OFFICE VISIT (OUTPATIENT)
Dept: OPTOMETRY | Facility: CLINIC | Age: 64
End: 2024-07-30
Payer: MEDICARE

## 2024-07-30 DIAGNOSIS — H26.9 CORTICAL CATARACT: ICD-10-CM

## 2024-07-30 DIAGNOSIS — H25.13 NUCLEAR SCLEROSIS, BILATERAL: ICD-10-CM

## 2024-07-30 DIAGNOSIS — E11.9 DIABETES MELLITUS TYPE 2 WITHOUT RETINOPATHY: Primary | ICD-10-CM

## 2024-07-30 DIAGNOSIS — H52.7 REFRACTIVE ERROR: ICD-10-CM

## 2024-07-30 DIAGNOSIS — E11.65 TYPE 2 DIABETES MELLITUS WITH HYPERGLYCEMIA, WITHOUT LONG-TERM CURRENT USE OF INSULIN: ICD-10-CM

## 2024-07-30 PROCEDURE — 92004 COMPRE OPH EXAM NEW PT 1/>: CPT | Mod: S$PBB,,, | Performed by: OPTOMETRIST

## 2024-07-30 PROCEDURE — 99213 OFFICE O/P EST LOW 20 MIN: CPT | Mod: PBBFAC,PO | Performed by: OPTOMETRIST

## 2024-07-30 PROCEDURE — 99999 PR PBB SHADOW E&M-EST. PATIENT-LVL III: CPT | Mod: PBBFAC,,, | Performed by: OPTOMETRIST

## 2024-07-30 NOTE — PROGRESS NOTES
HPI    Pt here today for DM eye exam.   States no visual changes or complaints.    Declines refraction today, happy with current specs.    Denies any headaches or eye pain.    DM x 10 years    Hemoglobin A1C       Date                     Value               Ref Range             Status                07/02/2024               9.5 (H)              4.0 - 5.6 %           Final                 04/04/2024               8.7 (H)              4.0 - 5.6 %           Final                 01/05/2024               10.8 (H)            4.5 - 6.2 %           Final              BSL running high due to recent change in meds    (+) dry eyes  (+) ATS OU qam  (-) floaters or light flashes    Last edited by Michael Romano, OD on 7/30/2024  3:56 PM.            Assessment /Plan     For exam results, see Encounter Report.    Diabetes mellitus type 2 without retinopathy    Type 2 diabetes mellitus with hyperglycemia, without long-term current use of insulin  -     Ambulatory referral/consult to Optometry    Nuclear sclerosis, bilateral    Cortical cataract    Refractive error      1. Diabetes mellitus type 2 without retinopathy  2. Type 2 diabetes mellitus with hyperglycemia, without long-term current use of insulin  Discussed possible ocular affects of uncontrolled blood sugar with patient.   Recommended continued strong blood sugar control and continued care with PCP.   Monitor yearly.     3. Nuclear sclerosis, bilateral  4. Cortical cataract  Mild OU, not VS  Discussed possible ocular affects of cataracts. Acceptable BCVA OU.   Discussed treatment options. Surgery not recommended at this time.   Monitor yearly.     5. Refractive error  Doing well with current specs  Declines refraction

## 2024-10-02 ENCOUNTER — LAB VISIT (OUTPATIENT)
Dept: LAB | Facility: HOSPITAL | Age: 64
End: 2024-10-02
Attending: NURSE PRACTITIONER
Payer: MEDICARE

## 2024-10-02 DIAGNOSIS — I10 ESSENTIAL HYPERTENSION: ICD-10-CM

## 2024-10-02 DIAGNOSIS — E11.65 TYPE 2 DIABETES MELLITUS WITH HYPERGLYCEMIA, WITHOUT LONG-TERM CURRENT USE OF INSULIN: ICD-10-CM

## 2024-10-02 DIAGNOSIS — Z12.5 SCREENING FOR PROSTATE CANCER: ICD-10-CM

## 2024-10-02 LAB
ALBUMIN SERPL BCP-MCNC: 3.9 G/DL (ref 3.5–5.2)
ALBUMIN/CREAT UR: 26.5 UG/MG (ref 0–30)
ALP SERPL-CCNC: 93 U/L (ref 55–135)
ALT SERPL W/O P-5'-P-CCNC: 17 U/L (ref 10–44)
ANION GAP SERPL CALC-SCNC: 10 MMOL/L (ref 8–16)
AST SERPL-CCNC: 15 U/L (ref 10–40)
BACTERIA #/AREA URNS AUTO: NORMAL /HPF
BILIRUB SERPL-MCNC: 0.5 MG/DL (ref 0.1–1)
BILIRUB UR QL STRIP: NEGATIVE
BUN SERPL-MCNC: 31 MG/DL (ref 8–23)
CALCIUM SERPL-MCNC: 9.4 MG/DL (ref 8.7–10.5)
CHLORIDE SERPL-SCNC: 104 MMOL/L (ref 95–110)
CLARITY UR REFRACT.AUTO: CLEAR
CO2 SERPL-SCNC: 25 MMOL/L (ref 23–29)
COLOR UR AUTO: YELLOW
COMPLEXED PSA SERPL-MCNC: 0.65 NG/ML (ref 0–4)
CREAT SERPL-MCNC: 1.5 MG/DL (ref 0.5–1.4)
CREAT UR-MCNC: 147 MG/DL (ref 23–375)
EST. GFR  (NO RACE VARIABLE): 51 ML/MIN/1.73 M^2
ESTIMATED AVG GLUCOSE: 192 MG/DL (ref 68–131)
GLUCOSE SERPL-MCNC: 239 MG/DL (ref 70–110)
GLUCOSE UR QL STRIP: ABNORMAL
HBA1C MFR BLD: 8.3 % (ref 4–5.6)
HGB UR QL STRIP: NEGATIVE
KETONES UR QL STRIP: ABNORMAL
LEUKOCYTE ESTERASE UR QL STRIP: NEGATIVE
MICROALBUMIN UR DL<=1MG/L-MCNC: 39 UG/ML
MICROSCOPIC COMMENT: NORMAL
NITRITE UR QL STRIP: NEGATIVE
PH UR STRIP: 6 [PH] (ref 5–8)
POTASSIUM SERPL-SCNC: 4.9 MMOL/L (ref 3.5–5.1)
PROT SERPL-MCNC: 7.6 G/DL (ref 6–8.4)
PROT UR QL STRIP: NEGATIVE
RBC #/AREA URNS AUTO: 1 /HPF (ref 0–4)
SODIUM SERPL-SCNC: 139 MMOL/L (ref 136–145)
SP GR UR STRIP: >=1.03 (ref 1–1.03)
SQUAMOUS #/AREA URNS AUTO: 0 /HPF
URN SPEC COLLECT METH UR: ABNORMAL
WBC #/AREA URNS AUTO: 1 /HPF (ref 0–5)
YEAST UR QL AUTO: NORMAL

## 2024-10-02 PROCEDURE — 36415 COLL VENOUS BLD VENIPUNCTURE: CPT | Performed by: NURSE PRACTITIONER

## 2024-10-02 PROCEDURE — 83036 HEMOGLOBIN GLYCOSYLATED A1C: CPT | Performed by: NURSE PRACTITIONER

## 2024-10-02 PROCEDURE — 82043 UR ALBUMIN QUANTITATIVE: CPT | Performed by: NURSE PRACTITIONER

## 2024-10-02 PROCEDURE — 80053 COMPREHEN METABOLIC PANEL: CPT | Performed by: NURSE PRACTITIONER

## 2024-10-02 PROCEDURE — 82570 ASSAY OF URINE CREATININE: CPT | Performed by: NURSE PRACTITIONER

## 2024-10-02 PROCEDURE — 81001 URINALYSIS AUTO W/SCOPE: CPT | Performed by: NURSE PRACTITIONER

## 2024-10-02 PROCEDURE — 84153 ASSAY OF PSA TOTAL: CPT | Performed by: NURSE PRACTITIONER

## 2024-10-09 ENCOUNTER — TELEPHONE (OUTPATIENT)
Dept: FAMILY MEDICINE | Facility: CLINIC | Age: 64
End: 2024-10-09

## 2024-10-09 ENCOUNTER — HOSPITAL ENCOUNTER (OUTPATIENT)
Dept: RADIOLOGY | Facility: HOSPITAL | Age: 64
Discharge: HOME | End: 2024-10-09
Attending: NURSE PRACTITIONER
Payer: MEDICARE

## 2024-10-09 ENCOUNTER — OFFICE VISIT (OUTPATIENT)
Dept: FAMILY MEDICINE | Facility: CLINIC | Age: 64
End: 2024-10-09
Payer: MEDICARE

## 2024-10-09 VITALS
RESPIRATION RATE: 20 BRPM | TEMPERATURE: 98 F | HEIGHT: 74 IN | DIASTOLIC BLOOD PRESSURE: 80 MMHG | OXYGEN SATURATION: 97 % | BODY MASS INDEX: 40.43 KG/M2 | HEART RATE: 77 BPM | WEIGHT: 315 LBS | SYSTOLIC BLOOD PRESSURE: 150 MMHG

## 2024-10-09 DIAGNOSIS — E66.813 CLASS 3 SEVERE OBESITY WITH SERIOUS COMORBIDITY AND BODY MASS INDEX (BMI) OF 40.0 TO 44.9 IN ADULT, UNSPECIFIED OBESITY TYPE: ICD-10-CM

## 2024-10-09 DIAGNOSIS — I10 ESSENTIAL HYPERTENSION: ICD-10-CM

## 2024-10-09 DIAGNOSIS — Z23 INFLUENZA VACCINE NEEDED: ICD-10-CM

## 2024-10-09 DIAGNOSIS — M79.89 PAIN AND SWELLING OF LEFT LOWER LEG: ICD-10-CM

## 2024-10-09 DIAGNOSIS — E11.65 TYPE 2 DIABETES MELLITUS WITH HYPERGLYCEMIA, WITHOUT LONG-TERM CURRENT USE OF INSULIN: Primary | ICD-10-CM

## 2024-10-09 DIAGNOSIS — M79.662 PAIN AND SWELLING OF LEFT LOWER LEG: ICD-10-CM

## 2024-10-09 DIAGNOSIS — E66.01 CLASS 3 SEVERE OBESITY WITH SERIOUS COMORBIDITY AND BODY MASS INDEX (BMI) OF 40.0 TO 44.9 IN ADULT, UNSPECIFIED OBESITY TYPE: ICD-10-CM

## 2024-10-09 PROCEDURE — 99215 OFFICE O/P EST HI 40 MIN: CPT | Mod: PBBFAC,PN | Performed by: NURSE PRACTITIONER

## 2024-10-09 PROCEDURE — 93971 EXTREMITY STUDY: CPT | Mod: TC,LT

## 2024-10-09 PROCEDURE — 93971 EXTREMITY STUDY: CPT | Mod: 26,LT,, | Performed by: RADIOLOGY

## 2024-10-09 PROCEDURE — 99999PBSHW PR PBB SHADOW TECHNICAL ONLY FILED TO HB: Mod: PBBFAC,,,

## 2024-10-09 PROCEDURE — 90653 IIV ADJUVANT VACCINE IM: CPT | Mod: PBBFAC,PN

## 2024-10-09 PROCEDURE — 99214 OFFICE O/P EST MOD 30 MIN: CPT | Mod: S$PBB,,, | Performed by: NURSE PRACTITIONER

## 2024-10-09 PROCEDURE — 99999 PR PBB SHADOW E&M-EST. PATIENT-LVL V: CPT | Mod: PBBFAC,,, | Performed by: NURSE PRACTITIONER

## 2024-10-09 PROCEDURE — G0008 ADMIN INFLUENZA VIRUS VAC: HCPCS | Mod: PBBFAC,PN

## 2024-10-09 RX ORDER — METFORMIN HYDROCHLORIDE 1000 MG/1
1000 TABLET ORAL 2 TIMES DAILY WITH MEALS
Qty: 180 TABLET | Refills: 1 | Status: SHIPPED | OUTPATIENT
Start: 2024-10-09

## 2024-10-09 RX ORDER — GLIPIZIDE 5 MG/1
5 TABLET ORAL 2 TIMES DAILY WITH MEALS
Qty: 180 TABLET | Refills: 1 | Status: SHIPPED | OUTPATIENT
Start: 2024-10-09

## 2024-10-09 RX ORDER — DOXYCYCLINE HYCLATE 100 MG
100 TABLET ORAL 2 TIMES DAILY
Qty: 14 TABLET | Refills: 0 | Status: SHIPPED | OUTPATIENT
Start: 2024-10-09 | End: 2024-10-16

## 2024-10-09 RX ADMIN — INFLUENZA A VIRUS A/VICTORIA/4897/2022 IVR-238 (H1N1) ANTIGEN (FORMALDEHYDE INACTIVATED), INFLUENZA A VIRUS A/THAILAND/8/2022 IVR-237 (H3N2) ANTIGEN (FORMALDEHYDE INACTIVATED), INFLUENZA B VIRUS B/AUSTRIA/1359417/2021 BVR-26 ANTIGEN (FORMALDEHYDE INACTIVATED) 0.5 ML: 15; 15; 15 INJECTION, SUSPENSION INTRAMUSCULAR at 01:10

## 2024-10-09 NOTE — TELEPHONE ENCOUNTER
----- Message from LEANNA Flores sent at 10/9/2024  4:03 PM CDT -----  I tried to call the patient twice, please see results as follows below; I am going to send antibiotics to his pharmacy, please make sure he gets the message to pick them up

## 2024-10-09 NOTE — PROGRESS NOTES
I tried to call the patient twice, please see results as follows below; I am going to send antibiotics to his pharmacy, please make sure he gets the message to pick them up

## 2024-10-09 NOTE — PROGRESS NOTES
SUBJECTIVE:      Patient ID: Donald Frey is a 64 y.o. male.    Chief Complaint: Diabetes and Hypertension    Established patient here for follow up on his diabetes. States he has been taking his Metformin, Glipizide and Jardiance without SE or complaints. Recent labs reviewed with pt. HTN is elevated today- taking all medications, but reports he is having some pain and swelling in his left foot and ankle. Reports that he began having some left foot redness and swelling about 10 days ago. States the pain and swelling increase as the day goes on and also experiences some pain in his calf while walking. Reports being taken off of his blood thinner since last visit because of his successful ablation over a year ago. Denies any fever or shortness of breath. States he checks his blood pressure at home and it runs about 130/80.      No visits with results within 1 Week(s) from this visit.   Latest known visit with results is:   Lab Visit on 10/02/2024   Component Date Value Ref Range Status    Sodium 10/02/2024 139  136 - 145 mmol/L Final    Potassium 10/02/2024 4.9  3.5 - 5.1 mmol/L Final    Chloride 10/02/2024 104  95 - 110 mmol/L Final    CO2 10/02/2024 25  23 - 29 mmol/L Final    Glucose 10/02/2024 239 (H)  70 - 110 mg/dL Final    BUN 10/02/2024 31 (H)  8 - 23 mg/dL Final    Creatinine 10/02/2024 1.5 (H)  0.5 - 1.4 mg/dL Final    Calcium 10/02/2024 9.4  8.7 - 10.5 mg/dL Final    Total Protein 10/02/2024 7.6  6.0 - 8.4 g/dL Final    Albumin 10/02/2024 3.9  3.5 - 5.2 g/dL Final    Total Bilirubin 10/02/2024 0.5  0.1 - 1.0 mg/dL Final    Comment: For infants and newborns, interpretation of results should be based  on gestational age, weight and in agreement with clinical  observations.    Premature Infant recommended reference ranges:  Up to 24 hours.............<8.0 mg/dL  Up to 48 hours............<12.0 mg/dL  3-5 days..................<15.0 mg/dL  6-29 days.................<15.0 mg/dL      Alkaline Phosphatase  10/02/2024 93  55 - 135 U/L Final    AST 10/02/2024 15  10 - 40 U/L Final    ALT 10/02/2024 17  10 - 44 U/L Final    eGFR 10/02/2024 51.0 (A)  >60 mL/min/1.73 m^2 Final    Anion Gap 10/02/2024 10  8 - 16 mmol/L Final    Specimen UA 10/02/2024 Urine, Clean Catch   Final    Color, UA 10/02/2024 Yellow  Yellow, Straw, Lyla Final    Appearance, UA 10/02/2024 Clear  Clear Final    pH, UA 10/02/2024 6.0  5.0 - 8.0 Final    Specific Gravity, UA 10/02/2024 >=1.030 (A)  1.005 - 1.030 Final    Protein, UA 10/02/2024 Negative  Negative Final    Comment: Recommend a 24 hour urine protein or a urine   protein/creatinine ratio if globulin induced proteinuria is  clinically suspected.      Glucose, UA 10/02/2024 4+ (A)  Negative Final    Ketones, UA 10/02/2024 Trace (A)  Negative Final    Bilirubin (UA) 10/02/2024 Negative  Negative Final    Occult Blood UA 10/02/2024 Negative  Negative Final    Nitrite, UA 10/02/2024 Negative  Negative Final    Leukocytes, UA 10/02/2024 Negative  Negative Final    Microalbumin, Urine 10/02/2024 39.0  ug/mL Final    Creatinine, Urine 10/02/2024 147.0  23.0 - 375.0 mg/dL Final    Microalb/Creat Ratio 10/02/2024 26.5  0.0 - 30.0 ug/mg Final    Hemoglobin A1C 10/02/2024 8.3 (H)  4.0 - 5.6 % Final    Comment: ADA Screening Guidelines:  5.7-6.4%  Consistent with prediabetes  >or=6.5%  Consistent with diabetes    High levels of fetal hemoglobin interfere with the HbA1C  assay. Heterozygous hemoglobin variants (HbS, HgC, etc)do  not significantly interfere with this assay.   However, presence of multiple variants may affect accuracy.      Estimated Avg Glucose 10/02/2024 192 (H)  68 - 131 mg/dL Final    PSA, Screen 10/02/2024 0.65  0.00 - 4.00 ng/mL Final    Comment: The testing method is a chemiluminescent microparticle immunoassay   manufactured by [employer] and performed on the    or   [employer] system. Values obtained with different assay manufacturers   for   methods may be  different and cannot be used interchangeably.  PSA Expected levels:  Hormonal Therapy: <0.05 ng/ml  Prostatectomy: <0.01 ng/ml  Radiation Therapy: <1.00 ng/ml      RBC, UA 10/02/2024 1  0 - 4 /hpf Final    WBC, UA 10/02/2024 1  0 - 5 /hpf Final    Bacteria 10/02/2024 Rare  None-Occ /hpf Final    Yeast, UA 10/02/2024 None  None Final    Squam Epithel, UA 10/02/2024 0  /hpf Final    Microscopic Comment 10/02/2024 SEE COMMENT   Final    Comment: Other formed elements not mentioned in the report are not   present in the microscopic examination.      ]  Family History   Problem Relation Name Age of Onset    Heart attack Father      Cancer Father          prostate    Glaucoma Neg Hx      Macular degeneration Neg Hx        Social History     Socioeconomic History    Marital status:    Tobacco Use    Smoking status: Never    Smokeless tobacco: Never   Substance and Sexual Activity    Alcohol use: Yes     Comment: occ    Drug use: Never     Social Drivers of Health     Financial Resource Strain: Patient Declined (1/9/2024)    Overall Financial Resource Strain (CARDIA)     Difficulty of Paying Living Expenses: Patient declined   Food Insecurity: No Food Insecurity (1/9/2024)    Hunger Vital Sign     Worried About Running Out of Food in the Last Year: Never true     Ran Out of Food in the Last Year: Never true   Transportation Needs: No Transportation Needs (1/9/2024)    PRAPARE - Transportation     Lack of Transportation (Medical): No     Lack of Transportation (Non-Medical): No   Physical Activity: Insufficiently Active (1/9/2024)    Exercise Vital Sign     Days of Exercise per Week: 3 days     Minutes of Exercise per Session: 30 min   Stress: No Stress Concern Present (1/9/2024)    [employer] [place] [employer] - Occupational Stress Questionnaire     Feeling of Stress : Not at all   Housing Stability: Low Risk  (1/9/2024)    Housing Stability Vital Sign     Unable to Pay for Housing in the Last Year: No      Number of Places Lived in the Last Year: 1     Unstable Housing in the Last Year: No     Current Outpatient Medications   Medication Sig Dispense Refill    amiodarone (PACERONE) 200 MG Tab Take 1 tablet (200 mg total) by mouth once daily. 30 tablet 11    aspirin 81 MG Chew Take 1 tablet (81 mg total) by mouth once daily. 30 tablet 0    carvediloL (COREG) 3.125 MG tablet Take 1 tablet (3.125 mg total) by mouth 2 (two) times daily. 60 tablet 0    diltiaZEM (CARDIZEM CD) 240 MG 24 hr capsule Take 1 capsule (240 mg total) by mouth 2 (two) times a day. 60 capsule 11    furosemide (LASIX) 20 MG tablet Take 1 tablet (20 mg total) by mouth 2 (two) times daily. 60 tablet 11    lisinopriL (PRINIVIL,ZESTRIL) 20 MG tablet Take 1 tablet (20 mg total) by mouth once daily. 90 tablet 3    rosuvastatin (CRESTOR) 40 MG Tab Take 40 mg by mouth.      empagliflozin (JARDIANCE) 25 mg tablet Take 1 tablet (25 mg total) by mouth once daily. 90 tablet 1    glipiZIDE (GLUCOTROL) 5 MG tablet Take 1 tablet (5 mg total) by mouth 2 (two) times daily with meals. 180 tablet 1    metFORMIN (GLUCOPHAGE) 1000 MG tablet Take 1 tablet (1,000 mg total) by mouth 2 (two) times daily with meals. 180 tablet 1     No current facility-administered medications for this visit.     Facility-Administered Medications Ordered in Other Visits   Medication Dose Route Frequency Provider Last Rate Last Admin    electrolyte-S (ISOLYTE)   Intravenous Continuous Diogo Kent MD   Stopped at 11/13/23 1444    fentaNYL 50 mcg/mL injection 25 mcg  25 mcg Intravenous Q5 Min PRN Diogo Kent MD        lactated ringers infusion   Intravenous Continuous Diogo Kent MD        LIDOcaine (PF) 10 mg/ml (1%) injection 10 mg  1 mL Intradermal Once Diogo Kent MD        oxyCODONE immediate release tablet 5 mg  5 mg Oral Once PRN Diogo Kent MD         Review of patient's allergies indicates:  No Known Allergies   Past Medical History:   Diagnosis Date     Controlled 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 01/13/2022    EYE EXAM  DR KATHY PARMAR    Coronary artery disease     1 coronary stent - Dr Orellana    Diabetes mellitus, type 2     Hypertension     Myocardial infarction 2013     Past Surgical History:   Procedure Laterality Date    ABLATION OF ARRHYTHMOGENIC FOCUS FOR ATRIAL FIBRILLATION N/A 7/7/2022    Procedure: ABLATION, ARRHYTHMOGENIC FOCUS, FOR ATRIAL FIBRILLATION;  Surgeon: Niko Pacheco MD;  Location: [facility];  Service: Cardiology;  Laterality: N/A;  AF, ARMANDO(Cx if SR), PVI, RFA, CARTO, GEN, GP, 3 PREP    ANGIOGRAM, CORONARY, WITH LEFT HEART CATHETERIZATION N/A 1/15/2024    Procedure: Angiogram, Coronary, with Left Heart Cath;  Surgeon: Samir Emmanuel MD;  Location: [facility] CATH/EP LAB;  Service: Cardiology;  Laterality: N/A;    ARTHROPLASTY OF SHOULDER Right 11/13/2023    Procedure: ARTHROPLASTY, SHOULDER, RIGHT;  Surgeon: Branden Hernandez MD;  Location: [facility];  Service: Orthopedics;  Laterality: Right;  DJO    CORONARY ARTERY BYPASS GRAFT (CABG) N/A 1/17/2024    Procedure: CORONARY ARTERY BYPASS GRAFT (CABG);  Surgeon: Darius Coreas MD;  Location: [facility];  Service: Cardiothoracic;  Laterality: N/A;    CORONARY STENT PLACEMENT  2013    ENDOSCOPIC HARVEST OF VEIN Left 1/17/2024    Procedure: SURGICAL PROCUREMENT, VEIN, ENDOSCOPIC;  Surgeon: Darius Coreas MD;  Location: [facility];  Service: Cardiothoracic;  Laterality: Left;    SURGICAL PROCUREMENT, ARTERY, RADIAL, FOR CABG Left 1/17/2024    Procedure: SURGICAL PROCUREMENT,ARTERY,RADIAL,FOR CABG, ENDOSCOPIC;  Surgeon: Darius Coreas MD;  Location: [facility] OR;  Service: Cardiothoracic;  Laterality: Left;    TRANSESOPHAGEAL ECHOCARDIOGRAPHY N/A 7/7/2022    Procedure: ECHOCARDIOGRAM, TRANSESOPHAGEAL;  Surgeon: Conor Chappell MD;  Location: [facility];  Service: Cardiology;  Laterality: N/A;    TREATMENT OF CARDIAC ARRHYTHMIA N/A 3/7/2022    Procedure:  "CARDIOVERSION;  Surgeon: Gomez Orellana MD;  Location: [facility] CATH/EP LAB;  Service: Cardiology;  Laterality: N/A;    TREATMENT OF CARDIAC ARRHYTHMIA  7/7/2022    Procedure: Cardioversion or Defibrillation;  Surgeon: Conor Chappell MD;  Location: [facility] EP LAB;  Service: Cardiology;;       Review of Systems   Constitutional:  Negative for activity change, chills, fatigue, fever and unexpected weight change.   HENT:  Negative for hearing loss, rhinorrhea and trouble swallowing.    Eyes:  Negative for pain, discharge and visual disturbance.   Respiratory:  Negative for chest tightness, shortness of breath and wheezing.    Cardiovascular:  Positive for leg swelling (left leg). Negative for chest pain and palpitations.   Gastrointestinal:  Negative for abdominal pain, blood in stool, constipation, diarrhea, nausea and vomiting.   Endocrine: Negative for polydipsia, polyphagia and polyuria.   Genitourinary:  Negative for difficulty urinating, dysuria, frequency, hematuria and urgency.   Musculoskeletal:  Positive for myalgias (left foot/leg pain). Negative for arthralgias, joint swelling and neck pain.   Skin:  Positive for color change. Negative for pallor, rash and wound.   Neurological:  Negative for dizziness, tremors, seizures, speech difficulty, weakness and headaches.   Hematological:  Negative for adenopathy. Does not bruise/bleed easily.   Psychiatric/Behavioral:  Negative for confusion and dysphoric mood. The patient is not nervous/anxious.       OBJECTIVE:      Vitals:    10/09/24 1256 10/09/24 1258   BP: (!) 170/78 (!) 150/80   BP Location: Left arm Left arm   Patient Position: Sitting Sitting   Pulse: 77    Resp: 20    Temp: 98.2 °F (36.8 °C)    TempSrc: Oral    SpO2: 97%    Weight: (!) 145.2 kg (320 lb 3.2 oz)    Height: 6' 2" (1.88 m)      Physical Exam  Vitals and nursing note reviewed.   Constitutional:       General: He is not in acute distress.     Appearance: Normal appearance. He is obese. He is not " ill-appearing, toxic-appearing or diaphoretic.   HENT:      Head: Normocephalic.      Nose: Nose normal.      Mouth/Throat:      Mouth: Mucous membranes are moist.   Eyes:      General: No scleral icterus.     Extraocular Movements: Extraocular movements intact.      Conjunctiva/sclera: Conjunctivae normal.      Pupils: Pupils are equal, round, and reactive to light.   Cardiovascular:      Rate and Rhythm: Normal rate and regular rhythm.      Heart sounds: Normal heart sounds. No murmur heard.     No gallop.   Pulmonary:      Effort: Pulmonary effort is normal. No respiratory distress.      Breath sounds: Normal breath sounds. No wheezing, rhonchi or rales.   Musculoskeletal:         General: Swelling (left foot) and tenderness (left foot) present. Normal range of motion.      Cervical back: Normal range of motion.      Right lower leg: No edema.      Left lower leg: Edema present.      Right foot: Normal range of motion.      Left foot: Normal range of motion. Swelling and tenderness present.        Legs:       Comments: Left foot is warmer to the touch in comparison to right foot; neg Rea's sign LLE    Feet:      Right foot:      Protective Sensation: 10 sites tested.  6 sites sensed.      Skin integrity: Callus and dry skin present.      Toenail Condition: Right toenails are abnormally thick. Fungal disease present.     Left foot:      Protective Sensation: 10 sites tested.   1 site sensed.     Skin integrity: Skin breakdown, erythema, warmth, callus and dry skin present.      Toenail Condition: Left toenails are abnormally thick. Fungal disease present.  Skin:     General: Skin is warm and dry.      Coloration: Skin is not jaundiced or pale.      Findings: Erythema (left foot) present.   Neurological:      General: No focal deficit present.      Mental Status: He is alert and oriented to person, place, and time.   Psychiatric:         Mood and Affect: Mood normal.         Behavior: Behavior normal.          Thought Content: Thought content normal.         Judgment: Judgment normal.          Assessment:       1. Type 2 diabetes mellitus with hyperglycemia, without long-term current use of insulin    2. Essential hypertension    3. Pain and swelling of left lower leg    4. Influenza vaccine needed    5. Class 3 severe obesity with serious comorbidity and body mass index (BMI) of 40.0 to 44.9 in adult, unspecified obesity type        Plan:       Type 2 diabetes mellitus with hyperglycemia, without long-term current use of insulin  -     empagliflozin (JARDIANCE) 25 mg tablet; Take 1 tablet (25 mg total) by mouth once daily.  Dispense: 90 tablet; Refill: 1  -     glipiZIDE (GLUCOTROL) 5 MG tablet; Take 1 tablet (5 mg total) by mouth 2 (two) times daily with meals.  Dispense: 180 tablet; Refill: 1  -     metFORMIN (GLUCOPHAGE) 1000 MG tablet; Take 1 tablet (1,000 mg total) by mouth 2 (two) times daily with meals.  Dispense: 180 tablet; Refill: 1  -     Foot Exam Performed  -A1C improving but not below goal of 8.0; recommend increase Jardiance to 25 mg daily; decrease II 1000 mg in a.m., 500 mg in p.m. due to slightly impaired kidney function; patient informed to stay hydrated with plenty of water, recheck in 3 months; continue glipizide daily at current dose, foot exam abnormal- will send to Podiatry    Essential hypertension   -hypertension normally well controlled, slightly elevated today likely due to pain and discomfort; patient is taking all of his medications as prescribed as per report; send home reading and continue medication per Cardiology    Pain and swelling of left lower leg  -     URIC ACID; Future; Expected date: 10/09/2024  -     CBC Auto Differential; Future; Expected date: 10/09/2024  -     US Lower Extremity Veins Left; Future; Expected date: 10/09/2024   -rule out DVT, stat ultrasound ordered and patient left clinic to complete; check labs to rule out gout or possible cellulitis; discussed if ultrasound  negative for DVT, likely will treat for cellulitis with antibiotics and patient to follow-up with podiatry   -ultrasound shows no DVT, uric acid normal, CBC shows no elevated white blood cell count; will send in antibiotics to treat for cellulitis, patient to elevate, monitor for worsening signs or symptoms, and proceed to emergency room if any rapid worsening symptoms observed or new symptoms such as fever or chills    Influenza vaccine needed  -     influenza (adjuvanted) (Fluad) 45 mcg/0.5 mL IM vaccine (> or = 64 yo) 0.5 mL        Follow up in about 3 months (around 1/9/2025) for diabetes, HTN.      10/9/2024 Radha Pinzon, HONORIO, FNP    This note was created using [employer] voice recognition software that occasionally misinterprets phrases or words.

## 2024-10-17 NOTE — PATIENT INSTRUCTIONS
CHARCOT FOOT    What Is Charcot Foot?         Charcot foot is a sudden softening of the bones in the foot that can occur in people who have significant nerve damage (neuropathy). The bones are weakened enough to fracture, and with continued walking the foot eventually changes shape. As the disorder progresses, the arch collapses and the foot takes on a convex shape, giving it a rocker-bottom appearance, making it very difficult to walk.    Charcot foot is a very serious condition that can lead to severe deformity,  disability,  and even amputation. Because of its seriousness, it is important that patients with diabetes-a disease often associated with neuropathy-take preventive measures  and   seek  immediate  care if signs or symptoms appear.      Symptoms  The symptoms of Charcot foot can appear after a sudden  trauma  or even a minor repetitive trauma (such as a long walk). A sudden trauma includes such mishaps as dropping something on the foot, or a sprain or fracture of the foot. The symptoms of Charcot foot are similar to those of infection. Although Charcot foot and infection are different conditions, both are serious problems requiring medical treatment.    Charcot foot symptoms may include :  Warmth to the touch (the foot feels warmer than the other)  Redness in the foot  Swelling in the area  Pain or soreness      What Causes Charcot Foot?   Charcot foot develops as a result of neuropathy, which decreases sensation and the ability to feel temperature, pain, or trauma. When neuropathy is severe, there is a total lack of feeling in  the feet. Because of neuropathy, the pain of an injury goes unnoticed and the patient continues to walk making the injury worse.  People with neuropathy (especially those who have had it for a long time) are at risk for developing Charcot foot. In addition, neuropathic patients with a tight Achilles tendon have been shown to have a tendency to develop Charcot  foot.      Diagnosis  Early diagnosis of Charcot foot is extremely important for successful treatment. To arrive at a diagnosis, the surgeon will examine the foot and ankle and ask about events that may have occurred prior to the symptoms.    X-rays are also essential for diagnosis. In some cases, other imaging studies and lab tests may be ordered. Once treatment  begins, x-rays are taken periodically to aid in evaluating the status of the condition.              Treatment  It is extremely important to follow the surgeons treatment plan for Charcot foot. Failure to do so can lead  to the loss of a toe, foot, leg, or life.  Treatment for Charcot foot consists of:  Immobilization. Because the foot and ankle are so fragile during the early stage of Charcot, they must be protected so the soft bones can repair themselves. Complete non-weightbearing is necessary to keep the foot from further collapsing. The  patient will not be able to walk on the affected foot until the surgeon determines it is safe to do so. During this period, the patient may be fitted with a cast, removable boot, or brace, and may be required to use crutches or a wheelchair. It may take the bones several months to heal, although it can take considerably longer in some patients.  Custom shoes and bracing. Shoes with special inserts may be needed after the bones have healed to enable the patient to return to daily activities- as well as help prevent recurrence of Charcot foot, development of ulcers, and possibly amputation. In  cases with significant deformity, bracing is also required.  Activity modification. A modification in activity level may be needed to avoid repetitive trauma to both feet. A patient  with  Charcot  in one foot is  more likely  to develop it in the other foot, so measures must be taken to protect both feet.  Surgery. In some cases, surgery may be required. The foot and ankle surgeon will determine the surgical procedure best  suited for the patient based on the severity of the deformity and the patients physical condition .      Preventive Care  The patient can play a vital role in preventing Charcot foot and its complications by following these measures:  Diabetes patients should keep blood sugar levels under control. This has been shown to reduce the progression of nerve damage in the feet.  Get regular check-ups from a foot and ankle surgeon.  Check both feet every day- and see a surgeon immediately if there are signs of Charcot foot.  Be careful to avoid injury, such as bumping the foot or overdoing an exercise program.    Follow the surgeons instructions for long-term treatment to prevent recurrences, ulcers, and amputation.        Your Diabetes Foot Care Program    Every day you depend on your feet to keep you moving. But when you have diabetes, your feet need special care. Even a small foot problem can become very serious. So dont take your feet for granted. By working with your diabetes healthcare team, you can learn how to protect your feet and keep them healthy.  Evaluating your feet  An evaluation helps your healthcare provider check the condition of your feet. The evaluation includes a review of your diabetes history and overall health. It may also include a foot exam, X-rays, or other tests. These can help show problems beneath the skin that you cant see or feel.  Medical history  You will be asked about your overall health and any history of foot problems. Youll also discuss your diabetes history, such as whether your blood sugar level has changed over time. It also includes questions about sensations of pain, tingling, pins and needles, or numbness. Your healthcare provider will also want to know if you have high blood pressure and heart disease, or if you smoke. Be sure to mention any medicines (including over-the-counter), supplements, or herbal remedies you take.  Foot exam  A foot exam checks the condition of  different parts of your foot. First, your skin and nails are examined for any signs of infection. Blood flow is checked by feeling for the pulses in each foot. You may also have tests to study the nerves in the foot. These include using a small filament (wire) to see how sensitive your feet are. In certain cases, you will be asked to walk a short distance to check for bone, joint, and muscle problems.  Diagnostic tests  If needed, your healthcare provider will suggest certain tests to learn more about your feet. These include:  Doppler tests to measure blood flow in the feet and lower leg.  X-rays, which can show bone or joint problems.  Other imaging tests, such as an MRI (magnetic resonance imaging), bone scan, and CT (computed tomography) scan. These can help show bone infections.  Other tests, such as vascular tests, which study the blood flow in your feet and legs. You may also have nerve studies to learn how sensitive your feet are.  Creating a foot care program  Based on the evaluation, your healthcare provider will create a foot care program for you. Your program may be as simple as starting a daily self-care routine and changing the types of shoes your wear. It may also involve treating minor foot problems, such as a corn or blister. In some cases, surgery will be needed to treat an infection or mechanical problems, such as hammer toes.  Preventing problems  When you have diabetes, its easier to prevent problems than to treat them later on. So see your healthcare team for regular checkups and foot care. Your healthcare team can also help you learn more about caring for your feet at home. For example, you may be told to avoid walking barefoot. Or you may be told that special footwear is needed to protect your feet.  Have regular checkups  Foot problems can develop quickly. So be sure to follow your healthcare teams schedule for regular checkups. During office visits, take off your shoes and socks as soon  as you get in the exam room. Ask your healthcare provider to examine your feet for problems. This will make it easier to find and treat small skin irritations before they get worse. Regular checkups can also help keep track of the blood flow and feeling in your feet. If you have neuropathy (lack of feeling in your feet), you will need to have checkups more often.  Learn about self-care  The more you know about diabetes and your feet, the easier it will be to prevent problems. Members of your healthcare team can teach you how to inspect your feet and teach you to look for warning signs. They can also give you other foot care tips. During office visits, be sure to ask any questions you have.  Date Last Reviewed: 7/1/2016  © 1012-5847 [employer] [address] [place] [address], [place] [address]. All rights reserved. This information is not intended as a substitute for professional medical care. Always follow your healthcare professional's instructions.       Your Diabetes Foot Care Program    Every day you depend on your feet to keep you moving. But when you have diabetes, your feet need special care. Even a small foot problem can become very serious. So dont take your feet for granted. By working with your diabetes healthcare team, you can learn how to protect your feet and keep them healthy.  Evaluating your feet  An evaluation helps your healthcare provider check the condition of your feet. The evaluation includes a review of your diabetes history and overall health. It may also include a foot exam, X-rays, or other tests. These can help show problems beneath the skin that you cant see or feel.  Medical history  You will be asked about your overall health and any history of foot problems. Youll also discuss your diabetes history, such as whether your blood sugar level has changed over time. It also includes questions about sensations of pain, tingling, pins and needles, or numbness. Your healthcare  provider will also want to know if you have high blood pressure and heart disease, or if you smoke. Be sure to mention any medicines (including over-the-counter), supplements, or herbal remedies you take.  Foot exam  A foot exam checks the condition of different parts of your foot. First, your skin and nails are examined for any signs of infection. Blood flow is checked by feeling for the pulses in each foot. You may also have tests to study the nerves in the foot. These include using a small filament (wire) to see how sensitive your feet are. In certain cases, you will be asked to walk a short distance to check for bone, joint, and muscle problems.  Diagnostic tests  If needed, your healthcare provider will suggest certain tests to learn more about your feet. These include:  Doppler tests to measure blood flow in the feet and lower leg.  X-rays, which can show bone or joint problems.  Other imaging tests, such as an MRI (magnetic resonance imaging), bone scan, and CT (computed tomography) scan. These can help show bone infections.  Other tests, such as vascular tests, which study the blood flow in your feet and legs. You may also have nerve studies to learn how sensitive your feet are.  Creating a foot care program  Based on the evaluation, your healthcare provider will create a foot care program for you. Your program may be as simple as starting a daily self-care routine and changing the types of shoes your wear. It may also involve treating minor foot problems, such as a corn or blister. In some cases, surgery will be needed to treat an infection or mechanical problems, such as hammer toes.  Preventing problems  When you have diabetes, its easier to prevent problems than to treat them later on. So see your healthcare team for regular checkups and foot care. Your healthcare team can also help you learn more about caring for your feet at home. For example, you may be told to avoid walking barefoot. Or you may be  told that special footwear is needed to protect your feet.  Have regular checkups  Foot problems can develop quickly. So be sure to follow your healthcare teams schedule for regular checkups. During office visits, take off your shoes and socks as soon as you get in the exam room. Ask your healthcare provider to examine your feet for problems. This will make it easier to find and treat small skin irritations before they get worse. Regular checkups can also help keep track of the blood flow and feeling in your feet. If you have neuropathy (lack of feeling in your feet), you will need to have checkups more often.  Learn about self-care  The more you know about diabetes and your feet, the easier it will be to prevent problems. Members of your healthcare team can teach you how to inspect your feet and teach you to look for warning signs. They can also give you other foot care tips. During office visits, be sure to ask any questions you have.  Date Last Reviewed: 7/1/2016  © 1453-3896 [employer] [address] [place] [address], [place] [address]. All rights reserved. This information is not intended as a substitute for professional medical care. Always follow your healthcare professional's instructions.

## 2024-10-21 ENCOUNTER — OFFICE VISIT (OUTPATIENT)
Dept: PODIATRY | Facility: CLINIC | Age: 64
End: 2024-10-21
Payer: MEDICARE

## 2024-10-21 ENCOUNTER — HOSPITAL ENCOUNTER (OUTPATIENT)
Dept: RADIOLOGY | Facility: CLINIC | Age: 64
Discharge: HOME | End: 2024-10-21
Attending: PODIATRIST
Payer: MEDICARE

## 2024-10-21 VITALS — HEART RATE: 76 BPM | BODY MASS INDEX: 40.43 KG/M2 | WEIGHT: 315 LBS | HEIGHT: 74 IN | RESPIRATION RATE: 18 BRPM

## 2024-10-21 DIAGNOSIS — M79.672 LEFT FOOT PAIN: ICD-10-CM

## 2024-10-21 DIAGNOSIS — E11.49 TYPE II DIABETES MELLITUS WITH NEUROLOGICAL MANIFESTATIONS: Primary | ICD-10-CM

## 2024-10-21 DIAGNOSIS — M14.679 CHARCOT ARTHROPATHY OF MIDFOOT: ICD-10-CM

## 2024-10-21 DIAGNOSIS — E11.65 TYPE 2 DIABETES MELLITUS WITH HYPERGLYCEMIA, WITHOUT LONG-TERM CURRENT USE OF INSULIN: ICD-10-CM

## 2024-10-21 DIAGNOSIS — E11.9 ENCOUNTER FOR DIABETIC FOOT EXAM: ICD-10-CM

## 2024-10-21 PROCEDURE — 99999 PR PBB SHADOW E&M-EST. PATIENT-LVL IV: CPT | Mod: PBBFAC,,, | Performed by: PODIATRIST

## 2024-10-21 PROCEDURE — 99214 OFFICE O/P EST MOD 30 MIN: CPT | Mod: PBBFAC,PN | Performed by: PODIATRIST

## 2024-10-21 PROCEDURE — 99204 OFFICE O/P NEW MOD 45 MIN: CPT | Mod: S$PBB,,, | Performed by: PODIATRIST

## 2024-10-21 NOTE — PROGRESS NOTES
"  [address] [facility] [address] [place] [address]  Phone: [phone]   [phone]    Patient's PCP:Radha Pinzon FNP  Referring Provider: Radha Pinzon    Subjective:      Chief Complaint:: Diabetes Mellitus and Foot Swelling (Left foot swelling 3 weeks)    CARLI Frey is a 64 y.o. male who presents today with a complaint of left foot swelling and pain. The current episode started three weeks ago.  The symptoms include pain and swelling. Probable cause of complaint unknown.  The symptoms are aggravated by walking and weightbearing. The problem has stayed the same. Treatment to date have included evaluation by pcp uric acid level 4.9, ultrasound to rule out DVT, and course of antibiotics which provided no relief. who presents to the clinic for a diabetic foot exam.   Pt has seen  Radha Pinzon on 10/ 9/2024 who treats them for their diabetes.  Pt has been a diabetic for 12 years.  Taking metformin, jardiance, ozempic, glimperide to treat diabetes.      Blood Sugar: 145  Hemoglobin A1c: 8.3    Vitals:    10/21/24 1414   Pulse: 76   Resp: 18   Weight: (!) 145.8 kg (321 lb 6.9 oz)   Height: 6' 2" (1.88 m)   PainSc:   3      Shoe Size:     Past Surgical History:   Procedure Laterality Date    ABLATION OF ARRHYTHMOGENIC FOCUS FOR ATRIAL FIBRILLATION N/A 7/7/2022    Procedure: ABLATION, ARRHYTHMOGENIC FOCUS, FOR ATRIAL FIBRILLATION;  Surgeon: Niko Pacheco MD;  Location: [facility] EP LAB;  Service: Cardiology;  Laterality: N/A;  AF, ARMANDO(Cx if SR), PVI, RFA, CARTO, GEN, GP, 3 PREP    ANGIOGRAM, CORONARY, WITH LEFT HEART CATHETERIZATION N/A 1/15/2024    Procedure: Angiogram, Coronary, with Left Heart Cath;  Surgeon: Samir Emmanuel MD;  Location: [facility] CATH/EP LAB;  Service: Cardiology;  Laterality: N/A;    ARTHROPLASTY OF SHOULDER Right 11/13/2023    Procedure: ARTHROPLASTY, SHOULDER, RIGHT;  Surgeon: Branden Hernandez MD;  Location: [facility] OR;  Service: Orthopedics;  Laterality: Right;  DJO    " CORONARY ARTERY BYPASS GRAFT (CABG) N/A 1/17/2024    Procedure: CORONARY ARTERY BYPASS GRAFT (CABG);  Surgeon: Darius Coreas MD;  [facility];  Service: Cardiothoracic;  Laterality: N/A;    CORONARY STENT PLACEMENT  2013    ENDOSCOPIC HARVEST OF VEIN Left 1/17/2024    Procedure: SURGICAL PROCUREMENT, VEIN, ENDOSCOPIC;  Surgeon: Darius Coreas MD;  Location: [facility] OR;  Service: Cardiothoracic;  Laterality: Left;    SURGICAL PROCUREMENT, ARTERY, RADIAL, FOR CABG Left 1/17/2024    Procedure: SURGICAL PROCUREMENT,ARTERY,RADIAL,FOR CABG, ENDOSCOPIC;  Surgeon: Darius Coreas MD;  Location: [facility] OR;  Service: Cardiothoracic;  Laterality: Left;    TRANSESOPHAGEAL ECHOCARDIOGRAPHY N/A 7/7/2022    Procedure: ECHOCARDIOGRAM, TRANSESOPHAGEAL;  Surgeon: Conor Chappell MD;  Location: [facility] EP LAB;  Service: Cardiology;  Laterality: N/A;    TREATMENT OF CARDIAC ARRHYTHMIA N/A 3/7/2022    Procedure: CARDIOVERSION;  Surgeon: Gomez Orellana MD;  Location: [facility] CATH/EP LAB;  Service: Cardiology;  Laterality: N/A;    TREATMENT OF CARDIAC ARRHYTHMIA  7/7/2022    Procedure: Cardioversion or Defibrillation;  Surgeon: Conor Chappell MD;  Location: [facility] EP LAB;  Service: Cardiology;;     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 01/13/2022    EYE EXAM  DR KATYH PARMAR    Coronary artery disease     1 coronary stent - Dr Orellana    Diabetes mellitus, type 2     Hypertension     Myocardial infarction 2013     Family History   Problem Relation Name Age of Onset    Heart attack Father      Cancer Father          prostate    Glaucoma Neg Hx      Macular degeneration Neg Hx          Social History:   Marital Status:   Alcohol History:  reports current alcohol use.  Tobacco History:  reports that he has never smoked. He has never used smokeless tobacco.  Drug History:  reports no history of drug use.    Review of patient's allergies  indicates:  No Known Allergies    Current Outpatient Medications   Medication Sig Dispense Refill    amiodarone (PACERONE) 200 MG Tab Take 1 tablet (200 mg total) by mouth once daily. 30 tablet 11    aspirin 81 MG Chew Take 1 tablet (81 mg total) by mouth once daily. 30 tablet 0    carvediloL (COREG) 3.125 MG tablet Take 1 tablet (3.125 mg total) by mouth 2 (two) times daily. 60 tablet 0    diltiaZEM (CARDIZEM CD) 240 MG 24 hr capsule Take 1 capsule (240 mg total) by mouth 2 (two) times a day. 60 capsule 11    empagliflozin (JARDIANCE) 25 mg tablet Take 1 tablet (25 mg total) by mouth once daily. 90 tablet 1    furosemide (LASIX) 20 MG tablet Take 1 tablet (20 mg total) by mouth 2 (two) times daily. 60 tablet 11    glipiZIDE (GLUCOTROL) 5 MG tablet Take 1 tablet (5 mg total) by mouth 2 (two) times daily with meals. 180 tablet 1    lisinopriL (PRINIVIL,ZESTRIL) 20 MG tablet Take 1 tablet (20 mg total) by mouth once daily. 90 tablet 3    metFORMIN (GLUCOPHAGE) 1000 MG tablet Take 1 tablet (1,000 mg total) by mouth 2 (two) times daily with meals. 180 tablet 1    rosuvastatin (CRESTOR) 40 MG Tab Take 40 mg by mouth.       No current facility-administered medications for this visit.     Facility-Administered Medications Ordered in Other Visits   Medication Dose Route Frequency Provider Last Rate Last Admin    electrolyte-S (ISOLYTE)   Intravenous Continuous Diogo Kent MD   Stopped at 11/13/23 3424    fentaNYL 50 mcg/mL injection 25 mcg  25 mcg Intravenous Q5 Min PRN Diogo Kent MD        lactated ringers infusion   Intravenous Continuous Diogo Kent MD        LIDOcaine (PF) 10 mg/ml (1%) injection 10 mg  1 mL Intradermal Once Diogo Kent MD        oxyCODONE immediate release tablet 5 mg  5 mg Oral Once PRN Diogo Kent MD           Review of Systems   Constitutional:  Negative for chills, fatigue, fever and unexpected weight change.   HENT:  Negative for hearing loss and trouble  swallowing.    Eyes:  Negative for photophobia and visual disturbance.   Respiratory:  Negative for cough, shortness of breath and wheezing.    Cardiovascular:  Negative for chest pain, palpitations and leg swelling.   Gastrointestinal:  Negative for abdominal pain and nausea.   Genitourinary:  Negative for dysuria and frequency.   Musculoskeletal:  Positive for arthralgias, gait problem and joint swelling. Negative for back pain and myalgias.   Skin:  Negative for rash.   Neurological:  Positive for numbness. Negative for tremors, seizures, speech difficulty, weakness and headaches.   Hematological:  Does not bruise/bleed easily.         Objective:        Physical Exam:   Foot Exam    General  Orientation: alert and oriented to person, place, and time   Affect: appropriate   Gait: antalgic       Right Foot/Ankle     Inspection and Palpation  Ecchymosis: none  Tenderness: none   Swelling: none   Arch: normal  Hammertoes: second toe  Skin Exam: skin intact; no drainage, no ulcer and no erythema   Neurovascular  Dorsalis pedis: 2+  Posterior tibial: 2+  Capillary Refill: 2+  Varicose veins: not present  Saphenous nerve sensation: diminished  Tibial nerve sensation: diminished  Superficial peroneal nerve sensation: diminished  Deep peroneal nerve sensation: diminished  Sural nerve sensation: diminished    Edema  Type of edema: non-pitting    Muscle Strength  Ankle dorsiflexion: 5  Ankle plantar flexion: 5  Ankle inversion: 5  Ankle eversion: 5  Great toe extension: 5  Great toe flexion: 5    Range of Motion    Normal right ankle ROM    Tests  Anterior drawer: negative   Talar tilt: negative   PT Tinel's sign: negative    Paresthesia: positive    Left Foot/Ankle      Inspection and Palpation  Ecchymosis: none  Tenderness: lisfranc joint and midtarsal joint   Swelling: midtarsal joint and lisfranc joint (Significant)  Arch: pes planus  Skin Exam: skin intact; no drainage, no ulcer and no erythema    Neurovascular  Dorsalis pedis: 2+  Posterior tibial: 2+  Capillary refill: 2+  Varicose veins: not present  Saphenous nerve sensation: diminished  Tibial nerve sensation: diminished  Superficial peroneal nerve sensation: diminished  Deep peroneal nerve sensation: diminished  Sural nerve sensation: diminished    Edema  Type of edema: non-pitting    Muscle Strength  Ankle dorsiflexion: 5  Ankle plantar flexion: 5  Ankle inversion: 5  Ankle eversion: 5  Great toe extension: 5  Great toe flexion: 5    Range of Motion    Normal left ankle ROM    Tests  Anterior drawer: negative   Talar tilt: negative   PT Tinel's sign: negative  Paresthesia: positive      Physical Exam  Cardiovascular:      Pulses:           Dorsalis pedis pulses are 2+ on the right side and 2+ on the left side.        Posterior tibial pulses are 2+ on the right side and 2+ on the left side.   Feet:      Right foot:      Skin integrity: No ulcer or erythema.      Left foot:      Skin integrity: No ulcer or erythema.               Right Ankle/Foot Exam     Range of Motion   The patient has normal right ankle ROM.    Left Ankle/Foot Exam     Range of Motion   The patient has normal left ankle ROM.       Muscle Strength   Right Lower Extremity   Ankle Dorsiflexion:  5   Plantar flexion:  5/5  Left Lower Extremity   Ankle Dorsiflexion:  5   Plantar flexion:  5/5     Reflexes     Left Side  Paresthesia: present    Right Side   Paresthesia: present    Vascular Exam     Right Pulses  Dorsalis Pedis:      2+  Posterior Tibial:      2+        Left Pulses  Dorsalis Pedis:      2+  Posterior Tibial:      2+           Imaging: X-Ray Foot Complete Left  Narrative: EXAMINATION:  XR FOOT COMPLETE 3 VIEW LEFT    CLINICAL HISTORY:  .  Pain in left toe(s)    TECHNIQUE:  AP, lateral and oblique views of the left foot were performed.    COMPARISON:  None    FINDINGS:  There is extensive arthritic change of the midfoot accompanied by bony fragmentation.  Marked soft tissue  swelling of the forefoot is present.  No distinct erosive changes.  Impression: Features of severe neuropathic arthropathy of the midfoot.    Severe soft tissue swelling.    Electronically signed by: Cecilio Solis MD  Date:    10/21/2024  Time:    15:10               Assessment:       1. Type II diabetes mellitus with neurological manifestations    2. Charcot arthropathy of midfoot    3. Type 2 diabetes mellitus with hyperglycemia, without long-term current use of insulin    4. Encounter for diabetic foot exam    5. Left foot pain      Plan:   Type II diabetes mellitus with neurological manifestations  -      DIABETES FOOT EXAM    Charcot arthropathy of midfoot  -     AIR CAST WALKER BOOT FOR HOME USE    Type 2 diabetes mellitus with hyperglycemia, without long-term current use of insulin  -     Ambulatory referral/consult to Podiatry    Encounter for diabetic foot exam  -      DIABETES FOOT EXAM    Left foot pain  -     X-Ray Foot Complete Left  -     AIR CAST WALKER BOOT FOR HOME USE      Follow up in about 4 weeks (around 11/18/2024), or if symptoms worsen or fail to improve, for x-ray.    Procedures        I explained the three stages of charcot deformity is and the possibility of major destruction of the joints of the foot with loss of normal architecture. I discussed treatment in early stages with MRI, either nonweight bearing or protected  weight bearing with CAM walker cast and accomodative insert.  I explained possible surgical intervention in later stages if ulcer formation occurrs. Also discussed diabetic shoes with accommodative insoles for long-term management.  For now I am going to place him in a Cam Walker boot and instructed him to remain nonweightbearing to the left foot.    Counseled patient on the aspects of diabetes and how it pertains to the feet.  I explained the importance of proper diabetic foot care and how it is essential for the health of their feet.    I discussed the importance  of knowing their HGA1c and that the level needs to be as close to 6 as possible.  I discussed the increase complications of high blood sugar including stroke, blindness, heart attack, kidney failure and loss of limb secondary to neuropathy and PVD.    Patient  was made aware of inspecting their feet.  Patient was told to be aware of any breaks in the skin or redness.  With neuropathy, these areas are not recognized early due to the numbness.  I discussed different treatments available to control the symptoms but whcih may not cure the problem.      Shoe inspection. Patient instructed on proper foot hygeine. We discussed wearing proper shoe gear, daily foot inspections, never walking without protective shoe gear, never putting sharp instruments to feet.      Counseling:     I provided patient education verbally regarding:   Patient diagnosis, treatment options, as well as alternatives, risks, and benefits.     This note was created using Dragon voice recognition software that occasionally misinterpreted phrases or words.

## 2024-11-26 ENCOUNTER — HOSPITAL ENCOUNTER (OUTPATIENT)
Dept: RADIOLOGY | Facility: CLINIC | Age: 64
Discharge: HOME | End: 2024-11-26
Attending: PODIATRIST
Payer: MEDICARE

## 2024-11-26 ENCOUNTER — OFFICE VISIT (OUTPATIENT)
Dept: PODIATRY | Facility: CLINIC | Age: 64
End: 2024-11-26
Payer: MEDICARE

## 2024-11-26 VITALS — BODY MASS INDEX: 41.78 KG/M2 | WEIGHT: 315 LBS

## 2024-11-26 DIAGNOSIS — E11.49 TYPE II DIABETES MELLITUS WITH NEUROLOGICAL MANIFESTATIONS: ICD-10-CM

## 2024-11-26 DIAGNOSIS — M79.672 LEFT FOOT PAIN: ICD-10-CM

## 2024-11-26 DIAGNOSIS — M14.679 CHARCOT ARTHROPATHY OF MIDFOOT: Primary | ICD-10-CM

## 2024-11-26 PROCEDURE — 99999 PR PBB SHADOW E&M-EST. PATIENT-LVL III: CPT | Mod: PBBFAC,,, | Performed by: PODIATRIST

## 2024-11-26 PROCEDURE — 99213 OFFICE O/P EST LOW 20 MIN: CPT | Mod: S$PBB,,, | Performed by: PODIATRIST

## 2024-11-26 PROCEDURE — 73630 X-RAY EXAM OF FOOT: CPT | Mod: 26,LT,, | Performed by: RADIOLOGY

## 2024-11-26 PROCEDURE — 99213 OFFICE O/P EST LOW 20 MIN: CPT | Mod: PBBFAC,PN | Performed by: PODIATRIST

## 2024-11-26 NOTE — PROGRESS NOTES
[address] [facility] [address]  [place] [address]  Phone: [phone]   [phone]    Patient's PCP:Radha Pinzon FNP  Referring Provider: No ref. provider found    Subjective:      Chief Complaint:: Follow-up (Left foot swelling )    Follow-up  Associated symptoms include arthralgias, joint swelling and numbness. Pertinent negatives include no abdominal pain, chest pain, chills, coughing, fatigue, fever, headaches, myalgias, nausea, rash or weakness.     Donald Frey is a 64 y.o. male who presents today for a follow up of left foot swelling. PT presents today in walking boot.  Patient states the swelling has improved slightly though does change day-to-day.  PT denies any new complaints at this time.       Systemic Doctor: Radha Pinzon FNP  Date Last Seen: 10/9/2024  Blood Sugar: Did not check  Hemoglobin A1c: 8.3    Vitals:    11/26/24 1050   Weight: (!) 147.6 kg (325 lb 6.4 oz)   PainSc:   5      Shoe Size: 13    Past Surgical History:   Procedure Laterality Date    ABLATION OF ARRHYTHMOGENIC FOCUS FOR ATRIAL FIBRILLATION N/A 7/7/2022    Procedure: ABLATION, ARRHYTHMOGENIC FOCUS, FOR ATRIAL FIBRILLATION;  Surgeon: Niko Pacheco MD;  Location: [facility] EP LAB;  Service: Cardiology;  Laterality: N/A;  AF, ARMANDO(Cx if SR), PVI, RFA, CARTO, GEN, GP, 3 PREP    ANGIOGRAM, CORONARY, WITH LEFT HEART CATHETERIZATION N/A 1/15/2024    Procedure: Angiogram, Coronary, with Left Heart Cath;  Surgeon: Samir Emmanuel MD;  Location: [facility] CATH/EP LAB;  Service: Cardiology;  Laterality: N/A;    ARTHROPLASTY OF SHOULDER Right 11/13/2023    Procedure: ARTHROPLASTY, SHOULDER, RIGHT;  Surgeon: Branden Hernandez MD;  Location: [facility] OR;  Service: Orthopedics;  Laterality: Right;  DJO    CORONARY ARTERY BYPASS GRAFT (CABG) N/A 1/17/2024    Procedure: CORONARY ARTERY BYPASS GRAFT (CABG);  Surgeon: Darius Coreas MD;  Location: [facility] OR;  Service: Cardiothoracic;  Laterality: N/A;    CORONARY STENT PLACEMENT  2013     ENDOSCOPIC HARVEST OF VEIN Left 1/17/2024    Procedure: SURGICAL PROCUREMENT, VEIN, ENDOSCOPIC;  Surgeon: Darius Coreas MD;  Location: [facility] OR;  Service: Cardiothoracic;  Laterality: Left;    SURGICAL PROCUREMENT, ARTERY, RADIAL, FOR CABG Left 1/17/2024    Procedure: SURGICAL PROCUREMENT,ARTERY,RADIAL,FOR CABG, ENDOSCOPIC;  Surgeon: Darius Coreas MD;  Location: [facility] OR;  Service: Cardiothoracic;  Laterality: Left;    TRANSESOPHAGEAL ECHOCARDIOGRAPHY N/A 7/7/2022    Procedure: ECHOCARDIOGRAM, TRANSESOPHAGEAL;  Surgeon: Conor Chappell MD;  Location: [facility] EP LAB;  Service: Cardiology;  Laterality: N/A;    TREATMENT OF CARDIAC ARRHYTHMIA N/A 3/7/2022    Procedure: CARDIOVERSION;  Surgeon: Gomez Orellana MD;  Location: [facility] CATH/EP LAB;  Service: Cardiology;  Laterality: N/A;    TREATMENT OF CARDIAC ARRHYTHMIA  7/7/2022    Procedure: Cardioversion or Defibrillation;  Surgeon: Conor Chappell MD;  Location: [facility] EP LAB;  Service: Cardiology;;     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 01/13/2022    EYE EXAM  DR KATHY PARMAR    Coronary artery disease     1 coronary stent - Dr Orellana    Diabetes mellitus, type 2     Hypertension     Myocardial infarction 2013     Family History   Problem Relation Name Age of Onset    Heart attack Father      Cancer Father          prostate    Glaucoma Neg Hx      Macular degeneration Neg Hx          Social History:   Marital Status:   Alcohol History:  reports current alcohol use.  Tobacco History:  reports that he has never smoked. He has never used smokeless tobacco.  Drug History:  reports no history of drug use.    Review of patient's allergies indicates:  No Known Allergies    Current Outpatient Medications   Medication Sig Dispense Refill    amiodarone (PACERONE) 200 MG Tab Take 1 tablet (200 mg total) by mouth once daily. 30 tablet 11    aspirin 81 MG Chew Take 1 tablet (81 mg total) by  mouth once daily. 30 tablet 0    carvediloL (COREG) 3.125 MG tablet Take 1 tablet (3.125 mg total) by mouth 2 (two) times daily. 60 tablet 0    diltiaZEM (CARDIZEM CD) 240 MG 24 hr capsule Take 1 capsule (240 mg total) by mouth 2 (two) times a day. 60 capsule 11    empagliflozin (JARDIANCE) 25 mg tablet Take 1 tablet (25 mg total) by mouth once daily. 90 tablet 1    furosemide (LASIX) 20 MG tablet Take 1 tablet (20 mg total) by mouth 2 (two) times daily. 60 tablet 11    glipiZIDE (GLUCOTROL) 5 MG tablet Take 1 tablet (5 mg total) by mouth 2 (two) times daily with meals. 180 tablet 1    lisinopriL (PRINIVIL,ZESTRIL) 20 MG tablet Take 1 tablet (20 mg total) by mouth once daily. 90 tablet 3    metFORMIN (GLUCOPHAGE) 1000 MG tablet Take 1 tablet (1,000 mg total) by mouth 2 (two) times daily with meals. 180 tablet 1    rosuvastatin (CRESTOR) 40 MG Tab Take 40 mg by mouth.       No current facility-administered medications for this visit.     Facility-Administered Medications Ordered in Other Visits   Medication Dose Route Frequency Provider Last Rate Last Admin    electrolyte-S (ISOLYTE)   Intravenous Continuous Diogo Kent MD   Stopped at 11/13/23 1444    fentaNYL 50 mcg/mL injection 25 mcg  25 mcg Intravenous Q5 Min PRN Diogo Kent MD        lactated ringers infusion   Intravenous Continuous Diogo Kent MD        LIDOcaine (PF) 10 mg/ml (1%) injection 10 mg  1 mL Intradermal Once Diogo Kent MD        oxyCODONE immediate release tablet 5 mg  5 mg Oral Once PRN Diogo Kent MD           Review of Systems   Constitutional:  Negative for chills, fatigue, fever and unexpected weight change.   HENT:  Negative for hearing loss and trouble swallowing.    Eyes:  Negative for photophobia and visual disturbance.   Respiratory:  Negative for cough, shortness of breath and wheezing.    Cardiovascular:  Negative for chest pain, palpitations and leg swelling.   Gastrointestinal:  Negative for  abdominal pain and nausea.   Genitourinary:  Negative for dysuria and frequency.   Musculoskeletal:  Positive for arthralgias, gait problem and joint swelling. Negative for back pain and myalgias.   Skin:  Negative for rash.   Neurological:  Positive for numbness. Negative for tremors, seizures, speech difficulty, weakness and headaches.   Hematological:  Does not bruise/bleed easily.         Objective:        Physical Exam:   Foot Exam    General  Orientation: alert and oriented to person, place, and time   Affect: appropriate   Gait: antalgic       Left Foot/Ankle      Inspection and Palpation  Ecchymosis: none  Tenderness: none   Swelling: midtarsal joint and lisfranc joint (Moderate-slightly improved)  Arch: pes planus  Skin Exam: skin intact; no drainage, no ulcer and no erythema   Neurovascular  Dorsalis pedis: 2+  Posterior tibial: 2+  Capillary refill: 2+  Varicose veins: not present  Saphenous nerve sensation: diminished  Tibial nerve sensation: diminished  Superficial peroneal nerve sensation: diminished  Deep peroneal nerve sensation: diminished  Sural nerve sensation: diminished    Edema  Type of edema: non-pitting    Muscle Strength  Ankle dorsiflexion: 5  Ankle plantar flexion: 5  Ankle inversion: 5  Ankle eversion: 5  Great toe extension: 5  Great toe flexion: 5    Range of Motion    Normal left ankle ROM    Tests  Anterior drawer: negative   Talar tilt: negative   PT Tinel's sign: negative  Paresthesia: positive      Physical Exam  Cardiovascular:      Pulses:           Dorsalis pedis pulses are 2+ on the left side.        Posterior tibial pulses are 2+ on the left side.   Feet:      Left foot:      Skin integrity: No ulcer or erythema.               Left Ankle/Foot Exam     Range of Motion   The patient has normal left ankle ROM.       Muscle Strength   Left Lower Extremity   Ankle Dorsiflexion:  5   Plantar flexion:  5/5     Reflexes     Left Side  Paresthesia: present    Vascular Exam       Left  Pulses  Dorsalis Pedis:      2+  Posterior Tibial:      2+           Imaging: X-Ray Foot Complete Left  Narrative: EXAMINATION:  XR FOOT COMPLETE 3 VIEW LEFT    CLINICAL HISTORY:  .  Charcot's joint, unspecified ankle and foot    TECHNIQUE:  AP, lateral and oblique views of the left foot were performed.    COMPARISON:  10/21/2024    FINDINGS:  Once again there is extensive arthritic change throughout the midfoot associated with bone destruction involving the bases of the metatarsals and the tarsal bones.  The metatarsals are displaced laterally with respect to the tarsals.  There is overlying soft tissue swelling.  There is pes planus.  There are hammertoes.  Heel spur is present.  Impression: Severe neuropathic joint formation similar to the previous study.    Electronically signed by: Nona De Santiago  Date:    11/26/2024  Time:    13:06               Assessment:       1. Charcot arthropathy of midfoot    2. Type II diabetes mellitus with neurological manifestations    3. Left foot pain      Plan:   Charcot arthropathy of midfoot  -     X-Ray Foot Complete Left    Type II diabetes mellitus with neurological manifestations    Left foot pain      Follow up in about 6 weeks (around 1/7/2025), or if symptoms worsen or fail to improve, for x-ray.    Procedures        I discussed with the patient that clinically and radiographically his Charcot does appear to be progressing and is in the consolidation phase.  I do not see any significant interval changes in the foot structure.  He is okay to continue with very limited weight-bearing with the use of his cam walker boot and walker.  I did caution him not to increase his activities as he is still at risk for further collapse of the arch.  Ice for pain and swelling.    Counseling:     I provided patient education verbally regarding:   Patient diagnosis, treatment options, as well as alternatives, risks, and benefits.     This note was created using Dragon voice  recognition software that occasionally misinterpreted phrases or words.

## 2025-01-02 ENCOUNTER — PATIENT MESSAGE (OUTPATIENT)
Dept: FAMILY MEDICINE | Facility: CLINIC | Age: 65
End: 2025-01-02
Payer: MEDICARE

## 2025-01-02 ENCOUNTER — PATIENT MESSAGE (OUTPATIENT)
Dept: ADMINISTRATIVE | Facility: HOSPITAL | Age: 65
End: 2025-01-02
Payer: MEDICARE

## 2025-01-02 ENCOUNTER — TELEPHONE (OUTPATIENT)
Dept: FAMILY MEDICINE | Facility: CLINIC | Age: 65
End: 2025-01-02
Payer: MEDICARE

## 2025-01-02 VITALS — DIASTOLIC BLOOD PRESSURE: 85 MMHG | SYSTOLIC BLOOD PRESSURE: 132 MMHG

## 2025-01-02 DIAGNOSIS — E11.65 TYPE 2 DIABETES MELLITUS WITH HYPERGLYCEMIA, WITHOUT LONG-TERM CURRENT USE OF INSULIN: Primary | ICD-10-CM

## 2025-01-02 NOTE — TELEPHONE ENCOUNTER
----- Message from Rosenda sent at 1/2/2025  9:08 AM CST -----  Pt would like to know if he needs labs done before his visit   In office   [phone]

## 2025-01-02 NOTE — TELEPHONE ENCOUNTER
Returned patients call to advise no labs orders are currently in. He last had labs drawn in October. Patient stated that his My Chart is telling him his A1C is due. Do you want the patient to have labs prior to his VV on 1-9-24?  Please advise.

## 2025-01-07 ENCOUNTER — TELEPHONE (OUTPATIENT)
Dept: FAMILY MEDICINE | Facility: CLINIC | Age: 65
End: 2025-01-07
Payer: MEDICARE

## 2025-01-07 ENCOUNTER — LAB VISIT (OUTPATIENT)
Dept: LAB | Facility: HOSPITAL | Age: 65
End: 2025-01-07
Attending: NURSE PRACTITIONER
Payer: MEDICARE

## 2025-01-07 DIAGNOSIS — E11.65 TYPE 2 DIABETES MELLITUS WITH HYPERGLYCEMIA, WITHOUT LONG-TERM CURRENT USE OF INSULIN: ICD-10-CM

## 2025-01-07 LAB
ALBUMIN SERPL BCP-MCNC: 4.3 G/DL (ref 3.5–5.2)
ALP SERPL-CCNC: 63 U/L (ref 40–150)
ALT SERPL W/O P-5'-P-CCNC: 23 U/L (ref 10–44)
ANION GAP SERPL CALC-SCNC: 14 MMOL/L (ref 8–16)
AST SERPL-CCNC: 20 U/L (ref 10–40)
BILIRUB SERPL-MCNC: 0.7 MG/DL (ref 0.1–1)
BUN SERPL-MCNC: 15 MG/DL (ref 8–23)
CALCIUM SERPL-MCNC: 10.1 MG/DL (ref 8.7–10.5)
CHLORIDE SERPL-SCNC: 108 MMOL/L (ref 95–110)
CO2 SERPL-SCNC: 22 MMOL/L (ref 23–29)
CREAT SERPL-MCNC: 1.2 MG/DL (ref 0.5–1.4)
EST. GFR  (NO RACE VARIABLE): >60 ML/MIN/1.73 M^2
ESTIMATED AVG GLUCOSE: 174 MG/DL (ref 68–131)
GLUCOSE SERPL-MCNC: 214 MG/DL (ref 70–110)
HBA1C MFR BLD: 7.7 % (ref 4–5.6)
POTASSIUM SERPL-SCNC: 5.5 MMOL/L (ref 3.5–5.1)
PROT SERPL-MCNC: 7.8 G/DL (ref 6–8.4)
SODIUM SERPL-SCNC: 144 MMOL/L (ref 136–145)

## 2025-01-07 PROCEDURE — 80053 COMPREHEN METABOLIC PANEL: CPT | Performed by: NURSE PRACTITIONER

## 2025-01-07 PROCEDURE — 83036 HEMOGLOBIN GLYCOSYLATED A1C: CPT | Performed by: NURSE PRACTITIONER

## 2025-01-07 NOTE — TELEPHONE ENCOUNTER
----- Message from Elizabeth sent at 1/7/2025  9:14 AM CST -----  Patient asking if he is able to come and due labs. Asking if the lab is open?  [phone]

## 2025-01-07 NOTE — TELEPHONE ENCOUNTER
Returned patients call to advise that the lab is open. Received voicemail left message and call back number.

## 2025-01-09 ENCOUNTER — OFFICE VISIT (OUTPATIENT)
Dept: FAMILY MEDICINE | Facility: CLINIC | Age: 65
End: 2025-01-09
Payer: MEDICARE

## 2025-01-09 VITALS — SYSTOLIC BLOOD PRESSURE: 130 MMHG | DIASTOLIC BLOOD PRESSURE: 85 MMHG

## 2025-01-09 DIAGNOSIS — Z12.11 COLON CANCER SCREENING: ICD-10-CM

## 2025-01-09 DIAGNOSIS — E11.65 TYPE 2 DIABETES MELLITUS WITH HYPERGLYCEMIA, WITHOUT LONG-TERM CURRENT USE OF INSULIN: ICD-10-CM

## 2025-01-09 DIAGNOSIS — I10 ESSENTIAL HYPERTENSION: Primary | ICD-10-CM

## 2025-01-09 NOTE — PROGRESS NOTES
Subjective:        The patient location is: home  The chief complaint leading to consultation is: f/u DM, HTN, lab review     Visit type: audiovisual      20 minutes of total time spent on the encounter, which includes face to face time and non-face to face time preparing to see the patient (eg, review of tests), Obtaining and/or reviewing separately obtained history, Documenting clinical information in the electronic or other health record, Independently interpreting results (not separately reported) and communicating results to the patient/family/caregiver, or Care coordination (not separately reported).     Each patient to whom he or she provides medical services by telemedicine is:  (1) informed of the relationship between the physician and patient and the respective role of any other health care provider with respect to management of the patient; and (2) notified that he or she may decline to receive medical services by telemedicine and may withdraw from such care at any time.    History of Present Illness    CHIEF COMPLAINT:  Donald presents for a follow-up visit to discuss recent labs results and ongoing foot issues.    HPI:  Donald reports seeing podiatry since last visit and dx with charcot arthropathy,  currently limiting physical activity and weight-bearing exercises. The foot appears to be progressing in the consolidation phase per Dr. Jasso. Donald has difficulty performing exercises due to the foot condition and he is wearing a boot.    Blood sugar levels have improved, consistently measuring under 200 when checked. Most days it is between 130-150s. Donald is taking 2 metformin tablets in the morning and 1 at night for diabetes management, which has been reported as the most effective regimen tried.    Donald notes a slower heart rate than usual, around 60-65 BPM, compared to a previous rate of approximately 75. Donald is uncertain if this change is due to inactivity or improved control of atrial  fibrillation.    Donald is taking Lasix less d/t mobility issues. Blood pressure is reported to be well-controlled.    Donald denies problems with current medications, unusual cramps, palpitations, changes in bowel movements, or hematochezia. Due for repeat colon cancer screening this month.     MEDICATIONS:  Donald is on Metformin for diabetes management, taking 2 tablets in the morning and 1 at night. He is also on Jardiance, Glipizide and Lasix.    MEDICAL HISTORY:  Donald has a history of atrial fibrillation and diabetes.    TEST RESULTS:  Donald's recent labs was reported as significantly improved. His recent A1C was 7.7, which is below the goal of 8 for patients 65 and older. Donald's recent potassium level was elevated, while his kidney function was normal. His recent blood sugar were trending under 200.       Review of Systems   Constitutional:  Positive for activity change. Negative for chills, fatigue, fever and unexpected weight change.   HENT:  Negative for hearing loss, rhinorrhea and trouble swallowing.    Eyes:  Negative for pain, discharge and visual disturbance.   Respiratory:  Negative for chest tightness, shortness of breath and wheezing.    Cardiovascular:  Negative for chest pain, palpitations and leg swelling.   Gastrointestinal:  Negative for abdominal pain, blood in stool, constipation, diarrhea, nausea and vomiting.        Denies changes in bowel habits    Endocrine: Negative for polydipsia, polyphagia and polyuria.   Genitourinary:  Negative for difficulty urinating, dysuria, frequency, hematuria and urgency.   Musculoskeletal:  Positive for myalgias (left foot/leg pain). Negative for arthralgias, joint swelling and neck pain.   Skin:  Negative for color change, pallor, rash and wound.   Neurological:  Negative for dizziness, weakness and headaches.   Hematological:  Negative for adenopathy. Does not bruise/bleed easily.   Psychiatric/Behavioral:  Negative for confusion and dysphoric mood. The patient  is not nervous/anxious.        Past Surgical History:   Procedure Laterality Date    ABLATION OF ARRHYTHMOGENIC FOCUS FOR ATRIAL FIBRILLATION N/A 7/7/2022    Procedure: ABLATION, ARRHYTHMOGENIC FOCUS, FOR ATRIAL FIBRILLATION;  Surgeon: Niko Pacheco MD;  Location: [facility] EP LAB;  Service: Cardiology;  Laterality: N/A;  AF, ARMANDO(Cx if SR), PVI, RFA, CARTO, GEN, GP, 3 PREP    ANGIOGRAM, CORONARY, WITH LEFT HEART CATHETERIZATION N/A 1/15/2024    Procedure: Angiogram, Coronary, with Left Heart Cath;  Surgeon: Samir Emmanuel MD;  Location: [facility] CATH/EP LAB;  Service: Cardiology;  Laterality: N/A;    ARTHROPLASTY OF SHOULDER Right 11/13/2023    Procedure: ARTHROPLASTY, SHOULDER, RIGHT;  Surgeon: Branden Hernandez MD;  Location: [facility];  Service: Orthopedics;  Laterality: Right;  DJO    CORONARY ARTERY BYPASS GRAFT (CABG) N/A 1/17/2024    Procedure: CORONARY ARTERY BYPASS GRAFT (CABG);  Surgeon: Darius Coreas MD;  Location: [facility];  Service: Cardiothoracic;  Laterality: N/A;    CORONARY STENT PLACEMENT  2013    ENDOSCOPIC HARVEST OF VEIN Left 1/17/2024    Procedure: SURGICAL PROCUREMENT, VEIN, ENDOSCOPIC;  Surgeon: Darius Coreas MD;  Location: [facility];  Service: Cardiothoracic;  Laterality: Left;    SURGICAL PROCUREMENT, ARTERY, RADIAL, FOR CABG Left 1/17/2024    Procedure: SURGICAL PROCUREMENT,ARTERY,RADIAL,FOR CABG, ENDOSCOPIC;  Surgeon: Darius Coreas MD;  Location: [facility];  Service: Cardiothoracic;  Laterality: Left;    TRANSESOPHAGEAL ECHOCARDIOGRAPHY N/A 7/7/2022    Procedure: ECHOCARDIOGRAM, TRANSESOPHAGEAL;  Surgeon: Conor Chappell MD;  Location: [facility] EP LAB;  Service: Cardiology;  Laterality: N/A;    TREATMENT OF CARDIAC ARRHYTHMIA N/A 3/7/2022    Procedure: CARDIOVERSION;  Surgeon: Gomez Orellana MD;  Location: [facility] CATH/EP LAB;  Service: Cardiology;  Laterality: N/A;    TREATMENT OF CARDIAC ARRHYTHMIA  7/7/2022    Procedure: Cardioversion or Defibrillation;  Surgeon: Conor Chappell,  MD;  Location: [facility] EP LAB;  Service: Cardiology;;     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 01/13/2022    EYE EXAM  DR KATHY PARMAR    Coronary artery disease     1 coronary stent - Dr Orellana    Diabetes mellitus, type 2     Hypertension     Myocardial infarction 2013     Family History   Problem Relation Name Age of Onset    Heart attack Father      Cancer Father          prostate    Glaucoma Neg Hx      Macular degeneration Neg Hx          Social History:   Marital Status:   Alcohol History:  reports current alcohol use.  Tobacco History:  reports that he has never smoked. He has never used smokeless tobacco.  Drug History:  reports no history of drug use.    Review of patient's allergies indicates:  No Known Allergies    Current Outpatient Medications   Medication Sig Dispense Refill    amiodarone (PACERONE) 200 MG Tab Take 1 tablet (200 mg total) by mouth once daily. 30 tablet 11    aspirin 81 MG Chew Take 1 tablet (81 mg total) by mouth once daily. 30 tablet 0    carvediloL (COREG) 3.125 MG tablet Take 1 tablet (3.125 mg total) by mouth 2 (two) times daily. 60 tablet 0    diltiaZEM (CARDIZEM CD) 240 MG 24 hr capsule Take 1 capsule (240 mg total) by mouth 2 (two) times a day. 60 capsule 11    empagliflozin (JARDIANCE) 25 mg tablet Take 1 tablet (25 mg total) by mouth once daily. 90 tablet 1    furosemide (LASIX) 20 MG tablet Take 1 tablet (20 mg total) by mouth 2 (two) times daily. 60 tablet 11    glipiZIDE (GLUCOTROL) 5 MG tablet Take 1 tablet (5 mg total) by mouth 2 (two) times daily with meals. 180 tablet 1    lisinopriL (PRINIVIL,ZESTRIL) 20 MG tablet Take 1 tablet (20 mg total) by mouth once daily. 90 tablet 3    metFORMIN (GLUCOPHAGE) 1000 MG tablet Take 1 tablet (1,000 mg total) by mouth 2 (two) times daily with meals. 180 tablet 1    rosuvastatin (CRESTOR) 40 MG Tab Take 40 mg by mouth.       No current facility-administered  medications for this visit.     Facility-Administered Medications Ordered in Other Visits   Medication Dose Route Frequency Provider Last Rate Last Admin    electrolyte-S (ISOLYTE)   Intravenous Continuous Diogo Kent MD   Stopped at 11/13/23 1444    fentaNYL 50 mcg/mL injection 25 mcg  25 mcg Intravenous Q5 Min PRN Diogo Kent MD        lactated ringers infusion   Intravenous Continuous Diogo Kent MD        LIDOcaine (PF) 10 mg/ml (1%) injection 10 mg  1 mL Intradermal Once Diogo Kent MD        oxyCODONE immediate release tablet 5 mg  5 mg Oral Once PRN Diogo Kent MD            Objective:        Physical Exam:   Physical Exam  Vitals reviewed.   Constitutional:       General: He is not in acute distress.     Appearance: Normal appearance. He is obese. He is not ill-appearing.   Pulmonary:      Effort: Pulmonary effort is normal.   Musculoskeletal:      Cervical back: Normal range of motion.   Skin:     Coloration: Skin is not jaundiced or pale.   Neurological:      Mental Status: He is alert and oriented to person, place, and time.   Psychiatric:         Mood and Affect: Mood normal.         Behavior: Behavior normal.         Thought Content: Thought content normal.         Judgment: Judgment normal.                     Assessment:       Assessment & Plan    TYPE II DIABETES MELLITUS :  - Discussed A1C goals for patients 65 and older, aiming for below 8.  - Current A1C at 7.7, which is below this goal but aim to bring it closer to 7 to help with other health issues.  - Blood sugars are trending well, consistently under 200.  - Continued current medication regimen: metformin 500 mg, 2 in the morning and 1 at night.  - Advised patient to maintain current diet.  - Ordered labs in 3 months to recheck A1C levels.    OBESITY, CLASS 3:  - Advised patient to perform upper body exercises and floor exercises as tolerated due to foot injury.    PERSISTENT ATRIAL FIBRILLATION:  - Monitored  patient's heart rate, which has been slower than normal (60-65 bpm), possibly due to inactivity or better control of atrial fibrillation.  - Evaluated blood pressure, which appears to be well controlled.  -continue f/u care with cardiology     LIANET APRIL LEFT FOOT:  -continue limited activity per podiatry recommendations  - f/u next week as planned with Dr. Jasso .    HYPERLIPIDEMIA:  - Scheduled cholesterol level check in 6 months.    DIABETES:  - Continued current medication regimen  - Aim to bring A1C closer to 7 to help with other health issues.    ELEVATED POTASSIUM:  - Cautioned patient to monitor banana consumption due to elevated potassium levels.   -could be hemolyzed sample; recheck       Plan:   Essential hypertension    Type 2 diabetes mellitus with hyperglycemia, without long-term current use of insulin  -     COMPREHENSIVE METABOLIC PANEL; Future; Expected date: 04/09/2025  -     Hemoglobin A1C; Future; Expected date: 04/09/2025    Colon cancer screening  -     Cologuard Screening (Multitarget Stool DNA); Future; Expected date: 01/09/2025      Follow up in about 6 months (around 7/9/2025) for diabetes, HTN.    Total time spent with patient: 15 minutes     Each patient to whom he or she provides medical services by telemedicine is:  (1) informed of the relationship between the physician and patient and the respective role of any other health care provider with respect to management of the patient; and (2) notified that he or she may decline to receive medical services by telemedicine and may withdraw from such care at any time.    This note was generated with the assistance of ambient listening technology. Verbal consent was obtained by the patient and accompanying visitor(s) for the recording of patient appointment to facilitate this note. I attest to having reviewed and edited the generated note for accuracy, though some syntax or spelling errors may persist. Please contact the author of this note  for any clarification.

## 2025-01-14 ENCOUNTER — HOSPITAL ENCOUNTER (OUTPATIENT)
Dept: RADIOLOGY | Facility: CLINIC | Age: 65
Discharge: HOME | End: 2025-01-14
Attending: PODIATRIST
Payer: MEDICARE

## 2025-01-14 ENCOUNTER — OFFICE VISIT (OUTPATIENT)
Dept: PODIATRY | Facility: CLINIC | Age: 65
End: 2025-01-14
Payer: MEDICARE

## 2025-01-14 VITALS — HEIGHT: 74 IN | WEIGHT: 315 LBS | BODY MASS INDEX: 40.43 KG/M2

## 2025-01-14 DIAGNOSIS — M14.679 CHARCOT ARTHROPATHY OF MIDFOOT: Primary | ICD-10-CM

## 2025-01-14 DIAGNOSIS — E11.49 TYPE II DIABETES MELLITUS WITH NEUROLOGICAL MANIFESTATIONS: ICD-10-CM

## 2025-01-14 DIAGNOSIS — M79.672 LEFT FOOT PAIN: ICD-10-CM

## 2025-01-14 PROCEDURE — 99213 OFFICE O/P EST LOW 20 MIN: CPT | Mod: S$PBB,,, | Performed by: PODIATRIST

## 2025-01-14 PROCEDURE — 99999 PR PBB SHADOW E&M-EST. PATIENT-LVL III: CPT | Mod: PBBFAC,,, | Performed by: PODIATRIST

## 2025-01-14 PROCEDURE — 73630 X-RAY EXAM OF FOOT: CPT | Mod: 26,LT,, | Performed by: RADIOLOGY

## 2025-01-14 PROCEDURE — 99213 OFFICE O/P EST LOW 20 MIN: CPT | Mod: PBBFAC,PN | Performed by: PODIATRIST

## 2025-01-14 NOTE — PROGRESS NOTES
"  [address] [facility] [address] [place] [address]  Phone: [phone]   [phone]    Patient's PCP:Radha Pinzon FNP  Referring Provider: No ref. provider found    Subjective:      Chief Complaint:: Follow-up (Charcot arthropathy of midfoot left foot)    CARLI Frey is a 64 y.o. male who presents today with a follow up on Charcot arthropathy of midfoot . No new complaints. Pt has use of boot and walker while fully weight bearing.     Systemic Doctor:    Date Last Seen: 10/9/24  Blood Sugar: 115  Hemoglobin A1c: 7.7    Vitals:    01/14/25 1448   Weight: (!) 147.6 kg (325 lb 6.4 oz)   Height: 6' 2" (1.88 m)   PainSc:   3      Shoe Size: 13    Past Surgical History:   Procedure Laterality Date    ABLATION OF ARRHYTHMOGENIC FOCUS FOR ATRIAL FIBRILLATION N/A 7/7/2022    Procedure: ABLATION, ARRHYTHMOGENIC FOCUS, FOR ATRIAL FIBRILLATION;  Surgeon: Niko Pacheco MD;  Location: [facility] EP LAB;  Service: Cardiology;  Laterality: N/A;  AF, ARMANDO(Cx if SR), PVI, RFA, CARTO, GEN, GP, 3 PREP    ANGIOGRAM, CORONARY, WITH LEFT HEART CATHETERIZATION N/A 1/15/2024    Procedure: Angiogram, Coronary, with Left Heart Cath;  Surgeon: Samir Emmanuel MD;  Location: [facility] CATH/EP LAB;  Service: Cardiology;  Laterality: N/A;    ARTHROPLASTY OF SHOULDER Right 11/13/2023    Procedure: ARTHROPLASTY, SHOULDER, RIGHT;  Surgeon: Branden Hernandez MD;  Location: [facility];  Service: Orthopedics;  Laterality: Right;  DJO    CORONARY ARTERY BYPASS GRAFT (CABG) N/A 1/17/2024    Procedure: CORONARY ARTERY BYPASS GRAFT (CABG);  Surgeon: Darius Coreas MD;  Location: [facility];  Service: Cardiothoracic;  Laterality: N/A;    CORONARY STENT PLACEMENT  2013    ENDOSCOPIC HARVEST OF VEIN Left 1/17/2024    Procedure: SURGICAL PROCUREMENT, VEIN, ENDOSCOPIC;  Surgeon: Darius Coreas MD;  Location: [facility];  Service: Cardiothoracic;  Laterality: Left;    SURGICAL PROCUREMENT, ARTERY, RADIAL, FOR CABG Left 1/17/2024    " Procedure: SURGICAL PROCUREMENT,ARTERY,RADIAL,FOR CABG, ENDOSCOPIC;  Surgeon: Darius Coreas MD;  Location: [facility] OR;  Service: Cardiothoracic;  Laterality: Left;    TRANSESOPHAGEAL ECHOCARDIOGRAPHY N/A 7/7/2022    Procedure: ECHOCARDIOGRAM, TRANSESOPHAGEAL;  Surgeon: oCnor Chappell MD;  Location: [facility] EP LAB;  Service: Cardiology;  Laterality: N/A;    TREATMENT OF CARDIAC ARRHYTHMIA N/A 3/7/2022    Procedure: CARDIOVERSION;  Surgeon: Gomez Orellana MD;  Location: [facility] CATH/EP LAB;  Service: Cardiology;  Laterality: N/A;    TREATMENT OF CARDIAC ARRHYTHMIA  7/7/2022    Procedure: Cardioversion or Defibrillation;  Surgeon: Conor Chappell MD;  Location: [facility] EP LAB;  Service: Cardiology;;     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 01/13/2022    EYE EXAM  DR KATHY PARMAR    Coronary artery disease     1 coronary stent - Dr Orellana    Diabetes mellitus, type 2     Hypertension     Myocardial infarction 2013     Family History   Problem Relation Name Age of Onset    Heart attack Father      Cancer Father          prostate    Glaucoma Neg Hx      Macular degeneration Neg Hx          Social History:   Marital Status:   Alcohol History:  reports current alcohol use.  Tobacco History:  reports that he has never smoked. He has never used smokeless tobacco.  Drug History:  reports no history of drug use.    Review of patient's allergies indicates:  No Known Allergies    Current Outpatient Medications   Medication Sig Dispense Refill    empagliflozin (JARDIANCE) 25 mg tablet Take 1 tablet (25 mg total) by mouth once daily. 90 tablet 1    glipiZIDE (GLUCOTROL) 5 MG tablet Take 1 tablet (5 mg total) by mouth 2 (two) times daily with meals. 180 tablet 1    metFORMIN (GLUCOPHAGE) 1000 MG tablet Take 1 tablet (1,000 mg total) by mouth 2 (two) times daily with meals. 180 tablet 1    rosuvastatin (CRESTOR) 40 MG Tab Take 40 mg by mouth.       amiodarone (PACERONE) 200 MG Tab Take 1 tablet (200 mg total) by mouth once daily. 30 tablet 11    aspirin 81 MG Chew Take 1 tablet (81 mg total) by mouth once daily. 30 tablet 0    carvediloL (COREG) 3.125 MG tablet Take 1 tablet (3.125 mg total) by mouth 2 (two) times daily. 60 tablet 0    diltiaZEM (CARDIZEM CD) 240 MG 24 hr capsule Take 1 capsule (240 mg total) by mouth 2 (two) times a day. 60 capsule 11    furosemide (LASIX) 20 MG tablet Take 1 tablet (20 mg total) by mouth 2 (two) times daily. 60 tablet 11    lisinopriL (PRINIVIL,ZESTRIL) 20 MG tablet Take 1 tablet (20 mg total) by mouth once daily. 90 tablet 3     No current facility-administered medications for this visit.     Facility-Administered Medications Ordered in Other Visits   Medication Dose Route Frequency Provider Last Rate Last Admin    electrolyte-S (ISOLYTE)   Intravenous Continuous Diogo Kent MD   Stopped at 11/13/23 1444    fentaNYL 50 mcg/mL injection 25 mcg  25 mcg Intravenous Q5 Min PRN Diogo Kent MD        lactated ringers infusion   Intravenous Continuous Diogo Kent MD        LIDOcaine (PF) 10 mg/ml (1%) injection 10 mg  1 mL Intradermal Once Diogo Kent MD        oxyCODONE immediate release tablet 5 mg  5 mg Oral Once PRN Diogo Kent MD           Review of Systems   Constitutional:  Negative for chills, fatigue, fever and unexpected weight change.   HENT:  Negative for hearing loss and trouble swallowing.    Eyes:  Negative for photophobia and visual disturbance.   Respiratory:  Negative for cough, shortness of breath and wheezing.    Cardiovascular:  Negative for chest pain, palpitations and leg swelling.   Gastrointestinal:  Negative for abdominal pain and nausea.   Genitourinary:  Negative for dysuria and frequency.   Musculoskeletal:  Positive for arthralgias, gait problem and joint swelling. Negative for back pain and myalgias.   Skin:  Negative for rash.   Neurological:  Positive for  numbness. Negative for tremors, seizures, speech difficulty, weakness and headaches.   Hematological:  Does not bruise/bleed easily.         Objective:        Physical Exam:   Foot Exam    General  Orientation: alert and oriented to person, place, and time   Affect: appropriate   Gait: antalgic   Assistance: walker use       Left Foot/Ankle      Inspection and Palpation  Ecchymosis: none  Tenderness: none   Swelling: midtarsal joint and lisfranc joint (Improving)  Arch: pes planus  Skin Exam: skin intact; no drainage, no ulcer and no erythema   Neurovascular  Dorsalis pedis: 2+  Posterior tibial: 2+  Capillary refill: 2+  Varicose veins: not present  Saphenous nerve sensation: diminished  Tibial nerve sensation: diminished  Superficial peroneal nerve sensation: diminished  Deep peroneal nerve sensation: diminished  Sural nerve sensation: diminished    Edema  Type of edema: non-pitting    Muscle Strength  Ankle dorsiflexion: 5  Ankle plantar flexion: 5  Ankle inversion: 5  Ankle eversion: 5  Great toe extension: 5  Great toe flexion: 5    Range of Motion    Normal left ankle ROM    Tests  Anterior drawer: negative   Talar tilt: negative   PT Tinel's sign: negative  Paresthesia: positive      Physical Exam  Cardiovascular:      Pulses:           Dorsalis pedis pulses are 2+ on the left side.        Posterior tibial pulses are 2+ on the left side.   Feet:      Left foot:      Skin integrity: No ulcer or erythema.               Left Ankle/Foot Exam     Range of Motion   The patient has normal left ankle ROM.       Muscle Strength   Left Lower Extremity   Ankle Dorsiflexion:  5   Plantar flexion:  5/5     Reflexes     Left Side  Paresthesia: present    Vascular Exam       Left Pulses  Dorsalis Pedis:      2+  Posterior Tibial:      2+           Imaging: X-Ray Foot Complete Left  Narrative: EXAMINATION:  XR FOOT COMPLETE 3 VIEW LEFT    CLINICAL HISTORY:  .  Pain in left foot    TECHNIQUE:  AP, lateral and oblique views of  the left foot were performed.    COMPARISON:  11/26/2024    FINDINGS:  severe degenerative change midfoot with sclerosis, bony eburnation, fragmentation. Second through 4th metatarsals laterally positioned relative to the tarsals and widening between the 1st and 2nd metatarsals and cuneiforms.  Findings suggesting neuropathic joint and old Lisfranc injury    Pes planus. Plantar and dorsal calcaneal spurs.  Os ossific density anterior to the tarsals old in appearance. No acute fracture or dislocation is seen and no interval change suggesting new worsening osteomyelitis.  Impression: Extensive degenerative change midfoot suggesting severe neuropathic arthropathy and with the appearance not significantly changed compared to the prior exam    Electronically signed by: Honey Augustine MD  Date:    01/14/2025  Time:    15:06               Assessment:       1. Charcot arthropathy of midfoot    2. Type II diabetes mellitus with neurological manifestations    3. Left foot pain      Plan:   Charcot arthropathy of midfoot    Type II diabetes mellitus with neurological manifestations    Left foot pain  -     X-Ray Foot Complete Left      Follow up in about 4 weeks (around 2/11/2025), or if symptoms worsen or fail to improve, for x-ray.    Procedures        I discussed with the patient that clinically and radiographically his Charcot is progressing and appears to be consolidating.  I explained that there is no interval detrimental change in alignment.  Given this he is okay to slowly increase his weight-bearing in the Cam Walker boot.  I discussed them that over the next several weeks if the foot remains stable then we will look to getting him prescriptions for new accommodative shoes and insoles.        Counseling:     I provided patient education verbally regarding:   Patient diagnosis, treatment options, as well as alternatives, risks, and benefits.     This note was created using Dragon voice recognition software that  occasionally misinterpreted phrases or words.

## 2025-02-04 NOTE — PROGRESS NOTES
Manual pressure was being held by hardik ep .  Manual pressure stopped. Ep pacu rn assessed groin. Soft, no hematoma or drainage noted. jeanne groin sutures gabriela, well approx. Dr david perez notified via telephone that manual pressure held for 15mins by ep pacu rn/ep .  No new orders given at this time. Ep pacu rn to continue to monitor per previous orders.   Patient isnt in school due to vomiting, sore throat, headaches, stomach ache and slight fever.

## 2025-02-18 ENCOUNTER — OFFICE VISIT (OUTPATIENT)
Dept: PODIATRY | Facility: CLINIC | Age: 65
End: 2025-02-18
Payer: MEDICARE

## 2025-02-18 ENCOUNTER — HOSPITAL ENCOUNTER (OUTPATIENT)
Dept: RADIOLOGY | Facility: CLINIC | Age: 65
Discharge: HOME | End: 2025-02-18
Attending: PODIATRIST
Payer: MEDICARE

## 2025-02-18 VITALS — RESPIRATION RATE: 18 BRPM | HEIGHT: 74 IN | BODY MASS INDEX: 40.43 KG/M2 | WEIGHT: 315 LBS

## 2025-02-18 DIAGNOSIS — M14.679 CHARCOT ARTHROPATHY OF MIDFOOT: Primary | ICD-10-CM

## 2025-02-18 DIAGNOSIS — M79.672 LEFT FOOT PAIN: ICD-10-CM

## 2025-02-18 DIAGNOSIS — E11.49 TYPE II DIABETES MELLITUS WITH NEUROLOGICAL MANIFESTATIONS: ICD-10-CM

## 2025-02-18 PROCEDURE — 99213 OFFICE O/P EST LOW 20 MIN: CPT | Mod: PBBFAC,PN | Performed by: PODIATRIST

## 2025-02-18 NOTE — PROGRESS NOTES
"  [address] [facility] [address] [place] [address]  Phone: [phone]   [phone]    Patient's PCP:Radha Pinzon FNP  Referring Provider: No ref. provider found    Subjective:      Chief Complaint:: Follow-up (Charcot arthropathy of midfoot left)    CARLI Frey is a 64 y.o. male who presents today for follow-up x-rays on complaint of a follow up on Charcot arthropathy of midfoot left . No new complaints. Using of boot cast and cane while fully weight bearing.      Systemic Doctor: Dr. Pinzon   Date Last Seen: 10/9/24  Blood Sugar: 147  Hemoglobin A1c: 7.7.    Vitals:    02/18/25 1451   Resp: 18   Weight: (!) 147.6 kg (325 lb 6.4 oz)   Height: 6' 2" (1.88 m)   PainSc:   5      Shoe Size:     Past Surgical History:   Procedure Laterality Date    ABLATION OF ARRHYTHMOGENIC FOCUS FOR ATRIAL FIBRILLATION N/A 7/7/2022    Procedure: ABLATION, ARRHYTHMOGENIC FOCUS, FOR ATRIAL FIBRILLATION;  Surgeon: Niko Pacheco MD;  Location: [facility] EP LAB;  Service: Cardiology;  Laterality: N/A;  AF, ARMANDO(Cx if SR), PVI, RFA, CARTO, GEN, GP, 3 PREP    ANGIOGRAM, CORONARY, WITH LEFT HEART CATHETERIZATION N/A 1/15/2024    Procedure: Angiogram, Coronary, with Left Heart Cath;  Surgeon: Samir Emmanuel MD;  Location: [facility] CATH/EP LAB;  Service: Cardiology;  Laterality: N/A;    ARTHROPLASTY OF SHOULDER Right 11/13/2023    Procedure: ARTHROPLASTY, SHOULDER, RIGHT;  Surgeon: Branden Hernandez MD;  Location: [facility];  Service: Orthopedics;  Laterality: Right;  DJO    CORONARY ARTERY BYPASS GRAFT (CABG) N/A 1/17/2024    Procedure: CORONARY ARTERY BYPASS GRAFT (CABG);  Surgeon: Darius Coreas MD;  Location: [facility] OR;  Service: Cardiothoracic;  Laterality: N/A;    CORONARY STENT PLACEMENT  2013    ENDOSCOPIC HARVEST OF VEIN Left 1/17/2024    Procedure: SURGICAL PROCUREMENT, VEIN, ENDOSCOPIC;  Surgeon: Darius Coreas MD;  Location: [facility];  Service: Cardiothoracic;  Laterality: Left;    SURGICAL PROCUREMENT, ARTERY, " RADIAL, FOR CABG Left 1/17/2024    Procedure: SURGICAL PROCUREMENT,ARTERY,RADIAL,FOR CABG, ENDOSCOPIC;  Surgeon: Darius Coreas MD;  Location: [facility] OR;  Service: Cardiothoracic;  Laterality: Left;    TRANSESOPHAGEAL ECHOCARDIOGRAPHY N/A 7/7/2022    Procedure: ECHOCARDIOGRAM, TRANSESOPHAGEAL;  Surgeon: Conor Chappell MD;  Location: [facility] EP LAB;  Service: Cardiology;  Laterality: N/A;    TREATMENT OF CARDIAC ARRHYTHMIA N/A 3/7/2022    Procedure: CARDIOVERSION;  Surgeon: Gomez Orellana MD;  Location: [facility] CATH/EP LAB;  Service: Cardiology;  Laterality: N/A;    TREATMENT OF CARDIAC ARRHYTHMIA  7/7/2022    Procedure: Cardioversion or Defibrillation;  Surgeon: Conor Chappell MD;  Location: [facility] EP LAB;  Service: Cardiology;;     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 01/13/2022    EYE EXAM  DR KATHY PARMAR    Coronary artery disease     1 coronary stent - Dr Orellana    Diabetes mellitus, type 2     Hypertension     Myocardial infarction 2013     Family History   Problem Relation Name Age of Onset    Heart attack Father      Cancer Father          prostate    Glaucoma Neg Hx      Macular degeneration Neg Hx          Social History:   Marital Status:   Alcohol History:  reports current alcohol use.  Tobacco History:  reports that he has never smoked. He has never used smokeless tobacco.  Drug History:  reports no history of drug use.    Review of patient's allergies indicates:  No Known Allergies    Current Medications[1]    Review of Systems   Constitutional:  Negative for chills, fatigue, fever and unexpected weight change.   HENT:  Negative for hearing loss and trouble swallowing.    Eyes:  Negative for photophobia and visual disturbance.   Respiratory:  Negative for cough, shortness of breath and wheezing.    Cardiovascular:  Negative for chest pain, palpitations and leg swelling.   Gastrointestinal:  Negative for abdominal pain and  nausea.   Genitourinary:  Negative for dysuria and frequency.   Musculoskeletal:  Positive for arthralgias, gait problem and joint swelling. Negative for back pain and myalgias.   Skin:  Negative for rash.   Neurological:  Positive for numbness. Negative for tremors, seizures, speech difficulty, weakness and headaches.   Hematological:  Does not bruise/bleed easily.         Objective:        Physical Exam:   Foot Exam    General  Orientation: alert and oriented to person, place, and time   Affect: appropriate   Gait: antalgic   Assistance: walker use       Left Foot/Ankle      Inspection and Palpation  Ecchymosis: none  Tenderness: lisfranc joint   Swelling: midtarsal joint and lisfranc joint (Improving)  Arch: pes planus  Skin Exam: no drainage, no ulcer and no erythema   Neurovascular  Dorsalis pedis: 2+  Posterior tibial: 2+  Capillary refill: 2+  Varicose veins: not present  Saphenous nerve sensation: diminished  Tibial nerve sensation: diminished  Superficial peroneal nerve sensation: diminished  Deep peroneal nerve sensation: diminished  Sural nerve sensation: diminished    Edema  Type of edema: non-pitting    Muscle Strength  Ankle dorsiflexion: 5  Ankle plantar flexion: 5  Ankle inversion: 5  Ankle eversion: 5  Great toe extension: 5  Great toe flexion: 5    Range of Motion    Normal left ankle ROM    Tests  Anterior drawer: negative   Talar tilt: negative   PT Tinel's sign: negative  Paresthesia: positive      Physical Exam  Cardiovascular:      Pulses:           Dorsalis pedis pulses are 2+ on the left side.        Posterior tibial pulses are 2+ on the left side.   Feet:      Left foot:      Skin integrity: No ulcer or erythema.               Left Ankle/Foot Exam     Range of Motion   The patient has normal left ankle ROM.       Muscle Strength   Left Lower Extremity   Ankle Dorsiflexion:  5   Plantar flexion:  5/5     Reflexes     Left Side  Paresthesia: present    Vascular Exam       Left  Pulses  Dorsalis Pedis:      2+  Posterior Tibial:      2+           Imaging: X-Ray Foot Complete Left  Narrative: EXAMINATION:  XR FOOT COMPLETE 3 VIEW LEFT    CLINICAL HISTORY:  .  Charcot's joint, unspecified ankle and foot    TECHNIQUE:  AP, lateral and oblique views of the left foot were performed.    COMPARISON:  Left foot x-rays of January 14, 2025 and November 26, 2024.    FINDINGS:  There is sclerosis and fragmentation of the anterior cuboid and navicular bones, cuneiform bones and proximal ends of the 5 metatarsals consistent with a neuropathic foot.  There is hammertoes of digits 1 through 5.  A small heel spur is noted.  Impression: Neuropathic midfoot with multiple fractures and sclerosis.  Small heel spur.  Hammertoes of digits 1 through 5    Electronically signed by: Pool Caballero MD  Date:    02/18/2025  Time:    15:00               Assessment:       1. Charcot arthropathy of midfoot    2. Type II diabetes mellitus with neurological manifestations    3. Left foot pain      Plan:   Charcot arthropathy of midfoot  -     X-Ray Foot Complete Left    Type II diabetes mellitus with neurological manifestations  -     X-Ray Foot Complete Left    Left foot pain  -     X-Ray Foot Complete Left      Follow up if symptoms worsen or fail to improve.    Procedures        I discussed with the patient his wife that clinically and radiographically his foot is continuing to consolidate.  I do not see any detrimental interval changes.  Given this he is okay to continue weight-bearing in the Cam Walker boot.  Over the next several weeks he can slowly increase his activities.  Continue icing and elevated when nonweightbearing.  I discussed with him that if his symptoms continue to improve in his swelling subsides then on his next visit we may be able to proceed with accommodative shoes and insoles.    Counseling:     I provided patient education verbally regarding:   Patient diagnosis, treatment options, as well as  alternatives, risks, and benefits.     This note was created using Dragon voice recognition software that occasionally misinterpreted phrases or words.                      [1]   Current Outpatient Medications   Medication Sig Dispense Refill    amiodarone (PACERONE) 200 MG Tab Take 1 tablet (200 mg total) by mouth once daily. 30 tablet 11    aspirin 81 MG Chew Take 1 tablet (81 mg total) by mouth once daily. 30 tablet 0    carvediloL (COREG) 3.125 MG tablet Take 1 tablet (3.125 mg total) by mouth 2 (two) times daily. 60 tablet 0    diltiaZEM (CARDIZEM CD) 240 MG 24 hr capsule Take 1 capsule (240 mg total) by mouth 2 (two) times a day. 60 capsule 11    empagliflozin (JARDIANCE) 25 mg tablet Take 1 tablet (25 mg total) by mouth once daily. 90 tablet 1    furosemide (LASIX) 20 MG tablet Take 1 tablet (20 mg total) by mouth 2 (two) times daily. 60 tablet 11    glipiZIDE (GLUCOTROL) 5 MG tablet Take 1 tablet (5 mg total) by mouth 2 (two) times daily with meals. 180 tablet 1    lisinopriL (PRINIVIL,ZESTRIL) 20 MG tablet Take 1 tablet (20 mg total) by mouth once daily. 90 tablet 3    metFORMIN (GLUCOPHAGE) 1000 MG tablet Take 1 tablet (1,000 mg total) by mouth 2 (two) times daily with meals. 180 tablet 1    rosuvastatin (CRESTOR) 40 MG Tab Take 40 mg by mouth.       No current facility-administered medications for this visit.     Facility-Administered Medications Ordered in Other Visits   Medication Dose Route Frequency Provider Last Rate Last Admin    electrolyte-S (ISOLYTE)   Intravenous Continuous Diogo Kent MD   Stopped at 11/13/23 1444    fentaNYL 50 mcg/mL injection 25 mcg  25 mcg Intravenous Q5 Min PRN Diogo Kent MD        lactated ringers infusion   Intravenous Continuous Diogo Kent MD        LIDOcaine (PF) 10 mg/ml (1%) injection 10 mg  1 mL Intradermal Once Diogo Kent MD        oxyCODONE immediate release tablet 5 mg  5 mg Oral Once PRN Diogo Kent MD

## 2025-03-25 ENCOUNTER — OFFICE VISIT (OUTPATIENT)
Dept: PODIATRY | Facility: CLINIC | Age: 65
End: 2025-03-25
Payer: MEDICARE

## 2025-03-25 ENCOUNTER — HOSPITAL ENCOUNTER (OUTPATIENT)
Dept: RADIOLOGY | Facility: CLINIC | Age: 65
Discharge: HOME | End: 2025-03-25
Attending: PODIATRIST
Payer: MEDICARE

## 2025-03-25 VITALS — RESPIRATION RATE: 16 BRPM | BODY MASS INDEX: 40.43 KG/M2 | WEIGHT: 315 LBS | HEIGHT: 74 IN

## 2025-03-25 DIAGNOSIS — M14.679 CHARCOT ARTHROPATHY OF MIDFOOT: Primary | ICD-10-CM

## 2025-03-25 DIAGNOSIS — M79.672 LEFT FOOT PAIN: ICD-10-CM

## 2025-03-25 DIAGNOSIS — E11.49 TYPE II DIABETES MELLITUS WITH NEUROLOGICAL MANIFESTATIONS: ICD-10-CM

## 2025-03-25 PROCEDURE — 99999 PR PBB SHADOW E&M-EST. PATIENT-LVL III: CPT | Mod: PBBFAC,,, | Performed by: PODIATRIST

## 2025-03-25 PROCEDURE — 99214 OFFICE O/P EST MOD 30 MIN: CPT | Mod: S$PBB,,, | Performed by: PODIATRIST

## 2025-03-25 PROCEDURE — 73630 X-RAY EXAM OF FOOT: CPT | Mod: 26,LT,, | Performed by: RADIOLOGY

## 2025-03-25 PROCEDURE — 99213 OFFICE O/P EST LOW 20 MIN: CPT | Mod: PBBFAC,PN | Performed by: PODIATRIST

## 2025-03-25 NOTE — PROGRESS NOTES
"  [address] [facility] [address]  [place] [address]  Phone: [phone]   [phone]    Patient's PCP:Radha Pinzon FNP  Referring Provider: No ref. provider found    Subjective:      Chief Complaint:: Follow-up (Charcot arthropathy of midfoot left)    CARLI Frey is a 64 y.o. male who presents today for follow-up x-rays for  complaint of Charcot arthropathy of midfoot left. States no new complaints and pain varies between 3 and 4/10. Using of boot cast and cane while fully weight bearing.       Systemic Doctor: Dr. Pinzon   Date Last Seen: 10/9/24  Blood Sugar:has not checked today  Hemoglobin A1c: 7.7.    Vitals:    03/25/25 1119   Resp: 16   Weight: (!) 147.6 kg (325 lb 6.4 oz)   Height: 6' 2" (1.88 m)   PainSc:   4      Shoe Size:     Past Surgical History:   Procedure Laterality Date    ABLATION OF ARRHYTHMOGENIC FOCUS FOR ATRIAL FIBRILLATION N/A 7/7/2022    Procedure: ABLATION, ARRHYTHMOGENIC FOCUS, FOR ATRIAL FIBRILLATION;  Surgeon: Niko Pacheco MD;  Location: [facility] EP LAB;  Service: Cardiology;  Laterality: N/A;  AF, ARMANDO(Cx if SR), PVI, RFA, CARTO, GEN, GP, 3 PREP    ANGIOGRAM, CORONARY, WITH LEFT HEART CATHETERIZATION N/A 1/15/2024    Procedure: Angiogram, Coronary, with Left Heart Cath;  Surgeon: Samir Emmanuel MD;  Location: [facility] CATH/EP LAB;  Service: Cardiology;  Laterality: N/A;    ARTHROPLASTY OF SHOULDER Right 11/13/2023    Procedure: ARTHROPLASTY, SHOULDER, RIGHT;  Surgeon: Branden Hernandez MD;  Location: [facility] OR;  Service: Orthopedics;  Laterality: Right;  DJO    CORONARY ARTERY BYPASS GRAFT (CABG) N/A 1/17/2024    Procedure: CORONARY ARTERY BYPASS GRAFT (CABG);  Surgeon: Darius Coreas MD;  Location: [facility] OR;  Service: Cardiothoracic;  Laterality: N/A;    CORONARY STENT PLACEMENT  2013    ENDOSCOPIC HARVEST OF VEIN Left 1/17/2024    Procedure: SURGICAL PROCUREMENT, VEIN, ENDOSCOPIC;  Surgeon: Darius Coreas MD;  Location: [facility];  Service: Cardiothoracic;  " Laterality: Left;    SURGICAL PROCUREMENT, ARTERY, RADIAL, FOR CABG Left 1/17/2024    Procedure: SURGICAL PROCUREMENT,ARTERY,RADIAL,FOR CABG, ENDOSCOPIC;  Surgeon: Darius Coreas MD;  Location: [facility] OR;  Service: Cardiothoracic;  Laterality: Left;    TRANSESOPHAGEAL ECHOCARDIOGRAPHY N/A 7/7/2022    Procedure: ECHOCARDIOGRAM, TRANSESOPHAGEAL;  Surgeon: Conor Chappell MD;  Location: [facility] EP LAB;  Service: Cardiology;  Laterality: N/A;    TREATMENT OF CARDIAC ARRHYTHMIA N/A 3/7/2022    Procedure: CARDIOVERSION;  Surgeon: Gomez Orellana MD;  Location: [facility] CATH/EP LAB;  Service: Cardiology;  Laterality: N/A;    TREATMENT OF CARDIAC ARRHYTHMIA  7/7/2022    Procedure: Cardioversion or Defibrillation;  Surgeon: Conor Chappell MD;  Location: [facility] EP LAB;  Service: Cardiology;;     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 01/13/2022    EYE EXAM  DR KATHY PARMAR    Coronary artery disease     1 coronary stent - Dr Orellana    Diabetes mellitus, type 2     Hypertension     Myocardial infarction 2013     Family History   Problem Relation Name Age of Onset    Heart attack Father      Cancer Father          prostate    Glaucoma Neg Hx      Macular degeneration Neg Hx          Social History:   Marital Status:   Alcohol History:  reports current alcohol use.  Tobacco History:  reports that he has never smoked. He has never used smokeless tobacco.  Drug History:  reports no history of drug use.    Review of patient's allergies indicates:  No Known Allergies    Current Medications[1]    Review of Systems   Constitutional:  Negative for chills, fatigue, fever and unexpected weight change.   HENT:  Negative for hearing loss and trouble swallowing.    Eyes:  Negative for photophobia and visual disturbance.   Respiratory:  Negative for cough, shortness of breath and wheezing.    Cardiovascular:  Negative for chest pain, palpitations and leg swelling.    Gastrointestinal:  Negative for abdominal pain and nausea.   Genitourinary:  Negative for dysuria and frequency.   Musculoskeletal:  Positive for arthralgias, gait problem and joint swelling. Negative for back pain and myalgias.   Skin:  Negative for rash.   Neurological:  Positive for numbness. Negative for tremors, seizures, speech difficulty, weakness and headaches.   Hematological:  Does not bruise/bleed easily.         Objective:        Physical Exam:   Foot Exam    General  Orientation: alert and oriented to person, place, and time   Affect: appropriate   Gait: unimpaired       Left Foot/Ankle      Inspection and Palpation  Ecchymosis: none  Tenderness: lisfranc joint (Improved)  Swelling: midtarsal joint and lisfranc joint (Improved)  Arch: pes planus  Skin Exam: no drainage, no ulcer and no erythema   Neurovascular  Dorsalis pedis: 2+  Posterior tibial: 2+  Capillary refill: 2+  Varicose veins: not present  Saphenous nerve sensation: diminished  Tibial nerve sensation: diminished  Superficial peroneal nerve sensation: diminished  Deep peroneal nerve sensation: diminished  Sural nerve sensation: diminished    Edema  Type of edema: non-pitting    Muscle Strength  Ankle dorsiflexion: 5  Ankle plantar flexion: 5  Ankle inversion: 5  Ankle eversion: 5  Great toe extension: 5  Great toe flexion: 5    Range of Motion    Normal left ankle ROM    Tests  Anterior drawer: negative   Talar tilt: negative   PT Tinel's sign: negative  Paresthesia: positive      Physical Exam  Cardiovascular:      Pulses:           Dorsalis pedis pulses are 2+ on the left side.        Posterior tibial pulses are 2+ on the left side.   Feet:      Left foot:      Skin integrity: No ulcer or erythema.               Left Ankle/Foot Exam     Range of Motion   The patient has normal left ankle ROM.       Muscle Strength   Left Lower Extremity   Ankle Dorsiflexion:  5   Plantar flexion:  5/5     Reflexes     Left Side  Paresthesia:  present    Vascular Exam       Left Pulses  Dorsalis Pedis:      2+  Posterior Tibial:      2+           Imaging: X-Ray Foot Complete Left  Narrative: EXAMINATION:  XR FOOT COMPLETE 3 VIEW LEFT    CLINICAL HISTORY:  .  Charcot's joint, unspecified ankle and foot    TECHNIQUE:  AP, lateral and oblique views of the left foot were performed.    COMPARISON:  Left foot x-ray of February 18, 2025    FINDINGS:  There is a neuropathic foot with fragmentation and sclerosis of the tarsals and proximal metatarsals in the midfoot.  Dorsal bone protrusion is noted.  A small heel spur is seen.  An acute fracture is not identified.  Impression: Neuropathic foot with fragmentation and sclerosis of the tarsals and proximal metatarsals in the midfoot.    Electronically signed by: Pool Caballero MD  Date:    03/25/2025  Time:    11:48               Assessment:       1. Charcot arthropathy of midfoot    2. Left foot pain    3. Type II diabetes mellitus with neurological manifestations      Plan:   Charcot arthropathy of midfoot  -     X-Ray Foot Complete Left  -     DIABETIC SHOES FOR HOME USE    Left foot pain  -     X-Ray Foot Complete Left  -     DIABETIC SHOES FOR HOME USE    Type II diabetes mellitus with neurological manifestations  -     DIABETIC SHOES FOR HOME USE      Follow up if symptoms worsen or fail to improve, for Patient will follow-up once obtaining his shoes and insoles.    Procedures        I discussed with the patient that clinically and radiographically his foot is improving.  At this time I am going to place the order for diabetic shoes and accommodative insoles for him.  He is okay to continue weight-bearing.  Around the house he is okay to weightbear without the cam walker boot however until he obtains his diabetic shoes and insoles recommend that he continue in the cam walker boot for any extended weight-bearing.      Counseling:     I provided patient education verbally regarding:   Patient diagnosis,  treatment options, as well as alternatives, risks, and benefits.     This note was created using Dragon voice recognition software that occasionally misinterpreted phrases or words.                      [1]   Current Outpatient Medications   Medication Sig Dispense Refill    amiodarone (PACERONE) 200 MG Tab Take 1 tablet (200 mg total) by mouth once daily. 30 tablet 11    aspirin 81 MG Chew Take 1 tablet (81 mg total) by mouth once daily. 30 tablet 0    carvediloL (COREG) 3.125 MG tablet Take 1 tablet (3.125 mg total) by mouth 2 (two) times daily. 60 tablet 0    diltiaZEM (CARDIZEM CD) 240 MG 24 hr capsule Take 1 capsule (240 mg total) by mouth 2 (two) times a day. 60 capsule 11    empagliflozin (JARDIANCE) 25 mg tablet Take 1 tablet (25 mg total) by mouth once daily. 90 tablet 1    furosemide (LASIX) 20 MG tablet Take 1 tablet (20 mg total) by mouth 2 (two) times daily. 60 tablet 11    glipiZIDE (GLUCOTROL) 5 MG tablet Take 1 tablet (5 mg total) by mouth 2 (two) times daily with meals. 180 tablet 1    lisinopriL (PRINIVIL,ZESTRIL) 20 MG tablet Take 1 tablet (20 mg total) by mouth once daily. 90 tablet 3    metFORMIN (GLUCOPHAGE) 1000 MG tablet Take 1 tablet (1,000 mg total) by mouth 2 (two) times daily with meals. 180 tablet 1    rosuvastatin (CRESTOR) 40 MG Tab Take 40 mg by mouth.       No current facility-administered medications for this visit.     Facility-Administered Medications Ordered in Other Visits   Medication Dose Route Frequency Provider Last Rate Last Admin    electrolyte-S (ISOLYTE)   Intravenous Continuous Diogo Kent MD   Stopped at 11/13/23 1444    fentaNYL 50 mcg/mL injection 25 mcg  25 mcg Intravenous Q5 Min PRN Diogo Kent MD        lactated ringers infusion   Intravenous Continuous Diogo Kent MD        LIDOcaine (PF) 10 mg/ml (1%) injection 10 mg  1 mL Intradermal Once Diogo Kent MD        oxyCODONE immediate release tablet 5 mg  5 mg Oral Once PRN Diogo Kent  MD HOLDEN

## 2025-04-01 DIAGNOSIS — E11.65 TYPE 2 DIABETES MELLITUS WITH HYPERGLYCEMIA, WITHOUT LONG-TERM CURRENT USE OF INSULIN: ICD-10-CM

## 2025-04-01 RX ORDER — METFORMIN HYDROCHLORIDE 1000 MG/1
1000 TABLET ORAL 2 TIMES DAILY WITH MEALS
Qty: 180 TABLET | Refills: 1 | Status: SHIPPED | OUTPATIENT
Start: 2025-04-01

## 2025-04-01 RX ORDER — GLIPIZIDE 5 MG/1
5 TABLET ORAL 2 TIMES DAILY WITH MEALS
Qty: 180 TABLET | Refills: 1 | Status: SHIPPED | OUTPATIENT
Start: 2025-04-01

## 2025-07-01 ENCOUNTER — LAB VISIT (OUTPATIENT)
Dept: LAB | Facility: HOSPITAL | Age: 65
End: 2025-07-01
Attending: NURSE PRACTITIONER
Payer: MEDICARE

## 2025-07-01 DIAGNOSIS — E11.9 DIABETES MELLITUS WITHOUT COMPLICATION: ICD-10-CM

## 2025-07-01 DIAGNOSIS — I25.2 OLD MYOCARDIAL INFARCTION: ICD-10-CM

## 2025-07-01 DIAGNOSIS — I10 ESSENTIAL HYPERTENSION, MALIGNANT: Primary | ICD-10-CM

## 2025-07-01 DIAGNOSIS — E66.09 EXOGENOUS OBESITY: ICD-10-CM

## 2025-07-01 DIAGNOSIS — E11.65 TYPE 2 DIABETES MELLITUS WITH HYPERGLYCEMIA, WITHOUT LONG-TERM CURRENT USE OF INSULIN: ICD-10-CM

## 2025-07-01 DIAGNOSIS — Z98.61 POSTSURGICAL PERCUTANEOUS TRANSLUMINAL CORONARY ANGIOPLASTY STATUS: ICD-10-CM

## 2025-07-01 DIAGNOSIS — I25.10 CORONARY ATHEROSCLEROSIS OF NATIVE CORONARY ARTERY: ICD-10-CM

## 2025-07-01 DIAGNOSIS — I48.91 ATRIAL FIBRILLATION: ICD-10-CM

## 2025-07-01 DIAGNOSIS — Z95.1 POSTSURGICAL AORTOCORONARY BYPASS STATUS: ICD-10-CM

## 2025-07-01 DIAGNOSIS — M79.605 LEFT LEG PAIN: ICD-10-CM

## 2025-07-01 LAB
ALBUMIN SERPL BCP-MCNC: 4.3 G/DL (ref 3.5–5.2)
ALP SERPL-CCNC: 60 UNIT/L (ref 40–150)
ALT SERPL W/O P-5'-P-CCNC: 27 UNIT/L (ref 10–44)
ANION GAP (OHS): 11 MMOL/L (ref 8–16)
AST SERPL-CCNC: 25 UNIT/L (ref 11–45)
BILIRUB SERPL-MCNC: 0.9 MG/DL (ref 0.1–1)
BUN SERPL-MCNC: 18 MG/DL (ref 8–23)
CALCIUM SERPL-MCNC: 9.4 MG/DL (ref 8.7–10.5)
CHLORIDE SERPL-SCNC: 105 MMOL/L (ref 95–110)
CHOLEST SERPL-MCNC: 112 MG/DL (ref 120–199)
CHOLEST/HDLC SERPL: 3.3 {RATIO} (ref 2–5)
CO2 SERPL-SCNC: 24 MMOL/L (ref 23–29)
CREAT SERPL-MCNC: 1.1 MG/DL (ref 0.5–1.4)
EAG (OHS): 177 MG/DL (ref 68–131)
GFR SERPLBLD CREATININE-BSD FMLA CKD-EPI: >60 ML/MIN/1.73/M2
GLUCOSE SERPL-MCNC: 196 MG/DL (ref 70–110)
HBA1C MFR BLD: 7.8 % (ref 4–5.6)
HDLC SERPL-MCNC: 34 MG/DL (ref 40–75)
HDLC SERPL: 30.4 % (ref 20–50)
LDLC SERPL CALC-MCNC: 47.2 MG/DL (ref 63–159)
NONHDLC SERPL-MCNC: 78 MG/DL
POTASSIUM SERPL-SCNC: 5.3 MMOL/L (ref 3.5–5.1)
PROT SERPL-MCNC: 7.4 GM/DL (ref 6–8.4)
SODIUM SERPL-SCNC: 140 MMOL/L (ref 136–145)
TRIGL SERPL-MCNC: 154 MG/DL (ref 30–150)

## 2025-07-01 PROCEDURE — 82040 ASSAY OF SERUM ALBUMIN: CPT

## 2025-07-01 PROCEDURE — 80061 LIPID PANEL: CPT

## 2025-07-01 PROCEDURE — 83036 HEMOGLOBIN GLYCOSYLATED A1C: CPT

## 2025-07-01 PROCEDURE — 36415 COLL VENOUS BLD VENIPUNCTURE: CPT | Mod: PN

## 2025-07-02 ENCOUNTER — RESULTS FOLLOW-UP (OUTPATIENT)
Dept: FAMILY MEDICINE | Facility: CLINIC | Age: 65
End: 2025-07-02

## 2025-07-02 ENCOUNTER — TELEPHONE (OUTPATIENT)
Dept: FAMILY MEDICINE | Facility: CLINIC | Age: 65
End: 2025-07-02
Payer: MEDICARE

## 2025-07-02 NOTE — TELEPHONE ENCOUNTER
----- Message from Denzel Gil MD sent at 7/2/2025  8:45 AM CDT -----  Potassium remains on the high end of normal.  Make sure there are no potassium supplements in place and blood sugar is well-controlled.  ----- Message -----  From: Lab, Background User  Sent: 7/1/2025   7:44 PM CDT  To: LEANNA Brownlee

## 2025-07-09 ENCOUNTER — OFFICE VISIT (OUTPATIENT)
Dept: FAMILY MEDICINE | Facility: CLINIC | Age: 65
End: 2025-07-09
Payer: MEDICARE

## 2025-07-09 VITALS
RESPIRATION RATE: 18 BRPM | HEIGHT: 74 IN | OXYGEN SATURATION: 95 % | SYSTOLIC BLOOD PRESSURE: 130 MMHG | BODY MASS INDEX: 40.43 KG/M2 | TEMPERATURE: 98 F | WEIGHT: 315 LBS | HEART RATE: 66 BPM | DIASTOLIC BLOOD PRESSURE: 86 MMHG

## 2025-07-09 DIAGNOSIS — I10 ESSENTIAL HYPERTENSION: ICD-10-CM

## 2025-07-09 DIAGNOSIS — I48.19 PERSISTENT ATRIAL FIBRILLATION: ICD-10-CM

## 2025-07-09 DIAGNOSIS — E66.01 CLASS 3 SEVERE OBESITY WITH SERIOUS COMORBIDITY AND BODY MASS INDEX (BMI) OF 40.0 TO 44.9 IN ADULT, UNSPECIFIED OBESITY TYPE: ICD-10-CM

## 2025-07-09 DIAGNOSIS — E66.813 CLASS 3 SEVERE OBESITY WITH SERIOUS COMORBIDITY AND BODY MASS INDEX (BMI) OF 40.0 TO 44.9 IN ADULT, UNSPECIFIED OBESITY TYPE: ICD-10-CM

## 2025-07-09 DIAGNOSIS — Z12.5 SCREENING FOR PROSTATE CANCER: ICD-10-CM

## 2025-07-09 DIAGNOSIS — E11.65 TYPE 2 DIABETES MELLITUS WITH HYPERGLYCEMIA, WITHOUT LONG-TERM CURRENT USE OF INSULIN: Primary | ICD-10-CM

## 2025-07-09 PROCEDURE — 99214 OFFICE O/P EST MOD 30 MIN: CPT | Mod: S$PBB,,, | Performed by: NURSE PRACTITIONER

## 2025-07-09 PROCEDURE — 99214 OFFICE O/P EST MOD 30 MIN: CPT | Mod: PBBFAC,PN | Performed by: NURSE PRACTITIONER

## 2025-07-09 PROCEDURE — 99999 PR PBB SHADOW E&M-EST. PATIENT-LVL IV: CPT | Mod: PBBFAC,,, | Performed by: NURSE PRACTITIONER

## 2025-07-09 PROCEDURE — G2211 COMPLEX E/M VISIT ADD ON: HCPCS | Mod: ,,, | Performed by: NURSE PRACTITIONER

## 2025-07-09 RX ORDER — CARVEDILOL 12.5 MG/1
12.5 TABLET ORAL 2 TIMES DAILY
COMMUNITY
Start: 2025-04-24

## 2025-07-09 NOTE — PROGRESS NOTES
SUBJECTIVE:      Patient ID: Donald Fery is a 65 y.o. male.    Chief Complaint: Diabetes and Hypertension    History of Present Illness    CHIEF COMPLAINT:  Donald presents today for follow up and review of recent lab results    DIABETES:  He reports ongoing diabetes management with current A1c of 7.8, stable compared to previous reading of 7.7 and significantly improved from 10.8 one year ago. Home glucose readings range from 110 to 200. He notes improvement in vision, reporting ability to see better without glasses, which he attributes to improved sugar control. He denies issues with current diabetes medications and demonstrates understanding of diabetes management and glycemic control. He is due for diabetic eye exam after the 30th of this month-patient will schedule.    FOOT CONDITION:  His ongoing foot condition is slowly improving. He last saw podiatrist in March and is currently using orthopedic shoes as part of treatment plan. He indicates gradual healing progress, noting it is still early in recovery and continuing to wear prescribed orthopedic footwear until next podiatry follow-up. His wife assists with regular foot checks to monitor for any potential diabetic foot complications.    CARDIOVASCULAR:  He reports no current atrial fibrillation. He has been provided with a home blood pressure monitoring device that automatically transmits readings directly to his cardiologist's office for ongoing cardiac surveillance.    LABS / TEST RESULTS:  Cologuard test was negative. He will require repeat screening in a few years.     Lab Visit on 07/01/2025   Component Date Value Ref Range Status    Sodium 07/01/2025 140  136 - 145 mmol/L Final    Potassium 07/01/2025 5.3 (H)  3.5 - 5.1 mmol/L Final    *No Visible Hemolysis    Chloride 07/01/2025 105  95 - 110 mmol/L Final    CO2 07/01/2025 24  23 - 29 mmol/L Final    Glucose 07/01/2025 196 (H)  70 - 110 mg/dL Final    BUN 07/01/2025 18  8 - 23 mg/dL Final     Creatinine 07/01/2025 1.1  0.5 - 1.4 mg/dL Final    Calcium 07/01/2025 9.4  8.7 - 10.5 mg/dL Final    Protein Total 07/01/2025 7.4  6.0 - 8.4 gm/dL Final    Albumin 07/01/2025 4.3  3.5 - 5.2 g/dL Final    Bilirubin Total 07/01/2025 0.9  0.1 - 1.0 mg/dL Final    For infants and newborns, interpretation of results should be based   on gestational age, weight and in agreement with clinical   observations.    Premature Infant recommended reference ranges:   0-24 hours:  <8.0 mg/dL   24-48 hours: <12.0 mg/dL   3-5 days:    <15.0 mg/dL   6-29 days:   <15.0 mg/dL    ALP 07/01/2025 60  40 - 150 unit/L Final    AST 07/01/2025 25  11 - 45 unit/L Final    ALT 07/01/2025 27  10 - 44 unit/L Final    Anion Gap 07/01/2025 11  8 - 16 mmol/L Final    eGFR 07/01/2025 >60  >60 mL/min/1.73/m2 Final    Estimated GFR calculated using the CKD-EPI creatinine (2021) equation.    Hemoglobin A1c 07/01/2025 7.8 (H)  4.0 - 5.6 % Final    ADA Screening Guidelines:  5.7-6.4%  Consistent with prediabetes  >=6.5%  Consistent with diabetes    High levels of fetal hemoglobin interfere with the HbA1C  assay. Heterozygous hemoglobin variants (HbS, HgC, etc)do  not significantly interfere with this assay.   However, presence of multiple variants may affect accuracy.    Estimated Average Glucose 07/01/2025 177 (H)  68 - 131 mg/dL Final    Cholesterol Total 07/01/2025 112 (L)  120 - 199 mg/dL Final    The National Cholesterol Education Program (NCEP) has set the  following guidelines (reference ranges) for Cholesterol:  Optimal.....................<200 mg/dL  Borderline High.............200-239 mg/dL  High........................> or = 240 mg/dL    Triglyceride 07/01/2025 154 (H)  30 - 150 mg/dL Final    The National Cholesterol Education Program (NCEP) has set the  following guidelines (reference values) for triglycerides:  Normal......................<150 mg/dL  Borderline High.............150-199 mg/dL  High........................200-499 mg/dL    HDL  Cholesterol 07/01/2025 34 (L)  40 - 75 mg/dL Final    The National Cholesterol Education Program (NCEP) has set the   following guidelines (reference values) for HDL Cholesterol:   Low...............<40 mg/dL   Optimal...........>60 mg/dL    LDL Cholesterol 07/01/2025 47.2 (L)  63.0 - 159.0 mg/dL Final    The National Cholesterol Education Program (NCEP) has set the  following guidelines (reference values) for LDL Cholesterol:  Optimal.......................<130 mg/dL  Borderline High...............130-159 mg/dL  High..........................160-189 mg/dL  Very High.....................>190 mg/dL  LDL calculated using the Friedewald equation.    HDL/Cholesterol Ratio 07/01/2025 30.4  20.0 - 50.0 % Final    Cholesterol/HDL Ratio 07/01/2025 3.3  2.0 - 5.0 Final    Non HDL Cholesterol 07/01/2025 78  mg/dL Final    Risk category and Non-HDL cholesterol goals:  Coronary heart disease (CHD)or equivalent (10-year risk of CHD >20%):  Non-HDL cholesterol goal     <130 mg/dL  Two or more CHD risk factors and 10-year risk of CHD <= 20%:  Non-HDL cholesterol goal     <160 mg/dL  0 to 1 CHD risk factor:  Non-HDL cholesterol goal     <190 mg/dL   ]         Family History   Problem Relation Name Age of Onset    Heart attack Father      Cancer Father          prostate    Glaucoma Neg Hx      Macular degeneration Neg Hx        Social History     Socioeconomic History    Marital status:    Tobacco Use    Smoking status: Never    Smokeless tobacco: Never   Substance and Sexual Activity    Alcohol use: Yes     Comment: occ    Drug use: Never     Social Drivers of Health     Financial Resource Strain: Low Risk  (7/8/2025)    Overall Financial Resource Strain (CARDIA)     Difficulty of Paying Living Expenses: Not hard at all   Food Insecurity: No Food Insecurity (7/8/2025)    Hunger Vital Sign     Worried About Running Out of Food in the Last Year: Never true     Ran Out of Food in the Last Year: Never true   Transportation  Needs: No Transportation Needs (7/8/2025)    PRAPARE - Transportation     Lack of Transportation (Medical): No     Lack of Transportation (Non-Medical): No   Physical Activity: Insufficiently Active (7/8/2025)    Exercise Vital Sign     Days of Exercise per Week: 3 days     Minutes of Exercise per Session: 20 min   Stress: No Stress Concern Present (7/8/2025)    [employer] - Occupational Stress Questionnaire     Feeling of Stress : Not at all   Housing Stability: Low Risk  (7/8/2025)    Housing Stability Vital Sign     Unable to Pay for Housing in the Last Year: No     Number of Times Moved in the Last Year: 0     Homeless in the Last Year: No     Current Medications[1]  Review of patient's allergies indicates:  No Known Allergies   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 01/13/2022    EYE EXAM  DR KATHY PARMAR    Coronary artery disease     1 coronary stent - Dr Orellana    Diabetes mellitus, type 2     Hypertension     Myocardial infarction 2013     Past Surgical History:   Procedure Laterality Date    ABLATION OF ARRHYTHMOGENIC FOCUS FOR ATRIAL FIBRILLATION N/A 7/7/2022    Procedure: ABLATION, ARRHYTHMOGENIC FOCUS, FOR ATRIAL FIBRILLATION;  Surgeon: Niko Pacheco MD;  Location: [facility] EP LAB;  Service: Cardiology;  Laterality: N/A;  AF, ARMANDO(Cx if SR), PVI, RFA, CARTO, GEN, GP, 3 PREP    ANGIOGRAM, CORONARY, WITH LEFT HEART CATHETERIZATION N/A 1/15/2024    Procedure: Angiogram, Coronary, with Left Heart Cath;  Surgeon: Samir Emmanuel MD;  Location: [facility] CATH/EP LAB;  Service: Cardiology;  Laterality: N/A;    ARTHROPLASTY OF SHOULDER Right 11/13/2023    Procedure: ARTHROPLASTY, SHOULDER, RIGHT;  Surgeon: Branden Hernandez MD;  Location: [facility] OR;  Service: Orthopedics;  Laterality: Right;  DJO    CORONARY ARTERY BYPASS GRAFT (CABG) N/A 1/17/2024    Procedure: CORONARY ARTERY BYPASS GRAFT (CABG);  Surgeon: Joss  Darius IQBAL MD;  Location: [facility];  Service: Cardiothoracic;  Laterality: N/A;    CORONARY STENT PLACEMENT  2013    ENDOSCOPIC HARVEST OF VEIN Left 1/17/2024    Procedure: SURGICAL PROCUREMENT, VEIN, ENDOSCOPIC;  Surgeon: Darius Coreas MD;  Location: [facility] OR;  Service: Cardiothoracic;  Laterality: Left;    SURGICAL PROCUREMENT, ARTERY, RADIAL, FOR CABG Left 1/17/2024    Procedure: SURGICAL PROCUREMENT,ARTERY,RADIAL,FOR CABG, ENDOSCOPIC;  Surgeon: Darius Coreas MD;  Location: [facility] OR;  Service: Cardiothoracic;  Laterality: Left;    TRANSESOPHAGEAL ECHOCARDIOGRAPHY N/A 7/7/2022    Procedure: ECHOCARDIOGRAM, TRANSESOPHAGEAL;  Surgeon: Conor Chappell MD;  Location: [facility] EP LAB;  Service: Cardiology;  Laterality: N/A;    TREATMENT OF CARDIAC ARRHYTHMIA N/A 3/7/2022    Procedure: CARDIOVERSION;  Surgeon: Gomez Orellana MD;  Location: [facility] CATH/EP LAB;  Service: Cardiology;  Laterality: N/A;    TREATMENT OF CARDIAC ARRHYTHMIA  7/7/2022    Procedure: Cardioversion or Defibrillation;  Surgeon: Conor Chappell MD;  Location: [facility] EP LAB;  Service: Cardiology;;       Review of Systems   Constitutional:  Negative for activity change, chills, fatigue, fever and unexpected weight change.   HENT:  Negative for hearing loss, rhinorrhea and trouble swallowing.    Eyes:  Negative for pain, discharge and visual disturbance.   Respiratory:  Negative for chest tightness, shortness of breath and wheezing.    Cardiovascular:  Negative for chest pain, palpitations and leg swelling.   Gastrointestinal:  Negative for abdominal pain, blood in stool, constipation, diarrhea, nausea and vomiting.   Endocrine: Negative for polydipsia, polyphagia and polyuria.   Genitourinary:  Negative for difficulty urinating, dysuria, frequency, hematuria and urgency.   Musculoskeletal:  Positive for arthralgias. Negative for joint swelling and neck pain.   Skin:  Negative for color change and wound.   Neurological:  Negative for dizziness  "and headaches.   Hematological:  Negative for adenopathy. Does not bruise/bleed easily.   Psychiatric/Behavioral:  Negative for confusion and dysphoric mood. The patient is not nervous/anxious.       OBJECTIVE:      Vitals:    07/09/25 1104   BP: 130/86   BP Location: Left arm   Patient Position: Sitting   Pulse: 66   Resp: 18   Temp: 98.2 °F (36.8 °C)   TempSrc: Oral   SpO2: 95%   Weight: (!) 149.1 kg (328 lb 11.3 oz)   Height: 6' 2" (1.88 m)     Physical Exam  Vitals and nursing note reviewed.   Constitutional:       General: He is not in acute distress.     Appearance: Normal appearance. He is well-developed. He is obese. He is not ill-appearing.      Comments: Morbid obesity    HENT:      Head: Normocephalic.      Nose: Nose normal.      Mouth/Throat:      Mouth: Mucous membranes are moist.   Eyes:      General: No scleral icterus.     Pupils: Pupils are equal, round, and reactive to light.   Neck:      Thyroid: No thyroid mass or thyromegaly.   Cardiovascular:      Rate and Rhythm: Normal rate and regular rhythm.      Heart sounds: Normal heart sounds. No murmur heard.  Pulmonary:      Effort: Pulmonary effort is normal. No respiratory distress.      Breath sounds: Normal breath sounds. No wheezing, rhonchi or rales.   Musculoskeletal:         General: Normal range of motion.      Cervical back: Normal range of motion and neck supple.   Lymphadenopathy:      Cervical: No cervical adenopathy.   Skin:     General: Skin is warm and dry.      Coloration: Skin is not jaundiced or pale.   Neurological:      Mental Status: He is alert and oriented to person, place, and time.   Psychiatric:         Mood and Affect: Mood normal.         Behavior: Behavior normal.         Thought Content: Thought content normal.         Judgment: Judgment normal.        Assessment:       1. Type 2 diabetes mellitus with hyperglycemia, without long-term current use of insulin    2. Class 3 severe obesity with serious comorbidity and body " mass index (BMI) of 40.0 to 44.9 in adult, unspecified obesity type    3. Persistent atrial fibrillation    4. Essential hypertension    5. Screening for prostate cancer        Plan:      Type 2 diabetes mellitus with hyperglycemia, without long-term current use of insulin  -     empagliflozin (JARDIANCE) 25 mg tablet; Take 1 tablet (25 mg total) by mouth once daily.  Dispense: 90 tablet; Refill: 1  -     Microalbumin/Creatinine Ratio, Urine; Future; Expected date: 01/09/2026  -     Hemoglobin A1C; Future; Expected date: 01/09/2026  -     Comprehensive Metabolic Panel; Future; Expected date: 01/09/2026  -A1c below goal at 7.8, continue current medications; schedule eye exam for next month; continue diet changes and exercise when possible    Class 3 severe obesity with serious comorbidity and body mass index (BMI) of 40.0 to 44.9 in adult, unspecified obesity type   -continue dietary changes, weight loss and exercise efforts    Persistent atrial fibrillation   -continue care with Cardiology and medications    Essential hypertension  -     Comprehensive Metabolic Panel; Future; Expected date: 01/09/2026  -     CBC Auto Differential; Future; Expected date: 01/09/2026  -hypertension stable/controlled, continue current medications and blood pressure monitoring at home    Screening for prostate cancer  -     PSA, Screening; Future; Expected date: 01/09/2026   -screening due after October      Follow up in about 6 months (around 1/9/2026) for diabetes, HTN.      7/9/2025 HONORIO Coello, FNP  This note was generated with the assistance of ambient listening technology. Verbal consent was obtained by the patient and accompanying visitor(s) for the recording of patient appointment to facilitate this note. I attest to having reviewed and edited the generated note for accuracy, though some syntax or spelling errors may persist. Please contact the author of this note for any clarification.     This note was created using  MModal voice recognition software that occasionally misinterprets phrases or words.            [1]   Current Outpatient Medications   Medication Sig Dispense Refill    amiodarone (PACERONE) 200 MG Tab Take 1 tablet (200 mg total) by mouth once daily. 30 tablet 11    aspirin 81 MG Chew Take 1 tablet (81 mg total) by mouth once daily. 30 tablet 0    carvediloL (COREG) 12.5 MG tablet Take 12.5 mg by mouth 2 (two) times daily.      diltiaZEM (CARDIZEM CD) 240 MG 24 hr capsule Take 1 capsule (240 mg total) by mouth 2 (two) times a day. 60 capsule 11    furosemide (LASIX) 20 MG tablet Take 1 tablet (20 mg total) by mouth 2 (two) times daily. 60 tablet 11    glipiZIDE (GLUCOTROL) 5 MG tablet TAKE 1 TABLET BY MOUTH TWICE  DAILY WITH MEALS 180 tablet 1    lisinopriL (PRINIVIL,ZESTRIL) 20 MG tablet Take 1 tablet (20 mg total) by mouth once daily. 90 tablet 3    metFORMIN (GLUCOPHAGE) 1000 MG tablet TAKE 1 TABLET BY MOUTH TWICE  DAILY WITH MEALS 180 tablet 1    rosuvastatin (CRESTOR) 40 MG Tab Take 40 mg by mouth.      empagliflozin (JARDIANCE) 25 mg tablet Take 1 tablet (25 mg total) by mouth once daily. 90 tablet 1     No current facility-administered medications for this visit.     Facility-Administered Medications Ordered in Other Visits   Medication Dose Route Frequency Provider Last Rate Last Admin    fentaNYL 50 mcg/mL injection 25 mcg  25 mcg Intravenous Q5 Min Diogo Summers MD

## 2025-08-06 DIAGNOSIS — E11.65 TYPE 2 DIABETES MELLITUS WITH HYPERGLYCEMIA, WITHOUT LONG-TERM CURRENT USE OF INSULIN: ICD-10-CM

## 2025-08-07 RX ORDER — EMPAGLIFLOZIN 25 MG/1
25 TABLET, FILM COATED ORAL
Qty: 90 TABLET | Refills: 3 | Status: SHIPPED | OUTPATIENT
Start: 2025-08-07

## (undated) DEVICE — GUIDEWIRE INQWIRE SE 3MM JTIP

## (undated) DEVICE — DRAPE CVMAX SPLIT ANES SCRN

## (undated) DEVICE — CATH VEN RET THINWALL 36/46F

## (undated) DEVICE — INTRO AGILIS MED CRL 8.5F 71CM

## (undated) DEVICE — REPROCESSED CATH ACUNAV 8FR

## (undated) DEVICE — SHEATH INTRODUCER 5F 10CM

## (undated) DEVICE — CANNULA IMA 1MM

## (undated) DEVICE — SHEATH INTRODUCER 9FR 11CM

## (undated) DEVICE — SUT FIBERWIRE 2 38 IN TAPER

## (undated) DEVICE — GLOVE SURG BIOGEL LATEX SZ 7.5

## (undated) DEVICE — INTRO FAST-CATH SL1 8.5FR 63CM

## (undated) DEVICE — BLADE SURG CARBON STEEL #10

## (undated) DEVICE — ELECTRODE REM PLYHSV RETURN 9

## (undated) DEVICE — NDL TRNSSPTL BRK-1 18GA 98CM

## (undated) DEVICE — DRESSING MEPILEX 4X6IN

## (undated) DEVICE — DRESSING TRANS 4X10 TEGADERM

## (undated) DEVICE — POWDER ARISTA AH 3G

## (undated) DEVICE — RETRACTOR OCTOBASE INSERT HOLD

## (undated) DEVICE — MANIFOLD 4 PORT

## (undated) DEVICE — DRAPE INCISE IOBAN 2 23X17IN

## (undated) DEVICE — SHEATH HEMOSTASIS 8.5FR

## (undated) DEVICE — DRAPE U SPLIT SHEET 54X76IN

## (undated) DEVICE — SYS CLSR DERMABOND PRINEO 22CM

## (undated) DEVICE — ELECTRODE BLD EXT 6.50 ST DISP

## (undated) DEVICE — PILLOW FACE ADLT FOAM W/VELCRO

## (undated) DEVICE — NDL TRNSSPTL BRK-1 18GA 71CM

## (undated) DEVICE — SUT 6 18IN STEEL MONO CCS

## (undated) DEVICE — APPLIER CLIP LIAGCLIP 9.375IN

## (undated) DEVICE — HEMOSTAT SURGICEL FIBRLR 2X4IN

## (undated) DEVICE — DRESSING MEPILEX 4X12IN

## (undated) DEVICE — CANNULA SOFT FLOW ANG 14IN 21F

## (undated) DEVICE — COVER TABLE 44X90 STERILE

## (undated) DEVICE — PUNCH AORTIC 2.8MM 6/CASE

## (undated) DEVICE — COVER SURG LIGHT HANDLE

## (undated) DEVICE — TRAY CV ACCESS W/AUX A

## (undated) DEVICE — SET CARDIOPLEGIA 4:1 RATIO

## (undated) DEVICE — TRAY SKIN SCRUB DRY PREMIUM

## (undated) DEVICE — SET SPRAY FOR TISSEAL

## (undated) DEVICE — PATCH CARTO REFERENCE

## (undated) DEVICE — UNDERGLOVES BIOGEL PI SIZE 8.5

## (undated) DEVICE — KIT SURGICAL SUTURE ECK

## (undated) DEVICE — CATH THERMOCOOL SMTCH SF D F

## (undated) DEVICE — ALCOHOL 70% ANTISEPTIC ISO 4OZ

## (undated) DEVICE — SLEEVE SCD EXPRESS KNEE MEDIUM

## (undated) DEVICE — IMPLANTABLE DEVICE
Type: IMPLANTABLE DEVICE | Site: SHOULDER | Status: NON-FUNCTIONAL
Removed: 2023-11-13

## (undated) DEVICE — WRAP KNEE ACCU THERM GEL PACK

## (undated) DEVICE — CATH BIDIRECTIONAL DF CRV 7FR

## (undated) DEVICE — GOWN TOGA SYS PEELWY ZIP 2 XL

## (undated) DEVICE — SOL NACL IRR 3000ML

## (undated) DEVICE — CATH THOR STND RGHT ANG 28F

## (undated) DEVICE — PAD DEFIB CADENCE ADULT R2

## (undated) DEVICE — GAUZE X RAY STRL 16PLY 4X4IN

## (undated) DEVICE — SYS CLSR DERMABOND PRINEO 42CM

## (undated) DEVICE — DRAPE ORTHOMAX BAR

## (undated) DEVICE — KIT GLIDESHEATH SLEND 6FR 10CM

## (undated) DEVICE — PACK EP DRAPE

## (undated) DEVICE — DECANTER FLUID TRNSF WHITE 9IN

## (undated) DEVICE — DRAPE INCISE IOBAN 2 23X33IN

## (undated) DEVICE — SET AT1 AUTOTRANSFUSION

## (undated) DEVICE — TRAY CV ACCESS W AUX B

## (undated) DEVICE — SET SMARTABLATE IRR TUBE

## (undated) DEVICE — GUIDEWIRE ANAT ALTIVATE 2.4MM
Type: IMPLANTABLE DEVICE | Site: SHOULDER | Status: NON-FUNCTIONAL
Removed: 2023-11-13

## (undated) DEVICE — CATH DXTERITY JR40 100CM 5FR

## (undated) DEVICE — RESERVOIR (FOR C A T S)

## (undated) DEVICE — TUBING INSUFFLATION W/LUER LOK

## (undated) DEVICE — CLIP LIGACLIP XTRA TITANIUM

## (undated) DEVICE — CONTAINER SPECIMEN OR STER 4OZ

## (undated) DEVICE — BLADE SAG DUAL 18MMX1.27MMX90M

## (undated) DEVICE — DRESSING TRANS 4X4 TEGADERM

## (undated) DEVICE — GLOVE SURG ULTRA TOUCH 8.5

## (undated) DEVICE — APPLICATOR CHLORAPREP ORN 26ML

## (undated) DEVICE — DRAPE THREE-QTR REINF 53X77IN

## (undated) DEVICE — MARKER CORONARY BYPASS GRAFT

## (undated) DEVICE — TOWEL OR DISP STRL BLUE 4/PK

## (undated) DEVICE — GLOVE SURGICAL LATEX SZ 6.5

## (undated) DEVICE — SUT CTD VICRYL CT-1 27

## (undated) DEVICE — TR BAND

## (undated) DEVICE — DRAPE STERI U-SHAPED 47X51IN

## (undated) DEVICE — KIT CUSTOM BLD CARDIOPLEGIA ST

## (undated) DEVICE — SUT MONOCRYL 3-0 PS-1

## (undated) DEVICE — CATH THORACIC 28FR ST

## (undated) DEVICE — Device

## (undated) DEVICE — SYR LUER LOCK STERILE 10ML

## (undated) DEVICE — PAD RADI FEMORAL

## (undated) DEVICE — INTERPULSE SET

## (undated) DEVICE — YANKAUER FLEX NO VENT HI CAP

## (undated) DEVICE — TUBING ANTICOAGULANT

## (undated) DEVICE — DRAPE STERI INSTRUMENT 1018

## (undated) DEVICE — STRAP OR TABLE 5IN X 72IN

## (undated) DEVICE — SLING SHLDR IMMOBILIZER LG

## (undated) DEVICE — LOOP VESSEL BLUE MINI 2/CARD

## (undated) DEVICE — SYS VIRTUOSAPH PLUS EVM

## (undated) DEVICE — DRESSING 5X5 4PLY QUIKCLOT

## (undated) DEVICE — PLEDGET TFLN FELT 9.5X4.8 ST

## (undated) DEVICE — PACK SIRUS BASIC V SURG STRL

## (undated) DEVICE — CATH LASSO NAV 25/15

## (undated) DEVICE — SPONGE LAP 18X18 PREWASHED

## (undated) DEVICE — PACK CUSTOM UNIV BASIN SLI

## (undated) DEVICE — MARKER DUAL TIP SKIN W/ RULER

## (undated) DEVICE — SUT STRATAFIX SPIRAL VIOLET

## (undated) DEVICE — NDL SAFETY 21G X 1 1/2 ECLPSE

## (undated) DEVICE — TUBING PRSS MON M/M LL 72IN

## (undated) DEVICE — SOL POVIDONE PREP IODINE 4 OZ

## (undated) DEVICE — DRAIN CHEST DRY SUCTION

## (undated) DEVICE — CATH OPTITORQUE RADIAL 5FR

## (undated) DEVICE — DRESSING GAUZE OIL EMUL 3X8

## (undated) DEVICE — SOL POVIDONE SCRUB IODINE 4 OZ

## (undated) DEVICE — LINE PRESSURE MONITORING 96IN

## (undated) DEVICE — CANNULA RETROGRADE CARDIOPLEG

## (undated) DEVICE — SEE MEDLINE ITEM 107746

## (undated) DEVICE — SOCKINETTE IMPERVIOUS 12X48IN

## (undated) DEVICE — APPLIER LIGACLIP SM 9.38IN

## (undated) DEVICE — BNDG COFLEX FOAM LF2 ST 6X5YD

## (undated) DEVICE — COVER PROBE US GEL BAND

## (undated) DEVICE — NDL ECLIPSE SAF REG 25GX5/8IN

## (undated) DEVICE — SPONGE BULKEE II ABSRB 6X6.75

## (undated) DEVICE — DRAPE EXTREMITY ORTHOMAX

## (undated) DEVICE — SUTURE PROLENE BLU MONO 4-30 SZ 7.0 DBLE ARMED BV175-6

## (undated) DEVICE — INTRODUCER HEMOSTASIS 7.5F

## (undated) DEVICE — SYR 50ML CATH TIP

## (undated) DEVICE — CATH DXTERITY PG 145 110CM 5FR

## (undated) DEVICE — DRAPE ORTH SPLIT 77X108IN

## (undated) DEVICE — YANKAUER OPEN TIP W/O VENT

## (undated) DEVICE — SET HBE EXT CARESITE FILTER